# Patient Record
Sex: MALE | Race: WHITE | Employment: UNEMPLOYED | URBAN - METROPOLITAN AREA
[De-identification: names, ages, dates, MRNs, and addresses within clinical notes are randomized per-mention and may not be internally consistent; named-entity substitution may affect disease eponyms.]

---

## 2017-10-18 ENCOUNTER — OFFICE VISIT (OUTPATIENT)
Dept: FAMILY MEDICINE CLINIC | Age: 42
End: 2017-10-18

## 2017-10-18 VITALS
DIASTOLIC BLOOD PRESSURE: 90 MMHG | RESPIRATION RATE: 16 BRPM | SYSTOLIC BLOOD PRESSURE: 170 MMHG | BODY MASS INDEX: 23.04 KG/M2 | HEART RATE: 124 BPM | HEIGHT: 68 IN | WEIGHT: 152 LBS | OXYGEN SATURATION: 97 %

## 2017-10-18 DIAGNOSIS — F41.9 ANXIETY: ICD-10-CM

## 2017-10-18 DIAGNOSIS — Z86.79 HISTORY OF HYPERTENSION: Primary | ICD-10-CM

## 2017-10-18 PROCEDURE — 99203 OFFICE O/P NEW LOW 30 MIN: CPT | Performed by: FAMILY MEDICINE

## 2017-10-18 RX ORDER — LISINOPRIL 10 MG/1
10 TABLET ORAL DAILY
Qty: 30 TABLET | Refills: 3 | Status: ON HOLD | OUTPATIENT
Start: 2017-10-18 | End: 2018-11-15 | Stop reason: HOSPADM

## 2017-10-18 RX ORDER — ESCITALOPRAM OXALATE 10 MG/1
10 TABLET ORAL DAILY
Qty: 30 TABLET | Refills: 3 | Status: ON HOLD | OUTPATIENT
Start: 2017-10-18 | End: 2018-11-15 | Stop reason: HOSPADM

## 2017-10-18 ASSESSMENT — PATIENT HEALTH QUESTIONNAIRE - PHQ9
SUM OF ALL RESPONSES TO PHQ QUESTIONS 1-9: 0
SUM OF ALL RESPONSES TO PHQ9 QUESTIONS 1 & 2: 0
2. FEELING DOWN, DEPRESSED OR HOPELESS: 0
1. LITTLE INTEREST OR PLEASURE IN DOING THINGS: 0

## 2017-10-18 NOTE — PROGRESS NOTES
10/18/2017    This is a 36 y.o. male   Chief Complaint   Patient presents with    Established New Doctor     HTN   . HPI  Pt presents today as a new pt to establish a PCP. Transfer pt from Dr. Dianne So. Has a past medical history of hypertension. States that he has been under a lot of stress lately and that he has once a month tremors of both hands that occur when he starts working. In the process of getting a new job and had a physical a few weeks ago. States that the physical was fine except for elevated BP. Past Medical History:   Diagnosis Date    Allergic rhinitis     Anxiety     Depression     Hypertension        History reviewed. No pertinent surgical history. Social History     Social History    Marital status: Single     Spouse name: N/A    Number of children: N/A    Years of education: N/A     Occupational History    Not on file. Social History Main Topics    Smoking status: Never Smoker    Smokeless tobacco: Current User     Types: Chew    Alcohol use 2.4 oz/week     4 Cans of beer per week      Comment: 28 cans    Drug use: No    Sexual activity: No     Other Topics Concern    Not on file     Social History Narrative    No narrative on file       History reviewed. No pertinent family history. Current Outpatient Prescriptions   Medication Sig Dispense Refill    lisinopril (PRINIVIL;ZESTRIL) 10 MG tablet Take 1 tablet by mouth daily 30 tablet 3    escitalopram (LEXAPRO) 10 MG tablet Take 1 tablet by mouth daily 30 tablet 3     No current facility-administered medications for this visit. There is no immunization history on file for this patient. Allergies   Allergen Reactions    No Known Allergies        No results found for any previous visit. Review of Systems   Neurological: Positive for tremors (once a month when he goes to work. ). Negative for dizziness, seizures, light-headedness and headaches.    Psychiatric/Behavioral: Positive for sleep disturbance. Negative for decreased concentration, dysphoric mood, self-injury and suicidal ideas. The patient is nervous/anxious. BP (!) 170/90 (Site: Left Arm, Position: Sitting, Cuff Size: Medium Adult)   Pulse 124   Resp 16   Ht 5' 8\" (1.727 m)   Wt 152 lb (68.9 kg)   SpO2 97%   BMI 23.11 kg/m²     Physical Exam   Constitutional: He is oriented to person, place, and time. He appears well-developed and well-nourished. Eyes: EOM are normal. Pupils are equal, round, and reactive to light. Neck: Normal range of motion. Cardiovascular: Normal rate, regular rhythm and normal heart sounds. Pulmonary/Chest: Effort normal and breath sounds normal.   Abdominal: Soft. There is no tenderness. Neurological: He is alert and oriented to person, place, and time. Skin: Skin is warm and dry. Psychiatric: He has a normal mood and affect. His behavior is normal. Judgment and thought content normal.       Plan  1. History of hypertension  TSH without Reflex    T4, Free    T3, Free    Lipid Panel    CBC Auto Differential    Comprehensive Metabolic Panel    Vitamin D 25 Hydroxy    Magnesium    lisinopril (PRINIVIL;ZESTRIL) 10 MG tablet   2. Anxiety  escitalopram (LEXAPRO) 10 MG tablet     Pt to record BP twice a day to a log to bring to his next appointment. Return in about 3 weeks (around 11/8/2017) for Physical Exam.    Prior to Visit Medications    Medication Sig Taking?  Authorizing Provider   lisinopril (PRINIVIL;ZESTRIL) 10 MG tablet Take 1 tablet by mouth daily Yes Mile Ellis, DO   escitalopram (LEXAPRO) 10 MG tablet Take 1 tablet by mouth daily Yes Mile Ellis, DO

## 2018-11-13 ENCOUNTER — HOSPITAL ENCOUNTER (INPATIENT)
Age: 43
LOS: 2 days | Discharge: NURSING, REHAB OR LONG-TERM CARE | DRG: 897 | End: 2018-11-15
Attending: EMERGENCY MEDICINE | Admitting: INTERNAL MEDICINE

## 2018-11-13 DIAGNOSIS — F10.929 ACUTE ALCOHOLIC INTOXICATION WITH COMPLICATION (HCC): Primary | ICD-10-CM

## 2018-11-13 DIAGNOSIS — F10.931 ALCOHOL WITHDRAWAL DELIRIUM (HCC): ICD-10-CM

## 2018-11-13 DIAGNOSIS — E87.1 HYPONATREMIA: ICD-10-CM

## 2018-11-13 LAB
A/G RATIO: 1.7 (ref 1.1–2.2)
ACETAMINOPHEN LEVEL: <5 UG/ML (ref 10–30)
ALBUMIN SERPL-MCNC: 4.6 G/DL (ref 3.4–5)
ALP BLD-CCNC: 69 U/L (ref 40–129)
ALT SERPL-CCNC: 31 U/L (ref 10–40)
AMPHETAMINE SCREEN, URINE: NORMAL
ANION GAP SERPL CALCULATED.3IONS-SCNC: 15 MMOL/L (ref 3–16)
AST SERPL-CCNC: 68 U/L (ref 15–37)
BARBITURATE SCREEN URINE: NORMAL
BASOPHILS ABSOLUTE: 0.1 K/UL (ref 0–0.2)
BASOPHILS RELATIVE PERCENT: 2.6 %
BENZODIAZEPINE SCREEN, URINE: NORMAL
BILIRUB SERPL-MCNC: 0.9 MG/DL (ref 0–1)
BILIRUBIN URINE: NEGATIVE
BLOOD, URINE: NEGATIVE
BUN BLDV-MCNC: 3 MG/DL (ref 7–20)
CALCIUM SERPL-MCNC: 9.1 MG/DL (ref 8.3–10.6)
CANNABINOID SCREEN URINE: NORMAL
CHLORIDE BLD-SCNC: 89 MMOL/L (ref 99–110)
CLARITY: CLEAR
CO2: 24 MMOL/L (ref 21–32)
COCAINE METABOLITE SCREEN URINE: NORMAL
COLOR: NORMAL
CREAT SERPL-MCNC: <0.5 MG/DL (ref 0.9–1.3)
EOSINOPHILS ABSOLUTE: 0 K/UL (ref 0–0.6)
EOSINOPHILS RELATIVE PERCENT: 0.7 %
ETHANOL: 328 MG/DL (ref 0–0.08)
GFR AFRICAN AMERICAN: >60
GFR NON-AFRICAN AMERICAN: >60
GLOBULIN: 2.7 G/DL
GLUCOSE BLD-MCNC: 91 MG/DL (ref 70–99)
GLUCOSE URINE: NEGATIVE MG/DL
HCT VFR BLD CALC: 42.7 % (ref 40.5–52.5)
HEMOGLOBIN: 14.5 G/DL (ref 13.5–17.5)
KETONES, URINE: NEGATIVE MG/DL
LEUKOCYTE ESTERASE, URINE: NEGATIVE
LYMPHOCYTES ABSOLUTE: 0.7 K/UL (ref 1–5.1)
LYMPHOCYTES RELATIVE PERCENT: 16.2 %
Lab: NORMAL
MCH RBC QN AUTO: 29.1 PG (ref 26–34)
MCHC RBC AUTO-ENTMCNC: 34 G/DL (ref 31–36)
MCV RBC AUTO: 85.3 FL (ref 80–100)
METHADONE SCREEN, URINE: NORMAL
MICROSCOPIC EXAMINATION: NORMAL
MONOCYTES ABSOLUTE: 0.3 K/UL (ref 0–1.3)
MONOCYTES RELATIVE PERCENT: 7.3 %
NEUTROPHILS ABSOLUTE: 3.2 K/UL (ref 1.7–7.7)
NEUTROPHILS RELATIVE PERCENT: 73.2 %
NITRITE, URINE: NEGATIVE
OPIATE SCREEN URINE: NORMAL
OXYCODONE URINE: NORMAL
PDW BLD-RTO: 13.6 % (ref 12.4–15.4)
PH UA: 6.5
PH UA: 6.5
PHENCYCLIDINE SCREEN URINE: NORMAL
PLATELET # BLD: 173 K/UL (ref 135–450)
PMV BLD AUTO: 6.9 FL (ref 5–10.5)
POTASSIUM SERPL-SCNC: 4.1 MMOL/L (ref 3.5–5.1)
PROPOXYPHENE SCREEN: NORMAL
PROTEIN UA: NEGATIVE MG/DL
RBC # BLD: 5 M/UL (ref 4.2–5.9)
SALICYLATE, SERUM: <0.3 MG/DL (ref 15–30)
SODIUM BLD-SCNC: 128 MMOL/L (ref 136–145)
SPECIFIC GRAVITY UA: <=1.005
TOTAL PROTEIN: 7.3 G/DL (ref 6.4–8.2)
URINE TYPE: NORMAL
UROBILINOGEN, URINE: 0.2 E.U./DL
WBC # BLD: 4.3 K/UL (ref 4–11)

## 2018-11-13 PROCEDURE — 6370000000 HC RX 637 (ALT 250 FOR IP): Performed by: INTERNAL MEDICINE

## 2018-11-13 PROCEDURE — 2580000003 HC RX 258: Performed by: INTERNAL MEDICINE

## 2018-11-13 PROCEDURE — 81003 URINALYSIS AUTO W/O SCOPE: CPT

## 2018-11-13 PROCEDURE — 96374 THER/PROPH/DIAG INJ IV PUSH: CPT

## 2018-11-13 PROCEDURE — 6360000002 HC RX W HCPCS: Performed by: INTERNAL MEDICINE

## 2018-11-13 PROCEDURE — 2500000003 HC RX 250 WO HCPCS: Performed by: INTERNAL MEDICINE

## 2018-11-13 PROCEDURE — 2580000003 HC RX 258: Performed by: EMERGENCY MEDICINE

## 2018-11-13 PROCEDURE — G0480 DRUG TEST DEF 1-7 CLASSES: HCPCS

## 2018-11-13 PROCEDURE — 96361 HYDRATE IV INFUSION ADD-ON: CPT

## 2018-11-13 PROCEDURE — 80307 DRUG TEST PRSMV CHEM ANLYZR: CPT

## 2018-11-13 PROCEDURE — 99284 EMERGENCY DEPT VISIT MOD MDM: CPT

## 2018-11-13 PROCEDURE — 80053 COMPREHEN METABOLIC PANEL: CPT

## 2018-11-13 PROCEDURE — 85025 COMPLETE CBC W/AUTO DIFF WBC: CPT

## 2018-11-13 PROCEDURE — 6360000002 HC RX W HCPCS: Performed by: EMERGENCY MEDICINE

## 2018-11-13 PROCEDURE — 99222 1ST HOSP IP/OBS MODERATE 55: CPT | Performed by: INTERNAL MEDICINE

## 2018-11-13 PROCEDURE — 2580000003 HC RX 258

## 2018-11-13 PROCEDURE — 2060000000 HC ICU INTERMEDIATE R&B

## 2018-11-13 RX ORDER — LORAZEPAM 1 MG/1
1 TABLET ORAL
Status: DISCONTINUED | OUTPATIENT
Start: 2018-11-13 | End: 2018-11-15 | Stop reason: HOSPADM

## 2018-11-13 RX ORDER — LORAZEPAM 2 MG/ML
4 INJECTION INTRAMUSCULAR
Status: DISCONTINUED | OUTPATIENT
Start: 2018-11-13 | End: 2018-11-15 | Stop reason: HOSPADM

## 2018-11-13 RX ORDER — LORAZEPAM 2 MG/ML
1 INJECTION INTRAMUSCULAR ONCE
Status: COMPLETED | OUTPATIENT
Start: 2018-11-13 | End: 2018-11-13

## 2018-11-13 RX ORDER — LORAZEPAM 2 MG/1
2 TABLET ORAL
Status: DISCONTINUED | OUTPATIENT
Start: 2018-11-13 | End: 2018-11-15 | Stop reason: HOSPADM

## 2018-11-13 RX ORDER — LORAZEPAM 2 MG/ML
3 INJECTION INTRAMUSCULAR
Status: DISCONTINUED | OUTPATIENT
Start: 2018-11-13 | End: 2018-11-15 | Stop reason: HOSPADM

## 2018-11-13 RX ORDER — POTASSIUM CHLORIDE 20 MEQ/1
40 TABLET, EXTENDED RELEASE ORAL PRN
Status: DISCONTINUED | OUTPATIENT
Start: 2018-11-13 | End: 2018-11-15 | Stop reason: HOSPADM

## 2018-11-13 RX ORDER — CHLORDIAZEPOXIDE HYDROCHLORIDE 25 MG/1
25 CAPSULE, GELATIN COATED ORAL 4 TIMES DAILY
Status: DISCONTINUED | OUTPATIENT
Start: 2018-11-13 | End: 2018-11-15 | Stop reason: HOSPADM

## 2018-11-13 RX ORDER — POTASSIUM CHLORIDE 7.45 MG/ML
10 INJECTION INTRAVENOUS PRN
Status: DISCONTINUED | OUTPATIENT
Start: 2018-11-13 | End: 2018-11-15 | Stop reason: HOSPADM

## 2018-11-13 RX ORDER — LORAZEPAM 2 MG/1
4 TABLET ORAL
Status: DISCONTINUED | OUTPATIENT
Start: 2018-11-13 | End: 2018-11-15 | Stop reason: HOSPADM

## 2018-11-13 RX ORDER — LISINOPRIL 10 MG/1
10 TABLET ORAL DAILY
Status: DISCONTINUED | OUTPATIENT
Start: 2018-11-13 | End: 2018-11-13

## 2018-11-13 RX ORDER — MAGNESIUM SULFATE 1 G/100ML
1 INJECTION INTRAVENOUS PRN
Status: DISCONTINUED | OUTPATIENT
Start: 2018-11-13 | End: 2018-11-15 | Stop reason: HOSPADM

## 2018-11-13 RX ORDER — SODIUM CHLORIDE 9 MG/ML
INJECTION, SOLUTION INTRAVENOUS
Status: COMPLETED
Start: 2018-11-13 | End: 2018-11-13

## 2018-11-13 RX ORDER — ESCITALOPRAM OXALATE 10 MG/1
10 TABLET ORAL DAILY
Status: DISCONTINUED | OUTPATIENT
Start: 2018-11-13 | End: 2018-11-13

## 2018-11-13 RX ORDER — LORAZEPAM 2 MG/ML
1 INJECTION INTRAMUSCULAR
Status: DISCONTINUED | OUTPATIENT
Start: 2018-11-13 | End: 2018-11-15 | Stop reason: HOSPADM

## 2018-11-13 RX ORDER — ACETAMINOPHEN 325 MG/1
650 TABLET ORAL EVERY 4 HOURS PRN
Status: DISCONTINUED | OUTPATIENT
Start: 2018-11-13 | End: 2018-11-15 | Stop reason: HOSPADM

## 2018-11-13 RX ORDER — SODIUM CHLORIDE 0.9 % (FLUSH) 0.9 %
10 SYRINGE (ML) INJECTION EVERY 12 HOURS SCHEDULED
Status: DISCONTINUED | OUTPATIENT
Start: 2018-11-13 | End: 2018-11-15 | Stop reason: HOSPADM

## 2018-11-13 RX ORDER — 0.9 % SODIUM CHLORIDE 0.9 %
1000 INTRAVENOUS SOLUTION INTRAVENOUS ONCE
Status: COMPLETED | OUTPATIENT
Start: 2018-11-13 | End: 2018-11-13

## 2018-11-13 RX ORDER — ONDANSETRON 2 MG/ML
4 INJECTION INTRAMUSCULAR; INTRAVENOUS EVERY 6 HOURS PRN
Status: DISCONTINUED | OUTPATIENT
Start: 2018-11-13 | End: 2018-11-15 | Stop reason: HOSPADM

## 2018-11-13 RX ORDER — CLONIDINE HYDROCHLORIDE 0.1 MG/1
0.1 TABLET ORAL EVERY 6 HOURS PRN
Status: DISCONTINUED | OUTPATIENT
Start: 2018-11-13 | End: 2018-11-15 | Stop reason: HOSPADM

## 2018-11-13 RX ORDER — SODIUM CHLORIDE 0.9 % (FLUSH) 0.9 %
10 SYRINGE (ML) INJECTION PRN
Status: DISCONTINUED | OUTPATIENT
Start: 2018-11-13 | End: 2018-11-15 | Stop reason: HOSPADM

## 2018-11-13 RX ORDER — LORAZEPAM 2 MG/ML
2 INJECTION INTRAMUSCULAR
Status: DISCONTINUED | OUTPATIENT
Start: 2018-11-13 | End: 2018-11-15 | Stop reason: HOSPADM

## 2018-11-13 RX ADMIN — CHLORDIAZEPOXIDE HYDROCHLORIDE 25 MG: 25 CAPSULE ORAL at 20:33

## 2018-11-13 RX ADMIN — SODIUM CHLORIDE 1000 ML: 9 INJECTION, SOLUTION INTRAVENOUS at 11:42

## 2018-11-13 RX ADMIN — SODIUM CHLORIDE, PRESERVATIVE FREE 10 ML: 5 INJECTION INTRAVENOUS at 20:33

## 2018-11-13 RX ADMIN — LORAZEPAM 1 MG: 2 INJECTION INTRAMUSCULAR; INTRAVENOUS at 11:42

## 2018-11-13 RX ADMIN — FOLIC ACID: 5 INJECTION, SOLUTION INTRAMUSCULAR; INTRAVENOUS; SUBCUTANEOUS at 16:05

## 2018-11-13 RX ADMIN — SODIUM CHLORIDE 250 ML: 9 INJECTION, SOLUTION INTRAVENOUS at 16:21

## 2018-11-13 RX ADMIN — CHLORDIAZEPOXIDE HYDROCHLORIDE 25 MG: 25 CAPSULE ORAL at 17:16

## 2018-11-13 RX ADMIN — ENOXAPARIN SODIUM 40 MG: 40 INJECTION SUBCUTANEOUS at 16:17

## 2018-11-13 ASSESSMENT — PAIN SCALES - GENERAL
PAINLEVEL_OUTOF10: 0
PAINLEVEL_OUTOF10: 0

## 2018-11-13 NOTE — PROGRESS NOTES
Patient refused 4 eyes. No outwardly visible wounds and patient reports no known wounds or open areas on the body. Will continue to monitor.  Justina Gracia RN

## 2018-11-13 NOTE — ED PROVIDER NOTES
the currently available lab results from this visit (if applicable):  Results for orders placed or performed during the hospital encounter of 11/13/18   Urinalysis   Result Value Ref Range    Color, UA Straw Straw/Yellow    Clarity, UA Clear Clear    Glucose, Ur Negative Negative mg/dL    Bilirubin Urine Negative Negative    Ketones, Urine Negative Negative mg/dL    Specific Gravity, UA <=1.005 1.005 - 1.030    Blood, Urine Negative Negative    pH, UA 6.5 5.0 - 8.0    Protein, UA Negative Negative mg/dL    Urobilinogen, Urine 0.2 <2.0 E.U./dL    Nitrite, Urine Negative Negative    Leukocyte Esterase, Urine Negative Negative    Microscopic Examination Not Indicated     Urine Type Not Specified    Ethanol   Result Value Ref Range    Ethanol Lvl 328 mg/dL   Comprehensive Metabolic Panel   Result Value Ref Range    Sodium 128 (L) 136 - 145 mmol/L    Potassium 4.1 3.5 - 5.1 mmol/L    Chloride 89 (L) 99 - 110 mmol/L    CO2 24 21 - 32 mmol/L    Anion Gap 15 3 - 16    Glucose 91 70 - 99 mg/dL    BUN 3 (L) 7 - 20 mg/dL    CREATININE <0.5 (L) 0.9 - 1.3 mg/dL    GFR Non-African American >60 >60    GFR African American >60 >60    Calcium 9.1 8.3 - 10.6 mg/dL    Total Protein 7.3 6.4 - 8.2 g/dL    Alb 4.6 3.4 - 5.0 g/dL    Albumin/Globulin Ratio 1.7 1.1 - 2.2    Total Bilirubin 0.9 0.0 - 1.0 mg/dL    Alkaline Phosphatase 69 40 - 129 U/L    ALT 31 10 - 40 U/L    AST 68 (H) 15 - 37 U/L    Globulin 2.7 g/dL   CBC Auto Differential   Result Value Ref Range    WBC 4.3 4.0 - 11.0 K/uL    RBC 5.00 4.20 - 5.90 M/uL    Hemoglobin 14.5 13.5 - 17.5 g/dL    Hematocrit 42.7 40.5 - 52.5 %    MCV 85.3 80.0 - 100.0 fL    MCH 29.1 26.0 - 34.0 pg    MCHC 34.0 31.0 - 36.0 g/dL    RDW 13.6 12.4 - 15.4 %    Platelets 980 621 - 108 K/uL    MPV 6.9 5.0 - 10.5 fL    Neutrophils % 73.2 %    Lymphocytes % 16.2 %    Monocytes % 7.3 %    Eosinophils % 0.7 %    Basophils % 2.6 %    Neutrophils # 3.2 1.7 - 7.7 K/uL    Lymphocytes # 0.7 (L) 1.0 - 5.1 K/uL errors in grammar, punctuation, and spelling, as well as words and phrases that may be inappropriate. If there are any questions or concerns please feel free to contact the dictating provider for clarification. Maryuri Douglass MD  11/13/18 8026

## 2018-11-13 NOTE — LETTER
[address]  Phone: [phone]             November 15, 2018    Patient: Tommie Morris   YOB: 1975   Date of Visit: 11/13/2018       To Whom It May Concern:    Adelita Maya was seen and treated in our facility  beginning 11/13/2018 until 11/15/2018. He may return to work on 11/16/18.       Sincerely,       Heron Ellis RN         Signature:__________________________________

## 2018-11-13 NOTE — H&P
History and Physical        HISTORY OF PRESENT ILLNESS:   Patient with chronic alcohol abuse drinks 12 pack per day. Failed detox alcohol rehab in the past.  Patient now interested in detox and wanted to go to alcohol rehab. Last drank earlier today. Feeling a little anxious. Denies any nausea or vomiting. No dizziness or shortness of breath. Past history of anxiety depression and hypertension. Lexapro and lisinopril listed on medication list.  Patient states that he has not taken any of these medications for several months    Past Medical History:   Diagnosis Date    Allergic rhinitis     Anxiety     Depression     Hypertension        History reviewed. No pertinent surgical history. Patient is allergic to no known allergies. Prior to Admission medications    Medication Sig Start Date End Date Taking?  Authorizing Provider   lisinopril (PRINIVIL;ZESTRIL) 10 MG tablet Take 1 tablet by mouth daily 10/18/17  Yes Pati El DO   escitalopram (LEXAPRO) 10 MG tablet Take 1 tablet by mouth daily 10/18/17  Yes Pati El DO       Social History     Social History    Marital status: Single     Spouse name: N/A    Number of children: N/A    Years of education: N/A     Social History Main Topics    Smoking status: Current Every Day Smoker     Years: 20.00    Smokeless tobacco: Current User     Types: Chew    Alcohol use 7.2 oz/week     12 Cans of beer per week      Comment: 28 cans    Drug use: No    Sexual activity: No     Other Topics Concern    None     Social History Narrative    None       Family History   Problem Relation Age of Onset    Depression Mother     Depression Father     High Blood Pressure Father     Depression Brother        REVIEW OF SYSTEMS:   Constitutional: Negative for fever   HEENT: Negative for sore throat   Eyes: Negative for redness   Respiratory: Negative for dyspnea, cough   Cardiovascular: Negative for chest pain   Gastrointestinal: Negative for protocol with Ativan as needed. Start scheduled Librium, and rally pack with multivitamin and thiamine and folate daily.  -Referral to alcohol rehab    2. Hyponatremia  -Related to alcohol abuse, monitor sodium levels    3. History of hypertension  - Has not taken his meds for a while. We will order clonidine as needed    4. Past history of anxiety /depression per chart  -Patient currently not on any medications.   Continue management of alcohol abuse and dependence as above       Full Code   DIET GENERAL;  Lovenox for DVT prophylaxis            Deepti Queen 11/13/2018 3:11 PM

## 2018-11-13 NOTE — ED NOTES
Perfect serve message to Dr. Gabriela Pineda for admission orders.  @ [address] [facility]  11/13/18 1228

## 2018-11-14 LAB
ALBUMIN SERPL-MCNC: 4 G/DL (ref 3.4–5)
ALP BLD-CCNC: 58 U/L (ref 40–129)
ALT SERPL-CCNC: 33 U/L (ref 10–40)
ANION GAP SERPL CALCULATED.3IONS-SCNC: 10 MMOL/L (ref 3–16)
AST SERPL-CCNC: 62 U/L (ref 15–37)
BILIRUB SERPL-MCNC: 1.1 MG/DL (ref 0–1)
BILIRUBIN DIRECT: <0.2 MG/DL (ref 0–0.3)
BILIRUBIN, INDIRECT: ABNORMAL MG/DL (ref 0–1)
BUN BLDV-MCNC: 8 MG/DL (ref 7–20)
CALCIUM SERPL-MCNC: 9.6 MG/DL (ref 8.3–10.6)
CHLORIDE BLD-SCNC: 100 MMOL/L (ref 99–110)
CO2: 28 MMOL/L (ref 21–32)
CREAT SERPL-MCNC: 0.6 MG/DL (ref 0.9–1.3)
GFR AFRICAN AMERICAN: >60
GFR NON-AFRICAN AMERICAN: >60
GLUCOSE BLD-MCNC: 82 MG/DL (ref 70–99)
PHOSPHORUS: 3.7 MG/DL (ref 2.5–4.9)
POTASSIUM REFLEX MAGNESIUM: 4 MMOL/L (ref 3.5–5.1)
SODIUM BLD-SCNC: 138 MMOL/L (ref 136–145)
TOTAL PROTEIN: 6.3 G/DL (ref 6.4–8.2)

## 2018-11-14 PROCEDURE — 6370000000 HC RX 637 (ALT 250 FOR IP): Performed by: INTERNAL MEDICINE

## 2018-11-14 PROCEDURE — 90686 IIV4 VACC NO PRSV 0.5 ML IM: CPT | Performed by: INTERNAL MEDICINE

## 2018-11-14 PROCEDURE — 2500000003 HC RX 250 WO HCPCS: Performed by: INTERNAL MEDICINE

## 2018-11-14 PROCEDURE — 6360000002 HC RX W HCPCS: Performed by: INTERNAL MEDICINE

## 2018-11-14 PROCEDURE — 84100 ASSAY OF PHOSPHORUS: CPT

## 2018-11-14 PROCEDURE — 36415 COLL VENOUS BLD VENIPUNCTURE: CPT

## 2018-11-14 PROCEDURE — 2580000003 HC RX 258: Performed by: INTERNAL MEDICINE

## 2018-11-14 PROCEDURE — G0008 ADMIN INFLUENZA VIRUS VAC: HCPCS | Performed by: INTERNAL MEDICINE

## 2018-11-14 PROCEDURE — 80048 BASIC METABOLIC PNL TOTAL CA: CPT

## 2018-11-14 PROCEDURE — 80076 HEPATIC FUNCTION PANEL: CPT

## 2018-11-14 PROCEDURE — 99232 SBSQ HOSP IP/OBS MODERATE 35: CPT | Performed by: INTERNAL MEDICINE

## 2018-11-14 PROCEDURE — 2060000000 HC ICU INTERMEDIATE R&B

## 2018-11-14 RX ADMIN — CLONIDINE HYDROCHLORIDE 0.1 MG: 0.1 TABLET ORAL at 21:24

## 2018-11-14 RX ADMIN — CHLORDIAZEPOXIDE HYDROCHLORIDE 25 MG: 25 CAPSULE ORAL at 17:04

## 2018-11-14 RX ADMIN — INFLUENZA A VIRUS A/MICHIGAN/45/2015 X-275 (H1N1) ANTIGEN (FORMALDEHYDE INACTIVATED), INFLUENZA A VIRUS A/SINGAPORE/INFIMH-16-0019/2016 IVR-186 (H3N2) ANTIGEN (FORMALDEHYDE INACTIVATED), INFLUENZA B VIRUS B/PHUKET/3073/2013 ANTIGEN (FORMALDEHYDE INACTIVATED), AND INFLUENZA B VIRUS B/MARYLAND/15/2016 BX-69A ANTIGEN (FORMALDEHYDE INACTIVATED) 0.5 ML: 15; 15; 15; 15 INJECTION, SUSPENSION INTRAMUSCULAR at 08:45

## 2018-11-14 RX ADMIN — CHLORDIAZEPOXIDE HYDROCHLORIDE 25 MG: 25 CAPSULE ORAL at 08:45

## 2018-11-14 RX ADMIN — CLONIDINE HYDROCHLORIDE 0.1 MG: 0.1 TABLET ORAL at 17:07

## 2018-11-14 RX ADMIN — SODIUM CHLORIDE, PRESERVATIVE FREE 10 ML: 5 INJECTION INTRAVENOUS at 21:13

## 2018-11-14 RX ADMIN — ENOXAPARIN SODIUM 40 MG: 40 INJECTION SUBCUTANEOUS at 08:45

## 2018-11-14 RX ADMIN — FOLIC ACID: 5 INJECTION, SOLUTION INTRAMUSCULAR; INTRAVENOUS; SUBCUTANEOUS at 09:35

## 2018-11-14 RX ADMIN — SODIUM CHLORIDE, PRESERVATIVE FREE 10 ML: 5 INJECTION INTRAVENOUS at 08:46

## 2018-11-14 RX ADMIN — CHLORDIAZEPOXIDE HYDROCHLORIDE 25 MG: 25 CAPSULE ORAL at 14:50

## 2018-11-14 RX ADMIN — CHLORDIAZEPOXIDE HYDROCHLORIDE 25 MG: 25 CAPSULE ORAL at 21:13

## 2018-11-14 ASSESSMENT — PAIN SCALES - GENERAL
PAINLEVEL_OUTOF10: 0

## 2018-11-14 NOTE — FLOWSHEET NOTE
Advance Directive referral      11/14/18 1446   Encounter Summary   Services provided to: Patient   Referral/Consult From: Nurse   Continue Visiting (11/14 Provided AD forms and discussion)   Complexity of Encounter Moderate   Length of Encounter 15 minutes   Advance Directives (For Healthcare)   Information on Healthcare Directives Requested (Provided information/education)

## 2018-11-15 VITALS
TEMPERATURE: 97.3 F | BODY MASS INDEX: 23.4 KG/M2 | HEART RATE: 91 BPM | WEIGHT: 149.1 LBS | RESPIRATION RATE: 16 BRPM | DIASTOLIC BLOOD PRESSURE: 79 MMHG | OXYGEN SATURATION: 96 % | HEIGHT: 67 IN | SYSTOLIC BLOOD PRESSURE: 116 MMHG

## 2018-11-15 PROCEDURE — 6360000002 HC RX W HCPCS: Performed by: INTERNAL MEDICINE

## 2018-11-15 PROCEDURE — 2500000003 HC RX 250 WO HCPCS: Performed by: INTERNAL MEDICINE

## 2018-11-15 PROCEDURE — 2580000003 HC RX 258: Performed by: INTERNAL MEDICINE

## 2018-11-15 PROCEDURE — 6370000000 HC RX 637 (ALT 250 FOR IP): Performed by: INTERNAL MEDICINE

## 2018-11-15 PROCEDURE — 99238 HOSP IP/OBS DSCHRG MGMT 30/<: CPT | Performed by: INTERNAL MEDICINE

## 2018-11-15 RX ORDER — THIAMINE MONONITRATE (VIT B1) 100 MG
100 TABLET ORAL DAILY
Qty: 30 TABLET | Refills: 2 | Status: SHIPPED | OUTPATIENT
Start: 2018-11-15 | End: 2019-03-12

## 2018-11-15 RX ORDER — FOLIC ACID 1 MG/1
1 TABLET ORAL DAILY
Qty: 30 TABLET | Refills: 1 | Status: SHIPPED | OUTPATIENT
Start: 2018-11-15 | End: 2019-03-12

## 2018-11-15 RX ORDER — CLONIDINE HYDROCHLORIDE 0.1 MG/1
0.1 TABLET ORAL 2 TIMES DAILY
Qty: 60 TABLET | Refills: 1 | Status: SHIPPED | OUTPATIENT
Start: 2018-11-15 | End: 2019-03-12

## 2018-11-15 RX ORDER — M-VIT,TX,IRON,MINS/CALC/FOLIC 27MG-0.4MG
1 TABLET ORAL DAILY
Qty: 30 TABLET | Refills: 2 | Status: SHIPPED | OUTPATIENT
Start: 2018-11-15 | End: 2019-03-12

## 2018-11-15 RX ORDER — CHLORDIAZEPOXIDE HYDROCHLORIDE 25 MG/1
25 CAPSULE, GELATIN COATED ORAL 3 TIMES DAILY PRN
Qty: 20 CAPSULE | Refills: 0 | Status: SHIPPED | OUTPATIENT
Start: 2018-11-15 | End: 2018-11-22

## 2018-11-15 RX ADMIN — FOLIC ACID: 5 INJECTION, SOLUTION INTRAMUSCULAR; INTRAVENOUS; SUBCUTANEOUS at 08:33

## 2018-11-15 RX ADMIN — CHLORDIAZEPOXIDE HYDROCHLORIDE 25 MG: 25 CAPSULE ORAL at 08:31

## 2018-11-15 RX ADMIN — CHLORDIAZEPOXIDE HYDROCHLORIDE 25 MG: 25 CAPSULE ORAL at 12:34

## 2018-11-15 RX ADMIN — ENOXAPARIN SODIUM 40 MG: 40 INJECTION SUBCUTANEOUS at 08:31

## 2018-11-15 RX ADMIN — SODIUM CHLORIDE, PRESERVATIVE FREE 10 ML: 5 INJECTION INTRAVENOUS at 08:33

## 2018-11-15 ASSESSMENT — PAIN SCALES - GENERAL
PAINLEVEL_OUTOF10: 0

## 2018-11-15 NOTE — PROGRESS NOTES
Shift assessment complete. Pt states he is feeling \"fine. \" Medications given per order. PRN clonidine given for b/p 161/84. No c/o pain; slight perspiration noted to forehead. Pt states all needs are met at this time.  Electronically signed by Stefano Bush RN on 11/14/2018 at 10:19 PM

## 2018-11-15 NOTE — PROGRESS NOTES
Hand off report given to [facility], RN. Pt is in stable condition at this time. Care transferred.  Electronically signed by Gretchen Dukes RN on 11/15/2018 at 7:31 AM

## 2018-11-15 NOTE — DISCHARGE SUMMARY
100 MG tablet           Discharged in stable condition to home. Follow Up: Follow up with PCP in 1 week. F/u OP with [facility].       Vin Ya MD  11/15/18

## 2018-11-15 NOTE — PROGRESS NOTES
Patient educated about drugs that help with withdrawal symptoms. Patient appeared to have understanding of education provided.  Agustin Corrales RN

## 2018-11-15 NOTE — PLAN OF CARE
Problem: DAILY CARE  Goal: Daily care needs are met  Outcome: Ongoing      Problem: SKIN INTEGRITY  Goal: Skin integrity is maintained or improved  Outcome: Ongoing      Problem: Nutrition Deficit:  Goal: Ability to achieve adequate nutritional intake will improve  Ability to achieve adequate nutritional intake will improve   Outcome: Ongoing

## 2018-11-15 NOTE — CARE COORDINATION
DISCHARGE ORDER  Date/Time 11/15/2018 3:02 PM  Completed by: Concepcion Valdes, Case Management    Patient Name: Payma Roberts    : 1975  Admitting Diagnosis: [facility] delir[facility] [F10.231]  Admit Date/Time: 2018 11:16 AM    Noted discharge order. Confirmed discharge plan with patient : Yes   Discharge Plan: Order for dc noted. Spoke with pt who states [facility] came in and met with him today and he has follow up info. Denies other needs. Chart reviewed and no other needs identified.

## 2018-11-15 NOTE — PROGRESS NOTES
Spoke with Efrain Viramontes from outpatient regarding pt stress test @[id number].     Williams Skiff PCA/MT  11/15/2018

## 2019-03-12 ENCOUNTER — HOSPITAL ENCOUNTER (EMERGENCY)
Age: 44
Discharge: HOME | End: 2019-03-12
Attending: EMERGENCY MEDICINE

## 2019-03-12 VITALS
SYSTOLIC BLOOD PRESSURE: 140 MMHG | RESPIRATION RATE: 16 BRPM | HEIGHT: 68 IN | DIASTOLIC BLOOD PRESSURE: 94 MMHG | BODY MASS INDEX: 22.73 KG/M2 | TEMPERATURE: 97 F | WEIGHT: 150 LBS | HEART RATE: 124 BPM | OXYGEN SATURATION: 94 %

## 2019-03-12 DIAGNOSIS — F10.10 ALCOHOL ABUSE: Primary | ICD-10-CM

## 2019-03-12 PROCEDURE — 99284 EMERGENCY DEPT VISIT MOD MDM: CPT

## 2019-03-12 RX ORDER — ONDANSETRON 4 MG/1
4 TABLET, ORALLY DISINTEGRATING ORAL EVERY 8 HOURS PRN
Qty: 20 TABLET | Refills: 0 | Status: SHIPPED | OUTPATIENT
Start: 2019-03-12 | End: 2019-03-22

## 2019-03-12 RX ORDER — HYDROXYZINE 50 MG/1
50 TABLET, FILM COATED ORAL 3 TIMES DAILY PRN
Qty: 20 TABLET | Refills: 0 | Status: SHIPPED | OUTPATIENT
Start: 2019-03-12 | End: 2019-03-22

## 2019-03-12 ASSESSMENT — PAIN SCALES - GENERAL: PAINLEVEL_OUTOF10: 7

## 2019-03-22 ENCOUNTER — OFFICE VISIT (OUTPATIENT)
Dept: FAMILY MEDICINE CLINIC | Age: 44
End: 2019-03-22

## 2019-03-22 VITALS
BODY MASS INDEX: 24.25 KG/M2 | DIASTOLIC BLOOD PRESSURE: 100 MMHG | RESPIRATION RATE: 14 BRPM | OXYGEN SATURATION: 96 % | HEIGHT: 68 IN | HEART RATE: 98 BPM | SYSTOLIC BLOOD PRESSURE: 160 MMHG | WEIGHT: 160 LBS

## 2019-03-22 DIAGNOSIS — I10 ESSENTIAL HYPERTENSION: ICD-10-CM

## 2019-03-22 DIAGNOSIS — F41.9 ANXIETY: Primary | ICD-10-CM

## 2019-03-22 DIAGNOSIS — R45.89 DEPRESSED MOOD: ICD-10-CM

## 2019-03-22 PROCEDURE — 99213 OFFICE O/P EST LOW 20 MIN: CPT | Performed by: FAMILY MEDICINE

## 2019-03-22 RX ORDER — CLONIDINE HYDROCHLORIDE 0.1 MG/1
0.1 TABLET ORAL DAILY
COMMUNITY
End: 2019-05-23 | Stop reason: SDUPTHER

## 2019-03-22 RX ORDER — THIAMINE MONONITRATE (VIT B1) 100 MG
100 TABLET ORAL DAILY
COMMUNITY
End: 2019-10-01 | Stop reason: ALTCHOICE

## 2019-03-22 RX ORDER — M-VIT,TX,IRON,MINS/CALC/FOLIC 27MG-0.4MG
1 TABLET ORAL DAILY
COMMUNITY

## 2019-03-22 RX ORDER — FOLIC ACID 1 MG/1
1 TABLET ORAL DAILY
COMMUNITY
End: 2019-10-01 | Stop reason: ALTCHOICE

## 2019-03-22 RX ORDER — CHLORDIAZEPOXIDE HYDROCHLORIDE 25 MG/1
25 CAPSULE, GELATIN COATED ORAL DAILY
Status: ON HOLD | COMMUNITY
End: 2019-09-24 | Stop reason: HOSPADM

## 2019-03-22 RX ORDER — ESCITALOPRAM OXALATE 10 MG/1
10 TABLET ORAL DAILY
Qty: 30 TABLET | Refills: 3 | Status: SHIPPED | OUTPATIENT
Start: 2019-03-22 | End: 2019-07-20 | Stop reason: SDUPTHER

## 2019-03-22 ASSESSMENT — PATIENT HEALTH QUESTIONNAIRE - PHQ9
SUM OF ALL RESPONSES TO PHQ9 QUESTIONS 1 & 2: 0
SUM OF ALL RESPONSES TO PHQ QUESTIONS 1-9: 0
1. LITTLE INTEREST OR PLEASURE IN DOING THINGS: 0
SUM OF ALL RESPONSES TO PHQ QUESTIONS 1-9: 0
2. FEELING DOWN, DEPRESSED OR HOPELESS: 0

## 2019-04-23 ENCOUNTER — OFFICE VISIT (OUTPATIENT)
Dept: FAMILY MEDICINE CLINIC | Age: 44
End: 2019-04-23

## 2019-04-23 VITALS
HEART RATE: 77 BPM | SYSTOLIC BLOOD PRESSURE: 146 MMHG | RESPIRATION RATE: 18 BRPM | OXYGEN SATURATION: 97 % | TEMPERATURE: 98 F | BODY MASS INDEX: 22.29 KG/M2 | WEIGHT: 146.6 LBS | DIASTOLIC BLOOD PRESSURE: 98 MMHG

## 2019-04-23 DIAGNOSIS — I10 ESSENTIAL HYPERTENSION: ICD-10-CM

## 2019-04-23 DIAGNOSIS — F41.9 ANXIETY: Primary | ICD-10-CM

## 2019-04-23 PROCEDURE — 99214 OFFICE O/P EST MOD 30 MIN: CPT | Performed by: FAMILY MEDICINE

## 2019-04-23 RX ORDER — LISINOPRIL 10 MG/1
10 TABLET ORAL DAILY
Qty: 30 TABLET | Refills: 1 | Status: SHIPPED | OUTPATIENT
Start: 2019-04-23 | End: 2019-07-04 | Stop reason: SDUPTHER

## 2019-04-23 ASSESSMENT — ENCOUNTER SYMPTOMS
ABDOMINAL PAIN: 0
CONSTIPATION: 0
DIARRHEA: 1

## 2019-04-23 NOTE — PROGRESS NOTES
4/23/2019    This is a 39 y.o. male   Chief Complaint   Patient presents with    Hypertension     pt brought a BP log with him. states he is averaging around 140    Anxiety     pt states he is \"doing good\". appetite is back, sleeping better, able to handle work    Other     1 mo f/u   . HPI  Patient presents today for hypertension and anxiety follow-up. He is currently on clonidine 0.1 mg and Lexapro 10 mg. Pt's BP log average 130-140's/mid/high 80's, does not salt his food but eats a lot of fast food. Uses a bicep cuff to check his BP at home. Also states that the Lexapro 10 mg has helped out a lot, appetite has returned and sleeps better, handles work better. Has some diarrhea periodically. Past Medical History:   Diagnosis Date    Allergic rhinitis     Anxiety     Depression     Hypertension        No past surgical history on file.     Social History     Socioeconomic History    Marital status: Single     Spouse name: Not on file    Number of children: Not on file    Years of education: Not on file    Highest education level: Not on file   Occupational History    Not on file   Social Needs    Financial resource strain: Not on file    Food insecurity:     Worry: Not on file     Inability: Not on file    Transportation needs:     Medical: Not on file     Non-medical: Not on file   Tobacco Use    Smoking status: Former Smoker     Packs/day: 1.50     Years: 20.00     Pack years: 30.00     Types: Cigarettes    Smokeless tobacco: Current User     Types: Chew   Substance and Sexual Activity    Alcohol use: Not Currently     Alcohol/week: 7.2 oz     Types: 12 Cans of beer per week    Drug use: No    Sexual activity: Never   Lifestyle    Physical activity:     Days per week: Not on file     Minutes per session: Not on file    Stress: Not on file   Relationships    Social connections:     Talks on phone: Not on file     Gets together: Not on file     Attends [employer] service: Not on file Negative  Negative Final    pH, UA 11/13/2018 6.5  5.0 - 8.0 Final    Protein, UA 11/13/2018 Negative  Negative mg/dL Final    Urobilinogen, Urine 11/13/2018 0.2  <2.0 E.U./dL Final    Nitrite, Urine 11/13/2018 Negative  Negative Final    Leukocyte Esterase, Urine 11/13/2018 Negative  Negative Final    Microscopic Examination 11/13/2018 Not Indicated   Final    Urine Type 11/13/2018 Not Specified   Final    Ethanol Lvl 11/13/2018 328  mg/dL Final    Comment:    None Detected  Conversion factor:  100 mg/dl = .100 g/dl  For Medical Purposes Only      Sodium 11/13/2018 128* 136 - 145 mmol/L Final    Potassium 11/13/2018 4.1  3.5 - 5.1 mmol/L Final    Chloride 11/13/2018 89* 99 - 110 mmol/L Final    CO2 11/13/2018 24  21 - 32 mmol/L Final    Anion Gap 11/13/2018 15  3 - 16 Final    Glucose 11/13/2018 91  70 - 99 mg/dL Final    BUN 11/13/2018 3* 7 - 20 mg/dL Final    CREATININE 11/13/2018 <0.5* 0.9 - 1.3 mg/dL Final    GFR Non- 11/13/2018 >60  >60 Final    Comment: >60 mL/min/1.73m2 EGFR, calc. for ages 25 and older using the  MDRD formula (not corrected for weight), is valid for stable  renal function.  GFR  11/13/2018 >60  >60 Final    Comment: Chronic Kidney Disease: less than 60 ml/min/1.73 sq.m. Kidney Failure: less than 15 ml/min/1.73 sq.m. Results valid for patients 18 years and older.       Calcium 11/13/2018 9.1  8.3 - 10.6 mg/dL Final    Total Protein 11/13/2018 7.3  6.4 - 8.2 g/dL Final    Alb 11/13/2018 4.6  3.4 - 5.0 g/dL Final    Albumin/Globulin Ratio 11/13/2018 1.7  1.1 - 2.2 Final    Total Bilirubin 11/13/2018 0.9  0.0 - 1.0 mg/dL Final    Alkaline Phosphatase 11/13/2018 69  40 - 129 U/L Final    ALT 11/13/2018 31  10 - 40 U/L Final    AST 11/13/2018 68* 15 - 37 U/L Final    Globulin 11/13/2018 2.7  g/dL Final    WBC 11/13/2018 4.3  4.0 - 11.0 K/uL Final    RBC 11/13/2018 5.00  4.20 - 5.90 M/uL Final    Hemoglobin 11/13/2018 14.5 13.5 - 17.5 g/dL Final    Hematocrit 11/13/2018 42.7  40.5 - 52.5 % Final    MCV 11/13/2018 85.3  80.0 - 100.0 fL Final    MCH 11/13/2018 29.1  26.0 - 34.0 pg Final    MCHC 11/13/2018 34.0  31.0 - 36.0 g/dL Final    RDW 11/13/2018 13.6  12.4 - 15.4 % Final    Platelets 50/62/0696 173  135 - 450 K/uL Final    MPV 11/13/2018 6.9  5.0 - 10.5 fL Final    Neutrophils % 11/13/2018 73.2  % Final    Lymphocytes % 11/13/2018 16.2  % Final    Monocytes % 11/13/2018 7.3  % Final    Eosinophils % 11/13/2018 0.7  % Final    Basophils % 11/13/2018 2.6  % Final    Neutrophils # 11/13/2018 3.2  1.7 - 7.7 K/uL Final    Lymphocytes # 11/13/2018 0.7* 1.0 - 5.1 K/uL Final    Monocytes # 11/13/2018 0.3  0.0 - 1.3 K/uL Final    Eosinophils # 11/13/2018 0.0  0.0 - 0.6 K/uL Final    Basophils # 11/13/2018 0.1  0.0 - 0.2 K/uL Final    Salicylate, Serum 87/08/9527 <0.3* 15.0 - 30.0 mg/dL Final    Comment: Therapeutic Range: 15.0-30.0 mg/dL  Toxic: >30.0 mg/dL      Acetaminophen Level 11/13/2018 <5* 10 - 30 ug/mL Final    Comment: Therapeutic Range: 10.0-30.0 ug/mL  Toxic: >200.0 ug/mL      Amphetamine Screen, Urine 11/13/2018 Neg  Negative <1000ng/mL Final    Barbiturate Screen, Ur 11/13/2018 Neg  Negative <200 ng/mL Final    Benzodiazepine Screen, Urine 11/13/2018 Neg  Negative <200 ng/mL Final    Cannabinoid Scrn, Ur 11/13/2018 Neg  Negative <50 ng/mL Final    Cocaine Metabolite Screen, Urine 11/13/2018 Neg  Negative <300 ng/mL Final    Opiate Scrn, Ur 11/13/2018 Neg  Negative <300 ng/mL Final    Comment: \"Therapeutic levels of pain medication, especially oxycontin and synthetic  opioids, may not be detected by this Methodology. Pain management screen  panel  Drug panel-PM-Hi Res Ur, Interp (PAIN) should be considered for drug  monitoring \".       PCP Screen, Urine 11/13/2018 Neg  Negative <25 ng/mL Final    Methadone Screen, Urine 11/13/2018 Neg  Negative <300 ng/mL Final    Propoxyphene Scrn, Ur 11/13/2018 Neg Negative <300 ng/mL Final    pH, UA 11/13/2018 6.5   Final    Comment: Urine pH less than 5.0 or greater than 8.0 may indicate sample adulteration. Another sample should be collected if clinically  indicated.  Drug Screen Comment: 11/13/2018 see below   Final    Comment: This method is a screening test to detect only these drug  classes as part of a medical workup. Confirmatory testing  by another method should be ordered if clinically indicated.  Oxycodone Urine 11/13/2018 Neg  Negative <100 ng/ml Final    Sodium 11/14/2018 138  136 - 145 mmol/L Final    Potassium reflex Magnesium 11/14/2018 4.0  3.5 - 5.1 mmol/L Final    Chloride 11/14/2018 100  99 - 110 mmol/L Final    CO2 11/14/2018 28  21 - 32 mmol/L Final    Anion Gap 11/14/2018 10  3 - 16 Final    Glucose 11/14/2018 82  70 - 99 mg/dL Final    BUN 11/14/2018 8  7 - 20 mg/dL Final    CREATININE 11/14/2018 0.6* 0.9 - 1.3 mg/dL Final    GFR Non- 11/14/2018 >60  >60 Final    Comment: >60 mL/min/1.73m2 EGFR, calc. for ages 25 and older using the  MDRD formula (not corrected for weight), is valid for stable  renal function.  GFR  11/14/2018 >60  >60 Final    Comment: Chronic Kidney Disease: less than 60 ml/min/1.73 sq.m. Kidney Failure: less than 15 ml/min/1.73 sq.m. Results valid for patients 18 years and older.       Calcium 11/14/2018 9.6  8.3 - 10.6 mg/dL Final    Total Protein 11/14/2018 6.3* 6.4 - 8.2 g/dL Final    Alb 11/14/2018 4.0  3.4 - 5.0 g/dL Final    Alkaline Phosphatase 11/14/2018 58  40 - 129 U/L Final    ALT 11/14/2018 33  10 - 40 U/L Final    AST 11/14/2018 62* 15 - 37 U/L Final    Total Bilirubin 11/14/2018 1.1* 0.0 - 1.0 mg/dL Final    Bilirubin, Direct 11/14/2018 <0.2  0.0 - 0.3 mg/dL Final    Bilirubin, Indirect 11/14/2018 see below  0.0 - 1.0 mg/dL Final    Comment: Indirect Bilirubin cannot be calculated since Total Bilirubin  and/or Direct Bilirubin is below measurable range.  Phosphorus 11/14/2018 3.7  2.5 - 4.9 mg/dL Final       Review of Systems   Constitutional: Positive for appetite change (improved). Negative for fatigue. Gastrointestinal: Positive for diarrhea. Negative for abdominal pain and constipation. Allergic/Immunologic: Positive for environmental allergies. Neurological: Negative for dizziness and headaches. Psychiatric/Behavioral: Negative for dysphoric mood and sleep disturbance. The patient is nervous/anxious. BP (!) 146/98   Pulse 77   Temp 98 °F (36.7 °C) (Oral)   Resp 18   Wt 146 lb 9.6 oz (66.5 kg)   SpO2 97%   BMI 22.29 kg/m²     Physical Exam   Constitutional: He is oriented to person, place, and time. He appears well-developed and well-nourished. HENT:   Head: Normocephalic and atraumatic. Eyes: Pupils are equal, round, and reactive to light. EOM are normal.   Neck: Normal range of motion. Cardiovascular: Normal rate, regular rhythm and normal heart sounds. Pulmonary/Chest: Effort normal and breath sounds normal.   Abdominal: Bowel sounds are normal.   Neurological: He is alert and oriented to person, place, and time. Psychiatric: He has a normal mood and affect. His behavior is normal. Judgment and thought content normal.   Vitals reviewed. Plan   Diagnosis Orders   1. Anxiety  Improved - continue Lexapro 10 mg   2. Essential hypertension  lisinopril (PRINIVIL;ZESTRIL) 10 MG tablet and record BP BID for 3 weeks       Return in about 3 weeks (around 5/14/2019) for HTN F/U. Prior to Visit Medications    Medication Sig Taking?  Authorizing Provider   lisinopril (PRINIVIL;ZESTRIL) 10 MG tablet Take 1 tablet by mouth daily Yes Nayana Lawrence DO   cloNIDine (CATAPRES) 0.1 MG tablet Take 0.1 mg by mouth daily Yes Historical Provider, MD   Multiple Vitamins-Minerals (THERAPEUTIC MULTIVITAMIN-MINERALS) tablet Take 1 tablet by mouth daily Yes Historical Provider, MD   folic acid (FOLVITE) 1 MG tablet Take 1 mg by mouth daily Yes Historical Provider, MD   vitamin B-1 (THIAMINE) 100 MG tablet Take 100 mg by mouth daily Yes Historical Provider, MD   chlordiazePOXIDE (LIBRIUM) 25 MG capsule Take 25 mg by mouth daily.  Indications: Panick Attacks Yes Historical Provider, MD   escitalopram (LEXAPRO) 10 MG tablet Take 1 tablet by mouth daily Yes aTshi Andres,

## 2019-05-21 ENCOUNTER — OFFICE VISIT (OUTPATIENT)
Dept: FAMILY MEDICINE CLINIC | Age: 44
End: 2019-05-21

## 2019-05-21 VITALS
BODY MASS INDEX: 23.95 KG/M2 | DIASTOLIC BLOOD PRESSURE: 112 MMHG | HEIGHT: 68 IN | OXYGEN SATURATION: 98 % | SYSTOLIC BLOOD PRESSURE: 172 MMHG | HEART RATE: 92 BPM | RESPIRATION RATE: 14 BRPM | WEIGHT: 158 LBS | TEMPERATURE: 98.1 F

## 2019-05-21 DIAGNOSIS — R42 DIZZINESS: ICD-10-CM

## 2019-05-21 DIAGNOSIS — I10 ESSENTIAL HYPERTENSION: Primary | ICD-10-CM

## 2019-05-21 PROCEDURE — 99213 OFFICE O/P EST LOW 20 MIN: CPT | Performed by: FAMILY MEDICINE

## 2019-05-21 NOTE — PROGRESS NOTES
5/21/2019    This is a 39 y.o. male   Chief Complaint   Patient presents with    Hypertension     F/U   .    HPI  Patient presents today for hypertension follow-up. He is currently on lisinopril 10 mg and clonidine 0.1 mg daily. His last appointment on April 23, 2019 patient was instructed to check his blood pressure twice a day for 3 weeks. His original blood pressure log averaged 130 to 140's over mid to high 80s. States that he slowly stopped taking all his medications a few days after his last visit because he was feeling good, started taking all his medications again a few days ago, after stopping his meds he started to feel anxiety again, loss of appetite and poor sleep. Also states that he has not checked his BP. Noticed that when he takes his meds sometimes he feels a little dizzy in th emorning when he stands up. Past Medical History:   Diagnosis Date    Allergic rhinitis     Anxiety     Depression     Hypertension        History reviewed. No pertinent surgical history.     Social History     Socioeconomic History    Marital status: Single     Spouse name: Not on file    Number of children: Not on file    Years of education: Not on file    Highest education level: Not on file   Occupational History    Not on file   Social Needs    Financial resource strain: Not on file    Food insecurity:     Worry: Not on file     Inability: Not on file    Transportation needs:     Medical: Not on file     Non-medical: Not on file   Tobacco Use    Smoking status: Former Smoker     Packs/day: 1.50     Years: 20.00     Pack years: 30.00     Types: Cigarettes    Smokeless tobacco: Current User     Types: Chew   Substance and Sexual Activity    Alcohol use: Not Currently     Alcohol/week: 7.2 oz     Types: 12 Cans of beer per week    Drug use: No    Sexual activity: Never   Lifestyle    Physical activity:     Days per week: Not on file     Minutes per session: Not on file    Stress: Not on file Relationships    Social connections:     Talks on phone: Not on file     Gets together: Not on file     Attends Buddhist service: Not on file     Active member of club or organization: Not on file     Attends meetings of clubs or organizations: Not on file     Relationship status: Not on file    Intimate partner violence:     Fear of current or ex partner: Not on file     Emotionally abused: Not on file     Physically abused: Not on file     Forced sexual activity: Not on file   Other Topics Concern    Not on file   Social History Narrative    Not on file       Family History   Problem Relation Age of Onset    Depression Mother     Depression Father     High Blood Pressure Father     Depression Brother        Current Outpatient Medications   Medication Sig Dispense Refill    lisinopril (PRINIVIL;ZESTRIL) 10 MG tablet Take 1 tablet by mouth daily 30 tablet 1    cloNIDine (CATAPRES) 0.1 MG tablet Take 0.1 mg by mouth daily      Multiple Vitamins-Minerals (THERAPEUTIC MULTIVITAMIN-MINERALS) tablet Take 1 tablet by mouth daily      folic acid (FOLVITE) 1 MG tablet Take 1 mg by mouth daily      vitamin B-1 (THIAMINE) 100 MG tablet Take 100 mg by mouth daily      chlordiazePOXIDE (LIBRIUM) 25 MG capsule Take 25 mg by mouth daily. Indications: Panick Attacks      escitalopram (LEXAPRO) 10 MG tablet Take 1 tablet by mouth daily 30 tablet 3     No current facility-administered medications for this visit.         Immunization History   Administered Date(s) Administered    Influenza, Quadv, 6 mo and older, IM, PF (Flulaval, Fluarix) 11/14/2018       Allergies   Allergen Reactions    No Known Allergies        Admission on 11/13/2018, Discharged on 11/15/2018   Component Date Value Ref Range Status    Color, UA 11/13/2018 Straw  Straw/Yellow Final    Clarity, UA 11/13/2018 Clear  Clear Final    Glucose, Ur 11/13/2018 Negative  Negative mg/dL Final    Bilirubin Urine 11/13/2018 Negative  Negative Final    Ketones, Urine 11/13/2018 Negative  Negative mg/dL Final    Specific [place], UA 11/13/2018 <=1.005  1.005 - 1.030 Final    Blood, Urine 11/13/2018 Negative  Negative Final    pH, UA 11/13/2018 6.5  5.0 - 8.0 Final    Protein, UA 11/13/2018 Negative  Negative mg/dL Final    Urobilinogen, Urine 11/13/2018 0.2  <2.0 E.U./dL Final    Nitrite, Urine 11/13/2018 Negative  Negative Final    Leukocyte Esterase, Urine 11/13/2018 Negative  Negative Final    Microscopic Examination 11/13/2018 Not Indicated   Final    Urine Type 11/13/2018 Not Specified   Final    Ethanol Lvl 11/13/2018 328  mg/dL Final    Comment:    None Detected  Conversion factor:  100 mg/dl = .100 g/dl  For Medical Purposes Only      Sodium 11/13/2018 128* 136 - 145 mmol/L Final    Potassium 11/13/2018 4.1  3.5 - 5.1 mmol/L Final    Chloride 11/13/2018 89* 99 - 110 mmol/L Final    CO2 11/13/2018 24  21 - 32 mmol/L Final    Anion Gap 11/13/2018 15  3 - 16 Final    Glucose 11/13/2018 91  70 - 99 mg/dL Final    BUN 11/13/2018 3* 7 - 20 mg/dL Final    CREATININE 11/13/2018 <0.5* 0.9 - 1.3 mg/dL Final    GFR Non- 11/13/2018 >60  >60 Final    Comment: >60 mL/min/1.73m2 EGFR, calc. for ages 25 and older using the  MDRD formula (not corrected for weight), is valid for stable  renal function.  GFR  11/13/2018 >60  >60 Final    Comment: Chronic Kidney Disease: less than 60 ml/min/1.73 sq.m. Kidney Failure: less than 15 ml/min/1.73 sq.m. Results valid for patients 18 years and older.       Calcium 11/13/2018 9.1  8.3 - 10.6 mg/dL Final    Total Protein 11/13/2018 7.3  6.4 - 8.2 g/dL Final    Alb 11/13/2018 4.6  3.4 - 5.0 g/dL Final    Albumin/Globulin Ratio 11/13/2018 1.7  1.1 - 2.2 Final    Total Bilirubin 11/13/2018 0.9  0.0 - 1.0 mg/dL Final    Alkaline Phosphatase 11/13/2018 69  40 - 129 U/L Final    ALT 11/13/2018 31  10 - 40 U/L Final    AST 11/13/2018 68* 15 - 37 U/L Final    Globulin 11/13/2018 2.7  g/dL Final    WBC 11/13/2018 4.3  4.0 - 11.0 K/uL Final    RBC 11/13/2018 5.00  4.20 - 5.90 M/uL Final    Hemoglobin 11/13/2018 14.5  13.5 - 17.5 g/dL Final    Hematocrit 11/13/2018 42.7  40.5 - 52.5 % Final    MCV 11/13/2018 85.3  80.0 - 100.0 fL Final    MCH 11/13/2018 29.1  26.0 - 34.0 pg Final    MCHC 11/13/2018 34.0  31.0 - 36.0 g/dL Final    RDW 11/13/2018 13.6  12.4 - 15.4 % Final    Platelets 73/61/0473 173  135 - 450 K/uL Final    MPV 11/13/2018 6.9  5.0 - 10.5 fL Final    Neutrophils % 11/13/2018 73.2  % Final    Lymphocytes % 11/13/2018 16.2  % Final    Monocytes % 11/13/2018 7.3  % Final    Eosinophils % 11/13/2018 0.7  % Final    Basophils % 11/13/2018 2.6  % Final    Neutrophils # 11/13/2018 3.2  1.7 - 7.7 K/uL Final    Lymphocytes # 11/13/2018 0.7* 1.0 - 5.1 K/uL Final    Monocytes # 11/13/2018 0.3  0.0 - 1.3 K/uL Final    Eosinophils # 11/13/2018 0.0  0.0 - 0.6 K/uL Final    Basophils # 11/13/2018 0.1  0.0 - 0.2 K/uL Final    Salicylate, Serum 77/41/0388 <0.3* 15.0 - 30.0 mg/dL Final    Comment: Therapeutic Range: 15.0-30.0 mg/dL  Toxic: >30.0 mg/dL      Acetaminophen Level 11/13/2018 <5* 10 - 30 ug/mL Final    Comment: Therapeutic Range: 10.0-30.0 ug/mL  Toxic: >200.0 ug/mL      Amphetamine Screen, Urine 11/13/2018 Neg  Negative <1000ng/mL Final    Barbiturate Screen, Ur 11/13/2018 Neg  Negative <200 ng/mL Final    Benzodiazepine Screen, Urine 11/13/2018 Neg  Negative <200 ng/mL Final    Cannabinoid Scrn, Ur 11/13/2018 Neg  Negative <50 ng/mL Final    Cocaine Metabolite Screen, Urine 11/13/2018 Neg  Negative <300 ng/mL Final    Opiate Scrn, Ur 11/13/2018 Neg  Negative <300 ng/mL Final    Comment: \"Therapeutic levels of pain medication, especially oxycontin and synthetic  opioids, may not be detected by this Methodology. Pain management screen  panel  Drug panel-PM-Hi Res Ur, Interp (PAIN) should be considered for drug  monitoring \".       PCP Screen, Urine 11/13/2018 Neg  Negative <25 ng/mL Final    Methadone Screen, Urine 11/13/2018 Neg  Negative <300 ng/mL Final    Propoxyphene Scrn, Ur 11/13/2018 Neg  Negative <300 ng/mL Final    pH, UA 11/13/2018 6.5   Final    Comment: Urine pH less than 5.0 or greater than 8.0 may indicate sample adulteration. Another sample should be collected if clinically  indicated.  Drug Screen Comment: 11/13/2018 see below   Final    Comment: This method is a screening test to detect only these drug  classes as part of a medical workup. Confirmatory testing  by another method should be ordered if clinically indicated.  Oxycodone Urine 11/13/2018 Neg  Negative <100 ng/ml Final    Sodium 11/14/2018 138  136 - 145 mmol/L Final    Potassium reflex Magnesium 11/14/2018 4.0  3.5 - 5.1 mmol/L Final    Chloride 11/14/2018 100  99 - 110 mmol/L Final    CO2 11/14/2018 28  21 - 32 mmol/L Final    Anion Gap 11/14/2018 10  3 - 16 Final    Glucose 11/14/2018 82  70 - 99 mg/dL Final    BUN 11/14/2018 8  7 - 20 mg/dL Final    CREATININE 11/14/2018 0.6* 0.9 - 1.3 mg/dL Final    GFR Non- 11/14/2018 >60  >60 Final    Comment: >60 mL/min/1.73m2 EGFR, calc. for ages 25 and older using the  MDRD formula (not corrected for weight), is valid for stable  renal function.  GFR  11/14/2018 >60  >60 Final    Comment: Chronic Kidney Disease: less than 60 ml/min/1.73 sq.m. Kidney Failure: less than 15 ml/min/1.73 sq.m. Results valid for patients 18 years and older.       Calcium 11/14/2018 9.6  8.3 - 10.6 mg/dL Final    Total Protein 11/14/2018 6.3* 6.4 - 8.2 g/dL Final    Alb 11/14/2018 4.0  3.4 - 5.0 g/dL Final    Alkaline Phosphatase 11/14/2018 58  40 - 129 U/L Final    ALT 11/14/2018 33  10 - 40 U/L Final    AST 11/14/2018 62* 15 - 37 U/L Final    Total Bilirubin 11/14/2018 1.1* 0.0 - 1.0 mg/dL Final    Bilirubin, Direct 11/14/2018 <0.2  0.0 - 0.3 mg/dL Final    Bilirubin, Indirect 11/14/2018 see below  0.0 - 1.0 mg/dL Final    Comment: Indirect Bilirubin cannot be calculated since Total Bilirubin  and/or Direct Bilirubin is below measurable range.  Phosphorus 11/14/2018 3.7  2.5 - 4.9 mg/dL Final       Review of Systems   Neurological: Positive for dizziness (mild in the morning when standing). Negative for headaches. Psychiatric/Behavioral: Positive for sleep disturbance. The patient is nervous/anxious. BP (!) 172/112 (Site: Right Upper Arm, Position: Sitting, Cuff Size: Medium Adult) Comment: states no naseu or dizziness feels fine just nervous  Pulse 92   Temp 98.1 °F (36.7 °C) (Oral)   Resp 14   Ht 5' 8\" (1.727 m)   Wt 158 lb (71.7 kg)   SpO2 98%   BMI 24.02 kg/m²     Physical Exam   Constitutional: He is oriented to person, place, and time. He appears well-developed and well-nourished. HENT:   Head: Normocephalic and atraumatic. Eyes: Pupils are equal, round, and reactive to light. EOM are normal.   Neck: Normal range of motion. Cardiovascular: Normal rate, regular rhythm and normal heart sounds. Pulmonary/Chest: Effort normal and breath sounds normal.   Abdominal: Bowel sounds are normal. There is no tenderness. Neurological: He is alert and oriented to person, place, and time. Psychiatric: He has a normal mood and affect. His behavior is normal. Judgment and thought content normal.   Vitals reviewed. Plan   Diagnosis Orders   1. Essential hypertension  Counseled pt on importance of taking medication daily   2. Dizziness  Pt to monitor and let office know     Pt Instructions:  Record your BP twice a day to a log to bring to the office with your name on it in 2 weeks. Return in about 3 months (around 8/21/2019) for HTN F/U. Prior to Visit Medications    Medication Sig Taking?  Authorizing Provider   lisinopril (PRINIVIL;ZESTRIL) 10 MG tablet Take 1 tablet by mouth daily Yes [place],    cloNIDine (CATAPRES) 0.1 MG tablet Take 0.1 mg by mouth daily Yes Historical Provider, MD   Multiple Vitamins-Minerals (THERAPEUTIC MULTIVITAMIN-MINERALS) tablet Take 1 tablet by mouth daily Yes Historical Provider, MD   folic acid (FOLVITE) 1 MG tablet Take 1 mg by mouth daily Yes Historical Provider, MD   vitamin B-1 (THIAMINE) 100 MG tablet Take 100 mg by mouth daily Yes Historical Provider, MD   chlordiazePOXIDE (LIBRIUM) 25 MG capsule Take 25 mg by mouth daily.  Indications: Panick Attacks Yes Historical Provider, MD   escitalopram (LEXAPRO) 10 MG tablet Take 1 tablet by mouth daily Yes Javad Underwood DO

## 2019-05-23 RX ORDER — CLONIDINE HYDROCHLORIDE 0.1 MG/1
0.1 TABLET ORAL DAILY
Qty: 30 TABLET | Refills: 1 | Status: SHIPPED | OUTPATIENT
Start: 2019-05-23 | End: 2019-07-20 | Stop reason: SDUPTHER

## 2019-07-04 DIAGNOSIS — I10 ESSENTIAL HYPERTENSION: ICD-10-CM

## 2019-07-05 RX ORDER — LISINOPRIL 10 MG/1
TABLET ORAL
Qty: 30 TABLET | Refills: 1 | Status: SHIPPED | OUTPATIENT
Start: 2019-07-05 | End: 2019-08-06 | Stop reason: SDUPTHER

## 2019-07-20 DIAGNOSIS — F41.9 ANXIETY: ICD-10-CM

## 2019-07-20 DIAGNOSIS — R45.89 DEPRESSED MOOD: ICD-10-CM

## 2019-07-23 RX ORDER — CLONIDINE HYDROCHLORIDE 0.1 MG/1
TABLET ORAL
Qty: 30 TABLET | Refills: 1 | Status: ON HOLD | OUTPATIENT
Start: 2019-07-23 | End: 2020-09-14 | Stop reason: HOSPADM

## 2019-07-23 RX ORDER — ESCITALOPRAM OXALATE 10 MG/1
TABLET ORAL
Qty: 30 TABLET | Refills: 3 | Status: SHIPPED | OUTPATIENT
Start: 2019-07-23 | End: 2020-09-15 | Stop reason: SDUPTHER

## 2019-08-06 DIAGNOSIS — I10 ESSENTIAL HYPERTENSION: ICD-10-CM

## 2019-08-06 RX ORDER — LISINOPRIL 10 MG/1
TABLET ORAL
Qty: 30 TABLET | Refills: 1 | Status: SHIPPED | OUTPATIENT
Start: 2019-08-06 | End: 2019-09-07 | Stop reason: SDUPTHER

## 2019-08-23 RX ORDER — CLONIDINE HYDROCHLORIDE 0.1 MG/1
TABLET ORAL
Qty: 30 TABLET | Refills: 1 | OUTPATIENT
Start: 2019-08-23

## 2019-09-06 DIAGNOSIS — I10 ESSENTIAL HYPERTENSION: ICD-10-CM

## 2019-09-07 RX ORDER — LISINOPRIL 10 MG/1
TABLET ORAL
Qty: 30 TABLET | Refills: 2 | Status: SHIPPED | OUTPATIENT
Start: 2019-09-07 | End: 2019-12-16 | Stop reason: SDUPTHER

## 2019-09-19 ENCOUNTER — HOSPITAL ENCOUNTER (INPATIENT)
Age: 44
LOS: 5 days | Discharge: HOME HEALTH | End: 2019-09-24
Attending: EMERGENCY MEDICINE | Admitting: INTERNAL MEDICINE
Payer: MEDICAID

## 2019-09-19 ENCOUNTER — APPOINTMENT (OUTPATIENT)
Dept: GENERAL RADIOLOGY | Age: 44
End: 2019-09-19
Payer: MEDICAID

## 2019-09-19 DIAGNOSIS — R79.89 ELEVATED LFTS: ICD-10-CM

## 2019-09-19 DIAGNOSIS — F10.931 ALCOHOL WITHDRAWAL DELIRIUM (HCC): Primary | ICD-10-CM

## 2019-09-19 DIAGNOSIS — E87.1 HYPONATREMIA: ICD-10-CM

## 2019-09-19 DIAGNOSIS — I10 ESSENTIAL HYPERTENSION: ICD-10-CM

## 2019-09-19 DIAGNOSIS — E83.42 HYPOMAGNESEMIA: ICD-10-CM

## 2019-09-19 DIAGNOSIS — E87.8 HYPOCHLOREMIA: ICD-10-CM

## 2019-09-19 DIAGNOSIS — R94.31 PROLONGED QT INTERVAL: ICD-10-CM

## 2019-09-19 PROBLEM — G93.41 ACUTE METABOLIC ENCEPHALOPATHY: Status: ACTIVE | Noted: 2019-09-19

## 2019-09-19 LAB
A/G RATIO: 2.1 (ref 1.1–2.2)
ALBUMIN SERPL-MCNC: 4.9 G/DL (ref 3.4–5)
ALP BLD-CCNC: 54 U/L (ref 40–129)
ALT SERPL-CCNC: 83 U/L (ref 10–40)
AMPHETAMINE SCREEN, URINE: NORMAL
ANION GAP SERPL CALCULATED.3IONS-SCNC: 11 MMOL/L (ref 3–16)
ANION GAP SERPL CALCULATED.3IONS-SCNC: 15 MMOL/L (ref 3–16)
ANION GAP SERPL CALCULATED.3IONS-SCNC: 17 MMOL/L (ref 3–16)
ANION GAP SERPL CALCULATED.3IONS-SCNC: 7 MMOL/L (ref 3–16)
ANION GAP SERPL CALCULATED.3IONS-SCNC: 8 MMOL/L (ref 3–16)
AST SERPL-CCNC: 108 U/L (ref 15–37)
BARBITURATE SCREEN URINE: NORMAL
BASOPHILS ABSOLUTE: 0 K/UL (ref 0–0.2)
BASOPHILS RELATIVE PERCENT: 0.9 %
BENZODIAZEPINE SCREEN, URINE: NORMAL
BILIRUB SERPL-MCNC: 1.1 MG/DL (ref 0–1)
BILIRUBIN URINE: NEGATIVE
BLOOD, URINE: NEGATIVE
BUN BLDV-MCNC: 3 MG/DL (ref 7–20)
BUN BLDV-MCNC: 3 MG/DL (ref 7–20)
BUN BLDV-MCNC: <2 MG/DL (ref 7–20)
CALCIUM SERPL-MCNC: 8.1 MG/DL (ref 8.3–10.6)
CALCIUM SERPL-MCNC: 8.1 MG/DL (ref 8.3–10.6)
CALCIUM SERPL-MCNC: 8.2 MG/DL (ref 8.3–10.6)
CALCIUM SERPL-MCNC: 8.3 MG/DL (ref 8.3–10.6)
CALCIUM SERPL-MCNC: 9.2 MG/DL (ref 8.3–10.6)
CANNABINOID SCREEN URINE: NORMAL
CHLORIDE BLD-SCNC: 101 MMOL/L (ref 99–110)
CHLORIDE BLD-SCNC: 109 MMOL/L (ref 99–110)
CHLORIDE BLD-SCNC: 84 MMOL/L (ref 99–110)
CHLORIDE BLD-SCNC: 90 MMOL/L (ref 99–110)
CHLORIDE BLD-SCNC: 94 MMOL/L (ref 99–110)
CLARITY: CLEAR
CO2: 19 MMOL/L (ref 21–32)
CO2: 21 MMOL/L (ref 21–32)
CO2: 21 MMOL/L (ref 21–32)
CO2: 24 MMOL/L (ref 21–32)
CO2: 24 MMOL/L (ref 21–32)
COCAINE METABOLITE SCREEN URINE: NORMAL
COLOR: YELLOW
CREAT SERPL-MCNC: <0.5 MG/DL (ref 0.9–1.3)
EKG ATRIAL RATE: 312 BPM
EKG DIAGNOSIS: NORMAL
EKG P AXIS: -15 DEGREES
EKG Q-T INTERVAL: 508 MS
EKG QRS DURATION: 88 MS
EKG QTC CALCULATION (BAZETT): 579 MS
EKG R AXIS: 46 DEGREES
EKG T AXIS: 38 DEGREES
EKG VENTRICULAR RATE: 78 BPM
EOSINOPHILS ABSOLUTE: 0 K/UL (ref 0–0.6)
EOSINOPHILS RELATIVE PERCENT: 0.5 %
ETHANOL: NORMAL MG/DL (ref 0–0.08)
GFR AFRICAN AMERICAN: >60
GFR NON-AFRICAN AMERICAN: >60
GLOBULIN: 2.3 G/DL
GLUCOSE BLD-MCNC: 114 MG/DL (ref 70–99)
GLUCOSE BLD-MCNC: 128 MG/DL (ref 70–99)
GLUCOSE BLD-MCNC: 85 MG/DL (ref 70–99)
GLUCOSE BLD-MCNC: 90 MG/DL (ref 70–99)
GLUCOSE BLD-MCNC: 91 MG/DL (ref 70–99)
GLUCOSE URINE: 250 MG/DL
HCT VFR BLD CALC: 37 % (ref 40.5–52.5)
HEMOGLOBIN: 13.1 G/DL (ref 13.5–17.5)
KETONES, URINE: NEGATIVE MG/DL
LEUKOCYTE ESTERASE, URINE: NEGATIVE
LYMPHOCYTES ABSOLUTE: 0.6 K/UL (ref 1–5.1)
LYMPHOCYTES RELATIVE PERCENT: 13.7 %
Lab: NORMAL
MAGNESIUM: 1 MG/DL (ref 1.8–2.4)
MAGNESIUM: 1.6 MG/DL (ref 1.8–2.4)
MAGNESIUM: 1.8 MG/DL (ref 1.8–2.4)
MCH RBC QN AUTO: 31 PG (ref 26–34)
MCHC RBC AUTO-ENTMCNC: 35.5 G/DL (ref 31–36)
MCV RBC AUTO: 87.3 FL (ref 80–100)
METHADONE SCREEN, URINE: NORMAL
MICROSCOPIC EXAMINATION: ABNORMAL
MONOCYTES ABSOLUTE: 0.6 K/UL (ref 0–1.3)
MONOCYTES RELATIVE PERCENT: 13.2 %
NEUTROPHILS ABSOLUTE: 3.4 K/UL (ref 1.7–7.7)
NEUTROPHILS RELATIVE PERCENT: 71.7 %
NITRITE, URINE: NEGATIVE
OPIATE SCREEN URINE: NORMAL
OSMOLALITY URINE: 89 MOSM/KG (ref 390–1070)
OSMOLALITY: 247 MOSM/KG (ref 275–295)
OXYCODONE URINE: NORMAL
PDW BLD-RTO: 13 % (ref 12.4–15.4)
PH UA: 7
PH UA: 7 (ref 5–8)
PHENCYCLIDINE SCREEN URINE: NORMAL
PLATELET # BLD: 134 K/UL (ref 135–450)
PMV BLD AUTO: 7.8 FL (ref 5–10.5)
POTASSIUM REFLEX MAGNESIUM: 3.5 MMOL/L (ref 3.5–5.1)
POTASSIUM SERPL-SCNC: 3 MMOL/L (ref 3.5–5.1)
POTASSIUM SERPL-SCNC: 3.2 MMOL/L (ref 3.5–5.1)
POTASSIUM SERPL-SCNC: 3.5 MMOL/L (ref 3.5–5.1)
POTASSIUM SERPL-SCNC: 4.1 MMOL/L (ref 3.5–5.1)
PROPOXYPHENE SCREEN: NORMAL
PROTEIN UA: NEGATIVE MG/DL
RBC # BLD: 4.23 M/UL (ref 4.2–5.9)
SODIUM BLD-SCNC: 122 MMOL/L (ref 136–145)
SODIUM BLD-SCNC: 126 MMOL/L (ref 136–145)
SODIUM BLD-SCNC: 129 MMOL/L (ref 136–145)
SODIUM BLD-SCNC: 132 MMOL/L (ref 136–145)
SODIUM BLD-SCNC: 136 MMOL/L (ref 136–145)
SPECIFIC GRAVITY UA: <=1.005 (ref 1–1.03)
TOTAL PROTEIN: 7.2 G/DL (ref 6.4–8.2)
URINE REFLEX TO CULTURE: ABNORMAL
URINE TYPE: ABNORMAL
UROBILINOGEN, URINE: 0.2 E.U./DL
WBC # BLD: 4.7 K/UL (ref 4–11)

## 2019-09-19 PROCEDURE — 71045 X-RAY EXAM CHEST 1 VIEW: CPT

## 2019-09-19 PROCEDURE — 83735 ASSAY OF MAGNESIUM: CPT

## 2019-09-19 PROCEDURE — 6370000000 HC RX 637 (ALT 250 FOR IP): Performed by: INTERNAL MEDICINE

## 2019-09-19 PROCEDURE — 6360000002 HC RX W HCPCS: Performed by: INTERNAL MEDICINE

## 2019-09-19 PROCEDURE — 2580000003 HC RX 258: Performed by: INTERNAL MEDICINE

## 2019-09-19 PROCEDURE — 2580000003 HC RX 258: Performed by: EMERGENCY MEDICINE

## 2019-09-19 PROCEDURE — 83930 ASSAY OF BLOOD OSMOLALITY: CPT

## 2019-09-19 PROCEDURE — 6360000002 HC RX W HCPCS

## 2019-09-19 PROCEDURE — 2000000000 HC ICU R&B

## 2019-09-19 PROCEDURE — 96374 THER/PROPH/DIAG INJ IV PUSH: CPT

## 2019-09-19 PROCEDURE — 83935 ASSAY OF URINE OSMOLALITY: CPT

## 2019-09-19 PROCEDURE — 80307 DRUG TEST PRSMV CHEM ANLYZR: CPT

## 2019-09-19 PROCEDURE — G0480 DRUG TEST DEF 1-7 CLASSES: HCPCS

## 2019-09-19 PROCEDURE — 80053 COMPREHEN METABOLIC PANEL: CPT

## 2019-09-19 PROCEDURE — 93005 ELECTROCARDIOGRAM TRACING: CPT | Performed by: EMERGENCY MEDICINE

## 2019-09-19 PROCEDURE — 36415 COLL VENOUS BLD VENIPUNCTURE: CPT

## 2019-09-19 PROCEDURE — 81003 URINALYSIS AUTO W/O SCOPE: CPT

## 2019-09-19 PROCEDURE — 6360000002 HC RX W HCPCS: Performed by: EMERGENCY MEDICINE

## 2019-09-19 PROCEDURE — 85025 COMPLETE CBC W/AUTO DIFF WBC: CPT

## 2019-09-19 PROCEDURE — 2500000003 HC RX 250 WO HCPCS: Performed by: EMERGENCY MEDICINE

## 2019-09-19 PROCEDURE — 96376 TX/PRO/DX INJ SAME DRUG ADON: CPT

## 2019-09-19 PROCEDURE — 93010 ELECTROCARDIOGRAM REPORT: CPT | Performed by: INTERNAL MEDICINE

## 2019-09-19 PROCEDURE — 99285 EMERGENCY DEPT VISIT HI MDM: CPT

## 2019-09-19 PROCEDURE — 99291 CRITICAL CARE FIRST HOUR: CPT | Performed by: INTERNAL MEDICINE

## 2019-09-19 PROCEDURE — 96375 TX/PRO/DX INJ NEW DRUG ADDON: CPT

## 2019-09-19 RX ORDER — POTASSIUM CHLORIDE 7.45 MG/ML
20 INJECTION INTRAVENOUS ONCE
Status: COMPLETED | OUTPATIENT
Start: 2019-09-19 | End: 2019-09-19

## 2019-09-19 RX ORDER — LORAZEPAM 1 MG/1
1 TABLET ORAL
Status: DISCONTINUED | OUTPATIENT
Start: 2019-09-19 | End: 2019-09-24 | Stop reason: HOSPADM

## 2019-09-19 RX ORDER — LABETALOL HYDROCHLORIDE 5 MG/ML
10 INJECTION, SOLUTION INTRAVENOUS EVERY 6 HOURS PRN
Status: DISCONTINUED | OUTPATIENT
Start: 2019-09-19 | End: 2019-09-24 | Stop reason: HOSPADM

## 2019-09-19 RX ORDER — LORAZEPAM 2 MG/ML
2 INJECTION INTRAMUSCULAR EVERY 30 MIN PRN
Status: DISCONTINUED | OUTPATIENT
Start: 2019-09-19 | End: 2019-09-24 | Stop reason: HOSPADM

## 2019-09-19 RX ORDER — CHLORDIAZEPOXIDE HYDROCHLORIDE 5 MG/1
10 CAPSULE, GELATIN COATED ORAL 4 TIMES DAILY
Status: DISCONTINUED | OUTPATIENT
Start: 2019-09-19 | End: 2019-09-19

## 2019-09-19 RX ORDER — LORAZEPAM 2 MG/ML
3 INJECTION INTRAMUSCULAR
Status: DISCONTINUED | OUTPATIENT
Start: 2019-09-19 | End: 2019-09-24 | Stop reason: HOSPADM

## 2019-09-19 RX ORDER — SODIUM CHLORIDE 0.9 % (FLUSH) 0.9 %
10 SYRINGE (ML) INJECTION EVERY 12 HOURS SCHEDULED
Status: DISCONTINUED | OUTPATIENT
Start: 2019-09-19 | End: 2019-09-24 | Stop reason: HOSPADM

## 2019-09-19 RX ORDER — LORAZEPAM 2 MG/1
2 TABLET ORAL
Status: DISCONTINUED | OUTPATIENT
Start: 2019-09-19 | End: 2019-09-24 | Stop reason: HOSPADM

## 2019-09-19 RX ORDER — LORAZEPAM 2 MG/ML
2 INJECTION INTRAMUSCULAR
Status: DISCONTINUED | OUTPATIENT
Start: 2019-09-19 | End: 2019-09-24 | Stop reason: HOSPADM

## 2019-09-19 RX ORDER — MAGNESIUM SULFATE IN WATER 40 MG/ML
2 INJECTION, SOLUTION INTRAVENOUS ONCE
Status: COMPLETED | OUTPATIENT
Start: 2019-09-19 | End: 2019-09-19

## 2019-09-19 RX ORDER — LORAZEPAM 2 MG/ML
INJECTION INTRAMUSCULAR
Status: DISCONTINUED
Start: 2019-09-19 | End: 2019-09-19

## 2019-09-19 RX ORDER — MAGNESIUM SULFATE 1 G/100ML
1 INJECTION INTRAVENOUS PRN
Status: DISCONTINUED | OUTPATIENT
Start: 2019-09-19 | End: 2019-09-24 | Stop reason: HOSPADM

## 2019-09-19 RX ORDER — LORAZEPAM 2 MG/ML
1 INJECTION INTRAMUSCULAR
Status: DISCONTINUED | OUTPATIENT
Start: 2019-09-19 | End: 2019-09-24 | Stop reason: HOSPADM

## 2019-09-19 RX ORDER — HALOPERIDOL 5 MG/ML
INJECTION INTRAMUSCULAR
Status: COMPLETED
Start: 2019-09-19 | End: 2019-09-19

## 2019-09-19 RX ORDER — DIPHENHYDRAMINE HYDROCHLORIDE 50 MG/ML
50 INJECTION INTRAMUSCULAR; INTRAVENOUS ONCE
Status: COMPLETED | OUTPATIENT
Start: 2019-09-19 | End: 2019-09-19

## 2019-09-19 RX ORDER — OLANZAPINE 10 MG/1
10 TABLET, ORALLY DISINTEGRATING ORAL ONCE
Status: DISCONTINUED | OUTPATIENT
Start: 2019-09-19 | End: 2019-09-19

## 2019-09-19 RX ORDER — 0.9 % SODIUM CHLORIDE 0.9 %
1000 INTRAVENOUS SOLUTION INTRAVENOUS ONCE
Status: COMPLETED | OUTPATIENT
Start: 2019-09-19 | End: 2019-09-19

## 2019-09-19 RX ORDER — LORAZEPAM 2 MG/ML
2 INJECTION INTRAMUSCULAR ONCE
Status: COMPLETED | OUTPATIENT
Start: 2019-09-19 | End: 2019-09-19

## 2019-09-19 RX ORDER — LORAZEPAM 2 MG/ML
10 INJECTION INTRAMUSCULAR ONCE
Status: COMPLETED | OUTPATIENT
Start: 2019-09-19 | End: 2019-09-19

## 2019-09-19 RX ORDER — ONDANSETRON 2 MG/ML
4 INJECTION INTRAMUSCULAR; INTRAVENOUS EVERY 6 HOURS PRN
Status: DISCONTINUED | OUTPATIENT
Start: 2019-09-19 | End: 2019-09-24 | Stop reason: HOSPADM

## 2019-09-19 RX ORDER — LORAZEPAM 2 MG/ML
4 INJECTION INTRAMUSCULAR ONCE
Status: COMPLETED | OUTPATIENT
Start: 2019-09-19 | End: 2019-09-19

## 2019-09-19 RX ORDER — M-VIT,TX,IRON,MINS/CALC/FOLIC 27MG-0.4MG
1 TABLET ORAL DAILY
Status: DISCONTINUED | OUTPATIENT
Start: 2019-09-19 | End: 2019-09-19

## 2019-09-19 RX ORDER — LORAZEPAM 2 MG/1
4 TABLET ORAL
Status: DISCONTINUED | OUTPATIENT
Start: 2019-09-19 | End: 2019-09-24 | Stop reason: HOSPADM

## 2019-09-19 RX ORDER — LORAZEPAM 2 MG/ML
2 INJECTION INTRAMUSCULAR ONCE
Status: DISCONTINUED | OUTPATIENT
Start: 2019-09-19 | End: 2019-09-19

## 2019-09-19 RX ORDER — FOLIC ACID 1 MG/1
1 TABLET ORAL DAILY
Status: DISCONTINUED | OUTPATIENT
Start: 2019-09-19 | End: 2019-09-19

## 2019-09-19 RX ORDER — LORAZEPAM 2 MG/ML
4 INJECTION INTRAMUSCULAR
Status: DISCONTINUED | OUTPATIENT
Start: 2019-09-19 | End: 2019-09-24 | Stop reason: HOSPADM

## 2019-09-19 RX ORDER — THIAMINE MONONITRATE (VIT B1) 100 MG
100 TABLET ORAL DAILY
Status: DISCONTINUED | OUTPATIENT
Start: 2019-09-19 | End: 2019-09-19

## 2019-09-19 RX ORDER — SODIUM CHLORIDE 9 MG/ML
INJECTION, SOLUTION INTRAVENOUS CONTINUOUS
Status: DISCONTINUED | OUTPATIENT
Start: 2019-09-19 | End: 2019-09-20

## 2019-09-19 RX ORDER — SODIUM CHLORIDE 0.9 % (FLUSH) 0.9 %
10 SYRINGE (ML) INJECTION PRN
Status: DISCONTINUED | OUTPATIENT
Start: 2019-09-19 | End: 2019-09-24 | Stop reason: HOSPADM

## 2019-09-19 RX ORDER — CLONIDINE HYDROCHLORIDE 0.1 MG/1
0.1 TABLET ORAL 2 TIMES DAILY
Status: DISCONTINUED | OUTPATIENT
Start: 2019-09-19 | End: 2019-09-24 | Stop reason: HOSPADM

## 2019-09-19 RX ORDER — ESCITALOPRAM OXALATE 10 MG/1
10 TABLET ORAL DAILY
Status: DISCONTINUED | OUTPATIENT
Start: 2019-09-19 | End: 2019-09-19

## 2019-09-19 RX ORDER — POTASSIUM CHLORIDE 750 MG/1
40 TABLET, EXTENDED RELEASE ORAL PRN
Status: DISCONTINUED | OUTPATIENT
Start: 2019-09-19 | End: 2019-09-24 | Stop reason: HOSPADM

## 2019-09-19 RX ORDER — POTASSIUM CHLORIDE 7.45 MG/ML
10 INJECTION INTRAVENOUS
Status: COMPLETED | OUTPATIENT
Start: 2019-09-19 | End: 2019-09-19

## 2019-09-19 RX ORDER — POTASSIUM CHLORIDE 7.45 MG/ML
10 INJECTION INTRAVENOUS PRN
Status: DISCONTINUED | OUTPATIENT
Start: 2019-09-19 | End: 2019-09-24 | Stop reason: HOSPADM

## 2019-09-19 RX ORDER — LISINOPRIL 10 MG/1
10 TABLET ORAL DAILY
Status: DISCONTINUED | OUTPATIENT
Start: 2019-09-19 | End: 2019-09-24 | Stop reason: HOSPADM

## 2019-09-19 RX ADMIN — LORAZEPAM 2 MG: 2 INJECTION INTRAMUSCULAR; INTRAVENOUS at 09:23

## 2019-09-19 RX ADMIN — LORAZEPAM 10 MG: 2 INJECTION INTRAMUSCULAR; INTRAVENOUS at 06:31

## 2019-09-19 RX ADMIN — MUPIROCIN: 20 OINTMENT TOPICAL at 11:00

## 2019-09-19 RX ADMIN — Medication 10 ML: at 20:39

## 2019-09-19 RX ADMIN — SODIUM CHLORIDE: 9 INJECTION, SOLUTION INTRAVENOUS at 09:26

## 2019-09-19 RX ADMIN — MAGNESIUM SULFATE HEPTAHYDRATE 2 G: 500 INJECTION, SOLUTION INTRAMUSCULAR; INTRAVENOUS at 07:30

## 2019-09-19 RX ADMIN — SODIUM CHLORIDE: 9 INJECTION, SOLUTION INTRAVENOUS at 17:49

## 2019-09-19 RX ADMIN — SODIUM CHLORIDE 1000 ML: 9 INJECTION, SOLUTION INTRAVENOUS at 12:24

## 2019-09-19 RX ADMIN — LORAZEPAM 2 MG/HR: 2 INJECTION INTRAMUSCULAR; INTRAVENOUS at 11:00

## 2019-09-19 RX ADMIN — POTASSIUM CHLORIDE 10 MEQ: 7.46 INJECTION, SOLUTION INTRAVENOUS at 17:48

## 2019-09-19 RX ADMIN — FOLIC ACID: 5 INJECTION, SOLUTION INTRAMUSCULAR; INTRAVENOUS; SUBCUTANEOUS at 07:09

## 2019-09-19 RX ADMIN — ENOXAPARIN SODIUM 40 MG: 40 INJECTION SUBCUTANEOUS at 09:22

## 2019-09-19 RX ADMIN — MUPIROCIN: 20 OINTMENT TOPICAL at 20:39

## 2019-09-19 RX ADMIN — DIPHENHYDRAMINE HYDROCHLORIDE 50 MG: 50 INJECTION, SOLUTION INTRAMUSCULAR; INTRAVENOUS at 05:05

## 2019-09-19 RX ADMIN — LORAZEPAM 4 MG: 2 INJECTION INTRAMUSCULAR; INTRAVENOUS at 05:58

## 2019-09-19 RX ADMIN — POTASSIUM CHLORIDE 20 MEQ: 7.46 INJECTION, SOLUTION INTRAVENOUS at 12:24

## 2019-09-19 RX ADMIN — MAGNESIUM SULFATE HEPTAHYDRATE 2 G: 40 INJECTION, SOLUTION INTRAVENOUS at 12:24

## 2019-09-19 RX ADMIN — POTASSIUM CHLORIDE 10 MEQ: 7.46 INJECTION, SOLUTION INTRAVENOUS at 18:39

## 2019-09-19 RX ADMIN — LORAZEPAM 1 MG/HR: 2 INJECTION INTRAMUSCULAR; INTRAVENOUS at 22:02

## 2019-09-19 RX ADMIN — HALOPERIDOL LACTATE 5 MG: 5 INJECTION, SOLUTION INTRAMUSCULAR at 06:11

## 2019-09-19 RX ADMIN — LORAZEPAM 2 MG: 2 INJECTION INTRAMUSCULAR; INTRAVENOUS at 05:05

## 2019-09-19 RX ADMIN — MAGNESIUM SULFATE HEPTAHYDRATE 2 G: 500 INJECTION, SOLUTION INTRAMUSCULAR; INTRAVENOUS at 07:09

## 2019-09-19 RX ADMIN — POTASSIUM CHLORIDE 10 MEQ: 7.46 INJECTION, SOLUTION INTRAVENOUS at 13:07

## 2019-09-19 ASSESSMENT — PAIN DESCRIPTION - PAIN TYPE: TYPE: ACUTE PAIN

## 2019-09-19 ASSESSMENT — PAIN DESCRIPTION - DESCRIPTORS: DESCRIPTORS: SQUEEZING

## 2019-09-19 ASSESSMENT — PAIN DESCRIPTION - LOCATION: LOCATION: CHEST

## 2019-09-19 ASSESSMENT — PAIN DESCRIPTION - FREQUENCY: FREQUENCY: INTERMITTENT

## 2019-09-20 LAB
A/G RATIO: 1.8 (ref 1.1–2.2)
ALBUMIN SERPL-MCNC: 3.4 G/DL (ref 3.4–5)
ALP BLD-CCNC: 44 U/L (ref 40–129)
ALT SERPL-CCNC: 61 U/L (ref 10–40)
ANION GAP SERPL CALCULATED.3IONS-SCNC: 12 MMOL/L (ref 3–16)
ANION GAP SERPL CALCULATED.3IONS-SCNC: 7 MMOL/L (ref 3–16)
ANION GAP SERPL CALCULATED.3IONS-SCNC: 7 MMOL/L (ref 3–16)
AST SERPL-CCNC: 72 U/L (ref 15–37)
BASOPHILS ABSOLUTE: 0 K/UL (ref 0–0.2)
BASOPHILS RELATIVE PERCENT: 1.2 %
BILIRUB SERPL-MCNC: 1 MG/DL (ref 0–1)
BUN BLDV-MCNC: <2 MG/DL (ref 7–20)
CALCIUM SERPL-MCNC: 8.1 MG/DL (ref 8.3–10.6)
CALCIUM SERPL-MCNC: 8.2 MG/DL (ref 8.3–10.6)
CALCIUM SERPL-MCNC: 8.6 MG/DL (ref 8.3–10.6)
CHLORIDE BLD-SCNC: 105 MMOL/L (ref 99–110)
CHLORIDE BLD-SCNC: 106 MMOL/L (ref 99–110)
CHLORIDE BLD-SCNC: 110 MMOL/L (ref 99–110)
CO2: 21 MMOL/L (ref 21–32)
CO2: 22 MMOL/L (ref 21–32)
CO2: 23 MMOL/L (ref 21–32)
CREAT SERPL-MCNC: 0.6 MG/DL (ref 0.9–1.3)
CREAT SERPL-MCNC: <0.5 MG/DL (ref 0.9–1.3)
CREAT SERPL-MCNC: <0.5 MG/DL (ref 0.9–1.3)
EOSINOPHILS ABSOLUTE: 0.1 K/UL (ref 0–0.6)
EOSINOPHILS RELATIVE PERCENT: 2.4 %
GFR AFRICAN AMERICAN: >60
GFR NON-AFRICAN AMERICAN: >60
GLOBULIN: 1.9 G/DL
GLUCOSE BLD-MCNC: 127 MG/DL (ref 70–99)
GLUCOSE BLD-MCNC: 83 MG/DL (ref 70–99)
GLUCOSE BLD-MCNC: 93 MG/DL (ref 70–99)
HCT VFR BLD CALC: 34.1 % (ref 40.5–52.5)
HEMOGLOBIN: 11.8 G/DL (ref 13.5–17.5)
LYMPHOCYTES ABSOLUTE: 0.5 K/UL (ref 1–5.1)
LYMPHOCYTES RELATIVE PERCENT: 15.6 %
MCH RBC QN AUTO: 31.4 PG (ref 26–34)
MCHC RBC AUTO-ENTMCNC: 34.6 G/DL (ref 31–36)
MCV RBC AUTO: 90.8 FL (ref 80–100)
MONOCYTES ABSOLUTE: 0.3 K/UL (ref 0–1.3)
MONOCYTES RELATIVE PERCENT: 8.6 %
NEUTROPHILS ABSOLUTE: 2.1 K/UL (ref 1.7–7.7)
NEUTROPHILS RELATIVE PERCENT: 72.2 %
PDW BLD-RTO: 13.5 % (ref 12.4–15.4)
PLATELET # BLD: 104 K/UL (ref 135–450)
PMV BLD AUTO: 7.4 FL (ref 5–10.5)
POTASSIUM REFLEX MAGNESIUM: 3.8 MMOL/L (ref 3.5–5.1)
POTASSIUM SERPL-SCNC: 3.5 MMOL/L (ref 3.5–5.1)
POTASSIUM SERPL-SCNC: 3.6 MMOL/L (ref 3.5–5.1)
POTASSIUM SERPL-SCNC: 3.8 MMOL/L (ref 3.5–5.1)
RBC # BLD: 3.75 M/UL (ref 4.2–5.9)
SODIUM BLD-SCNC: 135 MMOL/L (ref 136–145)
SODIUM BLD-SCNC: 139 MMOL/L (ref 136–145)
SODIUM BLD-SCNC: 139 MMOL/L (ref 136–145)
TOTAL PROTEIN: 5.3 G/DL (ref 6.4–8.2)
WBC # BLD: 3 K/UL (ref 4–11)

## 2019-09-20 PROCEDURE — 2500000003 HC RX 250 WO HCPCS: Performed by: INTERNAL MEDICINE

## 2019-09-20 PROCEDURE — 6370000000 HC RX 637 (ALT 250 FOR IP): Performed by: INTERNAL MEDICINE

## 2019-09-20 PROCEDURE — 80053 COMPREHEN METABOLIC PANEL: CPT

## 2019-09-20 PROCEDURE — 36415 COLL VENOUS BLD VENIPUNCTURE: CPT

## 2019-09-20 PROCEDURE — 99291 CRITICAL CARE FIRST HOUR: CPT | Performed by: INTERNAL MEDICINE

## 2019-09-20 PROCEDURE — 2580000003 HC RX 258: Performed by: INTERNAL MEDICINE

## 2019-09-20 PROCEDURE — 99232 SBSQ HOSP IP/OBS MODERATE 35: CPT | Performed by: INTERNAL MEDICINE

## 2019-09-20 PROCEDURE — 6360000002 HC RX W HCPCS: Performed by: INTERNAL MEDICINE

## 2019-09-20 PROCEDURE — 2000000000 HC ICU R&B

## 2019-09-20 PROCEDURE — 85025 COMPLETE CBC W/AUTO DIFF WBC: CPT

## 2019-09-20 RX ORDER — DEXTROSE AND POTASSIUM CHLORIDE 5; .15 G/100ML; G/100ML
SOLUTION INTRAVENOUS CONTINUOUS
Status: DISCONTINUED | OUTPATIENT
Start: 2019-09-20 | End: 2019-09-24 | Stop reason: HOSPADM

## 2019-09-20 RX ADMIN — MUPIROCIN: 20 OINTMENT TOPICAL at 20:33

## 2019-09-20 RX ADMIN — CLONIDINE HYDROCHLORIDE 0.1 MG: 0.1 TABLET ORAL at 11:26

## 2019-09-20 RX ADMIN — POTASSIUM CHLORIDE AND DEXTROSE MONOHYDRATE 75 ML/HR: 150; 5 INJECTION, SOLUTION INTRAVENOUS at 13:45

## 2019-09-20 RX ADMIN — SODIUM CHLORIDE: 9 INJECTION, SOLUTION INTRAVENOUS at 01:00

## 2019-09-20 RX ADMIN — FOLIC ACID: 5 INJECTION, SOLUTION INTRAMUSCULAR; INTRAVENOUS; SUBCUTANEOUS at 11:26

## 2019-09-20 RX ADMIN — SODIUM CHLORIDE: 9 INJECTION, SOLUTION INTRAVENOUS at 08:00

## 2019-09-20 RX ADMIN — ENOXAPARIN SODIUM 40 MG: 40 INJECTION SUBCUTANEOUS at 11:26

## 2019-09-20 RX ADMIN — LISINOPRIL 10 MG: 10 TABLET ORAL at 11:25

## 2019-09-20 RX ADMIN — Medication 10 ML: at 11:26

## 2019-09-20 RX ADMIN — MUPIROCIN: 20 OINTMENT TOPICAL at 11:26

## 2019-09-20 RX ADMIN — LORAZEPAM 1 MG/HR: 2 INJECTION INTRAMUSCULAR; INTRAVENOUS at 03:49

## 2019-09-20 RX ADMIN — Medication 10 ML: at 20:33

## 2019-09-20 ASSESSMENT — PAIN SCALES - GENERAL
PAINLEVEL_OUTOF10: 0

## 2019-09-21 LAB
ANION GAP SERPL CALCULATED.3IONS-SCNC: 12 MMOL/L (ref 3–16)
ANION GAP SERPL CALCULATED.3IONS-SCNC: 9 MMOL/L (ref 3–16)
ANION GAP SERPL CALCULATED.3IONS-SCNC: 9 MMOL/L (ref 3–16)
BUN BLDV-MCNC: <2 MG/DL (ref 7–20)
CALCIUM SERPL-MCNC: 8.7 MG/DL (ref 8.3–10.6)
CALCIUM SERPL-MCNC: 9.2 MG/DL (ref 8.3–10.6)
CALCIUM SERPL-MCNC: 9.2 MG/DL (ref 8.3–10.6)
CHLORIDE BLD-SCNC: 101 MMOL/L (ref 99–110)
CHLORIDE BLD-SCNC: 101 MMOL/L (ref 99–110)
CHLORIDE BLD-SCNC: 105 MMOL/L (ref 99–110)
CO2: 23 MMOL/L (ref 21–32)
CO2: 24 MMOL/L (ref 21–32)
CO2: 25 MMOL/L (ref 21–32)
CREAT SERPL-MCNC: <0.5 MG/DL (ref 0.9–1.3)
GFR AFRICAN AMERICAN: >60
GFR NON-AFRICAN AMERICAN: >60
GLUCOSE BLD-MCNC: 105 MG/DL (ref 70–99)
GLUCOSE BLD-MCNC: 124 MG/DL (ref 70–99)
GLUCOSE BLD-MCNC: 125 MG/DL (ref 70–99)
POTASSIUM SERPL-SCNC: 3.4 MMOL/L (ref 3.5–5.1)
POTASSIUM SERPL-SCNC: 3.4 MMOL/L (ref 3.5–5.1)
POTASSIUM SERPL-SCNC: 4 MMOL/L (ref 3.5–5.1)
SODIUM BLD-SCNC: 133 MMOL/L (ref 136–145)
SODIUM BLD-SCNC: 135 MMOL/L (ref 136–145)
SODIUM BLD-SCNC: 141 MMOL/L (ref 136–145)

## 2019-09-21 PROCEDURE — 99291 CRITICAL CARE FIRST HOUR: CPT | Performed by: INTERNAL MEDICINE

## 2019-09-21 PROCEDURE — 2500000003 HC RX 250 WO HCPCS: Performed by: INTERNAL MEDICINE

## 2019-09-21 PROCEDURE — 80048 BASIC METABOLIC PNL TOTAL CA: CPT

## 2019-09-21 PROCEDURE — 6360000002 HC RX W HCPCS: Performed by: INTERNAL MEDICINE

## 2019-09-21 PROCEDURE — 2580000003 HC RX 258: Performed by: INTERNAL MEDICINE

## 2019-09-21 PROCEDURE — 36415 COLL VENOUS BLD VENIPUNCTURE: CPT

## 2019-09-21 PROCEDURE — 2000000000 HC ICU R&B

## 2019-09-21 RX ADMIN — LORAZEPAM 2 MG/HR: 2 INJECTION INTRAMUSCULAR; INTRAVENOUS at 03:20

## 2019-09-21 RX ADMIN — LORAZEPAM 1 MG/HR: 2 INJECTION INTRAMUSCULAR; INTRAVENOUS at 16:42

## 2019-09-21 RX ADMIN — Medication 10 ML: at 20:06

## 2019-09-21 RX ADMIN — MUPIROCIN: 20 OINTMENT TOPICAL at 10:35

## 2019-09-21 RX ADMIN — LORAZEPAM 4 MG: 2 INJECTION INTRAMUSCULAR; INTRAVENOUS at 04:52

## 2019-09-21 RX ADMIN — LORAZEPAM 4 MG: 2 INJECTION INTRAMUSCULAR; INTRAVENOUS at 07:21

## 2019-09-21 RX ADMIN — FOLIC ACID: 5 INJECTION, SOLUTION INTRAMUSCULAR; INTRAVENOUS; SUBCUTANEOUS at 10:36

## 2019-09-21 RX ADMIN — DEXMEDETOMIDINE HYDROCHLORIDE 0.6 MCG/KG/HR: 100 INJECTION, SOLUTION INTRAVENOUS at 08:09

## 2019-09-21 RX ADMIN — LORAZEPAM 2 MG: 2 INJECTION INTRAMUSCULAR; INTRAVENOUS at 01:04

## 2019-09-21 RX ADMIN — Medication 10 ML: at 10:35

## 2019-09-21 RX ADMIN — LORAZEPAM 4 MG: 2 INJECTION INTRAMUSCULAR; INTRAVENOUS at 13:40

## 2019-09-21 RX ADMIN — POTASSIUM CHLORIDE AND DEXTROSE MONOHYDRATE 75 ML/HR: 150; 5 INJECTION, SOLUTION INTRAVENOUS at 17:41

## 2019-09-21 RX ADMIN — POTASSIUM CHLORIDE AND DEXTROSE MONOHYDRATE 75 ML/HR: 150; 5 INJECTION, SOLUTION INTRAVENOUS at 01:50

## 2019-09-21 RX ADMIN — MUPIROCIN: 20 OINTMENT TOPICAL at 20:06

## 2019-09-21 RX ADMIN — DEXMEDETOMIDINE HYDROCHLORIDE 0.6 MCG/KG/HR: 100 INJECTION, SOLUTION INTRAVENOUS at 16:42

## 2019-09-21 RX ADMIN — DEXMEDETOMIDINE HYDROCHLORIDE 0.2 MCG/KG/HR: 100 INJECTION, SOLUTION INTRAVENOUS at 06:03

## 2019-09-21 RX ADMIN — ENOXAPARIN SODIUM 40 MG: 40 INJECTION SUBCUTANEOUS at 10:34

## 2019-09-22 LAB
ANION GAP SERPL CALCULATED.3IONS-SCNC: 10 MMOL/L (ref 3–16)
ANION GAP SERPL CALCULATED.3IONS-SCNC: 10 MMOL/L (ref 3–16)
ANION GAP SERPL CALCULATED.3IONS-SCNC: 13 MMOL/L (ref 3–16)
BASOPHILS ABSOLUTE: 0 K/UL (ref 0–0.2)
BASOPHILS RELATIVE PERCENT: 0.8 %
BUN BLDV-MCNC: 4 MG/DL (ref 7–20)
BUN BLDV-MCNC: 4 MG/DL (ref 7–20)
BUN BLDV-MCNC: 5 MG/DL (ref 7–20)
CALCIUM SERPL-MCNC: 9.5 MG/DL (ref 8.3–10.6)
CALCIUM SERPL-MCNC: 9.5 MG/DL (ref 8.3–10.6)
CALCIUM SERPL-MCNC: 9.6 MG/DL (ref 8.3–10.6)
CHLORIDE BLD-SCNC: 101 MMOL/L (ref 99–110)
CHLORIDE BLD-SCNC: 102 MMOL/L (ref 99–110)
CHLORIDE BLD-SCNC: 103 MMOL/L (ref 99–110)
CO2: 24 MMOL/L (ref 21–32)
CO2: 25 MMOL/L (ref 21–32)
CO2: 25 MMOL/L (ref 21–32)
CREAT SERPL-MCNC: 0.6 MG/DL (ref 0.9–1.3)
CREAT SERPL-MCNC: 0.7 MG/DL (ref 0.9–1.3)
CREAT SERPL-MCNC: <0.5 MG/DL (ref 0.9–1.3)
EOSINOPHILS ABSOLUTE: 0.1 K/UL (ref 0–0.6)
EOSINOPHILS RELATIVE PERCENT: 1.9 %
GFR AFRICAN AMERICAN: >60
GFR NON-AFRICAN AMERICAN: >60
GLUCOSE BLD-MCNC: 114 MG/DL (ref 70–99)
GLUCOSE BLD-MCNC: 128 MG/DL (ref 70–99)
GLUCOSE BLD-MCNC: 136 MG/DL (ref 70–99)
HCT VFR BLD CALC: 41.8 % (ref 40.5–52.5)
HEMOGLOBIN: 14.1 G/DL (ref 13.5–17.5)
LYMPHOCYTES ABSOLUTE: 0.9 K/UL (ref 1–5.1)
LYMPHOCYTES RELATIVE PERCENT: 15.8 %
MCH RBC QN AUTO: 30.9 PG (ref 26–34)
MCHC RBC AUTO-ENTMCNC: 33.7 G/DL (ref 31–36)
MCV RBC AUTO: 91.7 FL (ref 80–100)
MONOCYTES ABSOLUTE: 0.9 K/UL (ref 0–1.3)
MONOCYTES RELATIVE PERCENT: 15.1 %
NEUTROPHILS ABSOLUTE: 3.9 K/UL (ref 1.7–7.7)
NEUTROPHILS RELATIVE PERCENT: 66.4 %
PDW BLD-RTO: 13.5 % (ref 12.4–15.4)
PLATELET # BLD: 127 K/UL (ref 135–450)
PMV BLD AUTO: 8.1 FL (ref 5–10.5)
POTASSIUM SERPL-SCNC: 3.7 MMOL/L (ref 3.5–5.1)
POTASSIUM SERPL-SCNC: 3.9 MMOL/L (ref 3.5–5.1)
POTASSIUM SERPL-SCNC: 4.5 MMOL/L (ref 3.5–5.1)
RBC # BLD: 4.56 M/UL (ref 4.2–5.9)
SODIUM BLD-SCNC: 135 MMOL/L (ref 136–145)
SODIUM BLD-SCNC: 137 MMOL/L (ref 136–145)
SODIUM BLD-SCNC: 141 MMOL/L (ref 136–145)
WBC # BLD: 5.9 K/UL (ref 4–11)

## 2019-09-22 PROCEDURE — 2500000003 HC RX 250 WO HCPCS: Performed by: INTERNAL MEDICINE

## 2019-09-22 PROCEDURE — 85025 COMPLETE CBC W/AUTO DIFF WBC: CPT

## 2019-09-22 PROCEDURE — 2000000000 HC ICU R&B

## 2019-09-22 PROCEDURE — 99291 CRITICAL CARE FIRST HOUR: CPT | Performed by: INTERNAL MEDICINE

## 2019-09-22 PROCEDURE — 6360000002 HC RX W HCPCS: Performed by: INTERNAL MEDICINE

## 2019-09-22 PROCEDURE — 6370000000 HC RX 637 (ALT 250 FOR IP): Performed by: INTERNAL MEDICINE

## 2019-09-22 PROCEDURE — 80048 BASIC METABOLIC PNL TOTAL CA: CPT

## 2019-09-22 PROCEDURE — 2580000003 HC RX 258: Performed by: INTERNAL MEDICINE

## 2019-09-22 PROCEDURE — 36415 COLL VENOUS BLD VENIPUNCTURE: CPT

## 2019-09-22 RX ADMIN — LORAZEPAM 2 MG: 2 INJECTION INTRAMUSCULAR; INTRAVENOUS at 13:33

## 2019-09-22 RX ADMIN — CLONIDINE HYDROCHLORIDE 0.1 MG: 0.1 TABLET ORAL at 20:48

## 2019-09-22 RX ADMIN — LISINOPRIL 10 MG: 10 TABLET ORAL at 10:27

## 2019-09-22 RX ADMIN — ENOXAPARIN SODIUM 40 MG: 40 INJECTION SUBCUTANEOUS at 10:27

## 2019-09-22 RX ADMIN — Medication 10 ML: at 20:47

## 2019-09-22 RX ADMIN — DEXMEDETOMIDINE HYDROCHLORIDE 0.7 MCG/KG/HR: 100 INJECTION, SOLUTION INTRAVENOUS at 04:20

## 2019-09-22 RX ADMIN — Medication 10 ML: at 10:27

## 2019-09-22 RX ADMIN — CLONIDINE HYDROCHLORIDE 0.1 MG: 0.1 TABLET ORAL at 10:27

## 2019-09-22 RX ADMIN — MUPIROCIN: 20 OINTMENT TOPICAL at 20:47

## 2019-09-22 RX ADMIN — MUPIROCIN: 20 OINTMENT TOPICAL at 10:29

## 2019-09-22 RX ADMIN — POTASSIUM CHLORIDE AND DEXTROSE MONOHYDRATE 75 ML/HR: 150; 5 INJECTION, SOLUTION INTRAVENOUS at 06:59

## 2019-09-22 RX ADMIN — DEXMEDETOMIDINE HYDROCHLORIDE 0.7 MCG/KG/HR: 100 INJECTION, SOLUTION INTRAVENOUS at 14:23

## 2019-09-22 RX ADMIN — POTASSIUM CHLORIDE AND DEXTROSE MONOHYDRATE 75 ML/HR: 150; 5 INJECTION, SOLUTION INTRAVENOUS at 21:31

## 2019-09-22 RX ADMIN — LORAZEPAM 3 MG: 2 INJECTION INTRAMUSCULAR; INTRAVENOUS at 11:16

## 2019-09-22 RX ADMIN — LORAZEPAM 4 MG: 2 INJECTION INTRAMUSCULAR; INTRAVENOUS at 06:59

## 2019-09-23 LAB
ANION GAP SERPL CALCULATED.3IONS-SCNC: 11 MMOL/L (ref 3–16)
ANION GAP SERPL CALCULATED.3IONS-SCNC: 11 MMOL/L (ref 3–16)
ANION GAP SERPL CALCULATED.3IONS-SCNC: 9 MMOL/L (ref 3–16)
BASOPHILS ABSOLUTE: 0.1 K/UL (ref 0–0.2)
BASOPHILS RELATIVE PERCENT: 2.3 %
BUN BLDV-MCNC: 4 MG/DL (ref 7–20)
BUN BLDV-MCNC: 5 MG/DL (ref 7–20)
BUN BLDV-MCNC: 5 MG/DL (ref 7–20)
CALCIUM SERPL-MCNC: 9 MG/DL (ref 8.3–10.6)
CALCIUM SERPL-MCNC: 9.1 MG/DL (ref 8.3–10.6)
CALCIUM SERPL-MCNC: 9.1 MG/DL (ref 8.3–10.6)
CHLORIDE BLD-SCNC: 101 MMOL/L (ref 99–110)
CHLORIDE BLD-SCNC: 103 MMOL/L (ref 99–110)
CHLORIDE BLD-SCNC: 103 MMOL/L (ref 99–110)
CO2: 24 MMOL/L (ref 21–32)
CO2: 24 MMOL/L (ref 21–32)
CO2: 25 MMOL/L (ref 21–32)
CREAT SERPL-MCNC: 0.6 MG/DL (ref 0.9–1.3)
CREAT SERPL-MCNC: 0.6 MG/DL (ref 0.9–1.3)
CREAT SERPL-MCNC: 0.7 MG/DL (ref 0.9–1.3)
EOSINOPHILS ABSOLUTE: 0.1 K/UL (ref 0–0.6)
EOSINOPHILS RELATIVE PERCENT: 3.2 %
GFR AFRICAN AMERICAN: >60
GFR NON-AFRICAN AMERICAN: >60
GLUCOSE BLD-MCNC: 102 MG/DL (ref 70–99)
GLUCOSE BLD-MCNC: 126 MG/DL (ref 70–99)
GLUCOSE BLD-MCNC: 127 MG/DL (ref 70–99)
HCT VFR BLD CALC: 40.7 % (ref 40.5–52.5)
HEMOGLOBIN: 14 G/DL (ref 13.5–17.5)
LYMPHOCYTES ABSOLUTE: 0.8 K/UL (ref 1–5.1)
LYMPHOCYTES RELATIVE PERCENT: 21.2 %
MCH RBC QN AUTO: 31.1 PG (ref 26–34)
MCHC RBC AUTO-ENTMCNC: 34.5 G/DL (ref 31–36)
MCV RBC AUTO: 89.9 FL (ref 80–100)
MONOCYTES ABSOLUTE: 0.6 K/UL (ref 0–1.3)
MONOCYTES RELATIVE PERCENT: 16.3 %
NEUTROPHILS ABSOLUTE: 2.1 K/UL (ref 1.7–7.7)
NEUTROPHILS RELATIVE PERCENT: 57 %
PDW BLD-RTO: 13.1 % (ref 12.4–15.4)
PLATELET # BLD: 161 K/UL (ref 135–450)
PMV BLD AUTO: 7.3 FL (ref 5–10.5)
POTASSIUM SERPL-SCNC: 4 MMOL/L (ref 3.5–5.1)
POTASSIUM SERPL-SCNC: 4 MMOL/L (ref 3.5–5.1)
POTASSIUM SERPL-SCNC: 4.2 MMOL/L (ref 3.5–5.1)
RBC # BLD: 4.52 M/UL (ref 4.2–5.9)
SODIUM BLD-SCNC: 135 MMOL/L (ref 136–145)
SODIUM BLD-SCNC: 138 MMOL/L (ref 136–145)
SODIUM BLD-SCNC: 138 MMOL/L (ref 136–145)
WBC # BLD: 3.6 K/UL (ref 4–11)

## 2019-09-23 PROCEDURE — 6360000002 HC RX W HCPCS: Performed by: INTERNAL MEDICINE

## 2019-09-23 PROCEDURE — 2500000003 HC RX 250 WO HCPCS: Performed by: INTERNAL MEDICINE

## 2019-09-23 PROCEDURE — 97535 SELF CARE MNGMENT TRAINING: CPT

## 2019-09-23 PROCEDURE — 99232 SBSQ HOSP IP/OBS MODERATE 35: CPT | Performed by: INTERNAL MEDICINE

## 2019-09-23 PROCEDURE — 97116 GAIT TRAINING THERAPY: CPT

## 2019-09-23 PROCEDURE — 36415 COLL VENOUS BLD VENIPUNCTURE: CPT

## 2019-09-23 PROCEDURE — 2000000000 HC ICU R&B

## 2019-09-23 PROCEDURE — 6370000000 HC RX 637 (ALT 250 FOR IP): Performed by: INTERNAL MEDICINE

## 2019-09-23 PROCEDURE — 97530 THERAPEUTIC ACTIVITIES: CPT

## 2019-09-23 PROCEDURE — 80048 BASIC METABOLIC PNL TOTAL CA: CPT

## 2019-09-23 PROCEDURE — 2580000003 HC RX 258: Performed by: INTERNAL MEDICINE

## 2019-09-23 PROCEDURE — 85025 COMPLETE CBC W/AUTO DIFF WBC: CPT

## 2019-09-23 PROCEDURE — 99291 CRITICAL CARE FIRST HOUR: CPT | Performed by: INTERNAL MEDICINE

## 2019-09-23 PROCEDURE — 97162 PT EVAL MOD COMPLEX 30 MIN: CPT

## 2019-09-23 PROCEDURE — 97166 OT EVAL MOD COMPLEX 45 MIN: CPT

## 2019-09-23 PROCEDURE — 2580000003 HC RX 258

## 2019-09-23 RX ORDER — THIAMINE MONONITRATE (VIT B1) 100 MG
100 TABLET ORAL DAILY
Status: DISCONTINUED | OUTPATIENT
Start: 2019-09-23 | End: 2019-09-24 | Stop reason: HOSPADM

## 2019-09-23 RX ORDER — M-VIT,TX,IRON,MINS/CALC/FOLIC 27MG-0.4MG
1 TABLET ORAL DAILY
Status: DISCONTINUED | OUTPATIENT
Start: 2019-09-23 | End: 2019-09-24 | Stop reason: HOSPADM

## 2019-09-23 RX ORDER — 0.9 % SODIUM CHLORIDE 0.9 %
1000 INTRAVENOUS SOLUTION INTRAVENOUS ONCE
Status: COMPLETED | OUTPATIENT
Start: 2019-09-23 | End: 2019-09-23

## 2019-09-23 RX ORDER — SODIUM CHLORIDE 9 MG/ML
INJECTION, SOLUTION INTRAVENOUS
Status: COMPLETED
Start: 2019-09-23 | End: 2019-09-23

## 2019-09-23 RX ORDER — FOLIC ACID 1 MG/1
1 TABLET ORAL DAILY
Status: DISCONTINUED | OUTPATIENT
Start: 2019-09-23 | End: 2019-09-24 | Stop reason: HOSPADM

## 2019-09-23 RX ADMIN — Medication 10 ML: at 20:13

## 2019-09-23 RX ADMIN — MUPIROCIN: 20 OINTMENT TOPICAL at 20:13

## 2019-09-23 RX ADMIN — POTASSIUM CHLORIDE AND DEXTROSE MONOHYDRATE 75 ML/HR: 150; 5 INJECTION, SOLUTION INTRAVENOUS at 11:25

## 2019-09-23 RX ADMIN — Medication 1000 ML: at 13:42

## 2019-09-23 RX ADMIN — Medication 10 ML: at 08:22

## 2019-09-23 RX ADMIN — FOLIC ACID 1 MG: 1 TABLET ORAL at 23:31

## 2019-09-23 RX ADMIN — Medication 100 MG: at 23:31

## 2019-09-23 RX ADMIN — SODIUM CHLORIDE 1000 ML: 9 INJECTION, SOLUTION INTRAVENOUS at 13:42

## 2019-09-23 RX ADMIN — DEXMEDETOMIDINE HYDROCHLORIDE 0.7 MCG/KG/HR: 100 INJECTION, SOLUTION INTRAVENOUS at 01:04

## 2019-09-23 RX ADMIN — LISINOPRIL 10 MG: 10 TABLET ORAL at 08:22

## 2019-09-23 RX ADMIN — ENOXAPARIN SODIUM 40 MG: 40 INJECTION SUBCUTANEOUS at 08:22

## 2019-09-23 RX ADMIN — MUPIROCIN: 20 OINTMENT TOPICAL at 08:23

## 2019-09-23 RX ADMIN — CLONIDINE HYDROCHLORIDE 0.1 MG: 0.1 TABLET ORAL at 08:22

## 2019-09-23 RX ADMIN — MULTIPLE VITAMINS W/ MINERALS TAB 1 TABLET: TAB at 23:31

## 2019-09-23 ASSESSMENT — PAIN SCALES - GENERAL: PAINLEVEL_OUTOF10: 0

## 2019-09-24 VITALS
RESPIRATION RATE: 21 BRPM | HEIGHT: 68 IN | WEIGHT: 142.3 LBS | TEMPERATURE: 98.5 F | HEART RATE: 119 BPM | SYSTOLIC BLOOD PRESSURE: 122 MMHG | OXYGEN SATURATION: 94 % | BODY MASS INDEX: 21.57 KG/M2 | DIASTOLIC BLOOD PRESSURE: 88 MMHG

## 2019-09-24 PROBLEM — F10.931 DELIRIUM TREMENS (HCC): Status: RESOLVED | Noted: 2019-09-19 | Resolved: 2019-09-24

## 2019-09-24 PROBLEM — E83.42 HYPOMAGNESEMIA: Status: RESOLVED | Noted: 2019-09-19 | Resolved: 2019-09-24

## 2019-09-24 PROBLEM — G93.41 ACUTE METABOLIC ENCEPHALOPATHY: Status: RESOLVED | Noted: 2019-09-19 | Resolved: 2019-09-24

## 2019-09-24 LAB
ANION GAP SERPL CALCULATED.3IONS-SCNC: 11 MMOL/L (ref 3–16)
ANION GAP SERPL CALCULATED.3IONS-SCNC: 12 MMOL/L (ref 3–16)
BASOPHILS ABSOLUTE: 0.1 K/UL (ref 0–0.2)
BASOPHILS RELATIVE PERCENT: 2.3 %
BUN BLDV-MCNC: 3 MG/DL (ref 7–20)
BUN BLDV-MCNC: 4 MG/DL (ref 7–20)
CALCIUM SERPL-MCNC: 9.5 MG/DL (ref 8.3–10.6)
CALCIUM SERPL-MCNC: 9.5 MG/DL (ref 8.3–10.6)
CHLORIDE BLD-SCNC: 95 MMOL/L (ref 99–110)
CHLORIDE BLD-SCNC: 98 MMOL/L (ref 99–110)
CO2: 23 MMOL/L (ref 21–32)
CO2: 23 MMOL/L (ref 21–32)
CREAT SERPL-MCNC: <0.5 MG/DL (ref 0.9–1.3)
CREAT SERPL-MCNC: <0.5 MG/DL (ref 0.9–1.3)
EOSINOPHILS ABSOLUTE: 0 K/UL (ref 0–0.6)
EOSINOPHILS RELATIVE PERCENT: 1.3 %
GFR AFRICAN AMERICAN: >60
GFR AFRICAN AMERICAN: >60
GFR NON-AFRICAN AMERICAN: >60
GFR NON-AFRICAN AMERICAN: >60
GLUCOSE BLD-MCNC: 108 MG/DL (ref 70–99)
GLUCOSE BLD-MCNC: 132 MG/DL (ref 70–99)
HCT VFR BLD CALC: 38.5 % (ref 40.5–52.5)
HEMOGLOBIN: 13.1 G/DL (ref 13.5–17.5)
LYMPHOCYTES ABSOLUTE: 0.7 K/UL (ref 1–5.1)
LYMPHOCYTES RELATIVE PERCENT: 22.2 %
MCH RBC QN AUTO: 30.9 PG (ref 26–34)
MCHC RBC AUTO-ENTMCNC: 34.1 G/DL (ref 31–36)
MCV RBC AUTO: 90.7 FL (ref 80–100)
MONOCYTES ABSOLUTE: 0.6 K/UL (ref 0–1.3)
MONOCYTES RELATIVE PERCENT: 18.5 %
NEUTROPHILS ABSOLUTE: 1.8 K/UL (ref 1.7–7.7)
NEUTROPHILS RELATIVE PERCENT: 55.7 %
PDW BLD-RTO: 13 % (ref 12.4–15.4)
PLATELET # BLD: 196 K/UL (ref 135–450)
PMV BLD AUTO: 8 FL (ref 5–10.5)
POTASSIUM SERPL-SCNC: 3.9 MMOL/L (ref 3.5–5.1)
POTASSIUM SERPL-SCNC: 4.1 MMOL/L (ref 3.5–5.1)
RBC # BLD: 4.25 M/UL (ref 4.2–5.9)
SODIUM BLD-SCNC: 130 MMOL/L (ref 136–145)
SODIUM BLD-SCNC: 132 MMOL/L (ref 136–145)
WBC # BLD: 3.3 K/UL (ref 4–11)

## 2019-09-24 PROCEDURE — 85025 COMPLETE CBC W/AUTO DIFF WBC: CPT

## 2019-09-24 PROCEDURE — 97116 GAIT TRAINING THERAPY: CPT

## 2019-09-24 PROCEDURE — 36415 COLL VENOUS BLD VENIPUNCTURE: CPT

## 2019-09-24 PROCEDURE — 99238 HOSP IP/OBS DSCHRG MGMT 30/<: CPT | Performed by: INTERNAL MEDICINE

## 2019-09-24 PROCEDURE — 6360000002 HC RX W HCPCS: Performed by: INTERNAL MEDICINE

## 2019-09-24 PROCEDURE — 6370000000 HC RX 637 (ALT 250 FOR IP): Performed by: INTERNAL MEDICINE

## 2019-09-24 PROCEDURE — 80048 BASIC METABOLIC PNL TOTAL CA: CPT

## 2019-09-24 PROCEDURE — 90686 IIV4 VACC NO PRSV 0.5 ML IM: CPT | Performed by: INTERNAL MEDICINE

## 2019-09-24 PROCEDURE — G0008 ADMIN INFLUENZA VIRUS VAC: HCPCS | Performed by: INTERNAL MEDICINE

## 2019-09-24 PROCEDURE — 99233 SBSQ HOSP IP/OBS HIGH 50: CPT | Performed by: INTERNAL MEDICINE

## 2019-09-24 RX ORDER — LORAZEPAM 1 MG/1
1 TABLET ORAL EVERY 8 HOURS PRN
Qty: 15 TABLET | Refills: 0 | Status: SHIPPED | OUTPATIENT
Start: 2019-09-24 | End: 2019-09-29

## 2019-09-24 RX ORDER — LORAZEPAM 1 MG/1
1 TABLET ORAL EVERY 8 HOURS PRN
Qty: 15 TABLET | Refills: 0 | Status: SHIPPED | OUTPATIENT
Start: 2019-09-24 | End: 2019-09-24 | Stop reason: SDUPTHER

## 2019-09-24 RX ADMIN — POTASSIUM CHLORIDE AND DEXTROSE MONOHYDRATE 75 ML/HR: 150; 5 INJECTION, SOLUTION INTRAVENOUS at 02:10

## 2019-09-24 RX ADMIN — MULTIPLE VITAMINS W/ MINERALS TAB 1 TABLET: TAB at 10:53

## 2019-09-24 RX ADMIN — FOLIC ACID 1 MG: 1 TABLET ORAL at 10:53

## 2019-09-24 RX ADMIN — LORAZEPAM 1 MG: 1 TABLET ORAL at 10:53

## 2019-09-24 RX ADMIN — CLONIDINE HYDROCHLORIDE 0.1 MG: 0.1 TABLET ORAL at 10:53

## 2019-09-24 RX ADMIN — Medication 100 MG: at 10:55

## 2019-09-24 RX ADMIN — INFLUENZA A VIRUS A/BRISBANE/02/2018 IVR-190 (H1N1) ANTIGEN (PROPIOLACTONE INACTIVATED), INFLUENZA A VIRUS A/KANSAS/14/2017 X-327 (H3N2) ANTIGEN (PROPIOLACTONE INACTIVATED), INFLUENZA B VIRUS B/MARYLAND/15/2016 ANTIGEN (PROPIOLACTONE INACTIVATED), INFLUENZA B VIRUS B/PHUKET/3073/2013 BVR-1B ANTIGEN (PROPIOLACTONE INACTIVATED) 0.5 ML: 15; 15; 15; 15 INJECTION, SUSPENSION INTRAMUSCULAR at 11:09

## 2019-09-24 ASSESSMENT — PAIN SCALES - GENERAL
PAINLEVEL_OUTOF10: 0
PAINLEVEL_OUTOF10: 0

## 2019-10-01 ENCOUNTER — OFFICE VISIT (OUTPATIENT)
Dept: FAMILY MEDICINE CLINIC | Age: 44
End: 2019-10-01
Payer: MEDICAID

## 2019-10-01 ENCOUNTER — TELEPHONE (OUTPATIENT)
Dept: FAMILY MEDICINE CLINIC | Age: 44
End: 2019-10-01

## 2019-10-01 VITALS
SYSTOLIC BLOOD PRESSURE: 118 MMHG | RESPIRATION RATE: 16 BRPM | BODY MASS INDEX: 22.2 KG/M2 | WEIGHT: 146 LBS | HEART RATE: 113 BPM | TEMPERATURE: 97.4 F | OXYGEN SATURATION: 96 % | DIASTOLIC BLOOD PRESSURE: 80 MMHG

## 2019-10-01 DIAGNOSIS — F10.931 ALCOHOL WITHDRAWAL SYNDROME, WITH DELIRIUM (HCC): Primary | ICD-10-CM

## 2019-10-01 DIAGNOSIS — I10 HYPERTENSION, UNSPECIFIED TYPE: ICD-10-CM

## 2019-10-01 DIAGNOSIS — F41.9 ANXIETY: ICD-10-CM

## 2019-10-01 PROCEDURE — 99213 OFFICE O/P EST LOW 20 MIN: CPT | Performed by: NURSE PRACTITIONER

## 2019-10-01 PROCEDURE — 1111F DSCHRG MED/CURRENT MED MERGE: CPT | Performed by: NURSE PRACTITIONER

## 2019-10-01 RX ORDER — LORAZEPAM 1 MG/1
1 TABLET ORAL EVERY 6 HOURS PRN
COMMUNITY
End: 2020-09-04

## 2019-10-01 ASSESSMENT — PATIENT HEALTH QUESTIONNAIRE - PHQ9
SUM OF ALL RESPONSES TO PHQ9 QUESTIONS 1 & 2: 0
SUM OF ALL RESPONSES TO PHQ QUESTIONS 1-9: 0
2. FEELING DOWN, DEPRESSED OR HOPELESS: 0
1. LITTLE INTEREST OR PLEASURE IN DOING THINGS: 0
SUM OF ALL RESPONSES TO PHQ QUESTIONS 1-9: 0

## 2019-12-16 DIAGNOSIS — I10 ESSENTIAL HYPERTENSION: ICD-10-CM

## 2019-12-16 RX ORDER — LISINOPRIL 10 MG/1
TABLET ORAL
Qty: 90 TABLET | Refills: 0 | Status: ON HOLD | OUTPATIENT
Start: 2019-12-16 | End: 2020-09-14 | Stop reason: HOSPADM

## 2020-09-04 ENCOUNTER — HOSPITAL ENCOUNTER (INPATIENT)
Age: 45
LOS: 9 days | Discharge: HOME | DRG: 896 | End: 2020-09-14
Attending: EMERGENCY MEDICINE | Admitting: INTERNAL MEDICINE
Payer: COMMERCIAL

## 2020-09-04 ENCOUNTER — APPOINTMENT (OUTPATIENT)
Dept: GENERAL RADIOLOGY | Age: 45
DRG: 896 | End: 2020-09-04
Payer: COMMERCIAL

## 2020-09-04 LAB
A/G RATIO: 1.8 (ref 1.1–2.2)
ALBUMIN SERPL-MCNC: 4.4 G/DL (ref 3.4–5)
ALP BLD-CCNC: 49 U/L (ref 40–129)
ALT SERPL-CCNC: 92 U/L (ref 10–40)
AMPHETAMINE SCREEN, URINE: NORMAL
ANION GAP SERPL CALCULATED.3IONS-SCNC: 19 MMOL/L (ref 3–16)
AST SERPL-CCNC: 139 U/L (ref 15–37)
BARBITURATE SCREEN URINE: NORMAL
BENZODIAZEPINE SCREEN, URINE: NORMAL
BILIRUB SERPL-MCNC: 0.6 MG/DL (ref 0–1)
BILIRUBIN URINE: NEGATIVE
BLOOD, URINE: NEGATIVE
BUN BLDV-MCNC: 3 MG/DL (ref 7–20)
CALCIUM SERPL-MCNC: 8.8 MG/DL (ref 8.3–10.6)
CANNABINOID SCREEN URINE: NORMAL
CHLORIDE BLD-SCNC: 94 MMOL/L (ref 99–110)
CLARITY: CLEAR
CO2: 21 MMOL/L (ref 21–32)
COCAINE METABOLITE SCREEN URINE: NORMAL
COLOR: YELLOW
CREAT SERPL-MCNC: <0.5 MG/DL (ref 0.9–1.3)
ETHANOL: 288 MG/DL (ref 0–0.08)
GFR AFRICAN AMERICAN: >60
GFR NON-AFRICAN AMERICAN: >60
GLOBULIN: 2.5 G/DL
GLUCOSE BLD-MCNC: 107 MG/DL (ref 70–99)
GLUCOSE URINE: NEGATIVE MG/DL
HCT VFR BLD CALC: 42.9 % (ref 40.5–52.5)
HEMOGLOBIN: 14.4 G/DL (ref 13.5–17.5)
KETONES, URINE: NEGATIVE MG/DL
LEUKOCYTE ESTERASE, URINE: NEGATIVE
LIPASE: 61 U/L (ref 13–60)
Lab: NORMAL
MAGNESIUM: 2 MG/DL (ref 1.8–2.4)
MCH RBC QN AUTO: 29.8 PG (ref 26–34)
MCHC RBC AUTO-ENTMCNC: 33.6 G/DL (ref 31–36)
MCV RBC AUTO: 88.6 FL (ref 80–100)
METHADONE SCREEN, URINE: NORMAL
MICROSCOPIC EXAMINATION: NORMAL
NITRITE, URINE: NEGATIVE
OPIATE SCREEN URINE: NORMAL
OXYCODONE URINE: NORMAL
PDW BLD-RTO: 13 % (ref 12.4–15.4)
PH UA: 6.5
PH UA: 6.5 (ref 5–8)
PHENCYCLIDINE SCREEN URINE: NORMAL
PLATELET # BLD: 111 K/UL (ref 135–450)
PMV BLD AUTO: 7.6 FL (ref 5–10.5)
POTASSIUM REFLEX MAGNESIUM: 4.9 MMOL/L (ref 3.5–5.1)
PROPOXYPHENE SCREEN: NORMAL
PROTEIN UA: NEGATIVE MG/DL
RBC # BLD: 4.85 M/UL (ref 4.2–5.9)
SODIUM BLD-SCNC: 134 MMOL/L (ref 136–145)
SPECIFIC GRAVITY UA: <=1.005 (ref 1–1.03)
TOTAL PROTEIN: 6.9 G/DL (ref 6.4–8.2)
TROPONIN: <0.01 NG/ML
URINE TYPE: NORMAL
UROBILINOGEN, URINE: 0.2 E.U./DL
WBC # BLD: 3.2 K/UL (ref 4–11)

## 2020-09-04 PROCEDURE — 83690 ASSAY OF LIPASE: CPT

## 2020-09-04 PROCEDURE — G0480 DRUG TEST DEF 1-7 CLASSES: HCPCS

## 2020-09-04 PROCEDURE — 85027 COMPLETE CBC AUTOMATED: CPT

## 2020-09-04 PROCEDURE — 83735 ASSAY OF MAGNESIUM: CPT

## 2020-09-04 PROCEDURE — 93005 ELECTROCARDIOGRAM TRACING: CPT | Performed by: EMERGENCY MEDICINE

## 2020-09-04 PROCEDURE — 2580000003 HC RX 258: Performed by: NURSE PRACTITIONER

## 2020-09-04 PROCEDURE — 80053 COMPREHEN METABOLIC PANEL: CPT

## 2020-09-04 PROCEDURE — 80307 DRUG TEST PRSMV CHEM ANLYZR: CPT

## 2020-09-04 PROCEDURE — 84484 ASSAY OF TROPONIN QUANT: CPT

## 2020-09-04 PROCEDURE — 6360000002 HC RX W HCPCS: Performed by: NURSE PRACTITIONER

## 2020-09-04 PROCEDURE — 36415 COLL VENOUS BLD VENIPUNCTURE: CPT

## 2020-09-04 PROCEDURE — 2500000003 HC RX 250 WO HCPCS: Performed by: NURSE PRACTITIONER

## 2020-09-04 PROCEDURE — 99285 EMERGENCY DEPT VISIT HI MDM: CPT

## 2020-09-04 PROCEDURE — 81003 URINALYSIS AUTO W/O SCOPE: CPT

## 2020-09-04 PROCEDURE — 71045 X-RAY EXAM CHEST 1 VIEW: CPT

## 2020-09-04 RX ORDER — 0.9 % SODIUM CHLORIDE 0.9 %
1000 INTRAVENOUS SOLUTION INTRAVENOUS ONCE
Status: COMPLETED | OUTPATIENT
Start: 2020-09-04 | End: 2020-09-04

## 2020-09-04 RX ADMIN — FOLIC ACID: 5 INJECTION, SOLUTION INTRAMUSCULAR; INTRAVENOUS; SUBCUTANEOUS at 22:29

## 2020-09-04 RX ADMIN — SODIUM CHLORIDE 1000 ML: 9 INJECTION, SOLUTION INTRAVENOUS at 22:24

## 2020-09-04 ASSESSMENT — ENCOUNTER SYMPTOMS
BACK PAIN: 0
CONSTIPATION: 0
VOMITING: 0
COUGH: 0
SHORTNESS OF BREATH: 0
NAUSEA: 0
DIARRHEA: 0
ABDOMINAL PAIN: 0
EYES NEGATIVE: 1
ABDOMINAL DISTENTION: 0
WHEEZING: 0
ALLERGIC/IMMUNOLOGIC NEGATIVE: 1

## 2020-09-05 PROBLEM — F10.930 ALCOHOL WITHDRAWAL SYNDROME WITHOUT COMPLICATION (HCC): Status: ACTIVE | Noted: 2020-09-05

## 2020-09-05 LAB
ANION GAP SERPL CALCULATED.3IONS-SCNC: 15 MMOL/L (ref 3–16)
BUN BLDV-MCNC: <2 MG/DL (ref 7–20)
CALCIUM SERPL-MCNC: 8.8 MG/DL (ref 8.3–10.6)
CHLORIDE BLD-SCNC: 104 MMOL/L (ref 99–110)
CO2: 26 MMOL/L (ref 21–32)
CREAT SERPL-MCNC: 0.6 MG/DL (ref 0.9–1.3)
EKG ATRIAL RATE: 111 BPM
EKG DIAGNOSIS: NORMAL
EKG P AXIS: 26 DEGREES
EKG P-R INTERVAL: 138 MS
EKG Q-T INTERVAL: 336 MS
EKG QRS DURATION: 86 MS
EKG QTC CALCULATION (BAZETT): 456 MS
EKG R AXIS: 11 DEGREES
EKG T AXIS: 46 DEGREES
EKG VENTRICULAR RATE: 111 BPM
GFR AFRICAN AMERICAN: >60
GFR NON-AFRICAN AMERICAN: >60
GLUCOSE BLD-MCNC: 75 MG/DL (ref 70–99)
HCT VFR BLD CALC: 41.3 % (ref 40.5–52.5)
HEMOGLOBIN: 13.9 G/DL (ref 13.5–17.5)
MAGNESIUM: 1.9 MG/DL (ref 1.8–2.4)
MCH RBC QN AUTO: 29.8 PG (ref 26–34)
MCHC RBC AUTO-ENTMCNC: 33.8 G/DL (ref 31–36)
MCV RBC AUTO: 88.3 FL (ref 80–100)
PDW BLD-RTO: 13.4 % (ref 12.4–15.4)
PLATELET # BLD: 117 K/UL (ref 135–450)
PMV BLD AUTO: 7.4 FL (ref 5–10.5)
POTASSIUM REFLEX MAGNESIUM: 4.4 MMOL/L (ref 3.5–5.1)
RBC # BLD: 4.68 M/UL (ref 4.2–5.9)
SODIUM BLD-SCNC: 145 MMOL/L (ref 136–145)
WBC # BLD: 3.9 K/UL (ref 4–11)

## 2020-09-05 PROCEDURE — 93010 ELECTROCARDIOGRAM REPORT: CPT | Performed by: INTERNAL MEDICINE

## 2020-09-05 PROCEDURE — 6370000000 HC RX 637 (ALT 250 FOR IP): Performed by: INTERNAL MEDICINE

## 2020-09-05 PROCEDURE — 80048 BASIC METABOLIC PNL TOTAL CA: CPT

## 2020-09-05 PROCEDURE — 83735 ASSAY OF MAGNESIUM: CPT

## 2020-09-05 PROCEDURE — 2060000000 HC ICU INTERMEDIATE R&B

## 2020-09-05 PROCEDURE — 36415 COLL VENOUS BLD VENIPUNCTURE: CPT

## 2020-09-05 PROCEDURE — 2580000003 HC RX 258: Performed by: INTERNAL MEDICINE

## 2020-09-05 PROCEDURE — 2500000003 HC RX 250 WO HCPCS: Performed by: INTERNAL MEDICINE

## 2020-09-05 PROCEDURE — 6360000002 HC RX W HCPCS: Performed by: INTERNAL MEDICINE

## 2020-09-05 PROCEDURE — 85027 COMPLETE CBC AUTOMATED: CPT

## 2020-09-05 RX ORDER — ACETAMINOPHEN 325 MG/1
650 TABLET ORAL EVERY 6 HOURS PRN
Status: DISCONTINUED | OUTPATIENT
Start: 2020-09-05 | End: 2020-09-14 | Stop reason: HOSPADM

## 2020-09-05 RX ORDER — LORAZEPAM 2 MG/ML
3 INJECTION INTRAMUSCULAR
Status: DISCONTINUED | OUTPATIENT
Start: 2020-09-05 | End: 2020-09-14

## 2020-09-05 RX ORDER — LORAZEPAM 2 MG/ML
2 INJECTION INTRAMUSCULAR
Status: DISCONTINUED | OUTPATIENT
Start: 2020-09-05 | End: 2020-09-14

## 2020-09-05 RX ORDER — LORAZEPAM 1 MG/1
3 TABLET ORAL
Status: DISCONTINUED | OUTPATIENT
Start: 2020-09-05 | End: 2020-09-14

## 2020-09-05 RX ORDER — LORAZEPAM 1 MG/1
4 TABLET ORAL
Status: DISCONTINUED | OUTPATIENT
Start: 2020-09-05 | End: 2020-09-14

## 2020-09-05 RX ORDER — LORAZEPAM 2 MG/ML
4 INJECTION INTRAMUSCULAR
Status: DISCONTINUED | OUTPATIENT
Start: 2020-09-05 | End: 2020-09-14

## 2020-09-05 RX ORDER — SODIUM CHLORIDE, SODIUM LACTATE, POTASSIUM CHLORIDE, CALCIUM CHLORIDE 600; 310; 30; 20 MG/100ML; MG/100ML; MG/100ML; MG/100ML
INJECTION, SOLUTION INTRAVENOUS CONTINUOUS
Status: DISCONTINUED | OUTPATIENT
Start: 2020-09-05 | End: 2020-09-06

## 2020-09-05 RX ORDER — SODIUM CHLORIDE 0.9 % (FLUSH) 0.9 %
10 SYRINGE (ML) INJECTION PRN
Status: DISCONTINUED | OUTPATIENT
Start: 2020-09-05 | End: 2020-09-14 | Stop reason: HOSPADM

## 2020-09-05 RX ORDER — LORAZEPAM 1 MG/1
1 TABLET ORAL
Status: DISCONTINUED | OUTPATIENT
Start: 2020-09-05 | End: 2020-09-14

## 2020-09-05 RX ORDER — POLYETHYLENE GLYCOL 3350 17 G/17G
17 POWDER, FOR SOLUTION ORAL DAILY PRN
Status: DISCONTINUED | OUTPATIENT
Start: 2020-09-05 | End: 2020-09-14 | Stop reason: HOSPADM

## 2020-09-05 RX ORDER — ACETAMINOPHEN 650 MG/1
650 SUPPOSITORY RECTAL EVERY 6 HOURS PRN
Status: DISCONTINUED | OUTPATIENT
Start: 2020-09-05 | End: 2020-09-14 | Stop reason: HOSPADM

## 2020-09-05 RX ORDER — LORAZEPAM 2 MG/ML
1 INJECTION INTRAMUSCULAR
Status: DISCONTINUED | OUTPATIENT
Start: 2020-09-05 | End: 2020-09-14

## 2020-09-05 RX ORDER — LORAZEPAM 1 MG/1
2 TABLET ORAL
Status: DISCONTINUED | OUTPATIENT
Start: 2020-09-05 | End: 2020-09-14

## 2020-09-05 RX ORDER — SODIUM CHLORIDE 0.9 % (FLUSH) 0.9 %
10 SYRINGE (ML) INJECTION EVERY 12 HOURS SCHEDULED
Status: DISCONTINUED | OUTPATIENT
Start: 2020-09-05 | End: 2020-09-14 | Stop reason: HOSPADM

## 2020-09-05 RX ORDER — CHLORDIAZEPOXIDE HYDROCHLORIDE 5 MG/1
10 CAPSULE, GELATIN COATED ORAL 3 TIMES DAILY
Status: DISCONTINUED | OUTPATIENT
Start: 2020-09-05 | End: 2020-09-11

## 2020-09-05 RX ORDER — PROMETHAZINE HYDROCHLORIDE 25 MG/1
12.5 TABLET ORAL EVERY 6 HOURS PRN
Status: DISCONTINUED | OUTPATIENT
Start: 2020-09-05 | End: 2020-09-14

## 2020-09-05 RX ORDER — ONDANSETRON 2 MG/ML
4 INJECTION INTRAMUSCULAR; INTRAVENOUS EVERY 6 HOURS PRN
Status: DISCONTINUED | OUTPATIENT
Start: 2020-09-05 | End: 2020-09-14 | Stop reason: HOSPADM

## 2020-09-05 RX ADMIN — SODIUM CHLORIDE, POTASSIUM CHLORIDE, SODIUM LACTATE AND CALCIUM CHLORIDE: 600; 310; 30; 20 INJECTION, SOLUTION INTRAVENOUS at 03:31

## 2020-09-05 RX ADMIN — SODIUM CHLORIDE, POTASSIUM CHLORIDE, SODIUM LACTATE AND CALCIUM CHLORIDE: 600; 310; 30; 20 INJECTION, SOLUTION INTRAVENOUS at 21:27

## 2020-09-05 RX ADMIN — CHLORDIAZEPOXIDE HYDROCHLORIDE 10 MG: 5 CAPSULE ORAL at 21:27

## 2020-09-05 RX ADMIN — Medication 10 ML: at 09:20

## 2020-09-05 RX ADMIN — FAMOTIDINE 20 MG: 10 INJECTION INTRAVENOUS at 21:27

## 2020-09-05 RX ADMIN — ENOXAPARIN SODIUM 40 MG: 40 INJECTION SUBCUTANEOUS at 09:19

## 2020-09-05 RX ADMIN — Medication 10 ML: at 21:27

## 2020-09-05 RX ADMIN — CHLORDIAZEPOXIDE HYDROCHLORIDE 10 MG: 5 CAPSULE ORAL at 09:20

## 2020-09-05 RX ADMIN — CHLORDIAZEPOXIDE HYDROCHLORIDE 10 MG: 5 CAPSULE ORAL at 14:53

## 2020-09-05 RX ADMIN — LORAZEPAM 1 MG: 2 INJECTION INTRAMUSCULAR; INTRAVENOUS at 12:53

## 2020-09-05 RX ADMIN — PROMETHAZINE HYDROCHLORIDE 12.5 MG: 25 TABLET ORAL at 12:52

## 2020-09-05 RX ADMIN — FAMOTIDINE 20 MG: 10 INJECTION INTRAVENOUS at 09:19

## 2020-09-05 ASSESSMENT — PAIN SCALES - GENERAL
PAINLEVEL_OUTOF10: 0

## 2020-09-05 NOTE — ED PROVIDER NOTES
I independently evaluated and obtained a history and physical on [id number]. All diagnostic, treatment, and disposition assistants were made to myself in conjunction the advanced practice provider. For further details of this patient's emergency department encounter, please see the advanced practice provider's documentation. History: 50-year-old male who reportedly drinks 03-08 alcoholic beverages a day presents after calling 911 on himself for being intoxicated and being unable to stand. The patient reports his last alcohol intake was 4 PM.  He reports that he wants to be admitted to safely withdraw from alcohol. Physician Exam: Middle-aged  male no acute distress. Regular tachycardic rhythm with intact distal pulses. Moves all 4 extremities spontaneously. No focal neurologic deficits    MDM: Patient reports high desire to medically detox from alcohol. He is admitted for alcohol detoxification. Patient expresses understanding and agreement with this plan. FINAL IMPRESSION      1. Acute alcoholic intoxication without complication ([facility] [address]    2.  Alcohol abuse               Tammy Fox MD  09/05/20 0117

## 2020-09-05 NOTE — ED NOTES
Bed: 01  Expected date:   Expected time:   Means of arrival:   Comments:  [place] [id number]  09/04/20 2114 Transposition Flap Text: The defect edges were debeveled with a #15 scalpel blade.  Given the location of the defect and the proximity to free margins a transposition flap was deemed most appropriate.  Using a sterile surgical marker, an appropriate transposition flap was drawn incorporating the defect.    The area thus outlined was incised deep to adipose tissue with a #15 scalpel blade.  The skin margins were undermined to an appropriate distance in all directions utilizing iris scissors.

## 2020-09-05 NOTE — FLOWSHEET NOTE
09/05/20 0319   Assessment   Charting Type Admission   Neurological   Neuro (WDL) X   Level of Consciousness 0   Orientation Level Oriented X4   Cognition Appropriate judgement; Appropriate safety awareness; Appropriate attention/concentration; Appropriate for developmental age; Follows commands   Language Clear   RUE Motor Response Tremors; Responds to command;Normal extension;Normal flexion   Sensation RUE Full sensation; No numbness; No pain; No tingling   LUE Motor Response Tremors; Normal flexion; Normal extension; Responds to command   Sensation LUE Full sensation; No numbness; No pain; No tingling   RLE Motor Response Tremors; Normal flexion; Normal extension; Responds to command   Sensation RLE Full sensation; No pain; No numbness; No tingling   LLE Motor Response Tremors; Normal flexion; Normal extension; Responds to command   Sensation LLE Full sensation; No numbness; No pain; No tingling   [place] Coma Scale   Eye Opening 4   Best Verbal Response 5   Best Motor Response 6   [place] Coma Scale Score 15   HEENT   HEENT (WDL) X   Right Eye Impaired vision   Left Eye Impaired vision   Teeth Missing teeth   Cardiac   Cardiac (WDL) WDL   Cardiac Regularity Regular   Cardiac Rhythm NSR   Rhythm Interpretation   Pulse 85   Cardiac Monitor   Telemetry Monitor On Yes   Gastrointestinal   Abdominal (WDL) WDL   Peripheral Vascular   Peripheral Vascular (WDL) WDL   Edema None   Skin Color/Condition   Skin Color/Condition (WDL) WDL   Skin Integrity   Skin Integrity (WDL) WDL   Musculoskeletal   Musculoskeletal (WDL) X   RUE Full movement;Weakness   LUE Full movement;Weakness   RL Extremity Full movement;Weakness   LL Extremity Full movement;Weakness   Genitourinary   Genitourinary (WDL) WDL   Flank Tenderness No   Suprapubic Tenderness No   Dysuria No   Anus/Rectum   Anus/Rectum (WDL) WDL  (per patient)   Psychosocial   Psychosocial (WDL) WDL

## 2020-09-05 NOTE — ED PROVIDER NOTES
[address] [facility]  ED  EMERGENCY DEPARTMENT ENCOUNTER        Pt Name: Darinel Kent  MRN: [id number]  [place] 1975  Date of evaluation: 9/4/2020  Provider: CARLA Boyd - RAJ  PCP: Aleida Smith, DO     I have seen and evaluated this patient with my supervising physician No att. providers found. CHIEF COMPLAINT       Chief Complaint   Patient presents with    Alcohol Problem     Found walking on road; unsteady; states he has been drinking for the last 3 weeks; has no been eating; last drink this afternoon. HISTORY OF PRESENT ILLNESS   (Location, Timing/Onset, Context/Setting, Quality, Duration, Modifying Factors, Severity, Associated Signs and Symptoms)  Note limiting factors. Darinel Kent is a 38 y.o. male who presents to the ED with complaints of having an alcohol problem. States he drinks about 12 beers a day. States he was so drunk today he was walking on the side of the road and was stumbling and he himself was worried he may fall into the road, so he called the police. States his last beer was at 4pm today. States he has also been having intermittent chest pain over the past couple of weeks when he drinks a lot. Denies any fevers, chills, nausea, vomiting, abdominal pain, weakness, headache, hallucinations, tactile disturbances or confusion. States he wants to be admitted for detox. Patient is anxious and states he doesn't want to go home, states he needs help to detox    Nursing Notes were all reviewed and agreed with or any disagreements were addressed in the HPI. REVIEW OF SYSTEMS    (2-9 systems for level 4, 10 or more for level 5)     Review of Systems   Constitutional: Negative for activity change, appetite change, chills, diaphoresis, fatigue and fever. HENT: Negative. Eyes: Negative. Respiratory: Negative for cough, shortness of breath and wheezing. Cardiovascular: Positive for chest pain. Negative for palpitations and leg swelling. Gastrointestinal: Negative for abdominal distention, abdominal pain, constipation, diarrhea, nausea and vomiting. Endocrine: Negative. Genitourinary: Negative for dysuria, flank pain and hematuria. Musculoskeletal: Negative for back pain, myalgias, neck pain and neck stiffness. Skin: Negative. Allergic/Immunologic: Negative. Neurological: Negative for dizziness, seizures, syncope, speech difficulty, weakness, light-headedness, numbness and headaches. Hematological: Negative. Psychiatric/Behavioral: Negative. Positives and Pertinent negatives as per HPI. Except as noted above in the ROS, all other systems were reviewed and negative. PAST MEDICAL HISTORY     Past Medical History:   Diagnosis Date    Allergic rhinitis     Anxiety     Depression     Hypertension          SURGICAL HISTORY   History reviewed. No pertinent surgical history. CURRENTMEDICATIONS       Previous Medications    CLONIDINE (CATAPRES) 0.1 MG TABLET    TAKE 1 TABLET BY MOUTH EVERY DAY    ESCITALOPRAM (LEXAPRO) 10 MG TABLET    TAKE 1 TABLET BY MOUTH EVERY DAY    LISINOPRIL (PRINIVIL;ZESTRIL) 10 MG TABLET    TAKE 1 TABLET BY MOUTH EVERY DAY    MULTIPLE VITAMINS-MINERALS (THERAPEUTIC MULTIVITAMIN-MINERALS) TABLET    Take 1 tablet by mouth daily         ALLERGIES     No known allergies    FAMILYHISTORY       Family History   Problem Relation Age of Onset    Depression Mother     Depression Father     High Blood Pressure Father     Depression Brother           SOCIAL HISTORY       Social History     Tobacco Use    Smoking status: Never Smoker    Smokeless tobacco: Current User     Types: Chew   Substance Use Topics    Alcohol use:  Yes     Alcohol/week: 6.0 standard drinks     Types: 6 Cans of beer per week     Comment: 15 beers/ day    Drug use: No       SCREENINGS    Callahan Coma Scale  Eye Opening: Spontaneous  Best Verbal Response: Oriented  Best Motor Response: Obeys commands  Lora Coma Scale Score: 15        PHYSICAL EXAM    (up to 7 for level 4, 8 or more for level 5)     ED Triage Vitals [09/04/20 2116]   BP Temp Temp Source Pulse Resp SpO2 Height Weight   (!) 175/114 97.6 °F (36.4 °C) Oral 117 22 94 % -- --       Physical Exam  Constitutional:       General: He is not in acute distress. Appearance: Normal appearance. He is normal weight. He is not ill-appearing, toxic-appearing or diaphoretic. HENT:      Head: Normocephalic and atraumatic. Nose: Nose normal.      Mouth/Throat:      Mouth: Mucous membranes are moist.      Pharynx: Oropharynx is clear. No oropharyngeal exudate. Eyes:      Extraocular Movements:      Right eye: Nystagmus present. Left eye: Nystagmus present. Conjunctiva/sclera: Conjunctivae normal.      Pupils: Pupils are equal, round, and reactive to light. Neck:      Musculoskeletal: Normal range of motion and neck supple. Cardiovascular:      Rate and Rhythm: Regular rhythm. Tachycardia present. Pulses: Normal pulses. Heart sounds: Normal heart sounds. No murmur. No friction rub. No gallop. Pulmonary:      Effort: Pulmonary effort is normal. No respiratory distress. Breath sounds: Normal breath sounds. No stridor. No wheezing, rhonchi or rales. Chest:      Chest wall: No tenderness. Abdominal:      General: Abdomen is flat. Bowel sounds are normal. There is no distension. Palpations: There is no mass. Tenderness: There is no abdominal tenderness. There is no guarding. Musculoskeletal: Normal range of motion. Lymphadenopathy:      Cervical: No cervical adenopathy. Skin:     General: Skin is warm and dry. Capillary Refill: Capillary refill takes less than 2 seconds. Neurological:      General: No focal deficit present. Mental Status: He is alert and oriented to person, place, and time. Gait: Gait abnormal.   Psychiatric:         Mood and Affect: Mood is anxious.          Behavior: Behavior normal. Thought Content: Thought content normal.         Judgment: Judgment normal.         DIAGNOSTIC RESULTS   LABS:    Labs Reviewed   COMPREHENSIVE METABOLIC PANEL W/ REFLEX TO MG FOR LOW K - Abnormal; Notable for the following components:       Result Value    Sodium 134 (*)     Chloride 94 (*)     Anion Gap 19 (*)     Glucose 107 (*)     BUN 3 (*)     CREATININE <0.5 (*)     ALT 92 (*)      (*)     All other components within normal limits    Narrative:     Jessie Crump tel. [phone],  Jason Rosario RN notified, 09/04/2020 22:12, by MAPDO  clotted  Performed at:  [place] [address] [employer]   Phone [phone]   LIPASE - Abnormal; Notable for the following components:    Lipase 61.0 (*)     All other components within normal limits    Narrative:     Jessie Crump tel. [phone],  Jason Rosario RN notified, 09/04/2020 22:12, by FREDISDO  clotted  Performed at:  [place] [address] [employer]   Phone [phone]   CBC - Abnormal; Notable for the following components:    WBC 3.2 (*)     Platelets 972 (*)     All other components within normal limits    Narrative:     Performed at:  [address] [place] [address] [employer]   Phone [phone]   ETHANOL    Narrative:     Jessie Crump tel. [phone],  Jason Rosario RN notified, 09/04/2020 22:12, by KATHI  clotted  Performed at:  [address] [place] [address] [employer]   Phone [phone]   URINE DRUG SCREEN    Narrative:     Performed at:  [address] [place] [address] [employer]   Phone [phone]   URINALYSIS    Narrative:     Performed at:  [address] [place] [address] [employer]   Phone [phone]   TROPONIN    Narrative:     [facility]  [facility] tel. [phone],  Ehsan Quinones RN notified, 09/04/2020 22:12, by KATHI  clotted  Performed at:  [address],  [place], [address] [place] [address] [employer]   Phone [phone]   MAGNESIUM    Narrative:     Stacie Reeves tel. [phone],  Ehsan Quinones RN notified, 09/04/2020 22:12, by KATHI  clotted  Performed at:  [address],  [place], [email or web address]   Phone [phone]       All other labs were within normal range or not returned as of this dictation. EKG: All EKG's are interpreted by the Emergency Department Physician in the absence of a cardiologist.  Please see their note for interpretation of EKG. RADIOLOGY:   Non-plain film images such as CT, Ultrasound and MRI are read by the radiologist. Plain radiographic images are visualized and preliminarily interpreted by the ED Provider with the below findings:        Interpretation per the Radiologist below, if available at the time of this note:    XR CHEST PORTABLE   Final Result   No acute process. No results found. PROCEDURES   Unless otherwise noted below, none     Procedures    CRITICAL CARE TIME   N/A    CONSULTS:  IP CONSULT TO HOSPITALIST      EMERGENCY DEPARTMENT COURSE and DIFFERENTIAL DIAGNOSIS/MDM:   Vitals:    Vitals:    09/04/20 2116 09/04/20 2312 09/04/20 2313 09/05/20 0020   BP: (!) 175/114 134/88 134/88 123/79   Pulse: 117 99 94 103   Resp: 22   17   Temp: 97.6 °F (36.4 °C)      TempSrc: Oral      SpO2: 94% 95%  97%       Patient was given the following medications:  Medications   sodium chloride 0.9 % 6,262 mL with folic acid 1 mg, adult multi-vitamin with vitamin k 10 mL, thiamine 100 mg ( Intravenous Stopped 9/5/20 0055)   0.9 % sodium chloride bolus (0 mLs Intravenous Stopped 9/4/20 2312)           Patient is alert and oriented x4. He is anxious. PERRLA, there is bilateral nystagmus on exam. Moist mucus membranes noted.  Patient is tachycardic with regular rhythm. Lungs are clear to auscultation. Abdomen is soft, non-tender and non-distended. Mild tremor noted to upper extremities with arms extended. Distal pulses are intact  Differentials: alcohol intoxication, alcohol withdrawal, hypomagnesemia, hyponatremia  Labs: elevated liver enzymes. Ethanol level 288  Patient ambulated to the bathroom and had a very unsteady gait and needed complete assistance in order not to fall  Xray: no acute process   Urine: no signs of infection  Banana bag given along with NS bolus  Patient signed out to Dr. Armin Landis. Please see attending note for further information. Diagnosis: alcohol intoxication, desire to detox  Patient will be admitted to safely detox from alcohol    FINAL IMPRESSION      1. Acute alcoholic intoxication without complication ([facility] [address]    2. Alcohol abuse          DISPOSITION/PLAN   DISPOSITION        PATIENT REFERREDTO:  No follow-up provider specified. DISCHARGE MEDICATIONS:  New Prescriptions    No medications on file       DISCONTINUED MEDICATIONS:  Discontinued Medications    LORAZEPAM (ATIVAN) 1 MG TABLET    Take 1 mg by mouth every 6 hours as needed for Anxiety.               (Please note that portions of this note were completed with a voice recognition program.  Efforts were made to edit the dictations but occasionally words are mis-transcribed.)    CARLA Strickland CNP (electronically signed)            CARLA Strickland CNP  09/05/20 [facility] CARLA Garcia CNP  09/05/20 9107

## 2020-09-05 NOTE — PROGRESS NOTES
Patient admitted to room 423 from ED. Patient oriented to room, call light, bed rails, phone, lights and bathroom. Patient instructed about the schedule of the day including: vital sign frequency, lab draws, possible tests, frequency of MD and staff rounds, including RN/MD rounding together at bedside, daily weights, and I &O's. Patient instructed about prescribed diet, how to use 8MENU, and television. Bed alarm in place, patient aware of placement and reason. Telemetry box 85 in place, patient aware of placement and reason. Bed locked, in lowest position, side rails up 2/4, call light within reach. Will continue to monitor.

## 2020-09-05 NOTE — H&P
 Depression Father     High Blood Pressure Father     Depression Brother        REVIEW OF SYSTEMS:   Pertinent positives as noted in the HPI. All other systems reviewed and negative. PHYSICAL EXAM PERFORMED:    BP (!) 157/94   Pulse 85   Temp 98.7 °F (37.1 °C) (Temporal)   Resp 18   Wt 153 lb 1.6 oz (69.4 kg)   SpO2 96%   BMI 23.28 kg/m²     General appearance:  No apparent distress, appears stated age and cooperative. HEENT:  Normal cephalic, atraumatic without obvious deformity. Pupils equal, round, and reactive to light. Extra ocular muscles intact. Conjunctivae/corneas clear. Neck: Supple, with full range of motion. No jugular venous distention. Trachea midline. Respiratory:  Normal respiratory effort. Clear to auscultation, bilaterally without Rales/Wheezes/Rhonchi. Cardiovascular:  Regular rate and rhythm with normal S1/S2 without murmurs, rubs or gallops. Abdomen: Soft, non-tender, non-distended with normal bowel sounds. Musculoskeletal:  No clubbing, cyanosis or edema bilaterally. Full range of motion without deformity. Skin: Skin color, texture, turgor normal.  No rashes or lesions. Neurologic:  Neurovascularly intact without any focal sensory/motor deficits. Cranial nerves: II-XII intact, grossly non-focal.  Psychiatric:  Alert and oriented, thought content appropriate, normal insight  Capillary Refill: Brisk,< 3 seconds   Peripheral Pulses: +2 palpable, equal bilaterally       Labs:     Recent Labs     09/04/20 2156 09/05/20  0555   WBC 3.2* 3.9*   HGB 14.4 13.9   HCT 42.9 41.3   * 117*     Recent Labs     09/04/20 2148 09/05/20  0555   * 145   K 4.9 4.4   CL 94* 104   CO2 21 26   BUN 3* <2*   CREATININE <0.5* 0.6*   CALCIUM 8.8 8.8     Recent Labs     09/04/20 2148   *   ALT 92*   BILITOT 0.6   ALKPHOS 49     No results for input(s): INR in the last 72 hours.   Recent Labs     09/04/20 2148   TROPONINI <0.01       Urinalysis:      Lab Results   Component Value Date    NITRU Negative 09/04/2020    BLOODU Negative 09/04/2020    SPECGRAV <=1.005 09/04/2020    GLUCOSEU Negative 09/04/2020       Radiology:     CXR: I have reviewed the CXR with the following interpretation:   No acute pathology noted. EKG:  I have reviewed the EKG with the following interpretation:   Sinus tachycardia, no ischemic changes noted. XR CHEST PORTABLE   Final Result   No acute process. ASSESSMENT:    Active Hospital Problems    Diagnosis Date Noted    [facility] [H85.628] 09/05/2020     Priority: High         PLAN:    Alcohol withdrawal and intoxication, admit to MedSurg, CIWA scale protocol initiated, PRN Ativan, supportive care, IV hydration, advance diet as tolerated, continue to monitor. Patient was counseled for alcoholism. DVT Prophylaxis: Lovenox  Diet: DIET GENERAL;  Code Status: Full Code    PT/OT Eval Status: Not ordered    Dispo -in 1 to 2 days       Serina Tiwari MD    Thank you Kami Avila DO for the opportunity to be involved in this patient's care. If you have any questions or concerns please feel free to contact me at [phone].

## 2020-09-05 NOTE — ED NOTES
Bed: 02  Expected date:   Expected time:   Means of arrival:   Comments:  Korey Holman RN  09/04/20 5932

## 2020-09-05 NOTE — ED NOTES
Patient identified as a positive fall risk on the ED triage fall screening. Patient placed in fall precautions which includes:  yellow fall risk bracelet on wrist, yellow socks on bed, \"Be Safe\" sign placed on patient's door, and bed alarm placed under patient/alarm turned on. Patient instructed on importance of not getting out of bed or ambulating without assistance for safety.           Filiberto Ramos RN  09/04/20 9719

## 2020-09-05 NOTE — ED NOTES
Patient ambulated to bathroom with RN at side. Patient was unsteady on feet at times.       Adam Champion RN  09/04/20 8464

## 2020-09-05 NOTE — ED NOTES
Patient ambulated to bathroom with more steady gait than last time.       Simran So, JEANINE  09/05/20 6456

## 2020-09-06 LAB
ALBUMIN SERPL-MCNC: 3.8 G/DL (ref 3.4–5)
ALP BLD-CCNC: 56 U/L (ref 40–129)
ALT SERPL-CCNC: 54 U/L (ref 10–40)
AMMONIA: 37 UMOL/L (ref 16–60)
ANION GAP SERPL CALCULATED.3IONS-SCNC: 10 MMOL/L (ref 3–16)
AST SERPL-CCNC: 63 U/L (ref 15–37)
BILIRUB SERPL-MCNC: 1 MG/DL (ref 0–1)
BILIRUBIN DIRECT: 0.3 MG/DL (ref 0–0.3)
BILIRUBIN, INDIRECT: 0.7 MG/DL (ref 0–1)
BUN BLDV-MCNC: 6 MG/DL (ref 7–20)
CALCIUM SERPL-MCNC: 9.5 MG/DL (ref 8.3–10.6)
CHLORIDE BLD-SCNC: 98 MMOL/L (ref 99–110)
CO2: 28 MMOL/L (ref 21–32)
CREAT SERPL-MCNC: <0.5 MG/DL (ref 0.9–1.3)
GFR AFRICAN AMERICAN: >60
GFR NON-AFRICAN AMERICAN: >60
GLUCOSE BLD-MCNC: 99 MG/DL (ref 70–99)
HCT VFR BLD CALC: 40.3 % (ref 40.5–52.5)
HEMOGLOBIN: 13.7 G/DL (ref 13.5–17.5)
INR BLD: 0.98 (ref 0.86–1.14)
MAGNESIUM: 1.7 MG/DL (ref 1.8–2.4)
MCH RBC QN AUTO: 30.1 PG (ref 26–34)
MCHC RBC AUTO-ENTMCNC: 34 G/DL (ref 31–36)
MCV RBC AUTO: 88.4 FL (ref 80–100)
PDW BLD-RTO: 13.4 % (ref 12.4–15.4)
PHOSPHORUS: 2.7 MG/DL (ref 2.5–4.9)
PLATELET # BLD: 92 K/UL (ref 135–450)
PLATELET SLIDE REVIEW: ABNORMAL
PMV BLD AUTO: 8.1 FL (ref 5–10.5)
POTASSIUM REFLEX MAGNESIUM: 3.5 MMOL/L (ref 3.5–5.1)
PROTHROMBIN TIME: 11.4 SEC (ref 10–13.2)
RBC # BLD: 4.56 M/UL (ref 4.2–5.9)
SLIDE REVIEW: ABNORMAL
SODIUM BLD-SCNC: 136 MMOL/L (ref 136–145)
TOTAL PROTEIN: 5.7 G/DL (ref 6.4–8.2)
TSH SERPL DL<=0.05 MIU/L-ACNC: 3.94 UIU/ML (ref 0.27–4.2)
VITAMIN B-12: 931 PG/ML (ref 211–911)
WBC # BLD: 3.1 K/UL (ref 4–11)

## 2020-09-06 PROCEDURE — 85027 COMPLETE CBC AUTOMATED: CPT

## 2020-09-06 PROCEDURE — 36415 COLL VENOUS BLD VENIPUNCTURE: CPT

## 2020-09-06 PROCEDURE — 82607 VITAMIN B-12: CPT

## 2020-09-06 PROCEDURE — 6370000000 HC RX 637 (ALT 250 FOR IP): Performed by: INTERNAL MEDICINE

## 2020-09-06 PROCEDURE — 2580000003 HC RX 258: Performed by: INTERNAL MEDICINE

## 2020-09-06 PROCEDURE — 2060000000 HC ICU INTERMEDIATE R&B

## 2020-09-06 PROCEDURE — 80076 HEPATIC FUNCTION PANEL: CPT

## 2020-09-06 PROCEDURE — 2500000003 HC RX 250 WO HCPCS: Performed by: INTERNAL MEDICINE

## 2020-09-06 PROCEDURE — 82140 ASSAY OF AMMONIA: CPT

## 2020-09-06 PROCEDURE — 85610 PROTHROMBIN TIME: CPT

## 2020-09-06 PROCEDURE — 84100 ASSAY OF PHOSPHORUS: CPT

## 2020-09-06 PROCEDURE — 83735 ASSAY OF MAGNESIUM: CPT

## 2020-09-06 PROCEDURE — 6360000002 HC RX W HCPCS: Performed by: INTERNAL MEDICINE

## 2020-09-06 PROCEDURE — 84443 ASSAY THYROID STIM HORMONE: CPT

## 2020-09-06 PROCEDURE — 80048 BASIC METABOLIC PNL TOTAL CA: CPT

## 2020-09-06 RX ORDER — CLONIDINE HYDROCHLORIDE 0.1 MG/1
0.2 TABLET ORAL EVERY 4 HOURS PRN
Status: DISCONTINUED | OUTPATIENT
Start: 2020-09-06 | End: 2020-09-14 | Stop reason: HOSPADM

## 2020-09-06 RX ADMIN — LORAZEPAM 4 MG: 2 INJECTION, SOLUTION INTRAMUSCULAR; INTRAVENOUS at 18:26

## 2020-09-06 RX ADMIN — LORAZEPAM 4 MG: 2 INJECTION, SOLUTION INTRAMUSCULAR; INTRAVENOUS at 17:25

## 2020-09-06 RX ADMIN — ENOXAPARIN SODIUM 40 MG: 40 INJECTION SUBCUTANEOUS at 09:51

## 2020-09-06 RX ADMIN — LORAZEPAM 3 MG: 1 TABLET ORAL at 14:53

## 2020-09-06 RX ADMIN — LORAZEPAM 4 MG: 2 INJECTION, SOLUTION INTRAMUSCULAR; INTRAVENOUS at 21:55

## 2020-09-06 RX ADMIN — CLONIDINE HYDROCHLORIDE 0.2 MG: 0.1 TABLET ORAL at 17:45

## 2020-09-06 RX ADMIN — LORAZEPAM 4 MG: 2 INJECTION, SOLUTION INTRAMUSCULAR; INTRAVENOUS at 22:58

## 2020-09-06 RX ADMIN — Medication 10 ML: at 19:33

## 2020-09-06 RX ADMIN — LORAZEPAM 4 MG: 2 INJECTION, SOLUTION INTRAMUSCULAR; INTRAVENOUS at 19:33

## 2020-09-06 RX ADMIN — CHLORDIAZEPOXIDE HYDROCHLORIDE 10 MG: 5 CAPSULE ORAL at 13:35

## 2020-09-06 RX ADMIN — CHLORDIAZEPOXIDE HYDROCHLORIDE 10 MG: 5 CAPSULE ORAL at 09:51

## 2020-09-06 RX ADMIN — FAMOTIDINE 20 MG: 10 INJECTION INTRAVENOUS at 09:51

## 2020-09-06 RX ADMIN — LORAZEPAM 2 MG: 1 TABLET ORAL at 13:54

## 2020-09-06 RX ADMIN — SODIUM CHLORIDE, POTASSIUM CHLORIDE, SODIUM LACTATE AND CALCIUM CHLORIDE: 600; 310; 30; 20 INJECTION, SOLUTION INTRAVENOUS at 06:58

## 2020-09-06 RX ADMIN — LORAZEPAM 2 MG: 1 TABLET ORAL at 15:54

## 2020-09-06 RX ADMIN — FAMOTIDINE 20 MG: 10 INJECTION INTRAVENOUS at 20:47

## 2020-09-06 ASSESSMENT — PAIN SCALES - GENERAL
PAINLEVEL_OUTOF10: 0

## 2020-09-06 NOTE — PROGRESS NOTES
Pts BP  continues to elevate now 165/105 . No medications ordered to help with their BP/HR. Upon hourly assessment Pt went from completely alert and orientated to extremely agitated and very confused trying to climb out of bed. We have been giving Ativan PRN hourly since 1:30pm. Dr. Comfort Wilhelm has been messaged.

## 2020-09-06 NOTE — PLAN OF CARE
Problem: Falls - Risk of:  Goal: Will remain free from falls  Description: Will remain free from falls  9/6/2020 1112 by Maria Elena Frausto RN  Outcome: Ongoing  9/5/2020 2248 by Payton Aguilar RN  Outcome: Ongoing  Note: Pt high fall risk. Instructed to use call light before getting out of bed. Call light within reach. Bed in low position. Bed alarm on. Will continue to monitor. Goal: Absence of physical injury  Description: Absence of physical injury  Outcome: Ongoing   Pt. Understands to call out when needing to get out of bed.

## 2020-09-06 NOTE — PROGRESS NOTES
Pts /110 HR 87. Has no PRN meds ordered. Would you like me to give him something? Also LR is still running at 125mL/hr would you like me to continue this? Pt is eating/drinking fine. Thank you. Dr. Moustapha Wasserman has been notified.        1453- D/C IVF per Dr. Helen Rojas orders

## 2020-09-06 NOTE — FLOWSHEET NOTE
09/06/20 0930   Vital Signs   Temp 99 °F (37.2 °C)   Temp Source Oral   Pulse 91   Heart Rate Source Monitor   Resp 16   BP (!) 147/88   BP Location Left upper arm   Level of Consciousness 0   MEWS Score 1   Patient Currently in Pain Denies   Pain Assessment   Pain Assessment 0-10   Pain Level 0   Oxygen Therapy   SpO2 96 %   O2 Device None (Room air)

## 2020-09-06 NOTE — FLOWSHEET NOTE
Spiritual Care     Received referral to support patient due to recent relapse after a long time of sobriety. Met with patient briefly. He simply invited prayer today. We prayed for recovery and moving forward a step at a time. Spiritual Care is available for further support, as needed.  [facility]      09/06/20 8394   Encounter Summary   Services provided to: Patient   Referral/Consult From: Multi-disciplinary team   Continue Visiting   (9/6 Provided prayer for recovery)   Complexity of Encounter Moderate   Length of Encounter 15 minutes   Spiritual Assessment Completed Yes   Spiritual/Sikhism   Type Spiritual support   Assessment Approachable   Intervention Active listening;Nurtured hope;Prayer   Outcome Connection/belonging;Comfort;Expressed gratitude

## 2020-09-06 NOTE — PROGRESS NOTES
Hospitalist Progress Note      PCP: Sridhar Denson DO    Date of Admission: 9/4/2020    Chief Complaint: Alcoholism    Hospital Course: See H&P     Subjective:   Patent is up in bed, comfortable, not in distress. No new event overnight. Medications:  Reviewed    Infusion Medications    lactated ringers 125 mL/hr at 09/06/20 0658     Scheduled Medications    sodium chloride flush  10 mL Intravenous 2 times per day    famotidine (PEPCID) injection  20 mg Intravenous BID    enoxaparin  40 mg Subcutaneous Daily    chlordiazePOXIDE  10 mg Oral TID     PRN Meds: sodium chloride flush, acetaminophen **OR** acetaminophen, polyethylene glycol, promethazine **OR** ondansetron, LORazepam **OR** LORazepam **OR** LORazepam **OR** LORazepam **OR** LORazepam **OR** LORazepam **OR** LORazepam **OR** LORazepam      Intake/Output Summary (Last 24 hours) at 9/6/2020 0924  Last data filed at 9/6/2020 0651  Gross per 24 hour   Intake 3145 ml   Output 2925 ml   Net 220 ml       Physical Exam Performed:    BP (!) 157/94   Pulse 73   Temp 97.8 °F (36.6 °C) (Axillary)   Resp 16   Ht 5' 7\" (1.702 m)   Wt 165 lb 5.5 oz (75 kg)   SpO2 97%   BMI 25.90 kg/m²     General appearance: No apparent distress, appears stated age and cooperative. HEENT: Pupils equal, round, and reactive to light. Conjunctivae/corneas clear. Neck: Supple, with full range of motion. No jugular venous distention. Trachea midline. Respiratory:  Normal respiratory effort. Clear to auscultation, bilaterally without Rales/Wheezes/Rhonchi. Cardiovascular: Regular rate and rhythm with normal S1/S2 without murmurs, rubs or gallops. Abdomen: Soft, non-tender, non-distended with normal bowel sounds. Musculoskeletal: No clubbing, cyanosis or edema bilaterally. Full range of motion without deformity. Skin: Skin color, texture, turgor normal.  No rashes or lesions. Neurologic:  Neurovascularly intact without any focal sensory/motor deficits. Cranial nerves: II-XII intact, grossly non-focal.  Psychiatric: Alert and oriented, thought content appropriate, normal insight  Capillary Refill: Brisk,< 3 seconds   Peripheral Pulses: +2 palpable, equal bilaterally       Labs:   Recent Labs     09/04/20 2156 09/05/20 0555 09/06/20  0535   WBC 3.2* 3.9* 3.1*   HGB 14.4 13.9 13.7   HCT 42.9 41.3 40.3*   * 117* 92*     Recent Labs     09/04/20 2148 09/05/20  0555 09/06/20  0535   * 145 136   K 4.9 4.4 3.5   CL 94* 104 98*   CO2 21 26 28   BUN 3* <2* 6*   CREATININE <0.5* 0.6* <0.5*   CALCIUM 8.8 8.8 9.5   PHOS  --   --  2.7     Recent Labs     09/04/20 2148 09/06/20  0535   * 63*   ALT 92* 54*   BILIDIR  --  0.3   BILITOT 0.6 1.0   ALKPHOS 49 56     Recent Labs     09/06/20  0535   INR 0.98     Recent Labs     09/04/20 2148   TROPONINI <0.01       Urinalysis:      Lab Results   Component Value Date    NITRU Negative 09/04/2020    BLOODU Negative 09/04/2020    SPECGRAV <=1.005 09/04/2020    GLUCOSEU Negative 09/04/2020       Radiology:  XR CHEST PORTABLE   Final Result   No acute process. Assessment/Plan:    Active Hospital Problems    Diagnosis    [facility] [U96.913]     Priority: High     Alcohol withdrawal and intoxication, admit to [facility] scale protocol initiated, PRN Ativan, supportive care, IV hydration, advance diet as tolerated, continue to monitor. Patient was counseled for alcoholism. Clinically improving gradually.     DVT Prophylaxis: Lovenox  Diet: DIET GENERAL;  Code Status: Full Code    PT/OT Eval Status: Ambulatory    Dispo - in 1-2 days    Orquidea Truong MD

## 2020-09-07 LAB
ANION GAP SERPL CALCULATED.3IONS-SCNC: 14 MMOL/L (ref 3–16)
BUN BLDV-MCNC: 6 MG/DL (ref 7–20)
CALCIUM SERPL-MCNC: 9.4 MG/DL (ref 8.3–10.6)
CHLORIDE BLD-SCNC: 105 MMOL/L (ref 99–110)
CO2: 25 MMOL/L (ref 21–32)
CREAT SERPL-MCNC: <0.5 MG/DL (ref 0.9–1.3)
GFR AFRICAN AMERICAN: >60
GFR NON-AFRICAN AMERICAN: >60
GLUCOSE BLD-MCNC: 91 MG/DL (ref 70–99)
HCT VFR BLD CALC: 40.1 % (ref 40.5–52.5)
HEMOGLOBIN: 13.6 G/DL (ref 13.5–17.5)
MAGNESIUM: 1.7 MG/DL (ref 1.8–2.4)
MCH RBC QN AUTO: 29.9 PG (ref 26–34)
MCHC RBC AUTO-ENTMCNC: 34 G/DL (ref 31–36)
MCV RBC AUTO: 88 FL (ref 80–100)
PDW BLD-RTO: 13.2 % (ref 12.4–15.4)
PLATELET # BLD: 101 K/UL (ref 135–450)
PMV BLD AUTO: 8.3 FL (ref 5–10.5)
POTASSIUM REFLEX MAGNESIUM: 3.7 MMOL/L (ref 3.5–5.1)
RBC # BLD: 4.55 M/UL (ref 4.2–5.9)
SODIUM BLD-SCNC: 144 MMOL/L (ref 136–145)
WBC # BLD: 4.1 K/UL (ref 4–11)

## 2020-09-07 PROCEDURE — 6370000000 HC RX 637 (ALT 250 FOR IP): Performed by: INTERNAL MEDICINE

## 2020-09-07 PROCEDURE — 51798 US URINE CAPACITY MEASURE: CPT

## 2020-09-07 PROCEDURE — 6360000002 HC RX W HCPCS: Performed by: INTERNAL MEDICINE

## 2020-09-07 PROCEDURE — 2500000003 HC RX 250 WO HCPCS: Performed by: INTERNAL MEDICINE

## 2020-09-07 PROCEDURE — 80048 BASIC METABOLIC PNL TOTAL CA: CPT

## 2020-09-07 PROCEDURE — 2580000003 HC RX 258: Performed by: INTERNAL MEDICINE

## 2020-09-07 PROCEDURE — 83735 ASSAY OF MAGNESIUM: CPT

## 2020-09-07 PROCEDURE — 36415 COLL VENOUS BLD VENIPUNCTURE: CPT

## 2020-09-07 PROCEDURE — 6360000002 HC RX W HCPCS: Performed by: NURSE PRACTITIONER

## 2020-09-07 PROCEDURE — 51701 INSERT BLADDER CATHETER: CPT

## 2020-09-07 PROCEDURE — 2060000000 HC ICU INTERMEDIATE R&B

## 2020-09-07 PROCEDURE — 85027 COMPLETE CBC AUTOMATED: CPT

## 2020-09-07 RX ORDER — LORAZEPAM 2 MG/ML
4 INJECTION INTRAMUSCULAR ONCE
Status: COMPLETED | OUTPATIENT
Start: 2020-09-07 | End: 2020-09-07

## 2020-09-07 RX ORDER — SODIUM CHLORIDE 9 MG/ML
INJECTION, SOLUTION INTRAVENOUS CONTINUOUS
Status: ACTIVE | OUTPATIENT
Start: 2020-09-08 | End: 2020-09-08

## 2020-09-07 RX ADMIN — FAMOTIDINE 20 MG: 10 INJECTION INTRAVENOUS at 09:55

## 2020-09-07 RX ADMIN — LORAZEPAM 4 MG: 2 INJECTION, SOLUTION INTRAMUSCULAR; INTRAVENOUS at 02:13

## 2020-09-07 RX ADMIN — LORAZEPAM 4 MG: 2 INJECTION, SOLUTION INTRAMUSCULAR; INTRAVENOUS at 22:51

## 2020-09-07 RX ADMIN — Medication 10 ML: at 20:25

## 2020-09-07 RX ADMIN — CHLORDIAZEPOXIDE HYDROCHLORIDE 10 MG: 5 CAPSULE ORAL at 16:32

## 2020-09-07 RX ADMIN — CHLORDIAZEPOXIDE HYDROCHLORIDE 10 MG: 5 CAPSULE ORAL at 10:01

## 2020-09-07 RX ADMIN — ENOXAPARIN SODIUM 40 MG: 40 INJECTION SUBCUTANEOUS at 09:55

## 2020-09-07 RX ADMIN — LORAZEPAM 4 MG: 2 INJECTION, SOLUTION INTRAMUSCULAR; INTRAVENOUS at 22:05

## 2020-09-07 RX ADMIN — LORAZEPAM 4 MG: 2 INJECTION, SOLUTION INTRAMUSCULAR; INTRAVENOUS at 06:02

## 2020-09-07 RX ADMIN — CHLORDIAZEPOXIDE HYDROCHLORIDE 10 MG: 5 CAPSULE ORAL at 20:25

## 2020-09-07 RX ADMIN — FAMOTIDINE 20 MG: 10 INJECTION INTRAVENOUS at 20:25

## 2020-09-07 RX ADMIN — LORAZEPAM 4 MG: 2 INJECTION, SOLUTION INTRAMUSCULAR; INTRAVENOUS at 03:14

## 2020-09-07 RX ADMIN — LORAZEPAM 4 MG: 2 INJECTION, SOLUTION INTRAMUSCULAR; INTRAVENOUS at 04:22

## 2020-09-07 RX ADMIN — LORAZEPAM 4 MG: 2 INJECTION, SOLUTION INTRAMUSCULAR; INTRAVENOUS at 20:50

## 2020-09-07 ASSESSMENT — PAIN SCALES - GENERAL: PAINLEVEL_OUTOF10: 0

## 2020-09-07 NOTE — PLAN OF CARE
Problem: Falls - Risk of:  Goal: Will remain free from falls  Description: Will remain free from falls  9/7/2020 0027 by Brooke Valero RN  Outcome: Ongoing     Problem: Falls - Risk of:  Goal: Absence of physical injury  Description: Absence of physical injury  9/7/2020 0027 by Brooke Valero RN  Outcome: Ongoing     Problem: Restraint Use - Nonviolent/Non-Self-Destructive Behavior:  Goal: Absence of restraint indications  Description: Absence of restraint indications  Outcome: Ongoing     Problem: Restraint Use - Nonviolent/Non-Self-Destructive Behavior:  Goal: Absence of restraint-related injury  Description: Absence of restraint-related injury  Outcome: Ongoing

## 2020-09-07 NOTE — PROGRESS NOTES
non-focal.  Psychiatric: Alert and oriented, thought content appropriate, normal insight  Capillary Refill: Brisk,< 3 seconds   Peripheral Pulses: +2 palpable, equal bilaterally       Labs:   Recent Labs     09/05/20  0555 09/06/20  0535 09/07/20  0617   WBC 3.9* 3.1* 4.1   HGB 13.9 13.7 13.6   HCT 41.3 40.3* 40.1*   * 92* 101*     Recent Labs     09/05/20  0555 09/06/20  0535 09/07/20  0617    136 144   K 4.4 3.5 3.7    98* 105   CO2 26 28 25   BUN <2* 6* 6*   CREATININE 0.6* <0.5* <0.5*   CALCIUM 8.8 9.5 9.4   PHOS  --  2.7  --      Recent Labs     09/04/20 2148 09/06/20  0535   * 63*   ALT 92* 54*   BILIDIR  --  0.3   BILITOT 0.6 1.0   ALKPHOS 49 56     Recent Labs     09/06/20  0535   INR 0.98     Recent Labs     09/04/20 2148   Henry Virgilio <0.01       Urinalysis:      Lab Results   Component Value Date    NITRU Negative 09/04/2020    BLOODU Negative 09/04/2020    SPECGRAV <=1.005 09/04/2020    GLUCOSEU Negative 09/04/2020       Radiology:  XR CHEST PORTABLE   Final Result   No acute process. Assessment/Plan:    Active Hospital Problems    Diagnosis    [facility] [L80.017]     Priority: High     Alcohol withdrawal and intoxication, admit to [facility] [place] scale protocol initiated, PRN Ativan, supportive care, IV hydration, advance diet as tolerated, continue to monitor. Patient was counseled for alcoholism. Clinically improving gradually.     DVT Prophylaxis: Lovenox  Diet: DIET GENERAL;  Code Status: Full Code    PT/OT Eval Status: Ambulatory    Dispo - in 1-2 days    Kymberly Toscano MD

## 2020-09-08 LAB
ANION GAP SERPL CALCULATED.3IONS-SCNC: 12 MMOL/L (ref 3–16)
BUN BLDV-MCNC: 5 MG/DL (ref 7–20)
CALCIUM SERPL-MCNC: 9 MG/DL (ref 8.3–10.6)
CHLORIDE BLD-SCNC: 106 MMOL/L (ref 99–110)
CO2: 23 MMOL/L (ref 21–32)
CREAT SERPL-MCNC: 0.6 MG/DL (ref 0.9–1.3)
GFR AFRICAN AMERICAN: >60
GFR NON-AFRICAN AMERICAN: >60
GLUCOSE BLD-MCNC: 118 MG/DL (ref 70–99)
HCT VFR BLD CALC: 38.5 % (ref 40.5–52.5)
HEMOGLOBIN: 12.9 G/DL (ref 13.5–17.5)
MAGNESIUM: 1.5 MG/DL (ref 1.8–2.4)
MCH RBC QN AUTO: 29.7 PG (ref 26–34)
MCHC RBC AUTO-ENTMCNC: 33.6 G/DL (ref 31–36)
MCV RBC AUTO: 88.4 FL (ref 80–100)
PDW BLD-RTO: 13.4 % (ref 12.4–15.4)
PLATELET # BLD: 106 K/UL (ref 135–450)
PMV BLD AUTO: 8.2 FL (ref 5–10.5)
POTASSIUM REFLEX MAGNESIUM: 3.4 MMOL/L (ref 3.5–5.1)
RBC # BLD: 4.35 M/UL (ref 4.2–5.9)
SODIUM BLD-SCNC: 141 MMOL/L (ref 136–145)
WBC # BLD: 3.6 K/UL (ref 4–11)

## 2020-09-08 PROCEDURE — 2580000003 HC RX 258: Performed by: NURSE PRACTITIONER

## 2020-09-08 PROCEDURE — 80048 BASIC METABOLIC PNL TOTAL CA: CPT

## 2020-09-08 PROCEDURE — 2580000003 HC RX 258: Performed by: INTERNAL MEDICINE

## 2020-09-08 PROCEDURE — 6360000002 HC RX W HCPCS: Performed by: INTERNAL MEDICINE

## 2020-09-08 PROCEDURE — 36415 COLL VENOUS BLD VENIPUNCTURE: CPT

## 2020-09-08 PROCEDURE — 6370000000 HC RX 637 (ALT 250 FOR IP): Performed by: INTERNAL MEDICINE

## 2020-09-08 PROCEDURE — 85027 COMPLETE CBC AUTOMATED: CPT

## 2020-09-08 PROCEDURE — 83735 ASSAY OF MAGNESIUM: CPT

## 2020-09-08 PROCEDURE — 2500000003 HC RX 250 WO HCPCS: Performed by: INTERNAL MEDICINE

## 2020-09-08 PROCEDURE — 2060000000 HC ICU INTERMEDIATE R&B

## 2020-09-08 RX ORDER — HALOPERIDOL 5 MG/ML
5 INJECTION INTRAMUSCULAR ONCE
Status: COMPLETED | OUTPATIENT
Start: 2020-09-08 | End: 2020-09-08

## 2020-09-08 RX ORDER — DEXMEDETOMIDINE HYDROCHLORIDE 4 UG/ML
0.2 INJECTION, SOLUTION INTRAVENOUS CONTINUOUS
Status: DISCONTINUED | OUTPATIENT
Start: 2020-09-08 | End: 2020-09-14

## 2020-09-08 RX ADMIN — Medication 0.8 MCG/KG/HR: at 20:02

## 2020-09-08 RX ADMIN — LORAZEPAM 4 MG: 2 INJECTION, SOLUTION INTRAMUSCULAR; INTRAVENOUS at 01:40

## 2020-09-08 RX ADMIN — Medication 10 ML: at 21:44

## 2020-09-08 RX ADMIN — ENOXAPARIN SODIUM 40 MG: 40 INJECTION SUBCUTANEOUS at 09:32

## 2020-09-08 RX ADMIN — FAMOTIDINE 20 MG: 10 INJECTION INTRAVENOUS at 09:32

## 2020-09-08 RX ADMIN — SODIUM CHLORIDE: 9 INJECTION, SOLUTION INTRAVENOUS at 00:13

## 2020-09-08 RX ADMIN — CLONIDINE HYDROCHLORIDE 0.2 MG: 0.1 TABLET ORAL at 22:06

## 2020-09-08 RX ADMIN — CHLORDIAZEPOXIDE HYDROCHLORIDE 10 MG: 5 CAPSULE ORAL at 21:43

## 2020-09-08 RX ADMIN — LORAZEPAM 4 MG: 2 INJECTION, SOLUTION INTRAMUSCULAR; INTRAVENOUS at 03:53

## 2020-09-08 RX ADMIN — Medication 0.4 MCG/KG/HR: at 12:46

## 2020-09-08 RX ADMIN — SODIUM CHLORIDE: 9 INJECTION, SOLUTION INTRAVENOUS at 09:38

## 2020-09-08 RX ADMIN — CHLORDIAZEPOXIDE HYDROCHLORIDE 10 MG: 5 CAPSULE ORAL at 09:37

## 2020-09-08 RX ADMIN — MUPIROCIN: 20 OINTMENT TOPICAL at 11:23

## 2020-09-08 RX ADMIN — LORAZEPAM 2 MG: 2 INJECTION INTRAMUSCULAR; INTRAVENOUS at 11:34

## 2020-09-08 RX ADMIN — CHLORDIAZEPOXIDE HYDROCHLORIDE 10 MG: 5 CAPSULE ORAL at 16:46

## 2020-09-08 RX ADMIN — HALOPERIDOL LACTATE 5 MG: 5 INJECTION, SOLUTION INTRAMUSCULAR at 06:22

## 2020-09-08 RX ADMIN — LORAZEPAM 4 MG: 2 INJECTION, SOLUTION INTRAMUSCULAR; INTRAVENOUS at 05:04

## 2020-09-08 RX ADMIN — FAMOTIDINE 20 MG: 10 INJECTION INTRAVENOUS at 21:44

## 2020-09-08 RX ADMIN — LORAZEPAM 3 MG: 2 INJECTION, SOLUTION INTRAMUSCULAR; INTRAVENOUS at 08:20

## 2020-09-08 RX ADMIN — MUPIROCIN: 20 OINTMENT TOPICAL at 21:49

## 2020-09-08 RX ADMIN — Medication 10 ML: at 11:28

## 2020-09-08 RX ADMIN — LORAZEPAM 4 MG: 2 INJECTION, SOLUTION INTRAMUSCULAR; INTRAVENOUS at 00:13

## 2020-09-08 ASSESSMENT — PAIN SCALES - GENERAL
PAINLEVEL_OUTOF10: 0

## 2020-09-08 NOTE — PROGRESS NOTES
Gave Bedside report to: Nelly Giang RN    4 Eyes Skin Assessment     The patient is being assess for   Shift Handoff    I agree that 2 RN's have performed a thorough Head to Toe Skin Assessment on the patient. ALL assessment sites listed below have been assessed. Areas assessed by both nurses:   [x]   Head, Face, and Ears   [x]   Shoulders, Back, and Chest, Abdomen  [x]   Arms, Elbows, and Hands   [x]   Coccyx, Sacrum, and Ischium  [x]   Legs, Feet, and Heels        Does the Patient have Skin Breakdown?   No          Mateusz Prevention initiated:  Yes   Wound Care Orders initiated:  No      WOC nurse consulted for Pressure Injury (Stage 3,4, Unstageable, DTI, NWPT, Complex wounds)and New or Established Ostomies:  NA      Primary Nurse eSignature: Electronically signed by Cesario Mccormack RN on 9/8/20 at 7:20 PM EDT    **SHARE this note so that the co-signing nurse is able to place an eSignature**    Co-signer eSignature: Electronically signed by Maxim Ji RN on 9/8/20 at 7:20 PM EDT

## 2020-09-08 NOTE — PROGRESS NOTES
Hospitalist Progress Note      PCP: Elio Goodwin DO    Date of Admission: 9/4/2020    Chief Complaint: Alcoholism    Hospital Course: See H&P     Subjective:   Patent is up in bed, more agitated. Transfer to ICU. Medications:  Reviewed    Infusion Medications    dexmedetomidine       Scheduled Medications    sodium chloride flush  10 mL Intravenous 2 times per day    famotidine (PEPCID) injection  20 mg Intravenous BID    enoxaparin  40 mg Subcutaneous Daily    chlordiazePOXIDE  10 mg Oral TID     PRN Meds: cloNIDine, sodium chloride flush, acetaminophen **OR** acetaminophen, polyethylene glycol, promethazine **OR** ondansetron, LORazepam **OR** LORazepam **OR** LORazepam **OR** LORazepam **OR** LORazepam **OR** LORazepam **OR** LORazepam **OR** LORazepam      Intake/Output Summary (Last 24 hours) at 9/8/2020 1025  Last data filed at 9/8/2020 0507  Gross per 24 hour   Intake 540 ml   Output 950 ml   Net -410 ml       Physical Exam Performed:    /84   Pulse 77   Temp 97.8 °F (36.6 °C) (Oral)   Resp 22   Ht 5' 7\" (1.702 m)   Wt 160 lb 7.9 oz (72.8 kg)   SpO2 97%   BMI 25.14 kg/m²     General appearance: No apparent distress, appears confused  HEENT: Pupils equal, round, and reactive to light. Conjunctivae/corneas clear. Neck: Supple, with full range of motion. No jugular venous distention. Trachea midline. Respiratory:  Normal respiratory effort. Clear to auscultation, bilaterally without Rales/Wheezes/Rhonchi. Cardiovascular: Regular rate and rhythm with normal S1/S2 without murmurs, rubs or gallops. Abdomen: Soft, non-tender, non-distended with normal bowel sounds. Musculoskeletal: No clubbing, cyanosis or edema bilaterally. Full range of motion without deformity. Skin: Skin color, texture, turgor normal.  No rashes or lesions. Neurologic:  Neurovascularly intact without any focal sensory/motor deficits.  Cranial nerves: II-XII intact, grossly non-focal.  Psychiatric: confused  Capillary Refill: Brisk,< 3 seconds   Peripheral Pulses: +2 palpable, equal bilaterally       Labs:   Recent Labs     09/06/20  0535 09/07/20  0617 09/08/20  1007   WBC 3.1* 4.1 3.6*   HGB 13.7 13.6 12.9*   HCT 40.3* 40.1* 38.5*   PLT 92* 101* 106*     Recent Labs     09/06/20  0535 09/07/20  0617    144   K 3.5 3.7   CL 98* 105   CO2 28 25   BUN 6* 6*   CREATININE <0.5* <0.5*   CALCIUM 9.5 9.4   PHOS 2.7  --      Recent Labs     09/06/20  0535   AST 63*   ALT 54*   BILIDIR 0.3   BILITOT 1.0   ALKPHOS 56     Recent Labs     09/06/20  0535   INR 0.98     No results for input(s): CKTOTAL, TROPONINI in the last 72 hours. Urinalysis:      Lab Results   Component Value Date    NITRU Negative 09/04/2020    BLOODU Negative 09/04/2020    SPECGRAV <=1.005 09/04/2020    GLUCOSEU Negative 09/04/2020       Radiology:  XR CHEST PORTABLE   Final Result   No acute process. Assessment/Plan:    Active Hospital Problems    Diagnosis    Alcohol withdrawal syndrome without complication ([facility] [C12.512]     Priority: High     Alcohol withdrawal and intoxication, admit to [facility] scale protocol initiated, PRN Ativan, supportive care, IV hydration, advance diet as tolerated, continue to monitor. Patient was counseled for alcoholism. Clinically worsening, start on iv Precedex, transfer to ICU.     DVT Prophylaxis: Lovenox  Diet: DIET GENERAL;  Code Status: Full Code    PT/OT Eval Status: Ambulatory    Dispo -  > 2 days    Renee Hahn MD

## 2020-09-08 NOTE — PROGRESS NOTES
4 Eyes Skin Assessment     The patient is being assess for   Transfer to New Unit    I agree that 2 RN's have performed a thorough Head to Toe Skin Assessment on the patient. ALL assessment sites listed below have been assessed. Areas assessed by both nurses:   [x]   Head, Face, and Ears   [x]   Shoulders, Back, and Chest, Abdomen  [x]   Arms, Elbows, and Hands   [x]   Coccyx, Sacrum, and Ischium  [x]   Legs, Feet, and Heels        ** A blister was found on each heel. They appear to be old and likely r/t wear from shoes **    **SHARE this note so that the co-signing nurse is able to place an eSignature**    Co-signer eSignature: Electronically signed by Connie Mendoza RN on 9/8/20 at 11:57 AM EDT    Does the Patient have Skin Breakdown?   No          Mateusz Prevention initiated:  Yes   Wound Care Orders initiated:  Yes      [id number]  nurse consulted for Pressure Injury (Stage 3,4, Unstageable, DTI, NWPT, Complex wounds)and New or Established Ostomies:  No      Primary Nurse eSignature: [email or web address]

## 2020-09-08 NOTE — PROGRESS NOTES
Patient is extremely agitated and restless tonight. Perfect serve sent to Deisi Hough NP for an extra dose of ativan as the patient is not due for another dose for the CIWA scale. Perfect serve sent to Deisi Hough NP for an order for IV fluids. Patient is very dehydrated. NS @ 100ml/hr ordered.

## 2020-09-08 NOTE — PLAN OF CARE
Problem: Falls - Risk of:  Goal: Will remain free from falls  Description: Will remain free from falls  Outcome: Ongoing  Goal: Absence of physical injury  Description: Absence of physical injury  Outcome: Ongoing     Problem: Restraint Use - Nonviolent/Non-Self-Destructive Behavior:  Goal: Absence of restraint indications  Description: Absence of restraint indications  Outcome: Ongoing  Goal: Absence of restraint-related injury  Description: Absence of restraint-related injury  Outcome: Ongoing     Problem: Skin Integrity:  Goal: Will show no infection signs and symptoms  Description: Will show no infection signs and symptoms  Outcome: Ongoing

## 2020-09-09 LAB
ANION GAP SERPL CALCULATED.3IONS-SCNC: 15 MMOL/L (ref 3–16)
BUN BLDV-MCNC: 4 MG/DL (ref 7–20)
CALCIUM SERPL-MCNC: 9 MG/DL (ref 8.3–10.6)
CHLORIDE BLD-SCNC: 103 MMOL/L (ref 99–110)
CO2: 24 MMOL/L (ref 21–32)
CREAT SERPL-MCNC: <0.5 MG/DL (ref 0.9–1.3)
GFR AFRICAN AMERICAN: >60
GFR NON-AFRICAN AMERICAN: >60
GLUCOSE BLD-MCNC: 100 MG/DL (ref 70–99)
HCT VFR BLD CALC: 43.8 % (ref 40.5–52.5)
HEMOGLOBIN: 15 G/DL (ref 13.5–17.5)
MAGNESIUM: 1.9 MG/DL (ref 1.8–2.4)
MCH RBC QN AUTO: 30.3 PG (ref 26–34)
MCHC RBC AUTO-ENTMCNC: 34.3 G/DL (ref 31–36)
MCV RBC AUTO: 88.4 FL (ref 80–100)
PDW BLD-RTO: 13.7 % (ref 12.4–15.4)
PLATELET # BLD: 123 K/UL (ref 135–450)
PMV BLD AUTO: 8 FL (ref 5–10.5)
POTASSIUM REFLEX MAGNESIUM: 3.4 MMOL/L (ref 3.5–5.1)
RBC # BLD: 4.96 M/UL (ref 4.2–5.9)
SODIUM BLD-SCNC: 142 MMOL/L (ref 136–145)
WBC # BLD: 4.1 K/UL (ref 4–11)

## 2020-09-09 PROCEDURE — 2500000003 HC RX 250 WO HCPCS: Performed by: INTERNAL MEDICINE

## 2020-09-09 PROCEDURE — 51702 INSERT TEMP BLADDER CATH: CPT

## 2020-09-09 PROCEDURE — 36415 COLL VENOUS BLD VENIPUNCTURE: CPT

## 2020-09-09 PROCEDURE — 2060000000 HC ICU INTERMEDIATE R&B

## 2020-09-09 PROCEDURE — 6360000002 HC RX W HCPCS: Performed by: INTERNAL MEDICINE

## 2020-09-09 PROCEDURE — 2580000003 HC RX 258: Performed by: INTERNAL MEDICINE

## 2020-09-09 PROCEDURE — 83735 ASSAY OF MAGNESIUM: CPT

## 2020-09-09 PROCEDURE — 85027 COMPLETE CBC AUTOMATED: CPT

## 2020-09-09 PROCEDURE — 6370000000 HC RX 637 (ALT 250 FOR IP): Performed by: INTERNAL MEDICINE

## 2020-09-09 PROCEDURE — 80048 BASIC METABOLIC PNL TOTAL CA: CPT

## 2020-09-09 RX ADMIN — LORAZEPAM 2 MG: 2 INJECTION INTRAMUSCULAR; INTRAVENOUS at 04:10

## 2020-09-09 RX ADMIN — LORAZEPAM 2 MG: 2 INJECTION INTRAMUSCULAR; INTRAVENOUS at 00:17

## 2020-09-09 RX ADMIN — ENOXAPARIN SODIUM 40 MG: 40 INJECTION SUBCUTANEOUS at 10:10

## 2020-09-09 RX ADMIN — LORAZEPAM 2 MG: 2 INJECTION INTRAMUSCULAR; INTRAVENOUS at 03:19

## 2020-09-09 RX ADMIN — LORAZEPAM 4 MG: 2 INJECTION, SOLUTION INTRAMUSCULAR; INTRAVENOUS at 02:13

## 2020-09-09 RX ADMIN — Medication 0.85 MCG/KG/HR: at 10:07

## 2020-09-09 RX ADMIN — LORAZEPAM 2 MG: 2 INJECTION INTRAMUSCULAR; INTRAVENOUS at 05:22

## 2020-09-09 RX ADMIN — Medication 0.9 MCG/KG/HR: at 06:52

## 2020-09-09 RX ADMIN — MUPIROCIN: 20 OINTMENT TOPICAL at 20:06

## 2020-09-09 RX ADMIN — Medication 0.9 MCG/KG/HR: at 22:08

## 2020-09-09 RX ADMIN — MUPIROCIN: 20 OINTMENT TOPICAL at 10:13

## 2020-09-09 RX ADMIN — Medication 10 ML: at 20:06

## 2020-09-09 RX ADMIN — Medication 0.9 MCG/KG/HR: at 08:27

## 2020-09-09 RX ADMIN — Medication 10 ML: at 10:13

## 2020-09-09 RX ADMIN — CHLORDIAZEPOXIDE HYDROCHLORIDE 10 MG: 5 CAPSULE ORAL at 20:06

## 2020-09-09 RX ADMIN — Medication 0.9 MCG/KG/HR: at 02:22

## 2020-09-09 RX ADMIN — FAMOTIDINE 20 MG: 10 INJECTION INTRAVENOUS at 20:06

## 2020-09-09 RX ADMIN — FAMOTIDINE 20 MG: 10 INJECTION INTRAVENOUS at 10:11

## 2020-09-09 RX ADMIN — LORAZEPAM 4 MG: 2 INJECTION, SOLUTION INTRAMUSCULAR; INTRAVENOUS at 06:18

## 2020-09-09 RX ADMIN — Medication 1 MCG/KG/HR: at 04:11

## 2020-09-09 ASSESSMENT — PAIN SCALES - GENERAL
PAINLEVEL_OUTOF10: 0

## 2020-09-09 NOTE — PROGRESS NOTES
Hospitalist Progress Note      PCP: Fer Goldstein DO    Date of Admission: 9/4/2020    Chief Complaint: Alcoholism    Hospital Course: See H&P     Subjective:   Patent is up in bed, more agitated. Transfer to ICU. Medications:  Reviewed    Infusion Medications    dexmedetomidine 0.9 mcg/kg/hr (09/09/20 0827)     Scheduled Medications    mupirocin   Nasal BID    sodium chloride flush  10 mL Intravenous 2 times per day    famotidine (PEPCID) injection  20 mg Intravenous BID    enoxaparin  40 mg Subcutaneous Daily    chlordiazePOXIDE  10 mg Oral TID     PRN Meds: cloNIDine, sodium chloride flush, acetaminophen **OR** acetaminophen, polyethylene glycol, promethazine **OR** ondansetron, LORazepam **OR** LORazepam **OR** LORazepam **OR** LORazepam **OR** LORazepam **OR** LORazepam **OR** LORazepam **OR** LORazepam      Intake/Output Summary (Last 24 hours) at 9/9/2020 0901  Last data filed at 9/9/2020 0700  Gross per 24 hour   Intake 1267 ml   Output --   Net 1267 ml       Physical Exam Performed:    BP (!) 151/88   Pulse 53   Temp 97.8 °F (36.6 °C) (Axillary)   Resp 10   Ht 5' 7\" (1.702 m)   Wt 151 lb 3.8 oz (68.6 kg)   SpO2 96%   BMI 23.69 kg/m²     General appearance: No apparent distress, appears confused  HEENT: Pupils equal, round, and reactive to light. Conjunctivae/corneas clear. Neck: Supple, with full range of motion. No jugular venous distention. Trachea midline. Respiratory:  Normal respiratory effort. Clear to auscultation, bilaterally without Rales/Wheezes/Rhonchi. Cardiovascular: Regular rate and rhythm with normal S1/S2 without murmurs, rubs or gallops. Abdomen: Soft, non-tender, non-distended with normal bowel sounds. Musculoskeletal: No clubbing, cyanosis or edema bilaterally. Full range of motion without deformity. Skin: Skin color, texture, turgor normal.  No rashes or lesions. Neurologic:  Neurovascularly intact without any focal sensory/motor deficits.  Cranial nerves: II-XII intact, grossly non-focal.  Psychiatric: confused  Capillary Refill: Brisk,< 3 seconds   Peripheral Pulses: +2 palpable, equal bilaterally       Labs:   Recent Labs     09/07/20 0617 09/08/20 1007 09/09/20  0429   WBC 4.1 3.6* 4.1   HGB 13.6 12.9* 15.0   HCT 40.1* 38.5* 43.8   * 106* 123*     Recent Labs     09/07/20 0617 09/08/20 1007 09/09/20  0429    141 142   K 3.7 3.4* 3.4*    106 103   CO2 25 23 24   BUN 6* 5* 4*   CREATININE <0.5* 0.6* <0.5*   CALCIUM 9.4 9.0 9.0       Urinalysis:      Lab Results   Component Value Date    NITRU Negative 09/04/2020    BLOODU Negative 09/04/2020    SPECGRAV <=1.005 09/04/2020    GLUCOSEU Negative 09/04/2020       Radiology:  XR CHEST PORTABLE   Final Result   No acute process. Assessment/Plan:    Active Hospital Problems    Diagnosis    [facility] [L52.352]     Priority: High     Alcohol withdrawal and intoxication, admit to [facility] scale protocol initiated, PRN Ativan, supportive care, IV hydration, advance diet as tolerated, continue to monitor. Patient was counseled for alcoholism. Clinically worsening, start on iv Precedex, transfer to ICU. Continue supportive care and monitor.     DVT Prophylaxis: Lovenox  Diet: DIET GENERAL;  Code Status: Full Code    PT/OT Eval Status: Ambulatory    Dispo -  > 2 days    Shanika Garcia MD

## 2020-09-09 NOTE — CARE COORDINATION
Chart review completed. Patient a 38year old male, admitted for alcohol withdrawal.  Hospital day 4, currently in ICU level of care on Precedex drip. Previously pt had undergone treatment at [facility] however does not appear to want to return. Previous CM provided resource packet. Pt unable to discuss further with CM at this time. Please advise should needs arise.  Jewell Nunez RN

## 2020-09-10 LAB
ANION GAP SERPL CALCULATED.3IONS-SCNC: 22 MMOL/L (ref 3–16)
BUN BLDV-MCNC: 10 MG/DL (ref 7–20)
CALCIUM SERPL-MCNC: 9.5 MG/DL (ref 8.3–10.6)
CHLORIDE BLD-SCNC: 103 MMOL/L (ref 99–110)
CO2: 18 MMOL/L (ref 21–32)
CREAT SERPL-MCNC: 0.6 MG/DL (ref 0.9–1.3)
GFR AFRICAN AMERICAN: >60
GFR NON-AFRICAN AMERICAN: >60
GLUCOSE BLD-MCNC: 72 MG/DL (ref 70–99)
HCT VFR BLD CALC: 45.5 % (ref 40.5–52.5)
HEMOGLOBIN: 15.2 G/DL (ref 13.5–17.5)
MAGNESIUM: 1.9 MG/DL (ref 1.8–2.4)
MCH RBC QN AUTO: 29.7 PG (ref 26–34)
MCHC RBC AUTO-ENTMCNC: 33.4 G/DL (ref 31–36)
MCV RBC AUTO: 89.1 FL (ref 80–100)
PDW BLD-RTO: 13.5 % (ref 12.4–15.4)
PLATELET # BLD: 141 K/UL (ref 135–450)
PMV BLD AUTO: 8 FL (ref 5–10.5)
POTASSIUM REFLEX MAGNESIUM: 3.9 MMOL/L (ref 3.5–5.1)
RBC # BLD: 5.11 M/UL (ref 4.2–5.9)
SODIUM BLD-SCNC: 143 MMOL/L (ref 136–145)
WBC # BLD: 5.3 K/UL (ref 4–11)

## 2020-09-10 PROCEDURE — 1200000000 HC SEMI PRIVATE

## 2020-09-10 PROCEDURE — 6370000000 HC RX 637 (ALT 250 FOR IP): Performed by: INTERNAL MEDICINE

## 2020-09-10 PROCEDURE — 36415 COLL VENOUS BLD VENIPUNCTURE: CPT

## 2020-09-10 PROCEDURE — 2580000003 HC RX 258: Performed by: INTERNAL MEDICINE

## 2020-09-10 PROCEDURE — 2500000003 HC RX 250 WO HCPCS: Performed by: INTERNAL MEDICINE

## 2020-09-10 PROCEDURE — 85027 COMPLETE CBC AUTOMATED: CPT

## 2020-09-10 PROCEDURE — 6360000002 HC RX W HCPCS: Performed by: INTERNAL MEDICINE

## 2020-09-10 PROCEDURE — 83735 ASSAY OF MAGNESIUM: CPT

## 2020-09-10 PROCEDURE — 80048 BASIC METABOLIC PNL TOTAL CA: CPT

## 2020-09-10 RX ORDER — CIPROFLOXACIN HYDROCHLORIDE 3.5 MG/ML
1 SOLUTION/ DROPS TOPICAL
Status: DISCONTINUED | OUTPATIENT
Start: 2020-09-10 | End: 2020-09-14 | Stop reason: HOSPADM

## 2020-09-10 RX ADMIN — ENOXAPARIN SODIUM 40 MG: 40 INJECTION SUBCUTANEOUS at 09:02

## 2020-09-10 RX ADMIN — FAMOTIDINE 20 MG: 10 INJECTION INTRAVENOUS at 21:51

## 2020-09-10 RX ADMIN — Medication 0.7 MCG/KG/HR: at 07:13

## 2020-09-10 RX ADMIN — FAMOTIDINE 20 MG: 10 INJECTION INTRAVENOUS at 09:02

## 2020-09-10 RX ADMIN — CHLORDIAZEPOXIDE HYDROCHLORIDE 10 MG: 5 CAPSULE ORAL at 13:30

## 2020-09-10 RX ADMIN — CHLORDIAZEPOXIDE HYDROCHLORIDE 10 MG: 5 CAPSULE ORAL at 09:02

## 2020-09-10 RX ADMIN — MUPIROCIN: 20 OINTMENT TOPICAL at 21:52

## 2020-09-10 RX ADMIN — MUPIROCIN: 20 OINTMENT TOPICAL at 09:02

## 2020-09-10 RX ADMIN — CHLORDIAZEPOXIDE HYDROCHLORIDE 10 MG: 5 CAPSULE ORAL at 21:51

## 2020-09-10 RX ADMIN — Medication 0.9 MCG/KG/HR: at 05:28

## 2020-09-10 RX ADMIN — LORAZEPAM 2 MG: 2 INJECTION INTRAMUSCULAR; INTRAVENOUS at 04:35

## 2020-09-10 RX ADMIN — LORAZEPAM 2 MG: 2 INJECTION INTRAMUSCULAR; INTRAVENOUS at 03:22

## 2020-09-10 RX ADMIN — CIPROFLOXACIN 1 DROP: 3 SOLUTION OPHTHALMIC at 16:56

## 2020-09-10 RX ADMIN — Medication 10 ML: at 21:51

## 2020-09-10 RX ADMIN — CIPROFLOXACIN 1 DROP: 3 SOLUTION OPHTHALMIC at 13:23

## 2020-09-10 RX ADMIN — LORAZEPAM 2 MG: 2 INJECTION INTRAMUSCULAR; INTRAVENOUS at 01:11

## 2020-09-10 RX ADMIN — CIPROFLOXACIN 1 DROP: 3 SOLUTION OPHTHALMIC at 21:51

## 2020-09-10 ASSESSMENT — PAIN SCALES - GENERAL
PAINLEVEL_OUTOF10: 0

## 2020-09-10 NOTE — PLAN OF CARE
Problem: Falls - Risk of:  Goal: Will remain free from falls  Description: Will remain free from falls  Outcome: Ongoing  Note: Pt high fall risk. Instructed to use call light before getting out of bed. Call light within reach. Bed in low position. Bed alarm on. Will continue to monitor. Goal: Absence of physical injury  Description: Absence of physical injury  Outcome: Ongoing     Problem: Restraint Use - Nonviolent/Non-Self-Destructive Behavior:  Goal: Absence of restraint indications  Description: Absence of restraint indications  Outcome: Ongoing  Note: Patient still demonstrating indications of the need to be restrained at this time. Visual safety checks performed every hour, and restraint monitoring performed every two hours. Patient has no signs of injuries from restraints at this time. Educated pt on the need for the restraints, no evidence of learning. Will continue to monitor. Goal: Absence of restraint-related injury  Description: Absence of restraint-related injury  Outcome: Ongoing     Problem: Skin Integrity:  Goal: Will show no infection signs and symptoms  Description: Will show no infection signs and symptoms  Outcome: Ongoing  Note: Patient skin kept clean and dry, cleansed if soiled. Patient turned every 2 hours if not able to turn self. No evidence of skin breakdown this shift, will continue to monitor.     Goal: Absence of new skin breakdown  Description: Absence of new skin breakdown  Outcome: Ongoing     Problem: Discharge Planning:  Goal: Discharged to appropriate level of care  Description: Discharged to appropriate level of care  Outcome: Ongoing     Problem: Fluid Volume - Deficit:  Goal: Absence of fluid volume deficit signs and symptoms  Description: Absence of fluid volume deficit signs and symptoms  Outcome: Ongoing     Problem: Nutrition Deficit:  Goal: Ability to achieve adequate nutritional intake will improve  Description: Ability to achieve adequate nutritional intake will improve  Outcome: Ongoing     Problem: Sleep Pattern Disturbance:  Goal: Appears well-rested  Description: Appears well-rested  Outcome: Ongoing     Problem: Violence - Risk of, Self/Other-Directed:  Goal: Knowledge of developmental care interventions  Description: Absence of violence  Outcome: Ongoing

## 2020-09-10 NOTE — PROGRESS NOTES
Hospitalist Progress Note      PCP: Sharan Lee DO    Date of Admission: 9/4/2020    Chief Complaint: Alcoholism    Hospital Course: See H&P     Subjective:   Patent is up in bed, more agitated. No new event overnight. Medications:  Reviewed    Infusion Medications    dexmedetomidine 0.7 mcg/kg/hr (09/10/20 0713)     Scheduled Medications    ciprofloxacin  1 drop Left Eye Q4H While awake    mupirocin   Nasal BID    sodium chloride flush  10 mL Intravenous 2 times per day    famotidine (PEPCID) injection  20 mg Intravenous BID    enoxaparin  40 mg Subcutaneous Daily    chlordiazePOXIDE  10 mg Oral TID     PRN Meds: cloNIDine, sodium chloride flush, acetaminophen **OR** acetaminophen, polyethylene glycol, promethazine **OR** ondansetron, LORazepam **OR** LORazepam **OR** LORazepam **OR** LORazepam **OR** LORazepam **OR** LORazepam **OR** LORazepam **OR** LORazepam      Intake/Output Summary (Last 24 hours) at 9/10/2020 1102  Last data filed at 9/10/2020 0900  Gross per 24 hour   Intake 613 ml   Output 1125 ml   Net -512 ml       Physical Exam Performed:    /67   Pulse 79   Temp 97.5 °F (36.4 °C) (Oral)   Resp 12   Ht 5' 7\" (1.702 m)   Wt 151 lb 3.8 oz (68.6 kg)   SpO2 99%   BMI 23.69 kg/m²     General appearance: No apparent distress, appears confused  HEENT: Pupils equal, round, and reactive to light. Conjunctivae/corneas clear. Neck: Supple, with full range of motion. No jugular venous distention. Trachea midline. Respiratory:  Normal respiratory effort. Clear to auscultation, bilaterally without Rales/Wheezes/Rhonchi. Cardiovascular: Regular rate and rhythm with normal S1/S2 without murmurs, rubs or gallops. Abdomen: Soft, non-tender, non-distended with normal bowel sounds. Musculoskeletal: No clubbing, cyanosis or edema bilaterally. Full range of motion without deformity. Skin: Skin color, texture, turgor normal.  No rashes or lesions.   Neurologic:  Neurovascularly intact without any focal sensory/motor deficits. Cranial nerves: II-XII intact, grossly non-focal.  Psychiatric: confused  Capillary Refill: Brisk,< 3 seconds   Peripheral Pulses: +2 palpable, equal bilaterally       Labs:   Recent Labs     09/08/20  1007 09/09/20  0429 09/10/20  0427   WBC 3.6* 4.1 5.3   HGB 12.9* 15.0 15.2   HCT 38.5* 43.8 45.5   * 123* 141     Recent Labs     09/08/20  1007 09/09/20  0429 09/10/20  0427    142 143   K 3.4* 3.4* 3.9    103 103   CO2 23 24 18*   BUN 5* 4* 10   CREATININE 0.6* <0.5* 0.6*   CALCIUM 9.0 9.0 9.5       Urinalysis:      Lab Results   Component Value Date    NITRU Negative 09/04/2020    BLOODU Negative 09/04/2020    SPECGRAV <=1.005 09/04/2020    GLUCOSEU Negative 09/04/2020       Radiology:  XR CHEST PORTABLE   Final Result   No acute process. Assessment/Plan:    Active Hospital Problems    Diagnosis    Alcohol withdrawal syndrome without complication ([facility] [P99.297]     Priority: High     Alcohol withdrawal and intoxication, admit to [facility] scale protocol initiated, PRN Ativan, supportive care, IV hydration, advance diet as tolerated, continue to monitor. Patient was counseled for alcoholism. Clinically worsening, start on iv Precedex, transfer to ICU. Continue supportive care and monitor.     DVT Prophylaxis: Lovenox  Diet: DIET GENERAL;  Code Status: Full Code    PT/OT Eval Status: Ambulatory    Dispo -  > 2 days    Jessie Millan MD

## 2020-09-10 NOTE — FLOWSHEET NOTE
09/10/20 0900   Assessment   Charting Type Shift assessment   Neurological   Neuro (WDL) X   Level of Consciousness 0   Orientation Level Oriented to person;Disoriented to place; Disoriented to time;Disoriented to situation   Cognition Poor safety awareness;Poor attention/concentration; Impulsive;Poor judgement   Language Clear   Size R Pupil (mm) 3   R Pupil Shape Round   R Pupil Reaction Brisk   Size L Pupil (mm) 3   L Pupil Shape Round   L Pupil Reaction Sluggish   R Hand  Strong   L Hand  Strong   R Foot Dorsiflexion Strong   L Foot Dorsiflexion Strong   R Foot Plantar Flexion Strong   L Foot Plantar Flexion Strong   RUE Motor Response Responds to command;Normal extension;Normal flexion   Sensation RUE Full sensation   LUE Motor Response Responds to command;Normal extension;Normal flexion   Sensation LUE Full sensation   RLE Motor Response Responds to command;Normal extension;Normal flexion   Sensation RLE Full sensation   LLE Motor Response Responds to command;Normal extension;Normal flexion   Sensation LLE Full sensation   Gag Present   Hurt Coma Scale   Eye Opening 4   Best Verbal Response 4   Best Motor Response 6   Hurt Coma Scale Score 14   HEENT   HEENT (WDL) X   Right Eye Impaired vision; Intact   Left Eye Intact; Cloudy;Drainage;Sclera red   Tongue Pink & moist   Voice Normal   Mucous Membrane Moist;Pink; Intact   Teeth Missing teeth   Respiratory   Respiratory (WDL) WDL   Cardiac   Cardiac (WDL) WDL   Cardiac Regularity Regular   Cardiac Rhythm NSR   Cardiac Monitor   Telemetry Box Number ICU Bedside monitor   Bedside Cardiac Monitor On Yes   Bedside Cardiac Audible Yes   Bedside Cardiac Alarms Set Yes   Bedside Monitor Alarm Parameters    Gastrointestinal   Abdominal (WDL) WDL   Peripheral Vascular   Peripheral Vascular (WDL) WDL   Edema None   Skin Color/Condition   Skin Color/Condition (WDL) WDL   Skin Integrity   Skin Integrity (WDL) X   Skin Integrity Blister   Location Bilat

## 2020-09-10 NOTE — PROGRESS NOTES
Perfectserve to Dr. Unique Warner: Pt's Precedex drip has been off for approximately an hour and he has been calm and cooperative. Pt still in restraints d/t poor safety awareness. His last CIWA score was 5. Is he able to be moved off? Electronically signed by Fiorella Starks RN on 9/10/2020 at 9090    Per Dr. Unique Warner, discontinue restraints and continue to monitor in the ICU.    Electronically signed by Fiorella Starks RN on 9/10/2020 at 2:43 PM

## 2020-09-10 NOTE — PROGRESS NOTES
Perfectserve to Dr. Olamide Nuñez: Pt's restraint order has , can I reorder? Thanks! Awaiting response. Per Dr. Olamide Nuñez, reorder restraints.    Electronically signed by Sarika Perry RN on 9/10/2020 at 8:13 AM

## 2020-09-10 NOTE — FLOWSHEET NOTE
This note also relates to the following rows which could not be included:  Pulse - Cannot attach notes to unvalidated device data       09/10/20 1600   Assessment   Charting Type Reassessment   Neurological   Neuro (WDL) X   Level of Consciousness 0   Orientation Level Oriented to person;Disoriented to place; Disoriented to time;Disoriented to situation   Cognition Poor safety awareness;Poor attention/concentration; Impulsive;Poor judgement   Language Clear   Size R Pupil (mm) 3   R Pupil Shape Round   R Pupil Reaction Brisk   Size L Pupil (mm) 3   L Pupil Shape Round   L Pupil Reaction Sluggish   R Hand  Strong   L Hand  Strong   R Foot Dorsiflexion Strong   L Foot Dorsiflexion Strong   R Foot Plantar Flexion Strong   L Foot Plantar Flexion Strong   RUE Motor Response Responds to command;Normal extension;Normal flexion   Sensation RUE Full sensation   LUE Motor Response Responds to command;Normal extension;Normal flexion   Sensation LUE Full sensation   RLE Motor Response Responds to command;Normal extension;Normal flexion   Sensation RLE Full sensation   LLE Motor Response Responds to command;Normal extension;Normal flexion   Sensation LLE Full sensation   Gag Present   Lora Coma Scale   Eye Opening 4   Best Verbal Response 4   Best Motor Response 6   Lora Coma Scale Score 14   HEENT   HEENT (WDL) X   Right Eye Impaired vision; Intact   Left Eye Intact; Cloudy;Drainage;Sclera red   Tongue Pink & moist   Voice Normal   Mucous Membrane Moist;Pink; Intact   Teeth Missing teeth   Respiratory   Respiratory (WDL) WDL   Cardiac   Cardiac (WDL) WDL   Cardiac Regularity Regular   Cardiac Rhythm NSR   Cardiac Monitor   Telemetry Box Number ICU Bedside monitor   Bedside Cardiac Monitor On Yes   Bedside Cardiac Audible Yes   Bedside Cardiac Alarms Set Yes   Bedside Monitor Alarm Parameters    Gastrointestinal   Abdominal (WDL) WDL   Peripheral Vascular   Peripheral Vascular (WDL) WDL   Edema None   Skin Color/Condition   Skin Color/Condition (WDL) WDL   Skin Integrity   Skin Integrity (WDL) X   Skin Integrity Blister   Location Bilat heels   Preventative Dressing Yes   Multiple Skin Integrity Sites No   Dressing Site Sacrum   Date Applied 09/10/20   Assessed this shift? Yes   Musculoskeletal   Musculoskeletal (WDL) X   RUE Full movement;Weakness   LUE Full movement;Weakness   RL Extremity Full movement;Weakness   LL Extremity Full movement;Weakness   Genitourinary   Genitourinary (WDL) WDL   Flank Tenderness No   Suprapubic Tenderness No   Dysuria No   Anus/Rectum   Anus/Rectum (WDL) WDL   Urethral Catheter Temperature probe 16 fr   Placement Date/Time: 09/09/20 0615   Urethral Catheter Timeout: Patient;Procedure;Site/Side;Appropriate Equipment;Sterile technique; Correct Position  Inserted by: Christine Kim RN  Catheter Type: Temperature probe  Tube Size (fr): 16 fr  Catheter Balloon Si. ..    Catheter Indications Need for fluid management in critically ill patients in a critical care setting not able to be managed by other means such as BSC with hat, bedpan, urinal, condom catheter, or short term intermittent urethral catherization   Site Assessment Pink   Urine Color Yellow   Psychosocial   Psychosocial (WDL) X   Patient Behaviors Cooperative

## 2020-09-10 NOTE — PROGRESS NOTES
[facility] Pt in three point restraints, appears calm at the moment. Precedex going at 0.7 mcg/k,g/hr. Bed in lowest position, side rails up x2, call light and bedside table within reach. Will continue to monitor. No

## 2020-09-10 NOTE — PROGRESS NOTES
Pt calm and cooperative, still disoriented. Precedex drip turned off. Three point restraints still in place as pt disoriented and has poor safety awareness.

## 2020-09-11 LAB
ANION GAP SERPL CALCULATED.3IONS-SCNC: 20 MMOL/L (ref 3–16)
BUN BLDV-MCNC: 12 MG/DL (ref 7–20)
CALCIUM SERPL-MCNC: 9.2 MG/DL (ref 8.3–10.6)
CHLORIDE BLD-SCNC: 99 MMOL/L (ref 99–110)
CO2: 21 MMOL/L (ref 21–32)
CREAT SERPL-MCNC: 0.6 MG/DL (ref 0.9–1.3)
GFR AFRICAN AMERICAN: >60
GFR NON-AFRICAN AMERICAN: >60
GLUCOSE BLD-MCNC: 86 MG/DL (ref 70–99)
HCT VFR BLD CALC: 42.3 % (ref 40.5–52.5)
HEMOGLOBIN: 14.3 G/DL (ref 13.5–17.5)
MAGNESIUM: 1.8 MG/DL (ref 1.8–2.4)
MCH RBC QN AUTO: 29.4 PG (ref 26–34)
MCHC RBC AUTO-ENTMCNC: 33.8 G/DL (ref 31–36)
MCV RBC AUTO: 87.2 FL (ref 80–100)
PDW BLD-RTO: 13.4 % (ref 12.4–15.4)
PLATELET # BLD: 189 K/UL (ref 135–450)
PMV BLD AUTO: 8.4 FL (ref 5–10.5)
POTASSIUM REFLEX MAGNESIUM: 3.4 MMOL/L (ref 3.5–5.1)
RBC # BLD: 4.86 M/UL (ref 4.2–5.9)
SODIUM BLD-SCNC: 140 MMOL/L (ref 136–145)
WBC # BLD: 4.8 K/UL (ref 4–11)

## 2020-09-11 PROCEDURE — 6370000000 HC RX 637 (ALT 250 FOR IP): Performed by: INTERNAL MEDICINE

## 2020-09-11 PROCEDURE — 2500000003 HC RX 250 WO HCPCS: Performed by: INTERNAL MEDICINE

## 2020-09-11 PROCEDURE — 85027 COMPLETE CBC AUTOMATED: CPT

## 2020-09-11 PROCEDURE — 6360000002 HC RX W HCPCS: Performed by: INTERNAL MEDICINE

## 2020-09-11 PROCEDURE — 80048 BASIC METABOLIC PNL TOTAL CA: CPT

## 2020-09-11 PROCEDURE — 36415 COLL VENOUS BLD VENIPUNCTURE: CPT

## 2020-09-11 PROCEDURE — 2580000003 HC RX 258: Performed by: INTERNAL MEDICINE

## 2020-09-11 PROCEDURE — 1200000000 HC SEMI PRIVATE

## 2020-09-11 PROCEDURE — 83735 ASSAY OF MAGNESIUM: CPT

## 2020-09-11 PROCEDURE — 6360000002 HC RX W HCPCS: Performed by: NURSE PRACTITIONER

## 2020-09-11 RX ORDER — THIAMINE MONONITRATE (VIT B1) 100 MG
100 TABLET ORAL DAILY
Status: DISCONTINUED | OUTPATIENT
Start: 2020-09-11 | End: 2020-09-12

## 2020-09-11 RX ORDER — FOLIC ACID 1 MG/1
1 TABLET ORAL DAILY
Status: DISCONTINUED | OUTPATIENT
Start: 2020-09-11 | End: 2020-09-12

## 2020-09-11 RX ORDER — LORAZEPAM 1 MG/1
2 TABLET ORAL ONCE
Status: DISCONTINUED | OUTPATIENT
Start: 2020-09-11 | End: 2020-09-11

## 2020-09-11 RX ORDER — HALOPERIDOL 5 MG/ML
5 INJECTION INTRAMUSCULAR EVERY 6 HOURS PRN
Status: DISCONTINUED | OUTPATIENT
Start: 2020-09-11 | End: 2020-09-14 | Stop reason: HOSPADM

## 2020-09-11 RX ORDER — HYDRALAZINE HYDROCHLORIDE 20 MG/ML
10 INJECTION INTRAMUSCULAR; INTRAVENOUS ONCE
Status: COMPLETED | OUTPATIENT
Start: 2020-09-12 | End: 2020-09-11

## 2020-09-11 RX ORDER — M-VIT,TX,IRON,MINS/CALC/FOLIC 27MG-0.4MG
1 TABLET ORAL DAILY
Status: DISCONTINUED | OUTPATIENT
Start: 2020-09-11 | End: 2020-09-12

## 2020-09-11 RX ORDER — HALOPERIDOL 5 MG/ML
INJECTION INTRAMUSCULAR
Status: DISPENSED
Start: 2020-09-11 | End: 2020-09-11

## 2020-09-11 RX ORDER — METOPROLOL TARTRATE 5 MG/5ML
5 INJECTION INTRAVENOUS ONCE
Status: COMPLETED | OUTPATIENT
Start: 2020-09-11 | End: 2020-09-11

## 2020-09-11 RX ORDER — CHLORDIAZEPOXIDE HYDROCHLORIDE 25 MG/1
25 CAPSULE, GELATIN COATED ORAL 3 TIMES DAILY
Status: DISCONTINUED | OUTPATIENT
Start: 2020-09-11 | End: 2020-09-14

## 2020-09-11 RX ADMIN — METOPROLOL TARTRATE 5 MG: 5 INJECTION INTRAVENOUS at 08:37

## 2020-09-11 RX ADMIN — FAMOTIDINE 20 MG: 10 INJECTION INTRAVENOUS at 10:20

## 2020-09-11 RX ADMIN — CIPROFLOXACIN 1 DROP: 3 SOLUTION OPHTHALMIC at 13:57

## 2020-09-11 RX ADMIN — FAMOTIDINE 20 MG: 10 INJECTION INTRAVENOUS at 23:07

## 2020-09-11 RX ADMIN — CIPROFLOXACIN 1 DROP: 3 SOLUTION OPHTHALMIC at 10:23

## 2020-09-11 RX ADMIN — ENOXAPARIN SODIUM 40 MG: 40 INJECTION SUBCUTANEOUS at 10:20

## 2020-09-11 RX ADMIN — Medication 100 MG: at 10:21

## 2020-09-11 RX ADMIN — CHLORDIAZEPOXIDE HYDROCHLORIDE 25 MG: 25 CAPSULE ORAL at 13:57

## 2020-09-11 RX ADMIN — CHLORDIAZEPOXIDE HYDROCHLORIDE 10 MG: 5 CAPSULE ORAL at 10:20

## 2020-09-11 RX ADMIN — HYDRALAZINE HYDROCHLORIDE 10 MG: 20 INJECTION INTRAMUSCULAR; INTRAVENOUS at 23:56

## 2020-09-11 RX ADMIN — CIPROFLOXACIN 1 DROP: 3 SOLUTION OPHTHALMIC at 05:10

## 2020-09-11 RX ADMIN — LORAZEPAM 2 MG: 2 INJECTION INTRAMUSCULAR; INTRAVENOUS at 18:53

## 2020-09-11 RX ADMIN — LORAZEPAM 4 MG: 2 INJECTION, SOLUTION INTRAMUSCULAR; INTRAVENOUS at 13:03

## 2020-09-11 RX ADMIN — HALOPERIDOL LACTATE 5 MG: 5 INJECTION, SOLUTION INTRAMUSCULAR at 23:11

## 2020-09-11 RX ADMIN — LORAZEPAM 2 MG: 2 INJECTION INTRAMUSCULAR; INTRAVENOUS at 17:12

## 2020-09-11 RX ADMIN — LORAZEPAM 2 MG: 2 INJECTION INTRAMUSCULAR; INTRAVENOUS at 20:13

## 2020-09-11 RX ADMIN — LORAZEPAM 3 MG: 2 INJECTION, SOLUTION INTRAMUSCULAR; INTRAVENOUS at 23:11

## 2020-09-11 RX ADMIN — LORAZEPAM 3 MG: 2 INJECTION, SOLUTION INTRAMUSCULAR; INTRAVENOUS at 11:40

## 2020-09-11 RX ADMIN — HALOPERIDOL LACTATE 5 MG: 5 INJECTION, SOLUTION INTRAMUSCULAR at 09:26

## 2020-09-11 RX ADMIN — Medication 10 ML: at 10:23

## 2020-09-11 RX ADMIN — LORAZEPAM 1 MG: 2 INJECTION INTRAMUSCULAR; INTRAVENOUS at 04:00

## 2020-09-11 RX ADMIN — LORAZEPAM 2 MG: 2 INJECTION INTRAMUSCULAR; INTRAVENOUS at 08:25

## 2020-09-11 RX ADMIN — LORAZEPAM 2 MG: 2 INJECTION INTRAMUSCULAR; INTRAVENOUS at 06:43

## 2020-09-11 RX ADMIN — LORAZEPAM 1 MG: 2 INJECTION INTRAMUSCULAR; INTRAVENOUS at 14:24

## 2020-09-11 RX ADMIN — CIPROFLOXACIN 1 DROP: 3 SOLUTION OPHTHALMIC at 18:44

## 2020-09-11 RX ADMIN — Medication 1 TABLET: at 10:21

## 2020-09-11 RX ADMIN — Medication 10 ML: at 23:07

## 2020-09-11 RX ADMIN — LORAZEPAM 2 MG: 2 INJECTION INTRAMUSCULAR; INTRAVENOUS at 15:35

## 2020-09-11 ASSESSMENT — PAIN SCALES - GENERAL: PAINLEVEL_OUTOF10: 0

## 2020-09-11 NOTE — CARE COORDINATION
Chart review day 6:  Pt followed by IM, transferred from ICU over night. CM attempted to assess pt, but pt is confused, with a sitter and in restraints. DCP will continue to follow for needs as they arise and will reassess needs once pt is stable.   Marcellina Kehr, RN

## 2020-09-11 NOTE — PROGRESS NOTES
Pt Reassessment completed. Pt is alert and orient * 4. Doesn't appear to be in pain. Pt is ambulate with assistance. Pt follows command. Pt respiration are regular and unlabored and on room air. Breath sounds diminished. PIV flushed and patent. Scheduled medications given as ordered. Patient encouraged to use call light with any needs. Patient states understanding, call light in reach, bed alarm on. All safety checks in place and will continue to monitor pt.

## 2020-09-11 NOTE — PROGRESS NOTES
4 Eyes Skin Assessment     The patient is being assess for  Transfer to New Unit    I agree that 2 RN's have performed a thorough Head to Toe Skin Assessment on the patient. ALL assessment sites listed below have been assessed. Areas assessed by both nurses:   [x]   Head, Face, and Ears   [x]   Shoulders, Back, and Chest  [x]   Arms, Elbows, and Hands   [x]   Coccyx, Sacrum, and Ischum  [x]   Legs, Feet, and Heels        Does the Patient have Skin Breakdown? No. Ulcers on feet were documented on admission.       Mateusz Prevention initiated:  Yes   Wound Care Orders initiated:  N/A      WOC nurse consulted for Pressure Injury (Stage 3,4, Unstageable, DTI, NWPT, and Complex wounds):  NA      Nurse 1 eSignature: Electronically signed by Jesse Miller RN on 9/10/20 at 10:45 PM EDT    **SHARE this note so that the co-signing nurse is able to place an eSignature**    Nurse 2 eSignature: Electronically signed by Dyana Alonzo RN on 9/10/20 at 11:14 PM EDT

## 2020-09-11 NOTE — PROGRESS NOTES
Transfer    Patient transferred out of ICU to room 526. Report given to Mikki Hagan RN, at bedside. All vital signs are stable. Patient left in good condition with all personal belongings via bed accompanied by RN & PCA.     Camelia Duel

## 2020-09-11 NOTE — PLAN OF CARE
Problem: Falls - Risk of:  Goal: Will remain free from falls  Description: Will remain free from falls  Outcome: Ongoing  Goal: Absence of physical injury  Description: Absence of physical injury  Outcome: Ongoing     Problem: Restraint Use - Nonviolent/Non-Self-Destructive Behavior:  Goal: Absence of restraint indications  Description: Absence of restraint indications  Outcome: Ongoing  Goal: Absence of restraint-related injury  Description: Absence of restraint-related injury  Outcome: Ongoing     Problem: Skin Integrity:  Goal: Will show no infection signs and symptoms  Description: Will show no infection signs and symptoms  Outcome: Ongoing  Goal: Absence of new skin breakdown  Description: Absence of new skin breakdown  Outcome: Ongoing     Problem: Discharge Planning:  Goal: Discharged to appropriate level of care  Description: Discharged to appropriate level of care  Outcome: Ongoing     Problem: Fluid Volume - Deficit:  Goal: Absence of fluid volume deficit signs and symptoms  Description: Absence of fluid volume deficit signs and symptoms  Outcome: Ongoing     Problem: Nutrition Deficit:  Goal: Ability to achieve adequate nutritional intake will improve  Description: Ability to achieve adequate nutritional intake will improve  Outcome: Ongoing     Problem: Sleep Pattern Disturbance:  Goal: Appears well-rested  Description: Appears well-rested  Outcome: Ongoing     Problem: Violence - Risk of, Self/Other-Directed:  Goal: Knowledge of developmental care interventions  Description: Absence of violence  Outcome: Ongoing

## 2020-09-11 NOTE — PROGRESS NOTES
clubbing, cyanosis or edema bilaterally. Full range of motion without deformity. Skin: Skin color, texture, turgor normal.  No rashes or lesions. Neurologic:  Neurovascularly intact without any focal sensory/motor deficits. Cranial nerves: II-XII intact, grossly non-focal.  Psychiatric: confused  Capillary Refill: Brisk,< 3 seconds   Peripheral Pulses: +2 palpable, equal bilaterally       Labs:   Recent Labs     09/09/20  0429 09/10/20  0427 09/11/20  0647   WBC 4.1 5.3 4.8   HGB 15.0 15.2 14.3   HCT 43.8 45.5 42.3   * 141 189     Recent Labs     09/09/20  0429 09/10/20  0427 09/11/20  0647    143 140   K 3.4* 3.9 3.4*    103 99   CO2 24 18* 21   BUN 4* 10 12   CREATININE <0.5* 0.6* 0.6*   CALCIUM 9.0 9.5 9.2       Urinalysis:      Lab Results   Component Value Date    NITRU Negative 09/04/2020    BLOODU Negative 09/04/2020    SPECGRAV <=1.005 09/04/2020    GLUCOSEU Negative 09/04/2020       Radiology:  XR CHEST PORTABLE   Final Result   No acute process. Assessment/Plan:    Active Hospital Problems    Diagnosis    [facility] [C10.942]     Priority: High     Alcohol withdrawal and intoxication, admit to [facility] [place] scale protocol initiated, PRN Ativan, supportive care, IV hydration, advance diet as tolerated, continue to monitor. Patient was counseled for alcoholism. Clinically worsening, start on iv Precedex, transfer to ICU. Clinically improving, off of Precedex, transfer to medsur. Worsening agitation, add prn Haldol, restrain, monitor. Continue supportive care and monitor.     DVT Prophylaxis: Lovenox  Diet: DIET GENERAL;  Code Status: Full Code    PT/OT Eval Status: Ambulatory    Dispo -  > 2 days    Irene Laboy MD

## 2020-09-11 NOTE — PROGRESS NOTES
While getting morning vitals pt stated \" I should just hang myself with this phone cord\" and \" I am going to kill myself with this phone cord\". Notified MD of suicide ideations.  Pt placed on suicide precautions per MD.

## 2020-09-11 NOTE — PROGRESS NOTES
Suicide precaution placed at 0830 Am. Pt told Nurse he was going to hang himself with phone cord. All cords are removed from Pt reach. Sitter was initiated at 0830 am on 9/11/2020. @ 0922 Pt tried getting out of bed and attempted to swat at me (sitter) as I was redirecting him back into bed. Pt also ripped out IV in left wrist. Pt now restrained, medicated, and calm.

## 2020-09-11 NOTE — PROGRESS NOTES
Pt transferred to C5 per bed from B2 awake alert , confused to situation. States he is going to have surgery right now. trying to clarify with pt that he is being transferred to a regular room .  Keeps saying\"will it hurt\" trying to reassure and reorient pt bedcheck on for safety and call light with in reach

## 2020-09-12 LAB
ANION GAP SERPL CALCULATED.3IONS-SCNC: 19 MMOL/L (ref 3–16)
BUN BLDV-MCNC: 8 MG/DL (ref 7–20)
CALCIUM SERPL-MCNC: 9.4 MG/DL (ref 8.3–10.6)
CHLORIDE BLD-SCNC: 104 MMOL/L (ref 99–110)
CO2: 21 MMOL/L (ref 21–32)
CREAT SERPL-MCNC: 0.6 MG/DL (ref 0.9–1.3)
GFR AFRICAN AMERICAN: >60
GFR NON-AFRICAN AMERICAN: >60
GLUCOSE BLD-MCNC: 108 MG/DL (ref 70–99)
HCT VFR BLD CALC: 44.3 % (ref 40.5–52.5)
HEMOGLOBIN: 15.1 G/DL (ref 13.5–17.5)
MAGNESIUM: 1.9 MG/DL (ref 1.8–2.4)
MCH RBC QN AUTO: 29.9 PG (ref 26–34)
MCHC RBC AUTO-ENTMCNC: 34.2 G/DL (ref 31–36)
MCV RBC AUTO: 87.3 FL (ref 80–100)
PDW BLD-RTO: 13.7 % (ref 12.4–15.4)
PLATELET # BLD: 227 K/UL (ref 135–450)
PMV BLD AUTO: 7.9 FL (ref 5–10.5)
POTASSIUM REFLEX MAGNESIUM: 3.1 MMOL/L (ref 3.5–5.1)
RBC # BLD: 5.07 M/UL (ref 4.2–5.9)
SODIUM BLD-SCNC: 144 MMOL/L (ref 136–145)
WBC # BLD: 6 K/UL (ref 4–11)

## 2020-09-12 PROCEDURE — 80048 BASIC METABOLIC PNL TOTAL CA: CPT

## 2020-09-12 PROCEDURE — 6360000002 HC RX W HCPCS: Performed by: HOSPITALIST

## 2020-09-12 PROCEDURE — 6360000002 HC RX W HCPCS: Performed by: INTERNAL MEDICINE

## 2020-09-12 PROCEDURE — 51798 US URINE CAPACITY MEASURE: CPT

## 2020-09-12 PROCEDURE — 6360000002 HC RX W HCPCS: Performed by: NURSE PRACTITIONER

## 2020-09-12 PROCEDURE — 2580000003 HC RX 258: Performed by: HOSPITALIST

## 2020-09-12 PROCEDURE — 2580000003 HC RX 258: Performed by: INTERNAL MEDICINE

## 2020-09-12 PROCEDURE — 83735 ASSAY OF MAGNESIUM: CPT

## 2020-09-12 PROCEDURE — 2500000003 HC RX 250 WO HCPCS: Performed by: NURSE PRACTITIONER

## 2020-09-12 PROCEDURE — 1200000000 HC SEMI PRIVATE

## 2020-09-12 PROCEDURE — 2500000003 HC RX 250 WO HCPCS: Performed by: HOSPITALIST

## 2020-09-12 PROCEDURE — 2500000003 HC RX 250 WO HCPCS: Performed by: INTERNAL MEDICINE

## 2020-09-12 PROCEDURE — 36415 COLL VENOUS BLD VENIPUNCTURE: CPT

## 2020-09-12 PROCEDURE — 85027 COMPLETE CBC AUTOMATED: CPT

## 2020-09-12 PROCEDURE — 6370000000 HC RX 637 (ALT 250 FOR IP): Performed by: INTERNAL MEDICINE

## 2020-09-12 RX ORDER — METOPROLOL TARTRATE 5 MG/5ML
5 INJECTION INTRAVENOUS ONCE
Status: COMPLETED | OUTPATIENT
Start: 2020-09-12 | End: 2020-09-12

## 2020-09-12 RX ORDER — ZIPRASIDONE MESYLATE 20 MG/ML
10 INJECTION, POWDER, LYOPHILIZED, FOR SOLUTION INTRAMUSCULAR ONCE
Status: COMPLETED | OUTPATIENT
Start: 2020-09-12 | End: 2020-09-12

## 2020-09-12 RX ORDER — HYDRALAZINE HYDROCHLORIDE 20 MG/ML
10 INJECTION INTRAMUSCULAR; INTRAVENOUS ONCE
Status: COMPLETED | OUTPATIENT
Start: 2020-09-12 | End: 2020-09-12

## 2020-09-12 RX ORDER — POTASSIUM CHLORIDE 7.45 MG/ML
10 INJECTION INTRAVENOUS
Status: COMPLETED | OUTPATIENT
Start: 2020-09-12 | End: 2020-09-12

## 2020-09-12 RX ADMIN — Medication 10 ML: at 21:04

## 2020-09-12 RX ADMIN — Medication 0.2 MCG/KG/HR: at 10:39

## 2020-09-12 RX ADMIN — CHLORDIAZEPOXIDE HYDROCHLORIDE 25 MG: 25 CAPSULE ORAL at 21:04

## 2020-09-12 RX ADMIN — LORAZEPAM 4 MG: 2 INJECTION, SOLUTION INTRAMUSCULAR; INTRAVENOUS at 03:44

## 2020-09-12 RX ADMIN — METOPROLOL TARTRATE 5 MG: 5 INJECTION INTRAVENOUS at 06:00

## 2020-09-12 RX ADMIN — LORAZEPAM 4 MG: 2 INJECTION, SOLUTION INTRAMUSCULAR; INTRAVENOUS at 08:14

## 2020-09-12 RX ADMIN — CIPROFLOXACIN 1 DROP: 3 SOLUTION OPHTHALMIC at 14:55

## 2020-09-12 RX ADMIN — METOPROLOL TARTRATE 5 MG: 5 INJECTION INTRAVENOUS at 02:56

## 2020-09-12 RX ADMIN — Medication 0.4 MCG/KG/HR: at 22:36

## 2020-09-12 RX ADMIN — MUPIROCIN: 20 OINTMENT TOPICAL at 22:33

## 2020-09-12 RX ADMIN — FAMOTIDINE 20 MG: 10 INJECTION INTRAVENOUS at 21:04

## 2020-09-12 RX ADMIN — LORAZEPAM 4 MG: 2 INJECTION, SOLUTION INTRAMUSCULAR; INTRAVENOUS at 09:19

## 2020-09-12 RX ADMIN — LORAZEPAM 3 MG: 2 INJECTION, SOLUTION INTRAMUSCULAR; INTRAVENOUS at 00:15

## 2020-09-12 RX ADMIN — POTASSIUM CHLORIDE 10 MEQ: 7.46 INJECTION, SOLUTION INTRAVENOUS at 15:48

## 2020-09-12 RX ADMIN — CIPROFLOXACIN 1 DROP: 3 SOLUTION OPHTHALMIC at 11:22

## 2020-09-12 RX ADMIN — POTASSIUM CHLORIDE 10 MEQ: 7.46 INJECTION, SOLUTION INTRAVENOUS at 18:45

## 2020-09-12 RX ADMIN — CIPROFLOXACIN 1 DROP: 3 SOLUTION OPHTHALMIC at 21:07

## 2020-09-12 RX ADMIN — LORAZEPAM 4 MG: 2 INJECTION, SOLUTION INTRAMUSCULAR; INTRAVENOUS at 06:00

## 2020-09-12 RX ADMIN — HYDRALAZINE HYDROCHLORIDE 10 MG: 20 INJECTION INTRAMUSCULAR; INTRAVENOUS at 06:45

## 2020-09-12 RX ADMIN — POTASSIUM CHLORIDE 10 MEQ: 7.46 INJECTION, SOLUTION INTRAVENOUS at 17:30

## 2020-09-12 RX ADMIN — FOLIC ACID: 5 INJECTION, SOLUTION INTRAMUSCULAR; INTRAVENOUS; SUBCUTANEOUS at 16:35

## 2020-09-12 RX ADMIN — CIPROFLOXACIN 1 DROP: 3 SOLUTION OPHTHALMIC at 18:44

## 2020-09-12 RX ADMIN — POTASSIUM CHLORIDE 10 MEQ: 7.46 INJECTION, SOLUTION INTRAVENOUS at 21:08

## 2020-09-12 RX ADMIN — LORAZEPAM 3 MG: 2 INJECTION, SOLUTION INTRAMUSCULAR; INTRAVENOUS at 02:22

## 2020-09-12 RX ADMIN — ZIPRASIDONE MESYLATE 10 MG: 20 INJECTION, POWDER, LYOPHILIZED, FOR SOLUTION INTRAMUSCULAR at 01:38

## 2020-09-12 ASSESSMENT — PAIN SCALES - GENERAL: PAINLEVEL_OUTOF10: 0

## 2020-09-12 NOTE — PROGRESS NOTES
[employer] to Dr. Zion Degroot:     Pt's BP running 150s-160s/110s. Metoprolol just administered for elevated HR. One time dose of 10mg hydralazine was given overnight for HTN. Would you like to repeat this? Also, pt already in bilateral wrist restraints (order expires at 0930), I added a soft restraint to R ankle because patient started kicking at sitter. Please advise, thanks!    0652:   Adike:  \"Yes you can repeat that dose\"

## 2020-09-12 NOTE — PLAN OF CARE
Nutrition Problem #1: Inadequate oral intake  Intervention: Food and/or Nutrient Delivery: Continue Current Diet, Start Oral Nutrition Supplement  Nutritional Goals: Patient will eat 50% or greater of meals and supplements without GI distress.

## 2020-09-12 NOTE — PROGRESS NOTES
Comprehensive Nutrition Assessment    Type and Reason for Visit:  Initial(LOS)    Nutrition Recommendations/Plan:   1. High nutrition risk with current condition making it difficult to safely eat and with recent inadequate consumption, monitor plan of care and if enteral nutrition possible  2. Continue general diet as able  3. Ensure with meals encourage in between meals and with medications as able  4. Will monitor nutritional adequacy, nutrition-related labs, weights, BMs, and clinical progress     Nutrition Assessment:  Patient admitted with alcohol withdrawl, increased agitation and confusion reported. Currently on a general diet eating 25% meals. Nursing attempting to feed patient today getting a few bites in.  RD ordered Ensure to start tomorrow to aid in caloric intake. Will closely monitor nutritional intake and plan of care with patient's poor nutritional intake. Malnutrition Assessment:  Malnutrition Status: At risk for malnutrition (Comment)      Estimated Daily Nutrient Needs:  Energy (kcal):  9636-0737; Weight Used for Energy Requirements:  Current(68.6 kg)     Protein (g):  ; Weight Used for Protein Requirements:  Current(1.3-1.5)        Fluid (ml/day):  1 kcal/ml; Weight Used for Fluid Requirements:         Nutrition Related Findings:  Last BM On 9/10/20      Wounds:  (blister on heels)       Current Nutrition Therapies:    DIET GENERAL;  Dietary Nutrition Supplements: Standard High Calorie Oral Supplement    Anthropometric Measures:  · Height: 5' 7\" (170.2 cm)  · Current Body Weight: 151 lb 3 oz (68.6 kg)   · Admission Body Weight:      · Usual Body Weight:       · Ideal Body Weight: 148 lbs; % Ideal Body Weight     · BMI: 23.7  · Adjusted Body Weight:  ;     · Adjusted BMI:      · BMI Categories: Normal Weight (BMI 18.5-24. 9)       Nutrition Diagnosis:   · Inadequate oral intake related to cognitive or neurological impairment, impaired respiratory function as evidenced by intake

## 2020-09-12 NOTE — CARE COORDINATION
Writer approached by Shanon Kendrick RN with information regarding pt substance abuse history. Stated that pt was hospitalized a year ago at [facility] for the same. After that pt enrolled at [facility] completed the program and had a joband was working until [place] when he was laid off. He was to go back to work in August but for some unknown reason that did not happen and pt relapsed. Mom Ernie Benoit is emergency contact. During this admission pt verbalized some suicide ideation and now has a sitter at bedside. CM will need to discuss sub abuse resources once stable. At this time pt on IV medication for withdrawal and not appropriate.  Yu Wei RN

## 2020-09-12 NOTE — PROGRESS NOTES
Comprehensive Nutrition Assessment    Type and Reason for Visit:  Initial(LOS)    Nutrition Recommendations/Plan: ***    Nutrition Assessment:       Malnutrition Assessment:  Malnutrition Status: At risk for malnutrition (Comment)      Estimated Daily Nutrient Needs:  Energy (kcal):  6829-6363; Weight Used for Energy Requirements:  Current(68.6 kg)     Protein (g):  ; Weight Used for Protein Requirements:  Current(1.3-1.5)        Fluid (ml/day):  1 kcal/ml; Weight Used for Fluid Requirements:         Nutrition Related Findings:  Last BM On 9/10/20      Wounds:  (blister on heels)       Current Nutrition Therapies:    DIET GENERAL;  Dietary Nutrition Supplements: Standard High Calorie Oral Supplement    Anthropometric Measures:  · Height: 5' 7\" (170.2 cm)  · Current Body Weight: 151 lb 3 oz (68.6 kg)   · Ideal Body Weight: 148 lbs; % Ideal Body Weight     · BMI: 23.7  · BMI Categories: Normal Weight (BMI 18.5-24. 9)       Nutrition Diagnosis:   · Inadequate oral intake related to cognitive or neurological impairment, impaired respiratory function as evidenced by intake 0-25%    Nutrition Interventions:   Food and/or Nutrient Delivery:  Continue Current Diet, Start Oral Nutrition Supplement  Nutrition Education/Counseling:      Coordination of Nutrition Care:  Continued Inpatient Monitoring    Goals:  Patient will eat 50% or greater of meals and supplements without GI distress.        Nutrition Monitoring and Evaluation:   Behavioral-Environmental Outcomes:      Food/Nutrient Intake Outcomes:  Supplement Intake, Food and Nutrient Intake  Physical Signs/Symptoms Outcomes:  Chewing or Swallowing, GI Status, Nutrition Focused Physical Findings, Meal Time Behavior, Weight     Discharge Planning:    Continue current diet, Continue Oral Nutrition Supplement     Electronically signed by Victor Manuel Barriga RD, LD on 9/11/20 at 9:51 PM EDT    Contact: Office: [phone]; [address] [place] [address] Road: [phone]

## 2020-09-12 NOTE — PROGRESS NOTES
1030 Patient transferred to ICU for Precedex gtt d/t increased agitation on C5. Sitter at bedside. 4 eye skin check done on transfer, skin intact on coccyx, pink & blanchable. Mepilex applied. Open dried round blisters noted on each inner heel, mepilex applied. Left eye matted shut with green drainage, cleansed & eye gtts given as ordered. 4 Eyes Skin Assessment     The patient is being assess for   Transfer to New Unit    I agree that 2 RN's have performed a thorough Head to Toe Skin Assessment on the patient. ALL assessment sites listed below have been assessed. Areas assessed by both nurses:   [x]   Head, Face, and Ears   [x]   Shoulders, Back, and Chest, Abdomen  [x]   Arms, Elbows, and Hands   [x]   Coccyx, Sacrum, and Ischium  [x]   Legs, Feet, and Heels            **SHARE this note so that the co-signing nurse is able to place an eSignature**    Co-signer eSignature: Electronically signed by Radha Evangelista RN on 9/12/20 at 11:50 AM EDT    Does the Patient have Skin Breakdown?   No          Mateusz Prevention initiated:  Yes   Wound Care Orders initiated:  No      WOC nurse consulted for Pressure Injury (Stage 3,4, Unstageable, DTI, NWPT, Complex wounds)and New or Established Ostomies:  No      Primary Nurse eSignature: {Esignature:[id number]}

## 2020-09-12 NOTE — PROGRESS NOTES
Hospitalist Progress Note      PCP: Piter Feliz DO    Date of Admission: 9/4/2020    Chief Complaint: Alcohol intoxication    Hospital Course: This is a 41-year-old male admitted with alcohol intoxication, delirium overnight requiring more than 4 mg Ativan was transferred to ICU this morning started on Precedex drip. Subjective: Patient is lying in bed in ICU lethargic on Precedex drip. Medications:  Reviewed    Infusion Medications    dexmedetomidine Stopped (09/10/20 1330)     Scheduled Medications    therapeutic multivitamin-minerals  1 tablet Oral Daily    folic acid  1 mg Oral Daily    vitamin B-1  100 mg Oral Daily    chlordiazePOXIDE  25 mg Oral TID    ciprofloxacin  1 drop Left Eye Q4H While awake    mupirocin   Nasal BID    sodium chloride flush  10 mL Intravenous 2 times per day    famotidine (PEPCID) injection  20 mg Intravenous BID    enoxaparin  40 mg Subcutaneous Daily     PRN Meds: haloperidol lactate, cloNIDine, sodium chloride flush, acetaminophen **OR** acetaminophen, polyethylene glycol, promethazine **OR** ondansetron, LORazepam **OR** LORazepam **OR** LORazepam **OR** LORazepam **OR** LORazepam **OR** LORazepam **OR** LORazepam **OR** LORazepam      Intake/Output Summary (Last 24 hours) at 9/12/2020 0949  Last data filed at 9/11/2020 2146  Gross per 24 hour   Intake 290 ml   Output 600 ml   Net -310 ml       Physical Exam Performed:    BP (!) 155/86   Pulse 123   Temp 98.3 °F (36.8 °C) (Oral)   Resp 20   Ht 5' 7\" (1.702 m)   Wt 142 lb 3.2 oz (64.5 kg)   SpO2 98%   BMI 22.27 kg/m²     General appearance: No apparent distress, appears stated age and cooperative. HEENT: Pupils equal, round, and reactive to light. Conjunctivae/corneas clear. Neck: Supple, with full range of motion. No jugular venous distention. Trachea midline. Respiratory:  Normal respiratory effort. Clear to auscultation, bilaterally without Rales/Wheezes/Rhonchi.   Cardiovascular: Regular rate and rhythm with normal S1/S2 without murmurs, rubs or gallops. Abdomen: Soft, non-tender, non-distended with normal bowel sounds. Musculoskeletal: No clubbing, cyanosis or edema bilaterally. Full range of motion without deformity. Skin: Skin color, texture, turgor normal.  No rashes or lesions. Neurologic:  Neurovascularly intact without any focal sensory/motor deficits. Cranial nerves: II-XII intact, grossly non-focal.  Psychiatric: Alert and oriented, thought content appropriate, normal insight  Capillary Refill: Brisk,< 3 seconds   Peripheral Pulses: +2 palpable, equal bilaterally       Labs:   Recent Labs     09/10/20  0427 09/11/20  0647 09/12/20  0731   WBC 5.3 4.8 6.0   HGB 15.2 14.3 15.1   HCT 45.5 42.3 44.3    189 227     Recent Labs     09/10/20  0427 09/11/20  0647 09/12/20  0731    140 144   K 3.9 3.4* 3.1*    99 104   CO2 18* 21 21   BUN 10 12 8   CREATININE 0.6* 0.6* 0.6*   CALCIUM 9.5 9.2 9.4     No results for input(s): AST, ALT, BILIDIR, BILITOT, ALKPHOS in the last 72 hours. No results for input(s): INR in the last 72 hours. No results for input(s): [place] in the last 72 hours. Urinalysis:      Lab Results   Component Value Date    NITRU Negative 09/04/2020    BLOODU Negative 09/04/2020    SPECGRAV <=1.005 09/04/2020    GLUCOSEU Negative 09/04/2020       Radiology:  XR CHEST PORTABLE   Final Result   No acute process. Assessment/Plan:    Active Hospital Problems    Diagnosis    [facility] syndrome [facility] [P32.159]     1. Alcohol withdrawal and intoxication we will transfer patient to ICU for close monitoring started on Precedex drip, [place], continue to monitor closely. IV thiamine, folic acid  2. Hypokalemia supplement IV potassium. Check BMP in a.m.       DVT Prophylaxis: Lovenox  Diet: DIET GENERAL;  Dietary Nutrition Supplements: Standard High Calorie Oral Supplement  Code Status: Full Code    PT/OT

## 2020-09-12 NOTE — PROGRESS NOTES
1300 patient voided per urinal 350 ml urine without difficulty. Will hold on placing falk since patient able to void & continue to monitor output. Precedex currently at 0.5 and resting in naps.  Sits up at times and mumbles, easily redirected to lay back down and rest. Sitter or RN remains at bedside 1:1. Aryan Mckeon

## 2020-09-12 NOTE — PROGRESS NOTES
Pt is receiving ativan 4 mg q 1 hour. Pt is still aggressive and tachycardic. Pt attempting to kick RN and PCA while giving ativan. MD paged about CIWA and moving pt to a higher level of care. Waiting on response.     MD responded and gave verbal order to move to ICU

## 2020-09-12 NOTE — PROGRESS NOTES
Per CMU, pt sustaining 's to 160's. Pt agitated and confused, unable to assess any other c/o. Dominguez NP notified, 1x IV metoprolol ordered. Primary RN Vita aware. 0600: Pt sustaining SVT 140s-160s again, repeat dose of 5mg IV metoprolol ordered by [place].

## 2020-09-12 NOTE — PLAN OF CARE
Problem: Falls - Risk of:  Goal: Will remain free from falls  Description: Will remain free from falls  Outcome: Ongoing  Goal: Absence of physical injury  Description: Absence of physical injury  Outcome: Ongoing     Problem: Restraint Use - Nonviolent/Non-Self-Destructive Behavior:  Goal: Absence of restraint-related injury  Description: Absence of restraint-related injury  Outcome: Ongoing     Problem: Skin Integrity:  Goal: Will show no infection signs and symptoms  Description: Will show no infection signs and symptoms  Outcome: Ongoing  Goal: Absence of new skin breakdown  Description: Absence of new skin breakdown  Outcome: Ongoing     Problem: Discharge Planning:  Goal: Discharged to appropriate level of care  Description: Discharged to appropriate level of care  Outcome: Ongoing     Problem: Fluid Volume - Deficit:  Goal: Absence of fluid volume deficit signs and symptoms  Description: Absence of fluid volume deficit signs and symptoms  Outcome: Ongoing     Problem: Nutrition Deficit:  Goal: Ability to achieve adequate nutritional intake will improve  Description: Ability to achieve adequate nutritional intake will improve  Outcome: Ongoing     Problem: Sleep Pattern Disturbance:  Goal: Appears well-rested  Description: Appears well-rested  Outcome: Ongoing     Problem: Violence - Risk of, Self/Other-Directed:  Goal: Knowledge of developmental care interventions  Description: Absence of violence  Outcome: Ongoing

## 2020-09-13 LAB
AMMONIA: 28 UMOL/L (ref 16–60)
ANION GAP SERPL CALCULATED.3IONS-SCNC: 13 MMOL/L (ref 3–16)
BUN BLDV-MCNC: 9 MG/DL (ref 7–20)
CALCIUM SERPL-MCNC: 8.9 MG/DL (ref 8.3–10.6)
CHLORIDE BLD-SCNC: 108 MMOL/L (ref 99–110)
CO2: 24 MMOL/L (ref 21–32)
CREAT SERPL-MCNC: <0.5 MG/DL (ref 0.9–1.3)
GFR AFRICAN AMERICAN: >60
GFR NON-AFRICAN AMERICAN: >60
GLUCOSE BLD-MCNC: 94 MG/DL (ref 70–99)
HCT VFR BLD CALC: 39.8 % (ref 40.5–52.5)
HEMOGLOBIN: 13.5 G/DL (ref 13.5–17.5)
MAGNESIUM: 1.8 MG/DL (ref 1.8–2.4)
MCH RBC QN AUTO: 29.5 PG (ref 26–34)
MCHC RBC AUTO-ENTMCNC: 34 G/DL (ref 31–36)
MCV RBC AUTO: 86.6 FL (ref 80–100)
PDW BLD-RTO: 13.7 % (ref 12.4–15.4)
PLATELET # BLD: 224 K/UL (ref 135–450)
PMV BLD AUTO: 8.1 FL (ref 5–10.5)
POTASSIUM REFLEX MAGNESIUM: 3.7 MMOL/L (ref 3.5–5.1)
RBC # BLD: 4.59 M/UL (ref 4.2–5.9)
SODIUM BLD-SCNC: 145 MMOL/L (ref 136–145)
WBC # BLD: 5 K/UL (ref 4–11)

## 2020-09-13 PROCEDURE — 6370000000 HC RX 637 (ALT 250 FOR IP): Performed by: INTERNAL MEDICINE

## 2020-09-13 PROCEDURE — 1200000000 HC SEMI PRIVATE

## 2020-09-13 PROCEDURE — 6360000002 HC RX W HCPCS: Performed by: HOSPITALIST

## 2020-09-13 PROCEDURE — 36415 COLL VENOUS BLD VENIPUNCTURE: CPT

## 2020-09-13 PROCEDURE — 2580000003 HC RX 258: Performed by: INTERNAL MEDICINE

## 2020-09-13 PROCEDURE — 2500000003 HC RX 250 WO HCPCS: Performed by: INTERNAL MEDICINE

## 2020-09-13 PROCEDURE — 83735 ASSAY OF MAGNESIUM: CPT

## 2020-09-13 PROCEDURE — 2500000003 HC RX 250 WO HCPCS: Performed by: HOSPITALIST

## 2020-09-13 PROCEDURE — 80048 BASIC METABOLIC PNL TOTAL CA: CPT

## 2020-09-13 PROCEDURE — 6360000002 HC RX W HCPCS: Performed by: INTERNAL MEDICINE

## 2020-09-13 PROCEDURE — 2580000003 HC RX 258: Performed by: HOSPITALIST

## 2020-09-13 PROCEDURE — 82140 ASSAY OF AMMONIA: CPT

## 2020-09-13 PROCEDURE — 85027 COMPLETE CBC AUTOMATED: CPT

## 2020-09-13 RX ORDER — THIAMINE MONONITRATE (VIT B1) 100 MG
100 TABLET ORAL DAILY
Status: DISCONTINUED | OUTPATIENT
Start: 2020-09-14 | End: 2020-09-14 | Stop reason: HOSPADM

## 2020-09-13 RX ORDER — M-VIT,TX,IRON,MINS/CALC/FOLIC 27MG-0.4MG
1 TABLET ORAL DAILY
Status: DISCONTINUED | OUTPATIENT
Start: 2020-09-14 | End: 2020-09-14 | Stop reason: HOSPADM

## 2020-09-13 RX ADMIN — CHLORDIAZEPOXIDE HYDROCHLORIDE 25 MG: 25 CAPSULE ORAL at 13:56

## 2020-09-13 RX ADMIN — FAMOTIDINE 20 MG: 10 INJECTION INTRAVENOUS at 09:40

## 2020-09-13 RX ADMIN — FOLIC ACID: 5 INJECTION, SOLUTION INTRAMUSCULAR; INTRAVENOUS; SUBCUTANEOUS at 09:40

## 2020-09-13 RX ADMIN — ENOXAPARIN SODIUM 40 MG: 40 INJECTION SUBCUTANEOUS at 09:40

## 2020-09-13 RX ADMIN — Medication 10 ML: at 20:52

## 2020-09-13 RX ADMIN — CHLORDIAZEPOXIDE HYDROCHLORIDE 25 MG: 25 CAPSULE ORAL at 20:36

## 2020-09-13 RX ADMIN — CHLORDIAZEPOXIDE HYDROCHLORIDE 25 MG: 25 CAPSULE ORAL at 09:40

## 2020-09-13 RX ADMIN — LORAZEPAM 1 MG: 2 INJECTION INTRAMUSCULAR; INTRAVENOUS at 12:25

## 2020-09-13 RX ADMIN — FAMOTIDINE 20 MG: 10 INJECTION INTRAVENOUS at 20:36

## 2020-09-13 RX ADMIN — LORAZEPAM 1 MG: 2 INJECTION INTRAMUSCULAR; INTRAVENOUS at 10:04

## 2020-09-13 RX ADMIN — ACETAMINOPHEN 650 MG: 325 TABLET ORAL at 16:26

## 2020-09-13 RX ADMIN — CIPROFLOXACIN 1 DROP: 3 SOLUTION OPHTHALMIC at 05:47

## 2020-09-13 RX ADMIN — CIPROFLOXACIN 1 DROP: 3 SOLUTION OPHTHALMIC at 20:36

## 2020-09-13 RX ADMIN — CIPROFLOXACIN 1 DROP: 3 SOLUTION OPHTHALMIC at 09:41

## 2020-09-13 RX ADMIN — Medication 10 ML: at 09:40

## 2020-09-13 RX ADMIN — CIPROFLOXACIN 1 DROP: 3 SOLUTION OPHTHALMIC at 13:58

## 2020-09-13 ASSESSMENT — PAIN SCALES - GENERAL: PAINLEVEL_OUTOF10: 6

## 2020-09-13 ASSESSMENT — PAIN DESCRIPTION - PAIN TYPE: TYPE: ACUTE PAIN

## 2020-09-13 ASSESSMENT — PAIN DESCRIPTION - LOCATION: LOCATION: HEAD

## 2020-09-13 NOTE — PROGRESS NOTES
Hospitalist Progress Note      PCP: Karina Evans DO    Date of Admission: 9/4/2020    Chief Complaint: Alcohol intoxication    Hospital Course:  38 Y M reportedly was intoxicated, walking outside, and realized he was in danger of stumbling into traffic, so he came to the ED for alcohol detox. He was been admitted once during each of the last two years with alcohol withdrawal.  His withdrawal this time was rather severe, unable to be controlled with high-dose benzodiazepines, so he was transferred to the ICU for precedex gtt on 9/8, then back to C4 on 9/10, then got very agitated and delirious again and transferred back to the ICU on 9/13. After being calm all day without the precedex gtt and minimal lorazepam he was transferred to the floor on 9/13. He was still quite confused at that point but very cooperative. Subjective:  No events. Patient is polite, calm, cooperative. Denies suicidal ideation, seems believable. I suspect he was just saying random things a few days ago when he mentioned hurting himself. Will keep the sitter one more day just in case.          Medications:  Reviewed    Infusion Medications    dexmedetomidine 0.2 mcg/kg/hr (09/13/20 0546)     Scheduled Medications    folic acid, thiamine, multi-vitamin with vitamin K infusion   Intravenous Daily    chlordiazePOXIDE  25 mg Oral TID    ciprofloxacin  1 drop Left Eye Q4H While awake    sodium chloride flush  10 mL Intravenous 2 times per day    famotidine (PEPCID) injection  20 mg Intravenous BID    enoxaparin  40 mg Subcutaneous Daily     PRN Meds: haloperidol lactate, cloNIDine, sodium chloride flush, acetaminophen **OR** acetaminophen, polyethylene glycol, promethazine **OR** ondansetron, LORazepam **OR** LORazepam **OR** LORazepam **OR** LORazepam **OR** LORazepam **OR** LORazepam **OR** LORazepam **OR** LORazepam      Intake/Output Summary (Last 24 hours) at 9/13/2020 1235  Last data filed at 9/13/2020 1365  Gross per 24 hour   Intake 1747 ml   Output 650 ml   Net 1097 ml       Physical Exam Performed:    /64   Pulse 63   Temp 98.6 °F (37 °C) (Axillary)   Resp 16   Ht 5' 7\" (1.702 m)   Wt 142 lb 3.2 oz (64.5 kg)   SpO2 96%   BMI 22.27 kg/m²       General appearance: No apparent distress, appears stated age and cooperative. HEENT: Pupils equal, round, and reactive to light. Conjunctivae/corneas clear. Neck: Supple, with full range of motion. No jugular venous distention. Trachea midline. Respiratory:  Normal respiratory effort. Clear to auscultation, bilaterally without Rales/Wheezes/Rhonchi. Cardiovascular: Regular rate and rhythm with normal S1/S2 without murmurs, rubs or gallops. Abdomen: Soft, non-tender, non-distended with normal bowel sounds. No apparent hepatomegaly. Musculoskeletal: No clubbing, cyanosis or edema bilaterally. Full range of motion without deformity. Skin: Skin color, texture, turgor normal.  No rashes or lesions. Neurologic:  Neurovascularly intact without any focal sensory/motor deficits. Cranial nerves: II-XII intact, grossly non-focal.  Psychiatric:  Alert, calm, cooperative, oriented to self only, has no insight  Capillary Refill: Brisk,< 3 seconds   Peripheral Pulses: +2 palpable, equal bilaterally       Labs:   Recent Labs     09/11/20  0647 09/12/20  0731 09/13/20  0429   WBC 4.8 6.0 5.0   HGB 14.3 15.1 13.5   HCT 42.3 44.3 39.8*    227 224     Recent Labs     09/11/20  0647 09/12/20  0731 09/13/20  0429    144 145   K 3.4* 3.1* 3.7   CL 99 104 108   CO2 21 21 24   BUN 12 8 9   CREATININE 0.6* 0.6* <0.5*   CALCIUM 9.2 9.4 8.9     No results for input(s): AST, ALT, BILIDIR, BILITOT, ALKPHOS in the last 72 hours. No results for input(s): INR in the last 72 hours. No results for input(s): [place] in the last 72 hours.     Urinalysis:      Lab Results   Component Value Date    NITRU Negative 09/04/2020    BLOODU Negative 09/04/2020    SPECGRAV <=1.005

## 2020-09-13 NOTE — FLOWSHEET NOTE
2000 Assessment complete, see note for details. 0000 Assessment complete, see note for details. 0600 Assessment complete, see note for details. Full bed bath complete to include linen change.

## 2020-09-13 NOTE — PROGRESS NOTES
At 0730, report was obtained from [facility]. At 0900, the patient was complaining of pain at the right forearm IV. Precedex was turned off at this time, and vitamin bag was connected to the right wrist IV. Will monitor. At 1215, Dr. Hermes Dutta rounded on the patient. Dr. Hermes Dutta was asked if patient required a Psychiatry consult at this time. States not at this time. At [phone], patient transferred via bed to [facility]. In no apparent state of distress at departure. Report was given to Dana Briseno RN. [id number]  Call placed to patient's mother to notify her of transfer. Mikki Hagan notified that patient moved to room 542.

## 2020-09-14 VITALS
BODY MASS INDEX: 22.32 KG/M2 | TEMPERATURE: 98.5 F | RESPIRATION RATE: 18 BRPM | SYSTOLIC BLOOD PRESSURE: 115 MMHG | HEIGHT: 67 IN | WEIGHT: 142.2 LBS | DIASTOLIC BLOOD PRESSURE: 76 MMHG | HEART RATE: 82 BPM | OXYGEN SATURATION: 97 %

## 2020-09-14 LAB
ALBUMIN SERPL-MCNC: 3.5 G/DL (ref 3.4–5)
ALP BLD-CCNC: 55 U/L (ref 40–129)
ALT SERPL-CCNC: 26 U/L (ref 10–40)
ANION GAP SERPL CALCULATED.3IONS-SCNC: 13 MMOL/L (ref 3–16)
AST SERPL-CCNC: 27 U/L (ref 15–37)
BILIRUB SERPL-MCNC: 0.5 MG/DL (ref 0–1)
BILIRUBIN DIRECT: <0.2 MG/DL (ref 0–0.3)
BILIRUBIN, INDIRECT: ABNORMAL MG/DL (ref 0–1)
BUN BLDV-MCNC: 8 MG/DL (ref 7–20)
CALCIUM SERPL-MCNC: 9 MG/DL (ref 8.3–10.6)
CHLORIDE BLD-SCNC: 107 MMOL/L (ref 99–110)
CO2: 23 MMOL/L (ref 21–32)
CREAT SERPL-MCNC: <0.5 MG/DL (ref 0.9–1.3)
GFR AFRICAN AMERICAN: >60
GFR NON-AFRICAN AMERICAN: >60
GLUCOSE BLD-MCNC: 86 MG/DL (ref 70–99)
HCT VFR BLD CALC: 37.6 % (ref 40.5–52.5)
HEMOGLOBIN: 12.9 G/DL (ref 13.5–17.5)
MAGNESIUM: 1.8 MG/DL (ref 1.8–2.4)
MCH RBC QN AUTO: 29.7 PG (ref 26–34)
MCHC RBC AUTO-ENTMCNC: 34.4 G/DL (ref 31–36)
MCV RBC AUTO: 86.4 FL (ref 80–100)
PDW BLD-RTO: 13.6 % (ref 12.4–15.4)
PLATELET # BLD: 223 K/UL (ref 135–450)
PMV BLD AUTO: 7.9 FL (ref 5–10.5)
POTASSIUM REFLEX MAGNESIUM: 3.5 MMOL/L (ref 3.5–5.1)
RBC # BLD: 4.35 M/UL (ref 4.2–5.9)
SODIUM BLD-SCNC: 143 MMOL/L (ref 136–145)
TOTAL PROTEIN: 5.8 G/DL (ref 6.4–8.2)
WBC # BLD: 4.5 K/UL (ref 4–11)

## 2020-09-14 PROCEDURE — 97166 OT EVAL MOD COMPLEX 45 MIN: CPT

## 2020-09-14 PROCEDURE — 36415 COLL VENOUS BLD VENIPUNCTURE: CPT

## 2020-09-14 PROCEDURE — 6370000000 HC RX 637 (ALT 250 FOR IP): Performed by: INTERNAL MEDICINE

## 2020-09-14 PROCEDURE — 97116 GAIT TRAINING THERAPY: CPT

## 2020-09-14 PROCEDURE — 97530 THERAPEUTIC ACTIVITIES: CPT

## 2020-09-14 PROCEDURE — 83735 ASSAY OF MAGNESIUM: CPT

## 2020-09-14 PROCEDURE — 80048 BASIC METABOLIC PNL TOTAL CA: CPT

## 2020-09-14 PROCEDURE — 97535 SELF CARE MNGMENT TRAINING: CPT

## 2020-09-14 PROCEDURE — 6360000002 HC RX W HCPCS: Performed by: INTERNAL MEDICINE

## 2020-09-14 PROCEDURE — 2580000003 HC RX 258: Performed by: INTERNAL MEDICINE

## 2020-09-14 PROCEDURE — 2500000003 HC RX 250 WO HCPCS: Performed by: INTERNAL MEDICINE

## 2020-09-14 PROCEDURE — 80076 HEPATIC FUNCTION PANEL: CPT

## 2020-09-14 PROCEDURE — 97162 PT EVAL MOD COMPLEX 30 MIN: CPT

## 2020-09-14 PROCEDURE — 85027 COMPLETE CBC AUTOMATED: CPT

## 2020-09-14 RX ORDER — LANOLIN ALCOHOL/MO/W.PET/CERES
100 CREAM (GRAM) TOPICAL DAILY
Qty: 30 TABLET | Refills: 3 | Status: SHIPPED | OUTPATIENT
Start: 2020-09-15 | End: 2020-09-16 | Stop reason: ALTCHOICE

## 2020-09-14 RX ORDER — CIPROFLOXACIN HYDROCHLORIDE 3.5 MG/ML
1 SOLUTION/ DROPS TOPICAL
Qty: 25 DROP | Refills: 0 | Status: SHIPPED | OUTPATIENT
Start: 2020-09-14 | End: 2020-09-19

## 2020-09-14 RX ADMIN — CHLORDIAZEPOXIDE HYDROCHLORIDE 25 MG: 25 CAPSULE ORAL at 08:43

## 2020-09-14 RX ADMIN — ENOXAPARIN SODIUM 40 MG: 40 INJECTION SUBCUTANEOUS at 08:43

## 2020-09-14 RX ADMIN — CIPROFLOXACIN 1 DROP: 3 SOLUTION OPHTHALMIC at 08:44

## 2020-09-14 RX ADMIN — Medication 1 TABLET: at 08:43

## 2020-09-14 RX ADMIN — Medication 100 MG: at 08:43

## 2020-09-14 RX ADMIN — Medication 10 ML: at 08:43

## 2020-09-14 RX ADMIN — CIPROFLOXACIN 1 DROP: 3 SOLUTION OPHTHALMIC at 14:22

## 2020-09-14 RX ADMIN — FAMOTIDINE 20 MG: 10 INJECTION INTRAVENOUS at 08:43

## 2020-09-14 ASSESSMENT — PAIN SCALES - GENERAL: PAINLEVEL_OUTOF10: 6

## 2020-09-14 ASSESSMENT — PAIN DESCRIPTION - LOCATION: LOCATION: HEAD

## 2020-09-14 ASSESSMENT — PAIN DESCRIPTION - PAIN TYPE: TYPE: ACUTE PAIN

## 2020-09-14 NOTE — DISCHARGE INSTR - COC
Continuity of Care Form    Patient Name: Sherlyn Davis   :  1975  MRN:  [id number]    Admit date:  2020  Discharge date:  ***    Code Status Order: Full Code   Advance Directives:   Advance Care Flowsheet Documentation       Date/Time Healthcare Directive Type of Healthcare Directive Copy in [address] [place] [address] [address] Agent's Name Healthcare Agent's Phone Number    [phone]  No, patient does not have an advance directive for healthcare treatment -- -- -- -- --            Admitting Physician:  Ella Granda MD  PCP: Keturah Holman DO    Discharging Nurse: [facility]/Room#: 9661/0589-67  Discharging Unit Phone Number: ***    Emergency Contact:   Extended Emergency Contact Information  Primary Emergency Contact: Ivis [address] [place] [address] Phone: [phone]  Mobile Phone: [phone]  Relation: Parent  Secondary Emergency Contact: Niki Neely[address] Phone: [phone]  Relation: Parent    Past Surgical History:  History reviewed. No pertinent surgical history.     Immunization History:   Immunization History   Administered Date(s) Administered    Influenza, Quadv, 6 mo and older, IM, PF (Flulaval, Fluarix) 2018    Influenza, Quadv, IM, PF (6 mo and older Fluzone, Flulaval, Fluarix, and 3 yrs and older Afluria) 2019       Active Problems:  Patient Active Problem List   Diagnosis Code    Alcohol withdrawal delirium ([facility] F10.231    Alcohol dependence with uncomplicated intoxication ([facility] F10.220    Essential hypertension I10    Anxiety F41.9    Elevated LFTs R94.5    Alcohol withdrawal syndrome without complication ([facility] N75.921       Isolation/Infection:   Isolation            No Isolation          Patient Infection Status       None to display            Nurse Assessment:  Last Vital Signs: /76   Pulse 82   Temp 98.5 °F (36.9 °C) (Oral)   Resp 18   Ht 5' 7\" (1.702 m)   Wt 142 lb 3.2 oz (64.5 kg)   SpO2 97%   BMI 22.27 kg/m²     Last documented pain score (0-10 scale): Pain Level: 6  Last Weight:   Wt Readings from Last 1 Encounters:   09/12/20 142 lb 3.2 oz (64.5 kg)     Mental Status:  {IP PT MENTAL STATUS:20030:::0}    IV Access:  { YUSEF IV ACCESS:040360220:::0}    Nursing Mobility/ADLs:  Walking   {CHP DME ADLs:979909136:::0}  Transfer  {CHP DME ADLs:311500212:::0}  Bathing  {CHP DME ADLs:936767809:::0}  Dressing  {CHP DME ADLs:105997923:::0}  Toileting  {CHP DME ADLs:560145340:::0}  Feeding  {CHP DME ADLs:505396149:::0}  Med Admin  {CHP DME ADLs:804977450:::0}  Med Delivery   { YUSEF MED Delivery:514913690:::0}    Wound Care Documentation and Therapy:        Elimination:  Continence: Bowel: {YES / DY:47088}  Bladder: {YES / PH:77965}  Urinary Catheter: {Urinary Catheter:783488161:::0}   Colostomy/Ileostomy/Ileal Conduit: {YES / UB:55486}       Date of Last BM: ***    Intake/Output Summary (Last 24 hours) at 9/14/2020 1552  Last data filed at 9/14/2020 1129  Gross per 24 hour   Intake 360 ml   Output 750 ml   Net -390 ml     I/O last 3 completed shifts:   In: 360 [P.O.:360]  Out: 750 [Urine:750]    Safety Concerns:     [address] Safety Concerns:544180428:::0}    Impairments/Disabilities:      [address] Impairments/Disabilities:202736970:::0}    Nutrition Therapy:  Current Nutrition Therapy:   [address] Diet List:891113490:::0}    Routes of Feeding: {CHP DME Other Feedings:148553895:::0}  Liquids: {Slp liquid thickness:40563}  Daily Fluid Restriction: {CHP DME Yes amt example:946194598:::0}  Last Modified Barium Swallow with Video (Video Swallowing Test): {Done Not Done QFXT:715133536:::5}    Treatments at the Time of Hospital Discharge:   Respiratory Treatments: ***  Oxygen Therapy:  {Therapy; copd oxygen:68096:::0}  Ventilator:    {ALBAN DALY Vent List:193585196:::0}    Rehab Therapies: {THERAPEUTIC INTERVENTION:2603629233}  Weight Bearing Status/Restrictions: {ALBAN DALY Weight Bearin:::0}  Other Medical Equipment (for information only, NOT a DME order):  {EQUIPMENT:533713563}  Other Treatments: ***    Patient's personal belongings (please select all that are sent with patient):  {CHP DME Belongings:282221055:::0}    RN SIGNATURE:  {Esignature:699879649:::0}    CASE MANAGEMENT/SOCIAL WORK SECTION    Inpatient Status Date: ***    Readmission Risk Assessment Score:  Readmission Risk              Risk of Unplanned Readmission:        14           Discharging to Facility/ Agency   Name:   Address:  Phone:  Fax:    Dialysis Facility (if applicable)   Name:  Address:  Dialysis Schedule:  Phone:  Fax:    / signature: {Esignature:010854461:::0}    PHYSICIAN SECTION    Prognosis: Good    Condition at Discharge: Stable    Rehab Potential (if transferring to Rehab): Good    Recommended Labs or Other Treatments After Discharge: Follow up with PCP within 1-2 weeks. Physician Certification: I certify the above information and transfer of Domi Mcbride  is necessary for the continuing treatment of the diagnosis listed and that he requires [address] for less 30 days.      Update Admission H&P: No change in H&P    PHYSICIAN SIGNATURE:  Electronically signed by Perri Cardona MD on 20 at 3:53 PM EDT

## 2020-09-14 NOTE — PROGRESS NOTES
PT d/c to private transportation. All paperwork went over with pt and mother at bedside. All questions answered. IV taken out mother verified all belongings given back to pt. prescriptions sent with patient from outpt pharmacy.

## 2020-09-14 NOTE — DISCHARGE SUMMARY
Hospital Medicine Discharge Summary    Patient ID: Bere Jimenez      Patient's PCP: Vicente Amato DO    Admit Date: 9/4/2020     Discharge Date:   09/14/20     Admitting Physician: Mirna Griffin MD     Discharge Physician: Jada Mihcael MD     Discharge Diagnoses: Active Hospital Problems    Diagnosis    Alcohol withdrawal syndrome without complication ([facility] [U90.741]       The patient was seen and examined on day of discharge and this discharge summary is in conjunction with any daily progress note from day of discharge. Hospital Course:  38 Y M reportedly was intoxicated, walking outside, and realized he was in danger of stumbling into traffic, so he came to the ED for alcohol detox on 9/5. He was been admitted once during each of the last two years with alcohol withdrawal.  His withdrawal this time was rather severe, unable to be controlled with high-dose benzodiazepines, so he was transferred to the ICU for precedex gtt on 9/8, then back to C4 on 9/10, then got very agitated and delirious again and transferred back to the ICU on 9/13. After being calm all day without the precedex gtt and minimal lorazepam he was transferred to the floor on 9/13. He was still quite confused at that point but very cooperative.          Alcohol dependence, with acute metabolic encephalopathy due to withdrawal  - chlordiazepoxide, CIWA, PRN lorazepam.  In contrast to med list, OARRS shows no chronic benzodiazepine prescriptions. - weaned off precedex gtt (again)  - vitamins  - encouraged cessation. CM input appreciated. - he did well and discharged home. He is well aware of outpatient resources.      Suicidal ideation resolved. - patient said \"I should hang myself with this phone cord\" a few days ago when he was agitated and delirious. He has not made any such comments since. Consistently denied any ongoing thoughts of harming himself or anyone else[facility]   No psychiatry consult needed. Muscular deconditioning  - home PT. Walker. Ned Doan was evaluated today and a DME order was entered for a wheeled walker because he requires this to successfully complete daily living tasks of ambulating. A wheeled walker is necessary due to the patient's unsteady gait, upper body weakness, and inability to  an ambulation device; and he can ambulate only by pushing a walker instead of a lesser assistive device such as a cane, crutch, or standard walker. The need for this equipment was discussed with the patient and he understands and is in agreement. Physical Exam Performed:     /70   Pulse 86   Temp 97.6 °F (36.4 °C)   Resp 18   Ht 5' 7\" (1.702 m)   Wt 142 lb 3.2 oz (64.5 kg)   SpO2 96%   BMI 22.27 kg/m²       General appearance:  No apparent distress, appears stated age and cooperative. HEENT:  Normal cephalic, atraumatic without obvious deformity. Pupils equal, round, and reactive to light. Extra ocular muscles intact. Conjunctivae/corneas clear. Neck: Supple, with full range of motion. No jugular venous distention. Trachea midline. Respiratory:  Normal respiratory effort. Clear to auscultation, bilaterally without Rales/Wheezes/Rhonchi. Cardiovascular:  Regular rate and rhythm with normal S1/S2 without murmurs, rubs or gallops. Abdomen: Soft, non-tender, non-distended with normal bowel sounds. Musculoskeletal:  No clubbing, cyanosis or edema bilaterally. Full range of motion without deformity. Skin: Skin color, texture, turgor normal.  No rashes or lesions. Neurologic:  Neurovascularly intact without any focal sensory/motor deficits. Cranial nerves: II-XII intact, grossly non-focal.  Psychiatric:  Alert and oriented, thought content appropriate, normal insight. No suicidal ideation. Capillary Refill: Brisk,< 3 seconds   Peripheral Pulses: +2 palpable, equal bilaterally       Labs:  For convenience and continuity at follow-up the following most recent labs are provided:      CBC:    Lab Results   Component Value Date    WBC 4.5 09/14/2020    HGB 12.9 09/14/2020    HCT 37.6 09/14/2020     09/14/2020       Renal:    Lab Results   Component Value Date     09/14/2020    K 3.5 09/14/2020     09/14/2020    CO2 23 09/14/2020    BUN 8 09/14/2020    CREATININE <0.5 09/14/2020    CALCIUM 9.0 09/14/2020    PHOS 2.7 09/06/2020         Significant Diagnostic Studies    Radiology:   XR CHEST PORTABLE   Final Result   No acute process. Consults:     IP CONSULT TO HOSPITALIST  IP CONSULT TO SOCIAL WORK  IP CONSULT TO SPIRITUAL SERVICES    Disposition:  home     Condition at Discharge: Stable    Discharge Instructions/Follow-up:  Follow up with PCP within 1-2 weeks. Quit drinking. Code Status:  Full Code     Activity: activity as tolerated    Diet: regular diet      Discharge Medications:     Current Discharge Medication List           Details   vitamin B-1 100 MG tablet Take 1 tablet by mouth daily  Qty: 30 tablet, Refills: 3      ciprofloxacin (CILOXAN) 0.3 % ophthalmic solution Place 1 drop into the left eye every 4 hours (while awake) for 5 days  Qty: 25 drop, Refills: 0              Details   escitalopram (LEXAPRO) 10 MG tablet TAKE 1 TABLET BY MOUTH EVERY DAY  Qty: 30 tablet, Refills: 3    Associated Diagnoses: Anxiety; Depressed mood      Multiple Vitamins-Minerals (THERAPEUTIC MULTIVITAMIN-MINERALS) tablet Take 1 tablet by mouth daily               Time Spent on discharge is more than 30 minutes in the examination, evaluation, counseling and review of medications and discharge plan. Signed:    Vasquez Galeano MD   9/14/2020      Thank you Keturah Holman DO for the opportunity to be involved in this patient's care. If you have any questions or concerns please feel free to contact me at [phone].

## 2020-09-14 NOTE — CARE COORDINATION
CASE MANAGEMENT DISCHARGE SUMMARY      Discharge to: [facility] ([facility]), [address] [address]. Updated [place] with [facility].. OP-Alcohol treatment thru [employer], Spoke to [employer] on this day. New Durable Medical Equipment ordered/agency:     Transportation: Mom    Confirmed discharge plan with: Met with pt and Mom at bedside. Facility/Agency, name:  YUSEF/AVS faxed- [facility]..  Spoke to [employer], name: Donis Hilton

## 2020-09-14 NOTE — PROGRESS NOTES
Delivered walker to [address] [place] [address].   Thanks for the referral.  Dennis Garcia  9/14/2020

## 2020-09-14 NOTE — PROGRESS NOTES
Denies  Social/Functional History  Social/Functional History  Lives With: Alone  Type of Home: Apartment(2nd floor apartment)  Home Layout: One level  Home Access: Stairs to enter with rails  Entrance Stairs - Number of Steps: 2STE without rails to enter complex. 8-9STE to apartment door  Entrance Stairs - Rails: Right  Bathroom Shower/Tub: Tub/Shower unit  Bathroom Toilet: Standard  ADL Assistance: Independent  Homemaking Assistance: Independent  Homemaking Responsibilities: Yes  Ambulation Assistance: Independent  Transfer Assistance: Independent  Active : Yes  Occupation: Unemployed  Leisure & Hobbies: Sports Fan       Objective   Vision: Impaired  Vision Exceptions: (Contacts)  Hearing: Within functional limits    Orientation  Overall Orientation Status: Within Functional Limits  Observation/Palpation  Posture: Fair  Balance  Sitting Balance: Supervision  Standing Balance: Contact guard assistance(with use of RW)  Standing Balance  Time: 2-3 minutes  Activity: transfers, ambulation  Comment: Pt demos ataxia  Functional Mobility  Functional - Mobility Device: Rolling Walker  Activity: To/from bathroom  Assist Level: Contact guard assistance  Functional Mobility Comments: Pt armani shaky ataxic gait  ADL  LE Dressing: Supervision(pt able to complete donning socks with figure four technique)  Tone RUE  RUE Tone: Normotonic  Tone LUE  LUE Tone: Normotonic  Coordination  Movements Are Fluid And Coordinated: Yes        Transfers  Sit to stand: Stand by assistance  Stand to sit: Stand by assistance  Vision - Basic Assessment  Prior Vision: (Pt c/o L eye blurriness)  Cognition  Overall Cognitive Status: Exceptions  Arousal/Alertness: Delayed responses to stimuli  Following Commands:  Follows one step commands with increased time  Attention Span: Appears intact  Memory: Appears intact  Safety Judgement: Decreased awareness of need for safety  Problem Solving: Assistance required to generate solutions  Insights: Decreased awareness of deficits  Initiation: Requires cues for some  Sequencing: Requires cues for some        Sensation  Overall Sensation Status: WFL        LUE PROM (degrees)  LUE PROM: WFL  LUE AROM (degrees)  LUE AROM : WFL  Left Hand PROM (degrees)  Left Hand PROM: WFL  Left Hand AROM (degrees)  Left Hand AROM: WFL  RUE PROM (degrees)  RUE PROM: WFL  RUE AROM (degrees)  RUE AROM : WFL  Right Hand PROM (degrees)  Right Hand PROM: WFL  Right Hand AROM (degrees)  Right Hand AROM: WFL  LUE Strength  Gross LUE Strength: WFL  RUE Strength  Gross RUE Strength: WFL                   Plan   Plan  Times per week: 1x  Current Treatment Recommendations: Strengthening, Balance Training, Functional Mobility Training, Safety Education & Training, Self-Care / ADL, Neuromuscular Re-education    Goals  Short term goals  Time Frame for Short term goals: 1 day  Short term goal 1: Pt will complete LB dressing with supervision (goal met 9/14)  Short term goal 2: Pt will manage short house hold distance with RW with CGA (goal met 9/14)  Patient Goals   Patient goals : to go home independent       Therapy Time   Individual Concurrent Group Co-treatment   Time In 1500         Time Out 1534         Minutes 34         Timed Code Treatment Minutes: 24 Minutes(10 minutes for eval)       Shena Soares OT

## 2020-09-14 NOTE — PROGRESS NOTES
Physical Therapy    Facility/Department: [facility] C5 - MED SURG/ORTHO  Initial Assessment    NAME: Cuco Johnston  : 1975  MRN: [id number]    Date of Service: 2020    Discharge Recommendations:  24 hour supervision or assist, Home with Home health PT   PT Equipment Recommendations  Equipment Needed: Yes  Mobility Devices: Jn Ronde: Rolling    Assessment   Body structures, Functions, Activity limitations: Decreased functional mobility ; Decreased strength;Decreased endurance;Decreased balance  Assessment: Pt referred for PT evaluation during current hospital stay with dx of ETOH w/d. Pt currently functioning below her baseline, requiring use of RW and CGA x 1 for safe transfers/amb. Gait appears unsteady compared to usual with pt fatiguing more quickly/easily with standing activity. Recommend 24-hr sup/assist from family  and home PT at D/C with RW for home use. Treatment Diagnosis: Impaired balance, decreased (I) with gait  Specific instructions for Next Treatment: Progress ther ex and mobility as tolerated  Prognosis: Good  Decision Making: Medium Complexity  PT Education: Goals; General Safety;Gait Training;PT Role;Plan of Care; Functional Mobility Training;Precautions;Transfer Training;Disease Specific Education; Family Education;Equipment  Patient Education: Disease-specific education: Pt educated on transfer/gait technique when using RW due to current level of weakness & impaired balance; pt and family verbalize understanding. REQUIRES PT FOLLOW UP: Yes  Activity Tolerance: Patient Tolerated treatment well;Patient limited by fatigue;Patient limited by endurance       Patient Diagnosis(es): The primary encounter diagnosis was Acute alcoholic intoxication without complication ([address] A diagnosis of Alcohol abuse was also pertinent to this visit. has a past medical history of Allergic rhinitis, Anxiety, Depression, and Hypertension.    has no past surgical history on guard assistance;Stand by assistance(using RW)     Ambulation  Ambulation?: Yes  Ambulation 1  Surface: level tile  Device: Rolling Walker  Assistance: Contact guard assistance  Quality of Gait: Pt amb with very slow simba with short, shuffling steps and widened PREMA. Mildly unsteady & ataxic/shaky, although balance appears improved when using RW. Gait Deviations: Slow Simba;Decreased step length;Decreased step height; Increased PREMA  Distance: x 40 feet     Balance  Posture: Fair  Sitting - Static: Good;-  Sitting - Dynamic: Fair;+  Standing - Static: Fair;+  Standing - Dynamic: Fair(using RW)     Exercises  Comments: Reviewed seated LE ther ex with pt including ankle pumps, LAQ, and seated marching; pt verbalizes understanding. Plan   Times per week: 3-5x/week in acute care  Times per day: Daily  Specific instructions for Next Treatment: Progress ther ex and mobility as tolerated  Current Treatment Recommendations: Strengthening, Balance Training, Functional Mobility Training, Transfer Training, Gait Training, Endurance Training, Home Exercise Program, Safety Education & Training, Patient/Caregiver Education & Training, Equipment Evaluation, Education, & procurement, Stair training  Safety Devices:  All fall risk precautions in place, Left in chair, Call light within reach, Chair alarm in place, Nurse notified, Gait belt, Patient at risk for falls    AM-PAC Score  AM-PAC Inpatient Mobility Raw Score : 20 (09/14/20 1550)  AM-PAC Inpatient T-Scale Score : 47.67 (09/14/20 1550)  Mobility Inpatient CMS 0-100% Score: 35.83 (09/14/20 1550)  Mobility Inpatient CMS G-Code Modifier : Ermias Hernandez (09/14/20 1550)    Goals  Short term goals  Time Frame for Short term goals: 3 days, 9/17/20 (unless otherwise specified)  Short term goal 1: Pt will transfer supine <-> sit with modified(I)  Short term goal 2: Pt will transfer sit <-> stand and bed>chair using RW or least AD with modified(I)  Short term goal 3: Pt will ambulate x 100

## 2020-09-15 RX ORDER — ESCITALOPRAM OXALATE 10 MG/1
TABLET ORAL
Qty: 30 TABLET | Refills: 2 | Status: SHIPPED | OUTPATIENT
Start: 2020-09-15 | End: 2020-09-16 | Stop reason: ALTCHOICE

## 2020-09-15 NOTE — TELEPHONE ENCOUNTER
Future Appointments   Date Time Provider Destinee Crump   9/16/2020  9:15 AM DO SANYA Miller  [facility] 10/1/2019

## 2020-09-15 NOTE — TELEPHONE ENCOUNTER
I refilled patient's medication and please give my verbal okay for physical therapy and home health care. Please have them send the paperwork for me to sign. Thank you.

## 2020-09-15 NOTE — TELEPHONE ENCOUNTER
Please contact his mother at [phone] or   [phone]. The patient needs a refill on his Escitalopram (Lexapro) as soon as possible.

## 2020-09-16 ENCOUNTER — VIRTUAL VISIT (OUTPATIENT)
Dept: FAMILY MEDICINE CLINIC | Age: 45
End: 2020-09-16
Payer: COMMERCIAL

## 2020-09-16 PROCEDURE — 99215 OFFICE O/P EST HI 40 MIN: CPT | Performed by: FAMILY MEDICINE

## 2020-09-16 PROCEDURE — 1111F DSCHRG MED/CURRENT MED MERGE: CPT | Performed by: FAMILY MEDICINE

## 2020-09-16 RX ORDER — ESCITALOPRAM OXALATE 10 MG/1
TABLET ORAL
Qty: 90 TABLET | Refills: 1 | Status: SHIPPED | OUTPATIENT
Start: 2020-09-16 | End: 2022-01-24 | Stop reason: ALTCHOICE

## 2020-09-16 ASSESSMENT — PATIENT HEALTH QUESTIONNAIRE - PHQ9
SUM OF ALL RESPONSES TO PHQ QUESTIONS 1-9: 1
SUM OF ALL RESPONSES TO PHQ9 QUESTIONS 1 & 2: 1
2. FEELING DOWN, DEPRESSED OR HOPELESS: 1
SUM OF ALL RESPONSES TO PHQ QUESTIONS 1-9: 1
1. LITTLE INTEREST OR PLEASURE IN DOING THINGS: 0

## 2020-09-16 NOTE — PROGRESS NOTES
2020    TELEHEALTH EVALUATION -- Audio/Visual (During ASSAQ-06 public health emergency)    HPI:    Myrtle Manuel (:  1975) has requested an audio/video evaluation for the following concern(s):        Myrtle Manuel is a 38 y.o. male being evaluated by a Virtual Visit (video visit) encounter to address concerns as mentioned above. A caregiver was present when appropriate. Due to this being a TeleHealth encounter ([facility]), evaluation of the following organ systems was limited: Vitals/Constitutional/EENT/Resp/CV/GI//MS/Neuro/Skin/Heme-Lymph-Imm. Pursuant to the emergency declaration under the [address] [place] [address], [address] [place] [address] authority and the [employer] Act, this Virtual Visit was conducted with patient's (and/or legal guardian's) consent, to reduce the patient's risk of exposure to COVID-19 and provide necessary medical care. The patient (and/or legal guardian) has also been advised to contact this office for worsening conditions or problems, and seek emergency medical treatment and/or call 911 if deemed necessary. Patient identification was verified at the start of the visit: Yes    Total time spent on this encounter: Not billed by time    Services were provided through a video synchronous discussion virtually to substitute for in-person clinic visit. Patient and provider were located at their individual homes. --Natalia Larose DO on 2020 at 9:32 AM    An electronic signature was used to authenticate this note. Post-Discharge Transitional Care Management Services or Hospital Follow Up      Myrtle Manuel   YOB: 1975    Date of Office Visit:  2020  Date of Hospital Admission: 20  Date of Hospital Discharge: 20  Risk of hospital readmission (high >=14%.  Medium >=10%) :Readmission Risk Score: 14      Care management risk score Rising risk (score 2-5) and Complex Care (Scores >=6): 1     Non face to face  following discharge, date last encounter closed (first attempt may have been earlier): *No documented post hospital discharge outreach found in the last 14 days    Call initiated 2 business days of discharge: *No response recorded in the last 14 days    Patient Active Problem List   Diagnosis    Alcohol withdrawal delirium ([address]    Alcohol dependence with uncomplicated intoxication ([address]    Essential hypertension    Anxiety    Elevated LFTs    Alcohol withdrawal syndrome without complication ([facility]       Allergies   Allergen Reactions    No Known Allergies        Medications listed as ordered at the time of discharge from hospital   ShannonGualberto \"Dl\"   Home Medication Instructions JIMMY:    Printed on:09/16/20 8842   Medication Information                      ciprofloxacin (CILOXAN) 0.3 % ophthalmic solution  Place 1 drop into the left eye every 4 hours (while awake) for 5 days             escitalopram (LEXAPRO) 10 MG tablet  TAKE 1 TABLET BY MOUTH EVERY DAY             Multiple Vitamins-Minerals (THERAPEUTIC MULTIVITAMIN-MINERALS) tablet  Take 1 tablet by mouth daily                   Medications marked \"taking\" at this time  Outpatient Medications Marked as Taking for the 9/16/20 encounter (Virtual Visit) with Natalia Marking, DO   Medication Sig Dispense Refill    escitalopram (LEXAPRO) 10 MG tablet TAKE 1 TABLET BY MOUTH EVERY DAY 90 tablet 1    ciprofloxacin (CILOXAN) 0.3 % ophthalmic solution Place 1 drop into the left eye every 4 hours (while awake) for 5 days 25 drop 0    Multiple Vitamins-Minerals (THERAPEUTIC MULTIVITAMIN-MINERALS) tablet Take 1 tablet by mouth daily          Medications patient taking as of now reconciled against medications ordered at time of hospital discharge: Yes    Chief Complaint   Patient presents with    Follow[facility] - 9/4/20 - 9/14/20 - alcohol withdrawal History of Present illness - Follow up of Hospital diagnosis(es): Alcohol Withdrawal Syndrome without complication    Inpatient course: Discharge summary reviewed- see chart. Interval history/Current status: Stable    A comprehensive review of systems was negative except for what was noted in the HPI. There were no vitals filed for this visit. There is no height or weight on file to calculate BMI. Wt Readings from Last 3 Encounters:   09/12/20 142 lb 3.2 oz (64.5 kg)   10/01/19 146 lb (66.2 kg)   09/22/19 142 lb 4.8 oz (64.5 kg)     BP Readings from Last 3 Encounters:   09/14/20 115/76   10/01/19 118/80   09/24/19 122/88        Physical Exam:  BP from 09/14/2020 - 155/76  Height  - 5' 8\"  Weight - 153 lb    Constitutional: [x] Appears well-developed and well-nourished [x] No apparent distress      [] Abnormal-   Mental status  [x] Alert and awake  [x] Oriented to person/place/time [x]Able to follow commands      Eyes:  EOM    [x]  Normal  [] Abnormal-  Sclera  []  Normal  [] Abnormal -         Discharge []  None visible  [] Abnormal -    HENT:   [x] Normocephalic, atraumatic.   [] Abnormal   [] Mouth/Throat: Mucous membranes are moist.     External Ears [x] Normal  [] Abnormal-     Neck: [x] No visualized mass     Pulmonary/Chest: [x] Respiratory effort normal.  [x] No visualized signs of difficulty breathing or respiratory distress        [] Abnormal-      Musculoskeletal:   [] Normal gait with no signs of ataxia         [x] Normal range of moNeurological:        [x] No Facial Asymmetry (Cranial nerve 7 motor function) (limited exam to video visit)          [x] No gaze palsy        [] Abnormal-         Skin:        [x] No significant exanthematous lesions or discoloration noted on facial skin         [] Abnormal-            Psychiatric:       [x] Normal Affect [] No Hallucinations        [] Abnormal-     Other pertinent observable physical exam findings:        [] Abnormal- Assessment/Plan:  1. Hospital discharge follow-up  - MD DISCHARGE MEDS RECONCILED W/ CURRENT OUTPATIENT MED LIST     2. Corneal erythema of left eye - continue Leonie. , pt has appointment with Dr. Tamiko Talamantes, this afternoon    Repeat CBC and CMP in 3 weeks.     Medical Decision Making: low complexity    Follow-up in 1 month

## 2020-09-16 NOTE — TELEPHONE ENCOUNTER
Called & spoke to [facility]. Verbal orders given  Orders to be faxed.     Office notes requested from today  Fax # [phone] Kamlesh Julian

## 2020-10-13 ENCOUNTER — NURSE ONLY (OUTPATIENT)
Dept: FAMILY MEDICINE CLINIC | Age: 45
End: 2020-10-13

## 2020-10-13 VITALS — TEMPERATURE: 97.7 F

## 2020-10-13 LAB
A/G RATIO: 2.3 (ref 1.1–2.2)
ALBUMIN SERPL-MCNC: 4.6 G/DL (ref 3.4–5)
ALP BLD-CCNC: 66 U/L (ref 40–129)
ALT SERPL-CCNC: 17 U/L (ref 10–40)
ANION GAP SERPL CALCULATED.3IONS-SCNC: 18 MMOL/L (ref 3–16)
AST SERPL-CCNC: 17 U/L (ref 15–37)
BASOPHILS ABSOLUTE: 0 K/UL (ref 0–0.2)
BASOPHILS RELATIVE PERCENT: 0.8 %
BILIRUB SERPL-MCNC: 0.5 MG/DL (ref 0–1)
BUN BLDV-MCNC: 11 MG/DL (ref 7–20)
CALCIUM SERPL-MCNC: 10.2 MG/DL (ref 8.3–10.6)
CHLORIDE BLD-SCNC: 103 MMOL/L (ref 99–110)
CO2: 23 MMOL/L (ref 21–32)
CREAT SERPL-MCNC: 0.6 MG/DL (ref 0.9–1.3)
EOSINOPHILS ABSOLUTE: 0.2 K/UL (ref 0–0.6)
EOSINOPHILS RELATIVE PERCENT: 2.9 %
GFR AFRICAN AMERICAN: >60
GFR NON-AFRICAN AMERICAN: >60
GLOBULIN: 2 G/DL
GLUCOSE BLD-MCNC: 114 MG/DL (ref 70–99)
HCT VFR BLD CALC: 42.9 % (ref 40.5–52.5)
HEMOGLOBIN: 14.5 G/DL (ref 13.5–17.5)
LYMPHOCYTES ABSOLUTE: 1.5 K/UL (ref 1–5.1)
LYMPHOCYTES RELATIVE PERCENT: 28.7 %
MCH RBC QN AUTO: 28.8 PG (ref 26–34)
MCHC RBC AUTO-ENTMCNC: 33.8 G/DL (ref 31–36)
MCV RBC AUTO: 85.2 FL (ref 80–100)
MONOCYTES ABSOLUTE: 0.5 K/UL (ref 0–1.3)
MONOCYTES RELATIVE PERCENT: 9.7 %
NEUTROPHILS ABSOLUTE: 3.1 K/UL (ref 1.7–7.7)
NEUTROPHILS RELATIVE PERCENT: 57.9 %
PDW BLD-RTO: 13.5 % (ref 12.4–15.4)
PLATELET # BLD: 233 K/UL (ref 135–450)
PMV BLD AUTO: 8.8 FL (ref 5–10.5)
POTASSIUM SERPL-SCNC: 4.6 MMOL/L (ref 3.5–5.1)
RBC # BLD: 5.03 M/UL (ref 4.2–5.9)
SODIUM BLD-SCNC: 144 MMOL/L (ref 136–145)
TOTAL PROTEIN: 6.6 G/DL (ref 6.4–8.2)
WBC # BLD: 5.3 K/UL (ref 4–11)

## 2020-10-21 ENCOUNTER — VIRTUAL VISIT (OUTPATIENT)
Dept: FAMILY MEDICINE CLINIC | Age: 45
End: 2020-10-21
Payer: COMMERCIAL

## 2020-10-21 PROCEDURE — 99213 OFFICE O/P EST LOW 20 MIN: CPT | Performed by: FAMILY MEDICINE

## 2020-10-21 PROCEDURE — G8427 DOCREV CUR MEDS BY ELIG CLIN: HCPCS | Performed by: FAMILY MEDICINE

## 2020-10-21 ASSESSMENT — PATIENT HEALTH QUESTIONNAIRE - PHQ9
SUM OF ALL RESPONSES TO PHQ9 QUESTIONS 1 & 2: 0
SUM OF ALL RESPONSES TO PHQ QUESTIONS 1-9: 0
1. LITTLE INTEREST OR PLEASURE IN DOING THINGS: 0
SUM OF ALL RESPONSES TO PHQ QUESTIONS 1-9: 0
2. FEELING DOWN, DEPRESSED OR HOPELESS: 0
SUM OF ALL RESPONSES TO PHQ QUESTIONS 1-9: 0

## 2020-10-21 NOTE — PROGRESS NOTES
10/21/2020    TELEHEALTH EVALUATION -- Audio/Visual (During YKOJA-33 public health emergency)    HPI:    Saray Ovalle (:  1975) has requested an audio/video evaluation for the following concern(s):    Pt presents today via doxy. me Video visit for a 1 month follow-up for depression. Was prescribed Lexapro 10 mg on 2020. Recent labs from 10/13/2020 wnl except for glucose of 114. States today that he has been taking taking daily and feels more even keeled and relaxed. Sleeping fine. Denies nightmares or teeth grinding. Denies fatigue. Has torn cornea and is taking a steroid, still some blurred surgery, sees [facility] who doesn't feel surgery needed at this time. States that he drank a regular Gatorade on the way to do his labs on 10/13/2020. Review of Systems   Constitutional: Negative for fatigue. Eyes: Positive for visual disturbance. Psychiatric/Behavioral: Positive for dysphoric mood (improved). Negative for sleep disturbance. The patient is nervous/anxious (improved). Prior to Visit Medications    Medication Sig Taking? Authorizing Provider   lodoxamide (ALOMIDE) 0.1 % ophthalmic solution 1 drop 4 times daily Yes Historical Provider, MD   escitalopram (LEXAPRO) 10 MG tablet TAKE 1 TABLET BY MOUTH EVERY DAY Yes Donal Peguero, DO   Multiple Vitamins-Minerals (THERAPEUTIC MULTIVITAMIN-MINERALS) tablet Take 1 tablet by mouth daily Yes Historical Provider, MD       Social History     Tobacco Use    Smoking status: Never Smoker    Smokeless tobacco: Current User     Types: Chew   Substance Use Topics    Alcohol use: Yes     Alcohol/week: 6.0 standard drinks     Types: 6 Cans of beer per week     Comment: 15 beers/ day    Drug use: No        Allergies   Allergen Reactions    No Known Allergies    ,   Past Medical History:   Diagnosis Date    Allergic rhinitis     Anxiety     Depression     Hypertension    , History reviewed. No pertinent surgical history.     PHYSICAL EXAMINATION:  [ INSTRUCTIONS:  \"[x]\" Indicates a positive item  \"[]\" Indicates a negative item  -- DELETE ALL ITEMS NOT EXAMINED]  Vital Signs: (As obtained by patient/caregiver or practitioner observation)    Height - 5' 8\"  Weight - 150 lb    Constitutional: [x] Appears well-developed and well-nourished [x] No apparent distress      [] Abnormal-   Mental status  [x] Alert and awake  [x] Oriented to person/place/time [x]Able to follow commands      Eyes:  EOM    [x]  Normal  [] Abnormal-  Sclera  []  Normal  [] Abnormal -         Discharge []  None visible  [] Abnormal -    HENT:   [x] Normocephalic, atraumatic. [] Abnormal   [] Mouth/Throat: Mucous membranes are moist.     External Ears [x] Normal  [] Abnormal-     Neck: [x] No visualized mass     Pulmonary/Chest: [x] Respiratory effort normal.  [x] No visualized signs of difficulty breathing or respiratory distress        [] Abnormal-      Musculoskeletal:   [] Normal gait with no signs of ataxia         [x] Normal range of motion of neck        [] Abnormal-       Neurological:        [x] No Facial Asymmetry (Cranial nerve 7 motor function) (limited exam to video visit)          [x] No gaze palsy        [] Abnormal-         Skin:        [x] No significant exanthematous lesions or discoloration noted on facial skin         [] Abnormal-            Psychiatric:       [x] Normal Affect [] No Hallucinations        [] Abnormal-     Other pertinent observable physical exam findings-     ASSESSMENT/PLAN:  1. Elevated glucose  - GLUCOSE in 1 month    2. Depressed mood  - doing well  - continue Lexapro 10 mg    3. Anxiety  - doing well  - continue Lexapro 10 mg    4. Corneal erythema of left eye  - improving  - sees [facility]      No follow-ups on file. Price Pedraza is a 54 y.o. male being evaluated by a Virtual Visit (video visit) encounter to address concerns as mentioned above. A caregiver was present when appropriate.  Due to this being a TeleHealth encounter (During QCKCJ-62 public health emergency), evaluation of the following organ systems was limited: Vitals/Constitutional/EENT/Resp/CV/GI//MS/Neuro/Skin/Heme-Lymph-Imm. Pursuant to the emergency declaration under the [facility], [address] [place] [address] authority and the [employer] Act, this Virtual Visit was conducted with patient's (and/or legal guardian's) consent, to reduce the patient's risk of exposure to COVID-19 and provide necessary medical care. The patient (and/or legal guardian) has also been advised to contact this office for worsening conditions or problems, and seek emergency medical treatment and/or call 911 if deemed necessary. Patient identification was verified at the start of the visit: Yes    Total time spent on this encounter: Not billed by time    Services were provided through a video synchronous discussion virtually to substitute for in-person clinic visit. Patient and provider were located at their individual homes. --Libyb Byrnes,  on 10/21/2020 at 5:02 PM    An electronic signature was used to authenticate this note.

## 2020-11-19 ENCOUNTER — TELEPHONE (OUTPATIENT)
Dept: FAMILY MEDICINE CLINIC | Age: 45
End: 2020-11-19

## 2020-11-19 NOTE — TELEPHONE ENCOUNTER
Following up on Plan of care and pt evaluation was sent 10/27 .  Also would like to see if provider wants to sign up for docusign

## 2022-01-24 ENCOUNTER — HOSPITAL ENCOUNTER (INPATIENT)
Age: 47
LOS: 6 days | Discharge: HOME | End: 2022-01-30
Attending: INTERNAL MEDICINE | Admitting: INTERNAL MEDICINE
Payer: MEDICAID

## 2022-01-24 DIAGNOSIS — F10.230 ALCOHOL DEPENDENCE WITH UNCOMPLICATED WITHDRAWAL (HCC): Primary | ICD-10-CM

## 2022-01-24 PROBLEM — F10.239 ALCOHOL DEPENDENCE WITH WITHDRAWAL (HCC): Status: ACTIVE | Noted: 2022-01-24

## 2022-01-24 LAB
A/G RATIO: 1.8 (ref 1.1–2.2)
ACETAMINOPHEN LEVEL: <5 UG/ML (ref 10–30)
ALBUMIN SERPL-MCNC: 4.7 G/DL (ref 3.4–5)
ALP BLD-CCNC: 56 U/L (ref 40–129)
ALT SERPL-CCNC: 56 U/L (ref 10–40)
AMPHETAMINE SCREEN, URINE: NORMAL
ANION GAP SERPL CALCULATED.3IONS-SCNC: 14 MMOL/L (ref 3–16)
AST SERPL-CCNC: 70 U/L (ref 15–37)
BARBITURATE SCREEN URINE: NORMAL
BASOPHILS ABSOLUTE: 0.1 K/UL (ref 0–0.2)
BASOPHILS RELATIVE PERCENT: 3.7 %
BENZODIAZEPINE SCREEN, URINE: NORMAL
BILIRUB SERPL-MCNC: 0.4 MG/DL (ref 0–1)
BILIRUBIN URINE: NEGATIVE
BLOOD, URINE: NEGATIVE
BUN BLDV-MCNC: 3 MG/DL (ref 7–20)
CALCIUM SERPL-MCNC: 9.3 MG/DL (ref 8.3–10.6)
CANNABINOID SCREEN URINE: NORMAL
CHLORIDE BLD-SCNC: 96 MMOL/L (ref 99–110)
CLARITY: CLEAR
CO2: 24 MMOL/L (ref 21–32)
COCAINE METABOLITE SCREEN URINE: NORMAL
COLOR: YELLOW
CREAT SERPL-MCNC: 0.6 MG/DL (ref 0.9–1.3)
EOSINOPHILS ABSOLUTE: 0 K/UL (ref 0–0.6)
EOSINOPHILS RELATIVE PERCENT: 0.9 %
ETHANOL: 359 MG/DL (ref 0–0.08)
GFR AFRICAN AMERICAN: >60
GFR NON-AFRICAN AMERICAN: >60
GLUCOSE BLD-MCNC: 95 MG/DL (ref 70–99)
GLUCOSE URINE: NEGATIVE MG/DL
HCT VFR BLD CALC: 41.9 % (ref 40.5–52.5)
HEMOGLOBIN: 14.1 G/DL (ref 13.5–17.5)
KETONES, URINE: NEGATIVE MG/DL
LEUKOCYTE ESTERASE, URINE: NEGATIVE
LYMPHOCYTES ABSOLUTE: 0.9 K/UL (ref 1–5.1)
LYMPHOCYTES RELATIVE PERCENT: 30 %
Lab: NORMAL
MCH RBC QN AUTO: 29.4 PG (ref 26–34)
MCHC RBC AUTO-ENTMCNC: 33.7 G/DL (ref 31–36)
MCV RBC AUTO: 87.1 FL (ref 80–100)
METHADONE SCREEN, URINE: NORMAL
MICROSCOPIC EXAMINATION: NORMAL
MONOCYTES ABSOLUTE: 0.3 K/UL (ref 0–1.3)
MONOCYTES RELATIVE PERCENT: 10.3 %
NEUTROPHILS ABSOLUTE: 1.6 K/UL (ref 1.7–7.7)
NEUTROPHILS RELATIVE PERCENT: 55.1 %
NITRITE, URINE: NEGATIVE
OPIATE SCREEN URINE: NORMAL
OXYCODONE URINE: NORMAL
PDW BLD-RTO: 13.7 % (ref 12.4–15.4)
PH UA: 6.5
PH UA: 6.5 (ref 5–8)
PHENCYCLIDINE SCREEN URINE: NORMAL
PLATELET # BLD: 175 K/UL (ref 135–450)
PMV BLD AUTO: 6.3 FL (ref 5–10.5)
POTASSIUM SERPL-SCNC: 3.9 MMOL/L (ref 3.5–5.1)
PROPOXYPHENE SCREEN: NORMAL
PROTEIN UA: NEGATIVE MG/DL
RBC # BLD: 4.81 M/UL (ref 4.2–5.9)
SALICYLATE, SERUM: <0.3 MG/DL (ref 15–30)
SODIUM BLD-SCNC: 134 MMOL/L (ref 136–145)
SPECIFIC GRAVITY UA: <=1.005 (ref 1–1.03)
TOTAL PROTEIN: 7.3 G/DL (ref 6.4–8.2)
URINE TYPE: NORMAL
UROBILINOGEN, URINE: 0.2 E.U./DL
WBC # BLD: 2.9 K/UL (ref 4–11)

## 2022-01-24 PROCEDURE — 2060000000 HC ICU INTERMEDIATE R&B

## 2022-01-24 PROCEDURE — 80307 DRUG TEST PRSMV CHEM ANLYZR: CPT

## 2022-01-24 PROCEDURE — 80053 COMPREHEN METABOLIC PANEL: CPT

## 2022-01-24 PROCEDURE — 6360000002 HC RX W HCPCS: Performed by: PHYSICIAN ASSISTANT

## 2022-01-24 PROCEDURE — 81003 URINALYSIS AUTO W/O SCOPE: CPT

## 2022-01-24 PROCEDURE — 80179 DRUG ASSAY SALICYLATE: CPT

## 2022-01-24 PROCEDURE — 2580000003 HC RX 258: Performed by: PHYSICIAN ASSISTANT

## 2022-01-24 PROCEDURE — 2000000000 HC ICU R&B

## 2022-01-24 PROCEDURE — 82077 ASSAY SPEC XCP UR&BREATH IA: CPT

## 2022-01-24 PROCEDURE — 80143 DRUG ASSAY ACETAMINOPHEN: CPT

## 2022-01-24 PROCEDURE — 2500000003 HC RX 250 WO HCPCS: Performed by: PHYSICIAN ASSISTANT

## 2022-01-24 PROCEDURE — 99283 EMERGENCY DEPT VISIT LOW MDM: CPT

## 2022-01-24 PROCEDURE — 87636 SARSCOV2 & INF A&B AMP PRB: CPT

## 2022-01-24 PROCEDURE — 85025 COMPLETE CBC W/AUTO DIFF WBC: CPT

## 2022-01-24 RX ORDER — LORAZEPAM 2 MG/ML
2 INJECTION INTRAMUSCULAR
Status: DISCONTINUED | OUTPATIENT
Start: 2022-01-24 | End: 2022-01-30 | Stop reason: HOSPADM

## 2022-01-24 RX ORDER — LORAZEPAM 2 MG/ML
1 INJECTION INTRAMUSCULAR
Status: DISCONTINUED | OUTPATIENT
Start: 2022-01-24 | End: 2022-01-30 | Stop reason: HOSPADM

## 2022-01-24 RX ORDER — LORAZEPAM 1 MG/1
4 TABLET ORAL
Status: DISCONTINUED | OUTPATIENT
Start: 2022-01-24 | End: 2022-01-30 | Stop reason: HOSPADM

## 2022-01-24 RX ORDER — ONDANSETRON 4 MG/1
4 TABLET, ORALLY DISINTEGRATING ORAL EVERY 8 HOURS PRN
Status: DISCONTINUED | OUTPATIENT
Start: 2022-01-24 | End: 2022-01-30 | Stop reason: HOSPADM

## 2022-01-24 RX ORDER — SODIUM CHLORIDE 0.9 % (FLUSH) 0.9 %
5-40 SYRINGE (ML) INJECTION EVERY 12 HOURS SCHEDULED
Status: DISCONTINUED | OUTPATIENT
Start: 2022-01-24 | End: 2022-01-30 | Stop reason: HOSPADM

## 2022-01-24 RX ORDER — LORAZEPAM 2 MG/ML
1 INJECTION INTRAMUSCULAR
Status: DISCONTINUED | OUTPATIENT
Start: 2022-01-24 | End: 2022-01-25 | Stop reason: SDUPTHER

## 2022-01-24 RX ORDER — M-VIT,TX,IRON,MINS/CALC/FOLIC 27MG-0.4MG
1 TABLET ORAL DAILY
Status: DISCONTINUED | OUTPATIENT
Start: 2022-01-25 | End: 2022-01-26

## 2022-01-24 RX ORDER — LANOLIN ALCOHOL/MO/W.PET/CERES
100 CREAM (GRAM) TOPICAL DAILY
Status: DISCONTINUED | OUTPATIENT
Start: 2022-01-25 | End: 2022-01-26

## 2022-01-24 RX ORDER — SODIUM CHLORIDE 9 MG/ML
25 INJECTION, SOLUTION INTRAVENOUS PRN
Status: DISCONTINUED | OUTPATIENT
Start: 2022-01-24 | End: 2022-01-30 | Stop reason: HOSPADM

## 2022-01-24 RX ORDER — LORAZEPAM 1 MG/1
2 TABLET ORAL
Status: DISCONTINUED | OUTPATIENT
Start: 2022-01-24 | End: 2022-01-30 | Stop reason: HOSPADM

## 2022-01-24 RX ORDER — MAGNESIUM SULFATE IN WATER 40 MG/ML
2000 INJECTION, SOLUTION INTRAVENOUS PRN
Status: DISCONTINUED | OUTPATIENT
Start: 2022-01-24 | End: 2022-01-30 | Stop reason: HOSPADM

## 2022-01-24 RX ORDER — LORAZEPAM 1 MG/1
3 TABLET ORAL
Status: DISCONTINUED | OUTPATIENT
Start: 2022-01-24 | End: 2022-01-25 | Stop reason: SDUPTHER

## 2022-01-24 RX ORDER — POTASSIUM CHLORIDE 7.45 MG/ML
10 INJECTION INTRAVENOUS PRN
Status: DISCONTINUED | OUTPATIENT
Start: 2022-01-24 | End: 2022-01-30 | Stop reason: HOSPADM

## 2022-01-24 RX ORDER — SODIUM CHLORIDE 0.9 % (FLUSH) 0.9 %
5-40 SYRINGE (ML) INJECTION PRN
Status: DISCONTINUED | OUTPATIENT
Start: 2022-01-24 | End: 2022-01-30 | Stop reason: HOSPADM

## 2022-01-24 RX ORDER — SODIUM CHLORIDE 9 MG/ML
25 INJECTION, SOLUTION INTRAVENOUS PRN
Status: DISCONTINUED | OUTPATIENT
Start: 2022-01-24 | End: 2022-01-24 | Stop reason: SDUPTHER

## 2022-01-24 RX ORDER — POLYETHYLENE GLYCOL 3350 17 G/17G
17 POWDER, FOR SOLUTION ORAL DAILY PRN
Status: DISCONTINUED | OUTPATIENT
Start: 2022-01-24 | End: 2022-01-30 | Stop reason: HOSPADM

## 2022-01-24 RX ORDER — LORAZEPAM 1 MG/1
1 TABLET ORAL
Status: DISCONTINUED | OUTPATIENT
Start: 2022-01-24 | End: 2022-01-30 | Stop reason: HOSPADM

## 2022-01-24 RX ORDER — SODIUM CHLORIDE 0.9 % (FLUSH) 0.9 %
5-40 SYRINGE (ML) INJECTION PRN
Status: DISCONTINUED | OUTPATIENT
Start: 2022-01-24 | End: 2022-01-24 | Stop reason: SDUPTHER

## 2022-01-24 RX ORDER — LORAZEPAM 1 MG/1
4 TABLET ORAL
Status: DISCONTINUED | OUTPATIENT
Start: 2022-01-24 | End: 2022-01-25 | Stop reason: SDUPTHER

## 2022-01-24 RX ORDER — LORAZEPAM 2 MG/ML
2 INJECTION INTRAMUSCULAR
Status: DISCONTINUED | OUTPATIENT
Start: 2022-01-24 | End: 2022-01-25 | Stop reason: SDUPTHER

## 2022-01-24 RX ORDER — FOLIC ACID 1 MG/1
1 TABLET ORAL DAILY
Status: DISCONTINUED | OUTPATIENT
Start: 2022-01-25 | End: 2022-01-26

## 2022-01-24 RX ORDER — LORAZEPAM 2 MG/ML
4 INJECTION INTRAMUSCULAR
Status: DISCONTINUED | OUTPATIENT
Start: 2022-01-24 | End: 2022-01-25 | Stop reason: SDUPTHER

## 2022-01-24 RX ORDER — ACETAMINOPHEN 325 MG/1
650 TABLET ORAL EVERY 6 HOURS PRN
Status: DISCONTINUED | OUTPATIENT
Start: 2022-01-24 | End: 2022-01-30 | Stop reason: HOSPADM

## 2022-01-24 RX ORDER — ACETAMINOPHEN 650 MG/1
650 SUPPOSITORY RECTAL EVERY 6 HOURS PRN
Status: DISCONTINUED | OUTPATIENT
Start: 2022-01-24 | End: 2022-01-30 | Stop reason: HOSPADM

## 2022-01-24 RX ORDER — LORAZEPAM 2 MG/ML
4 INJECTION INTRAMUSCULAR
Status: DISCONTINUED | OUTPATIENT
Start: 2022-01-24 | End: 2022-01-30 | Stop reason: HOSPADM

## 2022-01-24 RX ORDER — LORAZEPAM 1 MG/1
3 TABLET ORAL
Status: DISCONTINUED | OUTPATIENT
Start: 2022-01-24 | End: 2022-01-30 | Stop reason: HOSPADM

## 2022-01-24 RX ORDER — LORAZEPAM 1 MG/1
1 TABLET ORAL
Status: DISCONTINUED | OUTPATIENT
Start: 2022-01-24 | End: 2022-01-25 | Stop reason: SDUPTHER

## 2022-01-24 RX ORDER — SODIUM CHLORIDE 0.9 % (FLUSH) 0.9 %
5-40 SYRINGE (ML) INJECTION EVERY 12 HOURS SCHEDULED
Status: DISCONTINUED | OUTPATIENT
Start: 2022-01-24 | End: 2022-01-24 | Stop reason: SDUPTHER

## 2022-01-24 RX ORDER — LORAZEPAM 1 MG/1
2 TABLET ORAL
Status: DISCONTINUED | OUTPATIENT
Start: 2022-01-24 | End: 2022-01-25 | Stop reason: SDUPTHER

## 2022-01-24 RX ORDER — LORAZEPAM 2 MG/ML
3 INJECTION INTRAMUSCULAR
Status: DISCONTINUED | OUTPATIENT
Start: 2022-01-24 | End: 2022-01-30 | Stop reason: HOSPADM

## 2022-01-24 RX ORDER — LORAZEPAM 2 MG/ML
3 INJECTION INTRAMUSCULAR
Status: DISCONTINUED | OUTPATIENT
Start: 2022-01-24 | End: 2022-01-25 | Stop reason: SDUPTHER

## 2022-01-24 RX ORDER — ONDANSETRON 2 MG/ML
4 INJECTION INTRAMUSCULAR; INTRAVENOUS EVERY 6 HOURS PRN
Status: DISCONTINUED | OUTPATIENT
Start: 2022-01-24 | End: 2022-01-30 | Stop reason: HOSPADM

## 2022-01-24 RX ORDER — POTASSIUM CHLORIDE 20 MEQ/1
40 TABLET, EXTENDED RELEASE ORAL PRN
Status: DISCONTINUED | OUTPATIENT
Start: 2022-01-24 | End: 2022-01-30 | Stop reason: HOSPADM

## 2022-01-24 RX ADMIN — FOLIC ACID: 5 INJECTION, SOLUTION INTRAMUSCULAR; INTRAVENOUS; SUBCUTANEOUS at 23:32

## 2022-01-24 ASSESSMENT — ENCOUNTER SYMPTOMS
RESPIRATORY NEGATIVE: 1
NAUSEA: 1

## 2022-01-25 LAB
A/G RATIO: 1.7 (ref 1.1–2.2)
ALBUMIN SERPL-MCNC: 4.2 G/DL (ref 3.4–5)
ALP BLD-CCNC: 47 U/L (ref 40–129)
ALT SERPL-CCNC: 51 U/L (ref 10–40)
ANION GAP SERPL CALCULATED.3IONS-SCNC: 14 MMOL/L (ref 3–16)
AST SERPL-CCNC: 72 U/L (ref 15–37)
BASOPHILS ABSOLUTE: 0 K/UL (ref 0–0.2)
BASOPHILS RELATIVE PERCENT: 0.6 %
BILIRUB SERPL-MCNC: 0.6 MG/DL (ref 0–1)
BUN BLDV-MCNC: 4 MG/DL (ref 7–20)
CALCIUM SERPL-MCNC: 8.5 MG/DL (ref 8.3–10.6)
CHLORIDE BLD-SCNC: 102 MMOL/L (ref 99–110)
CO2: 23 MMOL/L (ref 21–32)
CREAT SERPL-MCNC: <0.5 MG/DL (ref 0.9–1.3)
EOSINOPHILS ABSOLUTE: 0 K/UL (ref 0–0.6)
EOSINOPHILS RELATIVE PERCENT: 1.5 %
GFR AFRICAN AMERICAN: >60
GFR NON-AFRICAN AMERICAN: >60
GLUCOSE BLD-MCNC: 76 MG/DL (ref 70–99)
HCT VFR BLD CALC: 40.4 % (ref 40.5–52.5)
HEMOGLOBIN: 13.6 G/DL (ref 13.5–17.5)
INFLUENZA A: NOT DETECTED
INFLUENZA B: NOT DETECTED
LYMPHOCYTES ABSOLUTE: 1 K/UL (ref 1–5.1)
LYMPHOCYTES RELATIVE PERCENT: 31.3 %
MCH RBC QN AUTO: 29.4 PG (ref 26–34)
MCHC RBC AUTO-ENTMCNC: 33.6 G/DL (ref 31–36)
MCV RBC AUTO: 87.4 FL (ref 80–100)
MONOCYTES ABSOLUTE: 0.4 K/UL (ref 0–1.3)
MONOCYTES RELATIVE PERCENT: 13.8 %
NEUTROPHILS ABSOLUTE: 1.6 K/UL (ref 1.7–7.7)
NEUTROPHILS RELATIVE PERCENT: 52.8 %
PDW BLD-RTO: 13.8 % (ref 12.4–15.4)
PLATELET # BLD: 154 K/UL (ref 135–450)
PMV BLD AUTO: 6.4 FL (ref 5–10.5)
POTASSIUM REFLEX MAGNESIUM: 3.9 MMOL/L (ref 3.5–5.1)
RBC # BLD: 4.62 M/UL (ref 4.2–5.9)
SARS-COV-2 RNA, RT PCR: NOT DETECTED
SODIUM BLD-SCNC: 139 MMOL/L (ref 136–145)
TOTAL PROTEIN: 6.7 G/DL (ref 6.4–8.2)
WBC # BLD: 3.1 K/UL (ref 4–11)

## 2022-01-25 PROCEDURE — 6360000002 HC RX W HCPCS: Performed by: INTERNAL MEDICINE

## 2022-01-25 PROCEDURE — 85025 COMPLETE CBC W/AUTO DIFF WBC: CPT

## 2022-01-25 PROCEDURE — 99221 1ST HOSP IP/OBS SF/LOW 40: CPT | Performed by: NURSE PRACTITIONER

## 2022-01-25 PROCEDURE — 2580000003 HC RX 258: Performed by: NURSE PRACTITIONER

## 2022-01-25 PROCEDURE — 2000000000 HC ICU R&B

## 2022-01-25 PROCEDURE — 2060000000 HC ICU INTERMEDIATE R&B

## 2022-01-25 PROCEDURE — 84450 TRANSFERASE (AST) (SGOT): CPT

## 2022-01-25 PROCEDURE — 2580000003 HC RX 258: Performed by: INTERNAL MEDICINE

## 2022-01-25 PROCEDURE — 6370000000 HC RX 637 (ALT 250 FOR IP): Performed by: INTERNAL MEDICINE

## 2022-01-25 PROCEDURE — 84132 ASSAY OF SERUM POTASSIUM: CPT

## 2022-01-25 PROCEDURE — 84460 ALANINE AMINO (ALT) (SGPT): CPT

## 2022-01-25 PROCEDURE — 80053 COMPREHEN METABOLIC PANEL: CPT

## 2022-01-25 PROCEDURE — 6370000000 HC RX 637 (ALT 250 FOR IP): Performed by: NURSE PRACTITIONER

## 2022-01-25 PROCEDURE — 6370000000 HC RX 637 (ALT 250 FOR IP)

## 2022-01-25 RX ORDER — SODIUM CHLORIDE 9 MG/ML
INJECTION, SOLUTION INTRAVENOUS CONTINUOUS
Status: DISCONTINUED | OUTPATIENT
Start: 2022-01-25 | End: 2022-01-28

## 2022-01-25 RX ORDER — NICOTINE 21 MG/24HR
1 PATCH, TRANSDERMAL 24 HOURS TRANSDERMAL DAILY
Status: DISCONTINUED | OUTPATIENT
Start: 2022-01-25 | End: 2022-01-30 | Stop reason: HOSPADM

## 2022-01-25 RX ORDER — LORAZEPAM 1 MG/1
TABLET ORAL
Status: COMPLETED
Start: 2022-01-25 | End: 2022-01-25

## 2022-01-25 RX ORDER — CHLORDIAZEPOXIDE HYDROCHLORIDE 25 MG/1
25 CAPSULE, GELATIN COATED ORAL 4 TIMES DAILY
Status: DISCONTINUED | OUTPATIENT
Start: 2022-01-25 | End: 2022-01-26

## 2022-01-25 RX ADMIN — Medication 1 TABLET: at 08:38

## 2022-01-25 RX ADMIN — CHLORDIAZEPOXIDE HYDROCHLORIDE 25 MG: 25 CAPSULE ORAL at 13:48

## 2022-01-25 RX ADMIN — LORAZEPAM 2 MG: 1 TABLET ORAL at 08:45

## 2022-01-25 RX ADMIN — FOLIC ACID 1 MG: 1 TABLET ORAL at 08:38

## 2022-01-25 RX ADMIN — LORAZEPAM 1 MG: 1 TABLET ORAL at 23:13

## 2022-01-25 RX ADMIN — SODIUM CHLORIDE, PRESERVATIVE FREE 10 ML: 5 INJECTION INTRAVENOUS at 22:28

## 2022-01-25 RX ADMIN — Medication 100 MG: at 08:38

## 2022-01-25 RX ADMIN — CHLORDIAZEPOXIDE HYDROCHLORIDE 25 MG: 25 CAPSULE ORAL at 22:27

## 2022-01-25 RX ADMIN — LORAZEPAM 2 MG: 1 TABLET ORAL at 18:22

## 2022-01-25 RX ADMIN — CHLORDIAZEPOXIDE HYDROCHLORIDE 25 MG: 25 CAPSULE ORAL at 17:31

## 2022-01-25 RX ADMIN — ENOXAPARIN SODIUM 40 MG: 100 INJECTION SUBCUTANEOUS at 08:40

## 2022-01-25 RX ADMIN — SODIUM CHLORIDE, PRESERVATIVE FREE 10 ML: 5 INJECTION INTRAVENOUS at 08:47

## 2022-01-25 RX ADMIN — LORAZEPAM 1 MG: 1 TABLET ORAL at 20:58

## 2022-01-25 RX ADMIN — SODIUM CHLORIDE: 9 INJECTION, SOLUTION INTRAVENOUS at 10:44

## 2022-01-25 NOTE — ED NOTES
Pt resting with both eyes closed. Respiration e/u. Appears to be comfortable. Call light within reach.      Nasra Lewis RN  01/25/22 [id number]

## 2022-01-25 NOTE — ED PROVIDER NOTES
I independently examined and evaluated Melita Driver. I personally saw the patient and performed a substantive portion of the visit including all aspects of the medical decision making. In brief, this 25-year-old male is presenting for alcohol detox. He states that he normally drinks 15 beers daily, last drink about 1600 today. He presented to [facility] to discuss detoxing on his own, however they advised to come to the ED. He states that he does have history of severe alcohol withdrawal requiring admission. He is uncertain if he is ever had a seizure from withdrawal.  He is currently feeling tremulous. Denies fevers, chills, headache, nausea, vomiting, chest pain, bone pain, shortness of breath. Focused exam revealed   General: Alert, no acute distress, patient resting comfortably   Skin: warm, intact, no pallor noted   Head: Normocephalic, atraumatic   Eye: Normal conjunctiva   Cardiac: Normal peripheral perfusion  Respiratory: No acute distress   Musculoskeletal: No deformity, full ROM. Neurological: alert and oriented, normal sensory and motor observed. Psychiatric: Cooperative    ED course: Presenting for alcohol withdrawal with a desire to quit drinking. He is currently intoxicated with an alcohol level of 359. I do not believe he is withdrawing at this time. Due to his history of severe withdrawal, his intoxication, and his desire to stop drinking I do believe that the most appropriate to admit to the hospital to monitor for withdrawal.    All diagnostic, treatment, and disposition decisions were made by myself in conjunction with the advanced practice provider. For all further details of the patient's emergency department visit, please see the advanced practice provider's documentation.     Comment: Please note this report has been produced using speech recognition software and may contain errors related to that system including errors in grammar, punctuation, and spelling, as well as words and phrases that may be inappropriate. If there are any questions or concerns please feel free to contact the dictating provider for clarification.        Griffin Dougherty, DO  01/25/22 4336

## 2022-01-25 NOTE — ED PROVIDER NOTES
[address] ED  EMERGENCY DEPARTMENT ENCOUNTER        Pt Name: Abbe Jack  MRN: [id number]  [place] 1975  Date of evaluation: 1/24/2022  Provider: Gagandeep Ghosh PA-C  PCP: Ary Rubio DO  Note Started: 10:39 PM EST        I have seen and evaluated this patient with my supervising physician Dr. Emily Marrero. CHIEF COMPLAINT       Chief Complaint   Patient presents with    Alcohol Problem     states here to detox from etoh. 6 beers PTA. approx 15 beers a day. HISTORY OF PRESENT ILLNESS   (Location, Timing/Onset, Context/Setting, Quality, Duration, Modifying Factors, Severity, Associated Signs and Symptoms)  Note limiting factors. Chief Complaint: alcohol detox/withdrawal     Abbe Jack is a 51 y.o. male with a past medical history of alcohol abuse hypertension brought in today by private vehicle with complaints of alcohol detox wanting to withdrawal from alcohol. Patient states he has been drinking heavily for quite some time. Typically drinks about 15 beers a day. His last drink was sometime this morning. He has tried to detox on his own. Does not recall if he has had any seizures. He states he has had hallucinations from detoxing from alcohol. Reports some nausea. Denies vomiting. Denies suicidal or homicidal ideation. No aggravating or alleviating symptoms. Otherwise denies any other complaints. Nothing seems to make symptoms better or worse. Nursing Notes were all reviewed and agreed with or any disagreements were addressed in the HPI. REVIEW OF SYSTEMS    (2-9 systems for level 4, 10 or more for level 5)     Review of Systems   Constitutional: Negative. HENT: Negative. Respiratory: Negative. Cardiovascular: Negative. Gastrointestinal: Positive for nausea. Genitourinary: Negative. Musculoskeletal: Negative. Skin: Negative. Neurological: Negative. Positives and Pertinent negatives as per HPI.  Except as noted above in the ROS, all other systems were reviewed and negative. PAST MEDICAL HISTORY     Past Medical History:   Diagnosis Date    Allergic rhinitis     Anxiety     Depression     Hypertension          SURGICAL HISTORY   History reviewed. No pertinent surgical history. CURRENTMEDICATIONS       Previous Medications    LODOXAMIDE (ALOMIDE) 0.1 % OPHTHALMIC SOLUTION    1 drop 4 times daily    MULTIPLE VITAMINS-MINERALS (THERAPEUTIC MULTIVITAMIN-MINERALS) TABLET    Take 1 tablet by mouth daily         ALLERGIES     No known allergies    FAMILYHISTORY       Family History   Problem Relation Age of Onset    Depression Mother     Depression Father     High Blood Pressure Father     Depression Brother           SOCIAL HISTORY       Social History     Tobacco Use    Smoking status: Never Smoker    Smokeless tobacco: Current User     Types: Chew   Vaping Use    Vaping Use: Never used   Substance Use Topics    Alcohol use: Yes     Alcohol/week: 6.0 standard drinks     Types: 6 Cans of beer per week     Comment: 15 beers/ day    Drug use: No       SCREENINGS             PHYSICAL EXAM    (up to 7 for level 4, 8 or more for level 5)     ED Triage Vitals [01/24/22 1657]   BP Temp Temp Source Pulse Resp SpO2 Height Weight   (!) 181/107 98.7 °F (37.1 °C) Temporal 120 18 93 % -- 142 lb (64.4 kg)       Physical Exam  Vitals and nursing note reviewed. Constitutional:       General: He is awake. He is not in acute distress. Appearance: Normal appearance. He is well-developed and normal weight. He is not ill-appearing, toxic-appearing or diaphoretic. HENT:      Head: Normocephalic and atraumatic. Nose: Nose normal.   Eyes:      General:         Right eye: No discharge. Left eye: No discharge. Cardiovascular:      Rate and Rhythm: Regular rhythm. Tachycardia present. Pulses:           Radial pulses are 2+ on the right side and 2+ on the left side. Heart sounds: Normal heart sounds. No murmur heard. No gallop. Pulmonary:      Effort: Pulmonary effort is normal. No respiratory distress. Breath sounds: Normal breath sounds. No decreased breath sounds, wheezing, rhonchi or rales. Chest:      Chest wall: No tenderness. Musculoskeletal:         General: No deformity. Normal range of motion. Cervical back: Normal range of motion and neck supple. Skin:     General: Skin is warm and dry. Neurological:      General: No focal deficit present. Mental Status: He is alert and oriented to person, place, and time. GCS: GCS eye subscore is 4. GCS verbal subscore is 5. GCS motor subscore is 6. Psychiatric:         Attention and Perception: Attention normal.         Mood and Affect: Mood normal.         Speech: Speech normal.         Behavior: Behavior normal. Behavior is cooperative. Thought Content:  Thought content normal.         Cognition and Memory: Cognition normal.         Judgment: Judgment normal.         DIAGNOSTIC RESULTS   LABS:    Labs Reviewed   CBC WITH AUTO DIFFERENTIAL - Abnormal; Notable for the following components:       Result Value    WBC 2.9 (*)     Neutrophils Absolute 1.6 (*)     Lymphocytes Absolute 0.9 (*)     All other components within normal limits    Narrative:     Performed at:  [facility] [address],  [employer], [facility]   Phone [phone]   COMPREHENSIVE METABOLIC PANEL - Abnormal; Notable for the following components:    Sodium 134 (*)     Chloride 96 (*)     BUN 3 (*)     CREATININE 0.6 (*)     ALT 56 (*)     AST 70 (*)     All other components within normal limits    Narrative:     Performed at:  [facility] [address],  [employer], [facility]   Phone [phone]   ACETAMINOPHEN LEVEL - Abnormal; Notable for the following components:    Acetaminophen Level <5 (*)     All other components within normal limits    Narrative:     Performed at:  [facility] [address],  [place], [facility]   Phone [phone]   SALICYLATE LEVEL - Abnormal; Notable for the following components:    Salicylate, Serum <0.3 (*)     All other components within normal limits    Narrative:     Performed at:  [facility] [address],  [place], [facility]   Phone [phone]   URINALYSIS    Narrative:     Performed at:  [facility] [address],  [place], [facility]   Phone [phone]   ETHANOL    Narrative:     Performed at:  [address] [place] [address],  [place], [facility]   Phone [phone]   URINE DRUG SCREEN    Narrative:     Performed at:  [facility] [address],  [place], [facility]   Phone [phone]   COMPREHENSIVE METABOLIC PANEL W/ REFLEX TO MG FOR LOW K   CBC WITH AUTO DIFFERENTIAL       When ordered only abnormal lab results are displayed. All other labs were within normal range or not returned as of this dictation. EKG: When ordered, EKG's are interpreted by the Emergency Department Physician in the absence of a cardiologist.  Please see their note for interpretation of EKG. RADIOLOGY:   Non-plain film images such as CT, Ultrasound and MRI are read by the radiologist. Plain radiographic images are visualized and preliminarily interpreted by the ED Provider with the below findings:        Interpretation per the Radiologist below, if available at the time of this note:    No orders to display     No results found.         PROCEDURES   Unless otherwise noted below, none     Procedures    CRITICAL CARE TIME       CONSULTS:  IP CONSULT TO SOCIAL WORK  IP CONSULT TO HOSPITALIST  IP CONSULT TO SOCIAL WORK      EMERGENCY DEPARTMENT COURSE and DIFFERENTIAL DIAGNOSIS/MDM:   Vitals:    Vitals:    01/24/22 1657   BP: (!) 181/107   Pulse: 120 Resp: 18   Temp: 98.7 °F (37.1 °C)   TempSrc: Temporal   SpO2: 93%   Weight: 142 lb (64.4 kg)       Patient was given the following medications:  Medications   sodium chloride flush 0.9 % injection 5-40 mL (has no administration in time range)   sodium chloride flush 0.9 % injection 5-40 mL (has no administration in time range)   0.9 % sodium chloride infusion (has no administration in time range)   LORazepam (ATIVAN) tablet 1 mg (has no administration in time range)     Or   LORazepam (ATIVAN) injection 1 mg (has no administration in time range)     Or   LORazepam (ATIVAN) tablet 2 mg (has no administration in time range)     Or   LORazepam (ATIVAN) injection 2 mg (has no administration in time range)     Or   LORazepam (ATIVAN) tablet 3 mg (has no administration in time range)     Or   LORazepam (ATIVAN) injection 3 mg (has no administration in time range)     Or   LORazepam (ATIVAN) tablet 4 mg (has no administration in time range)     Or   LORazepam (ATIVAN) injection 4 mg (has no administration in time range)   sodium chloride 0.9 % 338 mL with folic acid 1 mg, adult multi-vitamin with vitamin k 10 mL, thiamine 100 mg (has no administration in time range)   sodium chloride flush 0.9 % injection 5-40 mL (has no administration in time range)   sodium chloride flush 0.9 % injection 5-40 mL (has no administration in time range)   0.9 % sodium chloride infusion (has no administration in time range)   enoxaparin (LOVENOX) injection 40 mg (has no administration in time range)   ondansetron (ZOFRAN-ODT) disintegrating tablet 4 mg (has no administration in time range)     Or   ondansetron (ZOFRAN) injection 4 mg (has no administration in time range)   polyethylene glycol (GLYCOLAX) packet 17 g (has no administration in time range)   acetaminophen (TYLENOL) tablet 650 mg (has no administration in time range)     Or   acetaminophen (TYLENOL) suppository 650 mg (has no administration in time range)   potassium chloride (KLOR-CON M) extended release tablet 40 mEq (has no administration in time range)     Or   potassium bicarb-citric acid (EFFER-K) effervescent tablet 40 mEq (has no administration in time range)     Or   potassium chloride 10 mEq/100 mL IVPB (Peripheral Line) (has no administration in time range)   magnesium sulfate 2000 mg in 50 mL IVPB premix (has no administration in time range)   LORazepam (ATIVAN) tablet 1 mg (has no administration in time range)     Or   LORazepam (ATIVAN) injection 1 mg (has no administration in time range)     Or   LORazepam (ATIVAN) tablet 2 mg (has no administration in time range)     Or   LORazepam (ATIVAN) injection 2 mg (has no administration in time range)     Or   LORazepam (ATIVAN) tablet 3 mg (has no administration in time range)     Or   LORazepam (ATIVAN) injection 3 mg (has no administration in time range)     Or   LORazepam (ATIVAN) tablet 4 mg (has no administration in time range)     Or   LORazepam (ATIVAN) injection 4 mg (has no administration in time range)   therapeutic multivitamin-minerals 1 tablet (has no administration in time range)   thiamine tablet 100 mg (has no administration in time range)   folic acid (FOLVITE) tablet 1 mg (has no administration in time range)           Patient Brought in today by private vehicle with complaints of alcohol detox/withdrawal.  On exam he is anxious tachycardic to 120 breathing on room air satting at 93%. Nontoxic. No acute respiratory distress. Old labs and records reviewed at this time. Patient seen and evaluated by myself as well as my attending. Urine negative for infection. Urine drug screen is negative. CBC shows a white count of 2.9. Hemoglobin of 14.1. No electrolyte abnormalities. Elevated ALT and AST. Kidney function unremarkable. Ethanol of 359. Salicylate and acetaminophen levels are negative. CIWA protocol started here in the ED. Plan at this time will be to admit.   We will consult the hospitalist. Patient is agreeable to this plan and stable at time of admission. FINAL IMPRESSION      1. Alcohol dependence with uncomplicated withdrawal ([facility] [address]          DISPOSITION/PLAN   DISPOSITION Admitted 01/24/2022 11:13:29 PM      PATIENT REFERRED TO:  No follow-up provider specified.     DISCHARGE MEDICATIONS:  New Prescriptions    No medications on file       DISCONTINUED MEDICATIONS:  Discontinued Medications    ESCITALOPRAM (LEXAPRO) 10 MG TABLET    TAKE 1 TABLET BY MOUTH EVERY DAY              (Please note that portions of this note were completed with a voice recognition program.  Efforts were made to edit the dictations but occasionally words are mis-transcribed.)    Lillian Plaza PA-C (electronically signed)            Lillian Plaza PA-C  01/24/22 0904

## 2022-01-25 NOTE — ED NOTES
Pt resting with both eyes closed. Respiration e/u. Call light within easy reach.       Joaquín Hackett RN  01/25/22 5210

## 2022-01-25 NOTE — ED NOTES
I briefly spoke with patient about treatment options following detox. He had not been brought back from waiting room and I was getting ready to leave. I also gave the patient my contact info if he needs me or wanted to discuss treatment options later. I gave patient a stress ball to help with anxiety and \"jitters\" he was feeling. Patient states he is with [facility] to get treatment but needed detox prior to treatment. Patient verbalized understanding to call me if he needed help with treatment/food/clothing. I will not be back at [facility] until Thursday but will follow patient's chart for updates.       Meagan Catalan  01/24/22 2042

## 2022-01-25 NOTE — CARE COORDINATION
Case Management Assessment  Initial Evaluation      Patient Name: Biju Coello  YOB: 1975  Diagnosis: Alcohol dependence with withdrawal [facility]) [F10.239]  Date / Time: 1/24/2022 10:20 PM    Admission status/Date:inpt  Chart Reviewed: Yes      Patient Interviewed: No   Family Interviewed:  Yes - pt's momMary      Hospitalization in the last 30 days:  No      Health Care Decision Maker :   Primary Decision Maker: Reji Villegas - Parent - [phone]    Secondary Decision Maker: Corona Craig - Parent - [phone]    (CM - must 1st enter selection under Navigator - emergency contact- [place] Relationship and pick relationship)   Who do you trust or have selected to make healthcare decisions for you      Met with: pt's mom via TC  Interview conducted  (bedside/phone):    Current PCP:   Kathy Howard DO      Financial  Commercial  Precert required for SNF : Y, N          3 night stay required - Y, N    ADLS  Support Systems/Care Needs:    Transportation:family  Meal Preparation: self    Housing  Living Arrangements: home with parents  Steps: 2  Intent for return to present living arrangements: Yes  Identified Issues: no    Home Care Information  Active with [address] : No Agency:(Services)     Passport/Waiver : No  :                      Phone Number:    Passport/Waiver Services: no          Durable Medical Equiptment   DME Provider: mason  Equipment:   Walker___Cane___RTS___ BSC___Shower Chair___Hospital Bed___W/C____Other________  02 at ____Liter(s)---wears(frequency)_______ [facility] ___ CPAP___ BiPap___   N/A____      Home O2 Use :  No    If No for home O2---if presently on O2 during hospitalization:  No  if yes CM to follow for potential DC O2 need  Informed of need for care provider to bring portable home O2 tank on day of discharge for nursing to connect prior to leaving:   Not Indicated  Verbalized agreement/Understanding:   Not Indicated    Community Service Affiliation  Dialysis:  No    · Agency:  · Location:  · Dialysis Schedule:  · Phone:   · Fax: Other Community Services: (ex:PT/OT,Mental Health,Wound Clinic, Cardio/Pul [address])    DISCHARGE PLAN: Explained Case Management role/services. CM met with the pt at bedside and resource folder provided to the pt. Pt from home with parents and plan return. Pt active with [facility] per pt's mom. CM called and left  for Yu Rodriguez with [facility] for bedside peer support. Follow.

## 2022-01-25 NOTE — ED NOTES
Pt resting in bed, easily aroused to name. Denies needs at this time. No acute distress noted. Call light within reach.      Anusha Aguilar RN  01/25/22 8269

## 2022-01-25 NOTE — H&P
Hospital Medicine History & Physical      PCP: Enma López DO    Date of Admission: 1/24/2022    Date of Service: Pt seen/examined on 1/25/2022     Chief Complaint:    Chief Complaint   Patient presents with    Alcohol Problem     states here to detox from etoh. 6 beers PTA. approx 15 beers a day. History Of Present Illness: The patient is a 51 y.o. male with hypertension, depression, anxiety, and alcohol abuse who presents to [facility] with need for alcohol detox. He was going to try to detox on his own. He called [facility] and they recommended he go to the ED. He has been drinking for 20 years. He drinks 18 beers daily. His last drink was yesterday afternoon. He reports he has had hallucinations in the past.  Denies h/o seizures. Denies suicidal ideations. Feels shaky and some nausea. Denies fever, cough, chest pain, dyspnea, vomiting, or diarrhea. BP elevated. Labs with elevated LFT's and leukopenia. Admitted to med-surg for alcohol detox. Past Medical History:        Diagnosis Date    Allergic rhinitis     Anxiety     Depression     Hypertension        Past Surgical History:    History reviewed. No pertinent surgical history. Medications Prior to Admission:    Prior to Admission medications    Medication Sig Start Date End Date Taking? Authorizing Provider   lodoxamide (ALOMIDE) 0.1 % ophthalmic solution 1 drop 4 times daily    Historical Provider, MD   Multiple Vitamins-Minerals (THERAPEUTIC MULTIVITAMIN-MINERALS) tablet Take 1 tablet by mouth daily    Historical Provider, MD       Allergies:  No known allergies    Social History:     TOBACCO:   reports that he has never smoked. His smokeless tobacco use includes chew. ETOH:   reports current alcohol use of about 6.0 standard drinks of alcohol per week.       Family History:   Positive as follows:        Problem Relation Age of Onset    Depression Mother     Depression Father     High Blood Pressure Father     I have reviewed the EKG with the following interpretation:   N/A    RADIOLOGY  No orders to display         Active Problems:    Alcohol dependence with withdrawal ([facility] [address]  Resolved Problems:    * No resolved hospital problems. *        ASSESSMENT/PLAN:  Alcohol dependence  Alcohol withdrawal  - admitted to med-surg.   - fall/seizure precautions  - folic acid, thiamine, MV  - CIWA protocol- PRN Ativan  - IVF's  - added scheduled Librium    Elevated LFT's  - likely related to alcohol use. Monitor LFT's    Leukopenia   - likely related to alcohol use. Monitor CBC    Hypertension  - BP elevated. Likely related to alcohol withdrawal  - does not take any home medications. Monitor for now. Depression, anxiety  - was on Lexapro. Does not take when he drinks alcohol. Tobacco use  - recommend cessation. DVT Prophylaxis: Lovenox  Diet: ADULT DIET;  Regular  Code Status: Full Code    Moni Stanley [facility]  1/25/2022

## 2022-01-26 LAB
A/G RATIO: 2.2 (ref 1.1–2.2)
ALBUMIN SERPL-MCNC: 4.7 G/DL (ref 3.4–5)
ALP BLD-CCNC: 59 U/L (ref 40–129)
ALT SERPL-CCNC: 45 U/L (ref 10–40)
ANION GAP SERPL CALCULATED.3IONS-SCNC: 13 MMOL/L (ref 3–16)
AST SERPL-CCNC: 53 U/L (ref 15–37)
BASOPHILS ABSOLUTE: 0 K/UL (ref 0–0.2)
BASOPHILS RELATIVE PERCENT: 0.9 %
BILIRUB SERPL-MCNC: 1.3 MG/DL (ref 0–1)
BUN BLDV-MCNC: 4 MG/DL (ref 7–20)
CALCIUM SERPL-MCNC: 9.3 MG/DL (ref 8.3–10.6)
CHLORIDE BLD-SCNC: 99 MMOL/L (ref 99–110)
CO2: 22 MMOL/L (ref 21–32)
CREAT SERPL-MCNC: <0.5 MG/DL (ref 0.9–1.3)
EOSINOPHILS ABSOLUTE: 0 K/UL (ref 0–0.6)
EOSINOPHILS RELATIVE PERCENT: 0.7 %
GFR AFRICAN AMERICAN: >60
GFR NON-AFRICAN AMERICAN: >60
GLUCOSE BLD-MCNC: 99 MG/DL (ref 70–99)
HCT VFR BLD CALC: 41.2 % (ref 40.5–52.5)
HEMOGLOBIN: 13.7 G/DL (ref 13.5–17.5)
LYMPHOCYTES ABSOLUTE: 0.4 K/UL (ref 1–5.1)
LYMPHOCYTES RELATIVE PERCENT: 15.6 %
MCH RBC QN AUTO: 29.1 PG (ref 26–34)
MCHC RBC AUTO-ENTMCNC: 33.2 G/DL (ref 31–36)
MCV RBC AUTO: 87.5 FL (ref 80–100)
MONOCYTES ABSOLUTE: 0.3 K/UL (ref 0–1.3)
MONOCYTES RELATIVE PERCENT: 12.3 %
NEUTROPHILS ABSOLUTE: 1.8 K/UL (ref 1.7–7.7)
NEUTROPHILS RELATIVE PERCENT: 70.5 %
PDW BLD-RTO: 13.3 % (ref 12.4–15.4)
PLATELET # BLD: 141 K/UL (ref 135–450)
PMV BLD AUTO: 6.4 FL (ref 5–10.5)
POTASSIUM REFLEX MAGNESIUM: 3.8 MMOL/L (ref 3.5–5.1)
RBC # BLD: 4.71 M/UL (ref 4.2–5.9)
SODIUM BLD-SCNC: 134 MMOL/L (ref 136–145)
TOTAL PROTEIN: 6.8 G/DL (ref 6.4–8.2)
WBC # BLD: 2.5 K/UL (ref 4–11)

## 2022-01-26 PROCEDURE — 6360000002 HC RX W HCPCS: Performed by: INTERNAL MEDICINE

## 2022-01-26 PROCEDURE — 99255 IP/OBS CONSLTJ NEW/EST HI 80: CPT | Performed by: INTERNAL MEDICINE

## 2022-01-26 PROCEDURE — 2060000000 HC ICU INTERMEDIATE R&B

## 2022-01-26 PROCEDURE — 85025 COMPLETE CBC W/AUTO DIFF WBC: CPT

## 2022-01-26 PROCEDURE — 2000000000 HC ICU R&B

## 2022-01-26 PROCEDURE — 6370000000 HC RX 637 (ALT 250 FOR IP): Performed by: INTERNAL MEDICINE

## 2022-01-26 PROCEDURE — 2500000003 HC RX 250 WO HCPCS: Performed by: INTERNAL MEDICINE

## 2022-01-26 PROCEDURE — 80053 COMPREHEN METABOLIC PANEL: CPT

## 2022-01-26 PROCEDURE — 2580000003 HC RX 258: Performed by: INTERNAL MEDICINE

## 2022-01-26 PROCEDURE — 99232 SBSQ HOSP IP/OBS MODERATE 35: CPT | Performed by: NURSE PRACTITIONER

## 2022-01-26 RX ORDER — CLONIDINE HYDROCHLORIDE 0.1 MG/1
0.1 TABLET ORAL 2 TIMES DAILY
Status: DISCONTINUED | OUTPATIENT
Start: 2022-01-26 | End: 2022-01-27

## 2022-01-26 RX ORDER — HYDRALAZINE HYDROCHLORIDE 20 MG/ML
10 INJECTION INTRAMUSCULAR; INTRAVENOUS EVERY 6 HOURS PRN
Status: DISCONTINUED | OUTPATIENT
Start: 2022-01-26 | End: 2022-01-30 | Stop reason: HOSPADM

## 2022-01-26 RX ORDER — CHLORDIAZEPOXIDE HYDROCHLORIDE 25 MG/1
25 CAPSULE, GELATIN COATED ORAL 3 TIMES DAILY
Status: DISCONTINUED | OUTPATIENT
Start: 2022-01-27 | End: 2022-01-27

## 2022-01-26 RX ORDER — DEXMEDETOMIDINE HYDROCHLORIDE 4 UG/ML
.2-1.4 INJECTION, SOLUTION INTRAVENOUS CONTINUOUS
Status: DISCONTINUED | OUTPATIENT
Start: 2022-01-26 | End: 2022-01-28

## 2022-01-26 RX ADMIN — SODIUM CHLORIDE, PRESERVATIVE FREE 10 ML: 5 INJECTION INTRAVENOUS at 12:53

## 2022-01-26 RX ADMIN — LORAZEPAM 1 MG: 2 INJECTION INTRAMUSCULAR; INTRAVENOUS at 12:52

## 2022-01-26 RX ADMIN — LORAZEPAM 1 MG: 2 INJECTION INTRAMUSCULAR; INTRAVENOUS at 15:03

## 2022-01-26 RX ADMIN — LORAZEPAM 2 MG: 2 INJECTION INTRAMUSCULAR; INTRAVENOUS at 23:20

## 2022-01-26 RX ADMIN — Medication 0.8 MCG/KG/HR: at 16:47

## 2022-01-26 RX ADMIN — LORAZEPAM 3 MG: 1 TABLET ORAL at 03:42

## 2022-01-26 RX ADMIN — LORAZEPAM 1 MG: 2 INJECTION INTRAMUSCULAR; INTRAVENOUS at 09:56

## 2022-01-26 RX ADMIN — LORAZEPAM 4 MG: 2 INJECTION INTRAMUSCULAR; INTRAVENOUS at 07:24

## 2022-01-26 RX ADMIN — Medication 0.2 MCG/KG/HR: at 06:25

## 2022-01-26 RX ADMIN — Medication 1.2 MCG/KG/HR: at 10:28

## 2022-01-26 RX ADMIN — LORAZEPAM 4 MG: 2 INJECTION INTRAMUSCULAR; INTRAVENOUS at 06:09

## 2022-01-26 RX ADMIN — ENOXAPARIN SODIUM 40 MG: 100 INJECTION SUBCUTANEOUS at 10:30

## 2022-01-26 RX ADMIN — MUPIROCIN: 20 OINTMENT TOPICAL at 21:16

## 2022-01-26 RX ADMIN — SODIUM CHLORIDE, PRESERVATIVE FREE 10 ML: 5 INJECTION INTRAVENOUS at 22:24

## 2022-01-26 RX ADMIN — LORAZEPAM 4 MG: 2 INJECTION INTRAMUSCULAR; INTRAVENOUS at 05:17

## 2022-01-26 RX ADMIN — LORAZEPAM 1 MG: 2 INJECTION INTRAMUSCULAR; INTRAVENOUS at 10:59

## 2022-01-26 RX ADMIN — LORAZEPAM 3 MG: 1 TABLET ORAL at 04:10

## 2022-01-26 RX ADMIN — LORAZEPAM 1 MG: 2 INJECTION INTRAMUSCULAR; INTRAVENOUS at 08:42

## 2022-01-26 RX ADMIN — LORAZEPAM 2 MG: 1 TABLET ORAL at 02:08

## 2022-01-26 RX ADMIN — HYDRALAZINE HYDROCHLORIDE 10 MG: 20 INJECTION, SOLUTION INTRAMUSCULAR; INTRAVENOUS at 07:52

## 2022-01-26 RX ADMIN — LORAZEPAM 1 MG: 2 INJECTION INTRAMUSCULAR; INTRAVENOUS at 16:49

## 2022-01-26 RX ADMIN — LORAZEPAM 2 MG: 1 TABLET ORAL at 00:01

## 2022-01-26 RX ADMIN — FOLIC ACID: 5 INJECTION, SOLUTION INTRAMUSCULAR; INTRAVENOUS; SUBCUTANEOUS at 15:05

## 2022-01-26 ASSESSMENT — PAIN SCALES - GENERAL: PAINLEVEL_OUTOF10: 0

## 2022-01-26 NOTE — PROGRESS NOTES
Patient admitted to room __20__ in ER. Patient oriented to room, call light, bed rails, phone, lights and bathroom. Patient instructed about the schedule of the day including: vital sign frequency, lab draws, possible tests, frequency of MD and staff rounds, daily weights, I &O's and prescribed diet. Bed alarm deferred patient low fall risk and refuses alarm. Telemetry box in place, patient aware of placement and reason. Bed locked, in lowest position, side rails up 2/4, call light within reach. Recliner Assessment:     Patient is able to demonstrate the ability to move from a reclining position to an upright position within the recliner. 4 Eyes Skin Assessment     The patient is being assess for   Admission    I agree that 2 RN's have performed a thorough Head to Toe Skin Assessment on the patient. ALL assessment sites listed below have been assessed. Areas assessed for pressure by both nurses:   [x]   Head, Face, and Ears   [x]   Shoulders, Back, and Chest, Abdomen  [x]   Arms, Elbows, and Hands   [x]   Coccyx, Sacrum, and Ischium  [x]   Legs, Feet, and Heels        Skin Assessed Under all Medical Devices by both nurses:  N/A              All Mepilex Borders were peeled back and area peeked at by both nurses:  No: N/A  Please list where Mepilex Borders are located:  N/A             **SHARE this note so that the co-signing nurse is able to place an eSignature**    Co-signer eSignature: {Esignature:[id number]}    Does the Patient have Skin Breakdown related to pressure?   No            Mateusz Prevention initiated:  Yes   Wound Care Orders initiated:  NA      [facility] nurse consulted for Pressure Injury (Stage 3,4, Unstageable, DTI, NWPT, Complex wounds)and New or Established Ostomies:  NA      Primary Nurse eSignature: Electronically signed by Ny Zee RN on 1/25/22 at 11:56 PM EST

## 2022-01-26 NOTE — ED NOTES
Message sent to MD:      dominique resendiz 1975 RM: 2 Patient's last blood pressure was 163/105. Please advise    Awaiting new orders at this time.

## 2022-01-26 NOTE — ED NOTES
Verbal report from [facility], patient in bed with eyes closed, respirations easy and even. Will continue to monitor.      Josi Drew RN  01/25/22 1929

## 2022-01-26 NOTE — ED NOTES
Patient resting in bed, provided water per request. No other needs at this time. Will continue to monitor.      Shannen Mcmillan, JEANINE  01/25/22 2016

## 2022-01-26 NOTE — ED NOTES
Patient remains agitated after drip administration. Patient attempting to get out of bed and pulling lines. Patient becoming aggressive with RN. Drip titrated to max rate ahead of schedule. Two Rns in room monitoring patients condition.       Mayo Menjivar RN  01/26/22 8427

## 2022-01-26 NOTE — ED NOTES
After Visit Summary   5/31/2017    Rakan Amador    MRN: [id number]           Patient Information     Date Of Birth          1965        Visit Information        Provider Department      5/31/2017 1:15 PM Carolina Vasquez MD [facility]        Today's Diagnoses     Abnormal uterine bleeding    -  1      Care Instructions    Schedule your ultrasound at the .  We will call you regarding results of the ultrasound and to scheduled a follow up visit if needed.          Follow-ups after your visit        Follow-up notes from your care team     Return if symptoms worsen or fail to improve.      Future tests that were ordered for you today     Open Future Orders        Priority Expected Expires Ordered    US GYN Complete Transvaginal - 81414 (In Clinic) Routine  9/28/2017 5/31/2017            Who to contact     Please call your clinic at [phone] to:    Ask questions about your health    Make or cancel appointments    Discuss your medicines    Learn about your test results    Speak to your doctor   If you have compliments or concerns about an experience at your clinic, or if you wish to file a complaint, please contact [facility] Patient Relations at [phone] or email us at [email or web address]         Additional Information About Your Visit        MyChart Information     [employer] gives you secure access to your electronic health record. If you see a primary care provider, you can also send messages to your care team and make appointments. If you have questions, please call your primary care clinic.  If you do not have a primary care provider, please call [phone] and they will assist you.      [employer] is an electronic gateway that provides easy, online access to your medical records. With [employer], you can request a clinic appointment, read your test results, renew a prescription or communicate with your care team.     To  Patient's mother given updated on patient's condition.       Alyssa Poole RN  01/26/22 2055 "access your existing account, please contact your [facility] or call [phone] for assistance.        Care EveryWhere ID     This is your Care EveryWhere ID. This could be used by other organizations to access your [facility] medical records  [id number]        Your Vitals Were     Pulse Height BMI (Body Mass Index)             71 1.549 m (5' 1\") 22.77 kg/m2          Blood Pressure from Last 3 Encounters:   05/31/17 102/61    Weight from Last 3 Encounters:   05/31/17 54.7 kg (120 lb 8 oz)               Primary Care Provider    None Specified       No primary provider on file.        Thank you!     Thank you for choosing [facility]  for your care. Our goal is always to provide you with excellent care. Hearing back from our patients is one way we can continue to improve our services. Please take a few minutes to complete the written survey that you may receive in the mail after your visit with us. Thank you!             Your Updated Medication List - Protect others around you: Learn how to safely use, store and throw away your medicines at www.disposemymeds.org.          This list is accurate as of: 5/31/17  1:48 PM.  Always use your most recent med list.                   Brand Name Dispense Instructions for use    Multi-vitamin Tabs tablet      Take 1 tablet by mouth daily       progesterone 100 MG capsule    PROMETRIUM     Take 100 mg by mouth daily       zolpidem 10 MG tablet    AMBIEN     Take 10 mg by mouth         "

## 2022-01-26 NOTE — ED NOTES
Verbal report given to [employer], no questions at this time, patient taken to room 20. 1 mg of Ativan also given to [employer], due to not scanning.      Darryl Lockhart RN  01/25/22 1283

## 2022-01-26 NOTE — CONSULTS
Patient is being seen at the request of  CARLA Pop CNP for a consultation for DT    HISTORY OF PRESENT ILLNESS:   51years old with history of hypertension, depression, anxiety, alcohol abuse presented to [facility] for alcohol detox. He was trying to do detox on his own. Has been drinking for 20 years, 18 beers daily. Last drink was 1/2 23. No history of seizure or suicidal ideation. Patient was started on CIWA protocol, however increased agitation and therefore started on Precedex drip. Seen in ED on Precedex drip nonverbal unable to obtain further HPI. PAST MEDICAL HISTORY:  Past Medical History:   Diagnosis Date    Allergic rhinitis     Anxiety     Depression     Hypertension      PAST SURGICAL HISTORY:  History reviewed. No pertinent surgical history. FAMILY HISTORY:  family history includes Depression in his brother, father, and mother; High Blood Pressure in his father. SOCIAL HISTORY:   reports that he has been smoking cigarettes. He has been smoking about 0.00 packs per day. His smokeless tobacco use includes chew.     Scheduled Meds:   cloNIDine  0.1 mg Oral BID    chlordiazePOXIDE  25 mg Oral 4x Daily    nicotine  1 patch TransDERmal Daily    sodium chloride flush  5-40 mL IntraVENous 2 times per day    enoxaparin  40 mg SubCUTAneous Daily    multivitamin  1 tablet Oral Daily    thiamine  100 mg Oral Daily    folic acid  1 mg Oral Daily     Continuous Infusions:   dexmedetomidine HCl in NaCl 1 mcg/kg/hr (01/26/22 1311)    sodium chloride Stopped (01/25/22 2107)    sodium chloride       PRN Meds:  hydrALAZINE, sodium chloride flush, sodium chloride, ondansetron **OR** ondansetron, polyethylene glycol, acetaminophen **OR** acetaminophen, potassium chloride **OR** potassium alternative oral replacement **OR** potassium chloride, magnesium sulfate, LORazepam **OR** LORazepam **OR** LORazepam **OR** LORazepam **OR** LORazepam **OR** LORazepam **OR** LORazepam **OR** LORazepam    ALLERGIES:  Patient is allergic to no known allergies. REVIEW OF SYSTEMS: Sedated nonverbal unable to obtain      PHYSICAL EXAM:  Blood pressure 100/74, pulse 74, temperature 98.6 °F (37 °C), temperature source Oral, resp. rate 20, weight 142 lb (64.4 kg), SpO2 97 %.' on RA  Gen: No distress. Sedated  Eyes: PERRL. No sclera icterus. No conjunctival injection. ENT: No discharge. Pharynx clear. Neck: Trachea midline. No obvious mass. Resp: No accessory muscle use. No crackles. No wheezes. No rhonchi. No dullness on percussion. CV: Regular rate. Regular rhythm. No murmur or rub. No edema. GI: Non-tender. Non-distended. No hernia. Skin: Warm and dry. No nodule on exposed extremities. Lymph: No cervical LAD. No supraclavicular LAD. M/S: No cyanosis. No joint deformity. No clubbing. Neuro: Sedated. Responsive to painful stimuli. Did not follow commands. Psych: No agitation. LABS:  CBC:   Recent Labs     01/24/22 1704 01/25/22  0612 01/26/22  0735   WBC 2.9* 3.1* 2.5*   HGB 14.1 13.6 13.7   HCT 41.9 40.4* 41.2   MCV 87.1 87.4 87.5    154 141     BMP:   Recent Labs     01/24/22 1704 01/25/22  0612 01/25/22  0703 01/26/22  0735   * 139  --  134*   K 3.9  --  3.9 3.8   CL 96* 102  --  99   CO2 24 23  --  22   BUN 3* 4*  --  4*   CREATININE 0.6* <0.5*  --  <0.5*     LIVER PROFILE:   Recent Labs     01/24/22 1704 01/25/22 0612 01/25/22  0703 01/26/22  0735   AST 70*  --  72* 53*   ALT 56*  --  51* 45*   BILITOT 0.4 0.6  --  1.3*   ALKPHOS 56 47  --  59     PT/INR: No results for input(s): PROTIME, INR in the last 72 hours. APTT: No results for input(s): APTT in the last 72 hours.   UA:  Recent Labs     01/24/22  1712   COLORU Yellow   PHUR 6.5  6.5   CLARITYU Clear   SPECGRAV <=1.005   LEUKOCYTESUR Negative   UROBILINOGEN 0.2   BILIRUBINUR Negative   BLOODU Negative   GLUCOSEU Negative     No results for input(s): PHART, LMM2OGY, PO2ART in the last 72 hours.    Chest x-ray 9/4/2020 imaging was reviewed by me and showed   No acute cardiopulmonary disease    ASSESSMENT:  Alcohol withdrawal with Delirium tremens  Acute encephalopathy/Metabolic encephalopathy   Sinus tachycardia  Uncontrolled hypertension  Alcohol abuse  Elevated LFTs  Leukopenia  Agitation      PLAN:  Supplemental oxygen to maintain SaO2 >92%; wean as tolerated  ICU monitoring  Closely monitory airways, clinical status, cardiac rhythm, vital signs, and urine output   Seizure and fall precautions  Precedex drip  Librium 25 mg 3 times daily  Rally pack for 3 days  Intubation and propofol drip if refractory SATISH with no response to above medications  IVF NS  Urine drug screen-negative  Monitor electrolytes  Check CK for rhabdomyolysis  Adequate caloric support  DVT prophylaxis: Lovenox  MRSA prophylaxis: Bactroban

## 2022-01-26 NOTE — ED NOTES
Dose pulled from [address], 1 mg pulled instead of 2mg. Pulled a second Ativan, spoke with Shashank Mills in pharmacy and Valentino Hogue needs changed in [address]. Charge nurse Kennedy SOLORZANO notified.      Evangelist Fu RN  01/25/22 2932

## 2022-01-26 NOTE — ED NOTES
Patient with increased agitation despite hourly ativan doses. Patient started on precedex drip. Patient requiring 2 people to keep him in bed. Rates titrated up faster than schedule due to emergent situation. Rn remaining in room to monitor.       Kailash Menjivar RN  01/26/22 4355

## 2022-01-26 NOTE — PROGRESS NOTES
Pt arrived to [address] ICU bed [phone] with ER RN and transport. Pt was placed in seizure precautions, BUE restraints were checked and precedex running at 1. Pt declined any needs at this time. Bed in lowest position and call light within reach.

## 2022-01-26 NOTE — ED NOTES
Report given to [facility], ICU RN.  Transport put in to take patient up to room [address] Allan Pompa RN  01/26/22 1479

## 2022-01-27 LAB
A/G RATIO: 1.8 (ref 1.1–2.2)
ALBUMIN SERPL-MCNC: 4.2 G/DL (ref 3.4–5)
ALP BLD-CCNC: 67 U/L (ref 40–129)
ALT SERPL-CCNC: 41 U/L (ref 10–40)
ANION GAP SERPL CALCULATED.3IONS-SCNC: 13 MMOL/L (ref 3–16)
AST SERPL-CCNC: 45 U/L (ref 15–37)
BASOPHILS ABSOLUTE: 0 K/UL (ref 0–0.2)
BASOPHILS RELATIVE PERCENT: 1.2 %
BILIRUB SERPL-MCNC: 0.9 MG/DL (ref 0–1)
BUN BLDV-MCNC: 5 MG/DL (ref 7–20)
CALCIUM SERPL-MCNC: 9.5 MG/DL (ref 8.3–10.6)
CHLORIDE BLD-SCNC: 105 MMOL/L (ref 99–110)
CO2: 21 MMOL/L (ref 21–32)
CREAT SERPL-MCNC: <0.5 MG/DL (ref 0.9–1.3)
EOSINOPHILS ABSOLUTE: 0.1 K/UL (ref 0–0.6)
EOSINOPHILS RELATIVE PERCENT: 2.7 %
GFR AFRICAN AMERICAN: >60
GFR NON-AFRICAN AMERICAN: >60
GLUCOSE BLD-MCNC: 97 MG/DL (ref 70–99)
HCT VFR BLD CALC: 43.1 % (ref 40.5–52.5)
HEMOGLOBIN: 14.4 G/DL (ref 13.5–17.5)
LYMPHOCYTES ABSOLUTE: 0.5 K/UL (ref 1–5.1)
LYMPHOCYTES RELATIVE PERCENT: 14.5 %
MAGNESIUM: 2 MG/DL (ref 1.8–2.4)
MCH RBC QN AUTO: 29.4 PG (ref 26–34)
MCHC RBC AUTO-ENTMCNC: 33.4 G/DL (ref 31–36)
MCV RBC AUTO: 87.9 FL (ref 80–100)
MONOCYTES ABSOLUTE: 0.3 K/UL (ref 0–1.3)
MONOCYTES RELATIVE PERCENT: 9.2 %
NEUTROPHILS ABSOLUTE: 2.4 K/UL (ref 1.7–7.7)
NEUTROPHILS RELATIVE PERCENT: 72.4 %
PDW BLD-RTO: 13.3 % (ref 12.4–15.4)
PHOSPHORUS: 2.5 MG/DL (ref 2.5–4.9)
PLATELET # BLD: 140 K/UL (ref 135–450)
PMV BLD AUTO: 6.6 FL (ref 5–10.5)
POTASSIUM REFLEX MAGNESIUM: 3.9 MMOL/L (ref 3.5–5.1)
POTASSIUM SERPL-SCNC: 3.9 MMOL/L (ref 3.5–5.1)
RBC # BLD: 4.9 M/UL (ref 4.2–5.9)
SODIUM BLD-SCNC: 139 MMOL/L (ref 136–145)
TOTAL CK: 72 U/L (ref 39–308)
TOTAL PROTEIN: 6.6 G/DL (ref 6.4–8.2)
WBC # BLD: 3.3 K/UL (ref 4–11)

## 2022-01-27 PROCEDURE — 82550 ASSAY OF CK (CPK): CPT

## 2022-01-27 PROCEDURE — 6360000002 HC RX W HCPCS: Performed by: INTERNAL MEDICINE

## 2022-01-27 PROCEDURE — 2000000000 HC ICU R&B

## 2022-01-27 PROCEDURE — 6370000000 HC RX 637 (ALT 250 FOR IP): Performed by: INTERNAL MEDICINE

## 2022-01-27 PROCEDURE — 83735 ASSAY OF MAGNESIUM: CPT

## 2022-01-27 PROCEDURE — 99291 CRITICAL CARE FIRST HOUR: CPT | Performed by: INTERNAL MEDICINE

## 2022-01-27 PROCEDURE — 84100 ASSAY OF PHOSPHORUS: CPT

## 2022-01-27 PROCEDURE — 2580000003 HC RX 258: Performed by: INTERNAL MEDICINE

## 2022-01-27 PROCEDURE — 2500000003 HC RX 250 WO HCPCS: Performed by: INTERNAL MEDICINE

## 2022-01-27 PROCEDURE — 99232 SBSQ HOSP IP/OBS MODERATE 35: CPT | Performed by: INTERNAL MEDICINE

## 2022-01-27 PROCEDURE — 85025 COMPLETE CBC W/AUTO DIFF WBC: CPT

## 2022-01-27 PROCEDURE — 80053 COMPREHEN METABOLIC PANEL: CPT

## 2022-01-27 RX ORDER — CHLORDIAZEPOXIDE HYDROCHLORIDE 25 MG/1
25 CAPSULE, GELATIN COATED ORAL EVERY 6 HOURS SCHEDULED
Status: DISCONTINUED | OUTPATIENT
Start: 2022-01-27 | End: 2022-01-30 | Stop reason: HOSPADM

## 2022-01-27 RX ORDER — CLONIDINE 0.1 MG/24H
1 PATCH, EXTENDED RELEASE TRANSDERMAL WEEKLY
Status: DISCONTINUED | OUTPATIENT
Start: 2022-01-27 | End: 2022-01-30 | Stop reason: HOSPADM

## 2022-01-27 RX ADMIN — CHLORDIAZEPOXIDE HYDROCHLORIDE 25 MG: 25 CAPSULE ORAL at 09:21

## 2022-01-27 RX ADMIN — Medication 1 MCG/KG/HR: at 00:19

## 2022-01-27 RX ADMIN — LORAZEPAM 2 MG: 2 INJECTION INTRAMUSCULAR; INTRAVENOUS at 06:28

## 2022-01-27 RX ADMIN — HYDRALAZINE HYDROCHLORIDE 10 MG: 20 INJECTION, SOLUTION INTRAMUSCULAR; INTRAVENOUS at 11:48

## 2022-01-27 RX ADMIN — ENOXAPARIN SODIUM 40 MG: 100 INJECTION SUBCUTANEOUS at 09:21

## 2022-01-27 RX ADMIN — SODIUM CHLORIDE, PRESERVATIVE FREE 10 ML: 5 INJECTION INTRAVENOUS at 09:27

## 2022-01-27 RX ADMIN — CHLORDIAZEPOXIDE HYDROCHLORIDE 25 MG: 25 CAPSULE ORAL at 18:43

## 2022-01-27 RX ADMIN — FOLIC ACID: 5 INJECTION, SOLUTION INTRAMUSCULAR; INTRAVENOUS; SUBCUTANEOUS at 17:40

## 2022-01-27 RX ADMIN — Medication 1 MCG/KG/HR: at 07:45

## 2022-01-27 RX ADMIN — SODIUM CHLORIDE, PRESERVATIVE FREE 10 ML: 5 INJECTION INTRAVENOUS at 22:26

## 2022-01-27 RX ADMIN — CHLORDIAZEPOXIDE HYDROCHLORIDE 25 MG: 25 CAPSULE ORAL at 11:48

## 2022-01-27 RX ADMIN — MUPIROCIN: 20 OINTMENT TOPICAL at 22:25

## 2022-01-27 RX ADMIN — MUPIROCIN: 20 OINTMENT TOPICAL at 09:28

## 2022-01-27 ASSESSMENT — PAIN SCALES - GENERAL
PAINLEVEL_OUTOF10: 0

## 2022-01-27 NOTE — PROGRESS NOTES
Pulmonary & Critical Care Medicine ICU Progress Note    CC: DTs    Events of Last 24 hours: remains on preceddex gtt    Vitals:  BP (!) 168/97   Pulse 70   Temp 98.2 °F (36.8 °C) (Temporal)   Resp 15   Wt 142 lb (64.4 kg)   SpO2 98%   BMI 22.24 kg/m²     EXAM:  Constitutional: ill appearing. Eyes: PERRL. No sclera icterus. ENT: Normal Nose. Normal Tongue. Neck:  Trachea is midline. No thyroid tenderness. Respiratory: No accessory muscle usage. No wheezes. No rales. No Rhonchi. Cardiovascular: Normal S1S2. Cory Awkward No murmur. No digit cyanosis. No digit clubbing. No LE edema. GI: Non-tender. Non-distended. Normal bowel sounds. No masses. No umbilical hernia. Skin: No rash on the exposed extremities. No Nodules on exposed extremities. Neuro: Sedated but easily awakens and follows commands and is oriented to self. Moves all extremities. Psych:No agitation, no anxiety.     Scheduled Meds:   cloNIDine  0.1 mg Oral BID    folic acid, thiamine, multi-vitamin with vitamin K infusion   IntraVENous Daily    mupirocin   Nasal BID    chlordiazePOXIDE  25 mg Oral TID    nicotine  1 patch TransDERmal Daily    sodium chloride flush  5-40 mL IntraVENous 2 times per day    enoxaparin  40 mg SubCUTAneous Daily     PRN Meds:  hydrALAZINE, sodium chloride flush, sodium chloride, ondansetron **OR** ondansetron, polyethylene glycol, acetaminophen **OR** acetaminophen, potassium chloride **OR** potassium alternative oral replacement **OR** potassium chloride, magnesium sulfate, LORazepam **OR** LORazepam **OR** LORazepam **OR** LORazepam **OR** LORazepam **OR** LORazepam **OR** LORazepam **OR** LORazepam    Results:  CBC:   Recent Labs     01/25/22  0612 01/26/22  0735 01/27/22  0455   WBC 3.1* 2.5* 3.3*   HGB 13.6 13.7 14.4   HCT 40.4* 41.2 43.1   MCV 87.4 87.5 87.9    141 140     BMP:   Recent Labs     01/25/22  0612 01/25/22  0703 01/26/22  0735 01/27/22  0455     --  134* 139   K  --    < > 3.8 3.9 3.9     --  99 105   CO2 23  --  22 21   PHOS  --   --   --  2.5   BUN 4*  --  4* 5*   CREATININE <0.5*  --  <0.5* <0.5*    < > = values in this interval not displayed. LIVER PROFILE:   Recent Labs     01/24/22  1704 01/25/22  0612 01/25/22  0703 01/26/22  0735 01/27/22  0455   AST   < >  --  72* 53* 45*   ALT   < >  --  51* 45* 41*   BILITOT  --  0.6  --  1.3* 0.9   ALKPHOS  --  47  --  59 67    < > = values in this interval not displayed. Chest x-ray 9/4/2020 imaging was reviewed by me and showed   No acute cardiopulmonary disease     ASSESSMENT:  · Alcohol withdrawal with Delirium tremens  · Acute encephalopathy/Metabolic encephalopathy   · Sinus tachycardia  · Uncontrolled hypertension  · Alcohol abuse  · Elevated LFTs  · Leukopenia  · Agitation        PLAN:  · Supplemental oxygen to maintain SaO2 >92%; wean as tolerated  · ICU monitoring  · Closely monitory airways, clinical status, cardiac rhythm, vital signs, and urine output   · Seizure and fall precautions  · Precedex drip  · Librium 25 mg 3 times daily  · Rally pack for 3 days  · Intubation and propofol drip if refractory SATISH with no response to above medications  · IVF NS  · DVT prophylaxis: Lovenox  MRSA prophylaxis: Bactroban   Total critical care time caring for this patient with life threatening, unstable organ failure, including direct patient contact, management of life support systems, review of data including imaging and labs, discussions with other team members and physicians at least 31 minutes so far today, excluding procedures.

## 2022-01-27 NOTE — PROGRESS NOTES
CM-SB. Precedex gtt infusing @ 1 mcg/kg/hr. Catapres patch held for HR. Will attempt to wean Precedex gtt. Pt will arouses & remains calm. Will place Catapres patch or give Hydralazine if needed for BP. Will cont. To monitor.

## 2022-01-27 NOTE — PROGRESS NOTES
Progress Note    Admit Date:  1/24/2022    51 y.o. male with hypertension, depression, anxiety, and alcohol abuse who presents to [facility] with need for alcohol detox. Subjective:  Mr. Omid Martinez. Continues to be on the Precedex drip    Objective:   Patient Vitals for the past 4 hrs:   BP Pulse Resp   01/27/22 0628 (!) 168/97 70 15   01/27/22 0600 (!) 147/93 51 18   01/27/22 0448 121/87 61 14          No intake or output data in the 24 hours ending 01/27/22 0748    Physical Exam:    Gen: No distress. Resting  Eyes: PERRL. No sclera icterus. No conjunctival injection. ENT: No discharge. Pharynx clear. Neck: Trachea midline. Resp: No accessory muscle use. No crackles. No wheezes. No rhonchi. CV: Regular rate. Regular rhythm. No murmur. No rub. No edema. GI: Non-tender. Non-distended. Normal bowel sounds. No hernia. Skin: Warm and dry. No nodule on exposed extremities. No rash on exposed extremities. M/S: No cyanosis. No joint deformity. No clubbing. Neuro: Sleeping. Psych: No anxiety or agitation. Data:  CBC:   Recent Labs     01/25/22  0612 01/26/22  0735 01/27/22  0455   WBC 3.1* 2.5* 3.3*   HGB 13.6 13.7 14.4   HCT 40.4* 41.2 43.1   MCV 87.4 87.5 87.9    141 140     BMP:   Recent Labs     01/25/22  0612 01/25/22  0703 01/26/22  0735 01/27/22  0455     --  134* 139   K  --    < > 3.8 3.9  3.9     --  99 105   CO2 23  --  22 21   PHOS  --   --   --  2.5   BUN 4*  --  4* 5*   CREATININE <0.5*  --  <0.5* <0.5*    < > = values in this interval not displayed. LIVER PROFILE:   Recent Labs     01/24/22  1704 01/25/22  0612 01/25/22  0703 01/26/22  0735 01/27/22  0455   AST   < >  --  72* 53* 45*   ALT   < >  --  51* 45* 41*   BILITOT  --  0.6  --  1.3* 0.9   ALKPHOS  --  47  --  59 67    < > = values in this interval not displayed. PT/INR: No results for input(s): PROTIME, INR in the last 72 hours.     CULTURES  COVID/Influenza: not detected    RADIOLOGY  No orders to display         Assessment/Plan:  Alcohol dependence  Alcohol withdrawal  - admitted to med-surg.   - fall/seizure precautions  - folic acid, thiamine, MV  - CIWA protocol- PRN Ativan  - IVF's  - added scheduled Librium  Patient with increased agitation   Started on Precedex drip.      Elevated LFT's  - likely related to alcohol use. Monitor LFTs     Leukopenia   - likely related to alcohol use. Monitor CBC     Hypertension  - BP elevated. Likely related to alcohol withdrawal  - does not take any home medications. - PRN Hydralazine, Clonidine added. Depression, anxiety  - was on Lexapro. Does not take when he drinks alcohol.     Tobacco use  - recommend cessation.   - Nicotine patch.     DVT Prophylaxis: Lovenox    Diet: ADULT DIET; Regular  Code Status: Full Code  VICTORINO Sandoval.

## 2022-01-27 NOTE — PROGRESS NOTES
CM-SR, pt remains afebrile. Pt remains calm @ this time. He is taking PO w/out diffculty. Pt arouses easily. Weaning Precedex gtt. Pt is w/out evidence of distress @ this time.

## 2022-01-28 LAB
ANION GAP SERPL CALCULATED.3IONS-SCNC: 12 MMOL/L (ref 3–16)
BASOPHILS ABSOLUTE: 0 K/UL (ref 0–0.2)
BASOPHILS RELATIVE PERCENT: 0.9 %
BUN BLDV-MCNC: 7 MG/DL (ref 7–20)
CALCIUM SERPL-MCNC: 9.2 MG/DL (ref 8.3–10.6)
CHLORIDE BLD-SCNC: 102 MMOL/L (ref 99–110)
CO2: 25 MMOL/L (ref 21–32)
CREAT SERPL-MCNC: 0.6 MG/DL (ref 0.9–1.3)
EOSINOPHILS ABSOLUTE: 0.1 K/UL (ref 0–0.6)
EOSINOPHILS RELATIVE PERCENT: 1.3 %
GFR AFRICAN AMERICAN: >60
GFR NON-AFRICAN AMERICAN: >60
GLUCOSE BLD-MCNC: 88 MG/DL (ref 70–99)
HCT VFR BLD CALC: 42 % (ref 40.5–52.5)
HEMOGLOBIN: 14.1 G/DL (ref 13.5–17.5)
LYMPHOCYTES ABSOLUTE: 0.6 K/UL (ref 1–5.1)
LYMPHOCYTES RELATIVE PERCENT: 12.9 %
MAGNESIUM: 1.6 MG/DL (ref 1.8–2.4)
MCH RBC QN AUTO: 29.6 PG (ref 26–34)
MCHC RBC AUTO-ENTMCNC: 33.5 G/DL (ref 31–36)
MCV RBC AUTO: 88.5 FL (ref 80–100)
MONOCYTES ABSOLUTE: 0.4 K/UL (ref 0–1.3)
MONOCYTES RELATIVE PERCENT: 9.8 %
NEUTROPHILS ABSOLUTE: 3.2 K/UL (ref 1.7–7.7)
NEUTROPHILS RELATIVE PERCENT: 75.1 %
PDW BLD-RTO: 13.5 % (ref 12.4–15.4)
PHOSPHORUS: 2.5 MG/DL (ref 2.5–4.9)
PLATELET # BLD: 140 K/UL (ref 135–450)
PMV BLD AUTO: 7.3 FL (ref 5–10.5)
POTASSIUM SERPL-SCNC: 3.5 MMOL/L (ref 3.5–5.1)
RBC # BLD: 4.75 M/UL (ref 4.2–5.9)
SODIUM BLD-SCNC: 139 MMOL/L (ref 136–145)
WBC # BLD: 4.3 K/UL (ref 4–11)

## 2022-01-28 PROCEDURE — 6370000000 HC RX 637 (ALT 250 FOR IP): Performed by: INTERNAL MEDICINE

## 2022-01-28 PROCEDURE — 85025 COMPLETE CBC W/AUTO DIFF WBC: CPT

## 2022-01-28 PROCEDURE — 6360000002 HC RX W HCPCS: Performed by: INTERNAL MEDICINE

## 2022-01-28 PROCEDURE — 97161 PT EVAL LOW COMPLEX 20 MIN: CPT

## 2022-01-28 PROCEDURE — 2500000003 HC RX 250 WO HCPCS: Performed by: INTERNAL MEDICINE

## 2022-01-28 PROCEDURE — 97530 THERAPEUTIC ACTIVITIES: CPT

## 2022-01-28 PROCEDURE — 2580000003 HC RX 258: Performed by: INTERNAL MEDICINE

## 2022-01-28 PROCEDURE — 99233 SBSQ HOSP IP/OBS HIGH 50: CPT | Performed by: INTERNAL MEDICINE

## 2022-01-28 PROCEDURE — 97116 GAIT TRAINING THERAPY: CPT

## 2022-01-28 PROCEDURE — 83735 ASSAY OF MAGNESIUM: CPT

## 2022-01-28 PROCEDURE — 97166 OT EVAL MOD COMPLEX 45 MIN: CPT

## 2022-01-28 PROCEDURE — 99232 SBSQ HOSP IP/OBS MODERATE 35: CPT | Performed by: INTERNAL MEDICINE

## 2022-01-28 PROCEDURE — 36415 COLL VENOUS BLD VENIPUNCTURE: CPT

## 2022-01-28 PROCEDURE — 80048 BASIC METABOLIC PNL TOTAL CA: CPT

## 2022-01-28 PROCEDURE — 2060000000 HC ICU INTERMEDIATE R&B

## 2022-01-28 PROCEDURE — 84100 ASSAY OF PHOSPHORUS: CPT

## 2022-01-28 RX ADMIN — FOLIC ACID: 5 INJECTION, SOLUTION INTRAMUSCULAR; INTRAVENOUS; SUBCUTANEOUS at 15:29

## 2022-01-28 RX ADMIN — MUPIROCIN: 20 OINTMENT TOPICAL at 19:47

## 2022-01-28 RX ADMIN — LORAZEPAM 1 MG: 1 TABLET ORAL at 00:24

## 2022-01-28 RX ADMIN — CHLORDIAZEPOXIDE HYDROCHLORIDE 25 MG: 25 CAPSULE ORAL at 00:24

## 2022-01-28 RX ADMIN — MUPIROCIN: 20 OINTMENT TOPICAL at 08:04

## 2022-01-28 RX ADMIN — SODIUM CHLORIDE, PRESERVATIVE FREE 10 ML: 5 INJECTION INTRAVENOUS at 08:03

## 2022-01-28 RX ADMIN — MAGNESIUM SULFATE HEPTAHYDRATE 2000 MG: 40 INJECTION, SOLUTION INTRAVENOUS at 08:14

## 2022-01-28 RX ADMIN — CHLORDIAZEPOXIDE HYDROCHLORIDE 25 MG: 25 CAPSULE ORAL at 17:41

## 2022-01-28 RX ADMIN — LORAZEPAM 1 MG: 2 INJECTION INTRAMUSCULAR; INTRAVENOUS at 08:03

## 2022-01-28 RX ADMIN — CHLORDIAZEPOXIDE HYDROCHLORIDE 25 MG: 25 CAPSULE ORAL at 11:08

## 2022-01-28 RX ADMIN — LORAZEPAM 2 MG: 2 INJECTION INTRAMUSCULAR; INTRAVENOUS at 12:14

## 2022-01-28 RX ADMIN — SODIUM CHLORIDE, PRESERVATIVE FREE 10 ML: 5 INJECTION INTRAVENOUS at 19:47

## 2022-01-28 RX ADMIN — CHLORDIAZEPOXIDE HYDROCHLORIDE 25 MG: 25 CAPSULE ORAL at 06:36

## 2022-01-28 RX ADMIN — ENOXAPARIN SODIUM 40 MG: 100 INJECTION SUBCUTANEOUS at 08:03

## 2022-01-28 ASSESSMENT — PAIN SCALES - GENERAL
PAINLEVEL_OUTOF10: 0

## 2022-01-28 NOTE — FLOWSHEET NOTE
01/28/22 0830   Vital Signs   Temp 98 °F (36.7 °C)   Pulse 122   Heart Rate Source Monitor   Resp 20   BP (!) 148/80   BP Location Left upper arm   Patient Position Semi fowlers   Level of Consciousness Alert (0)   MEWS Score 3   Pain Assessment   Pain Assessment 0-10   Pain Level 0   Patient's Stated Pain Goal No pain   Oxygen Therapy   SpO2 98 %   O2 Device None (Room air)   pt has noted shaking/cold hands , noted anxious , assisted to BSC, pt  weak ,shaking able to transfer with moderate assist back to bed without incident , Dr LOO notified of need for PT/OT CIWA protocol meds given pt refused nicotine patch at this time

## 2022-01-28 NOTE — PROGRESS NOTES
Pulmonary & Critical Care Medicine ICU Progress Note    CC: DTs    Events of Last 24 hours: weaned off of preceddex gtt    Vitals:  BP (!) 121/90   Pulse 119   Temp 98 °F (36.7 °C)   Resp 20   Wt 141 lb 5 oz (64.1 kg)   SpO2 98%   BMI 22.13 kg/m²     EXAM:  Constitutional: ill appearing. Eyes: PERRL. No sclera icterus. ENT: Normal Nose. Normal Tongue. Neck:  Trachea is midline. No thyroid tenderness. Respiratory: No accessory muscle usage. No wheezes. No rales. No Rhonchi. Cardiovascular: Normal S1S2. No murmur. No digit cyanosis. No digit clubbing. No LE edema. GI: Non-tender. Non-distended. Normal bowel sounds. No masses. No umbilical hernia. Skin: No rash on the exposed extremities. No Nodules on exposed extremities. Neuro: Alert and oriented. Moves all extremities. Psych:No agitation, no anxiety.     Scheduled Meds:   chlordiazePOXIDE  25 mg Oral 4 times per day    cloNIDine  1 patch TransDERmal Weekly    folic acid, thiamine, multi-vitamin with vitamin K infusion   IntraVENous Daily    mupirocin   Nasal BID    nicotine  1 patch TransDERmal Daily    sodium chloride flush  5-40 mL IntraVENous 2 times per day    enoxaparin  40 mg SubCUTAneous Daily     PRN Meds:  hydrALAZINE, sodium chloride flush, sodium chloride, ondansetron **OR** ondansetron, polyethylene glycol, acetaminophen **OR** acetaminophen, potassium chloride **OR** potassium alternative oral replacement **OR** potassium chloride, magnesium sulfate, LORazepam **OR** LORazepam **OR** LORazepam **OR** LORazepam **OR** LORazepam **OR** LORazepam **OR** LORazepam **OR** LORazepam    Results:  CBC:   Recent Labs     01/26/22  0735 01/27/22  0455 01/28/22  0430   WBC 2.5* 3.3* 4.3   HGB 13.7 14.4 14.1   HCT 41.2 43.1 42.0   MCV 87.5 87.9 88.5    140 140     BMP:   Recent Labs     01/26/22  0735 01/26/22  0735 01/27/22  0455 01/28/22  0430   *  --  139 139   K 3.8   < > 3.9  3.9 3.5   CL 99  --  105 102   CO2 22  -- 21 25   PHOS  --   --  2.5 2.5   BUN 4*  --  5* 7   CREATININE <0.5*  --  <0.5* 0.6*    < > = values in this interval not displayed. LIVER PROFILE:   Recent Labs     01/26/22  0735 01/27/22  0455   AST 53* 45*   ALT 45* 41*   BILITOT 1.3* 0.9   ALKPHOS 59 67     Chest x-ray 9/4/2020 imaging was reviewed by me and showed   No acute cardiopulmonary disease     ASSESSMENT:  · Alcohol withdrawal with Delirium tremens  · Acute encephalopathy/Metabolic encephalopathy   · Sinus tachycardia  · Uncontrolled hypertension  · Alcohol abuse  · Elevated LFTs  · Leukopenia  · Agitation        PLAN:  · Seizure and fall precautions  · Librium 25 mg 3 times daily  · Rally pack for 3 days  · IVF NS  · Ok to leave the ICU.  I will not plan to follow

## 2022-01-28 NOTE — PROGRESS NOTES
Inpatient Physical Therapy Evaluation and Treatment    Unit: ICU (3E [facility])  Date:  1/28/2022  Patient Name:    Renea Harper  Admitting diagnosis:  Alcohol dependence with withdrawal ([facility] [F10.239]  Alcohol dependence with uncomplicated withdrawal ([facility] [F10.230]  Admit Date:  1/24/2022  Precautions/Restrictions/WB Status/ Lines/ Wounds/ Oxygen: Fall risk, Bed/chair alarm, Lines -IV, Telemetry and Continuous pulse oximetry, monitor HR (tachycardic during functional mobility)    Treatment Time:  1030 - 1054  Treatment Number:  1   Timed Code Treatment Minutes: 13 minutes  Total Treatment Minutes:  23  minutes    Patient Goals for Therapy: \" Go home \"          Discharge Recommendations: Home 24 hr assist and with home PT (Patient planning to stay with his parents at the time of discharge)  DME needs for discharge: Needs Met       Therapy recommendation for EMS Transport: can transport by wheelchair    Therapy recommendations for staff:   Assist of 1 with use of rolling walker (RW) and gait belt for all transfers and ambulation to/from [facility]  to/from chair  to/from bathroom  within room    History of Present Illness: H & P as per CARLA Villatoro CNP's note dated 1/25/2022  The patient is a 51 y.o. male with hypertension, depression, anxiety, and alcohol abuse who presents to [facility] with need for alcohol detox. He was going to try to detox on his own. He called [facility] and they recommended he go to the ED. He has been drinking for 20 years. He drinks 18 beers daily. His last drink was yesterday afternoon. He reports he has had hallucinations in the past.  Denies h/o seizures. Denies suicidal ideations. Feels shaky and some nausea. Denies fever, cough, chest pain, dyspnea, vomiting, or diarrhea. BP elevated. Labs with elevated LFT's and leukopenia.   Admitted to med-surg for alcohol detox.         Home Health S4 Level Recommendation:  Level 3 Safety  AM-PAC Mobility Score    AM-PAC Inpatient Mobility Raw Score : 20       Preadmission Environment    Pt. Lives with family (parents), planning to stay with parents  Home environment:  one story home  Steps to enter first floor: 1 steps to enter, HR on both sides  Steps to second floor: N/A  Bathroom: tub/shower unit, walk in shower, grab bars, standard height commode and shower chair  Equipment owned:     Preadmission Status:  Pt. Able to drive: No  Pt Fully independent with ADLs: Yes  Pt. Required assistance from family for: Independent PTA  Pt. independent for transfers and gait and walked with No Device  History of falls No    Pain   No    Cognition    A&O Person, Place and Situation   Able to follow 2 step commands    Subjective  Patient lying supine in bed with no family present. Pt agreeable to this PT eval & tx. Upper Extremity ROM/Strength  Please see OT evaluation.       Lower Extremity ROM / Strength   AROM WFL: Yes  ROM limitations: N/A    Strength Assessment (measured on a 0-5 scale):  R LE   Quad   4+   Ant Tib  4+   Hamstring 4+   Iliopsoas 4+  L LE  Quad   4+   Ant Tib  4+   Hamstring 4+   Iliopsoas 4+    Lower Extremity Sensation    WFL    Lower Extremity Proprioception:   WFL    Coordination and Tone  WFL    Balance  Sitting:  Fair +; SBA  Comments:     Standing: Fair -; CGA  Comments: 3 min    Bed Mobility   Supine to Sit:    CGA  Sit to Supine:   Not Tested  Rolling:   Not Tested  Scooting in sitting: CGA  Scooting in supine:  Not Tested    Transfer Training     Sit to stand:   CGA  Stand to sit:   CGA  Bed to Chair:   CGA with use of gait belt and rolling walker (RW)    Gait gait completed as indicated below  Distance:      25 ft  Deviations (firm surface/linoleum):  decreased simba, narrow PREMA, decreased step length bilaterally and decreased stance time bilaterally  Assistive Device Used:    gait belt and rolling walker (RW)  Level of Assist:    CGA  Comment: Patient was nervous during ambulation but presented with good balance with RW. Stair Training deferred, pt unsafe/ not appropriate to complete stairs at this time    Activity Tolerance   Pt completed therapy session with anxiety noted with all functional activity. At rest   SpO2: 98% on RA  HR: 111 bpm  BP: 157/103    After ambulation:   SpO2: 98% on RA  HR: 147 recovered to baseline with deep breathing exercises to 111 in 6 min  BP: 155/96    RN notified about the treatment response. Positioning Needs   Pt up in chair, alarm set, positioned in proper neutral alignment and pressure relief provided. Call light provided and all needs within reach    Exercises Initiated  all completed bilaterally unless indicated  Ankle Pumps x 10 reps    Other  None. Patient/Family Education   Pt educated on role of inpatient PT, POC, importance of continued activity, DC recommendations, safety awareness, transfer techniques, pursed lip breathing, energy conservation, pacing activity and calling for assist with mobility. Assessment  Pt seen for Physical Therapy evaluation in acute care setting. Pt demonstrated decreased Activity tolerance, Balance, ROM, Safety and Strength as well as decreased independence with Ambulation, Bed Mobility  and Transfers. Recommending Home 24 hr assist and with home PT upon discharge as patient functioning below baseline level and would benefit from continued therapy services    Goals : To be met in 3 visits:  1). Independent with LE Ex x 10 reps    To be met in 6 visits:  1). Supine to/from sit: Supervision  2). Sit to/from stand: Supervision  3). Bed to chair: Supervision  4). Gait: Ambulate 150 ft.  with  Supervision and use of LRAD (least restrictive assistive device)  5). Tolerate B LE exercises 3 sets of 10-15 reps  6).   Ascend/descend 1 steps with SBA with use of no handrail and LRAD (least restrictive assistive device)    Rehabilitation Potential: Good  Strengths for achieving goals include:   Pt motivated and Pt cooperative   Barriers to achieving goals include:    Complexity of condition    Plan    To be seen 3-5 x / week  while in acute care setting for therapeutic exercises, bed mobility, transfers, progressive gait training, balance training, and family/patient education. Signature: Hedy Montano MS PT, # [id number]    If patient discharges from this facility prior to next visit, this note will serve as the Discharge Summary.

## 2022-01-28 NOTE — PROGRESS NOTES
This Rn reported to oncoming RN to assume care of patient , pt resting quietly at time of transfer of care

## 2022-01-28 NOTE — PROGRESS NOTES
Progress Note    Admit Date:  1/24/2022    51 y.o. male with hypertension, depression, anxiety, and alcohol abuse who presents to [facility] with need for alcohol detox. Subjective:  Mr. Trotter Friday is awake and alert today. Precedex drip discontinued yesterday. Tremulous. Tachycardic    Objective:   Patient Vitals for the past 4 hrs:   BP Temp Temp src Pulse Resp SpO2 Weight   01/28/22 0738 (!) 155/90 98 °F (36.7 °C) Oral 101 20 98 % --   01/28/22 0706 (!) 140/96 -- -- -- -- -- --   01/28/22 0658 -- -- -- 94 -- 98 % --   01/28/22 0640 -- -- -- -- -- -- 141 lb 5 oz (64.1 kg)   01/28/22 0600 121/87 -- -- 91 -- 97 % --   01/28/22 0500 (!) 129/92 -- -- 98 25 99 % --   01/28/22 0400 (!) 149/103 98 °F (36.7 °C) Temporal 102 18 100 % --            Intake/Output Summary (Last 24 hours) at 1/28/2022 0756  Last data filed at 1/28/2022 0030  Gross per 24 hour   Intake 102.38 ml   Output 2200 ml   Net -2097.62 ml       Physical Exam:    Gen: No distress. Eyes: PERRL. No sclera icterus. No conjunctival injection. ENT: No discharge. Pharynx clear. Neck: Trachea midline. Resp: No accessory muscle use. No crackles. No wheezes. No rhonchi. CV: Regular rate. Regular rhythm. No murmur. No rub. No edema. GI: Non-tender. Non-distended. Normal bowel sounds. No hernia. Skin: Warm and dry. No nodule on exposed extremities. No rash on exposed extremities. M/S: No cyanosis. No joint deformity. No clubbing. Neuro:  Alert and oriented. Tremulous.     Data:  CBC:   Recent Labs     01/26/22  0735 01/27/22  0455 01/28/22  0430   WBC 2.5* 3.3* 4.3   HGB 13.7 14.4 14.1   HCT 41.2 43.1 42.0   MCV 87.5 87.9 88.5    140 140     BMP:   Recent Labs     01/26/22  0735 01/26/22  0735 01/27/22  0455 01/28/22  0430   *  --  139 139   K 3.8   < > 3.9  3.9 3.5   CL 99  --  105 102   CO2 22  --  21 25   PHOS  --   --  2.5 2.5   BUN 4*  --  5* 7   CREATININE <0.5*  --  <0.5* 0.6*    < > = values in this interval not displayed. LIVER PROFILE:   Recent Labs     01/26/22  0735 01/27/22  0455   AST 53* 45*   ALT 45* 41*   BILITOT 1.3* 0.9   ALKPHOS 59 67     PT/INR: No results for input(s): PROTIME, INR in the last 72 hours. CULTURES  COVID/Influenza: not detected    RADIOLOGY  No orders to display         Assessment/Plan:  Alcohol dependence  Alcohol withdrawal  - admitted to med-surg.   - fall/seizure precautions  - folic acid, thiamine, MV  - CIWA protocol- PRN Ativan  - IVF's  - added scheduled Librium  Patient with increased agitation   Started on Precedex drip. Precedex DC'd 1/27  Continue scheduled Librium and as needed Ativan     Elevated LFTs  - likely related to alcohol use. Monitor LFTs     Leukopenia   - likely related to alcohol use. Monitor CBC     Hypertension  - BP elevated. Likely related to alcohol withdrawal  - does not take any home medications. - PRN Hydralazine, Clonidine added, currently transdermal     Depression, anxiety  - was on Lexapro. Does not take when he drinks alcohol.     Tobacco use  - recommend cessation.   - Nicotine patch.     DVT Prophylaxis: Lovenox    Diet: ADULT DIET; Regular  Code Status: Full Code     DC IV fluids    Transfer to PCU    VICTORINO Sandoval.

## 2022-01-28 NOTE — PROGRESS NOTES
Inpatient Occupational Therapy  Evaluation and Treatment    Unit: ICU (3E overflow)  Date:  1/28/2022  Patient Name:    Florie Fothergill Stenger  Admitting diagnosis:  Alcohol dependence with withdrawal ([facility] [F10.239]  Alcohol dependence with uncomplicated withdrawal ([facility] [F10.230]  Admit Date:  1/24/2022  Precautions/Restrictions/WB Status/ Lines/ Wounds/ Oxygen: Fall risk, Bed/chair alarm, Lines -IV, Telemetry and Continuous pulse oximetry, monitor HR (tachycardic during functional mobility)    Treatment Time:  10:30-10:54  Treatment Number: 1     Billable Treatment Time: 13 minutes   Total Treatment Time:  23  minutes    Patient Goals for Therapy:  \" to go home \"      Discharge Recommendations: Home with 24/7 assist and home therapy  DME needs for discharge: RW       Therapy recommendations for staff:   Assist of 1 with use of rolling walker (RW) for all transfers to/from BSC/chair    History of Present Illness: H & P as per CARLA Higginbotham CNP's note dated 1/25/2022  The patient is [phone] y. o. male with hypertension, depression, anxiety, and alcohol abuse who presents to [facility] with need for alcohol detox.  He was going to try to detox on his own. Mahendra Dawson called [facility] and they recommended he go to the ED. He has been drinking for 20 years.  He drinks 18 beers daily.  His last drink was yesterday afternoon. Mahendra Dawson reports he has had hallucinations in the past.  Denies h/o seizures.  Denies suicidal ideations.  Feels shaky and some nausea.  Denies fever, cough, chest pain, dyspnea, vomiting, or diarrhea.    BP elevated.  Labs with elevated LFT's and leukopenia.  Admitted to med-surg for alcohol detox.      Home Health S4 Level Recommendation:  Level 1 Standard  AM-PAC Score: AM-PAC Inpatient Daily Activity Raw Score: 13     Preadmission Environment    Pt.  Lives with family (parents), planning to stay with parents  Home environment:    one story home  Steps to enter first floor: 1 steps to enter, HR on both sides  Steps to second floor: N/A  Bathroom: tub/shower unit, walk in shower, grab bars, standard height commode and shower chair  Equipment owned:      Preadmission Status:  Pt. Able to drive: No  Pt Fully independent with ADLs: Yes  Pt. Required assistance from family for: Independent PTA  Pt. independent for transfers and gait and walked with No Device  History of falls No     Pain   No     Cognition    A&O Person, Place and Situation   Able to follow 2 step commands    Subjective  Patient lying supine in bed with no family present   Pt agreeable to this OT eval & tx. Upper Extremity ROM:    WFL,  pt able to perform all bed mobility, transfers, and gait without ROM limitation. Upper Extremity Strength:    BUE strength impaired but not formally assessed w/ MMT    Upper Extremity Sensation    Impaired    Upper Extremity Proprioception:  Impaired    Coordination and Tone  Impaired (patient with whole body tremors at rest and with activity. Patient reports feeling anxious)     Balance  Functional Sitting Balance:  Diminished (SBA)  Functional Standing Balance:Impaired (CGA)    Bed mobility:    Supine to sit:   SBA  Sit to supine:   Not Tested  Rolling:    Not Tested  Scooting in sitting:  SBA  Scooting to head of bed:   Not Tested    Bridging:   Not Tested     Transfers:    Sit to stand:  CGA  Stand to sit:  CGA  Bed to chair:   CGA and with use of RW  Standard toilet: Not Tested  Bed to [facility]:  Not Tested    Dressing:      UE:   Not Tested  LE:    Not Tested    Bathing:    UE:  Not Tested  LE:  Not Tested    Eating:   setup    Toileting:  Not Tested    Activity Tolerance   Pt completed therapy session with anxiety noted with all functional activity. At rest   SpO2: 98% on RA  HR: 111 bpm  BP: 157/103     After ambulation:   SpO2: 98% on RA  HR: 147 recovered to baseline with deep breathing exercises to 111 in 6 min  BP: 155/96     RN notified about the treatment response.      Positioning Needs:   Up in chair, call light and needs in reach. Exercise / Activities Initiated:   N/A    Patient/Family Education:   Role of OT  Recommendations for DC  Safe RW use/hand placement    Assessment of Deficits: Pt seen for Occupational therapy evaluation in acute care setting. Pt demonstrated decreased Activity tolerance, ADLs, IADLs, Balance , Bathing, Bed mobility, Dressing, ROM, Safety Awareness, Strength, Transfers, Cognition and Coping Skills. Pt functioning below baseline and will likely benefit from skilled occupational therapy services to maximize safety and independence. Goal(s) : To be met in 3 Visits:  1). Bed to toilet/BSC: SBA    To be met in 5 Visits:  1). Supine to/from Sit:  Supervision  2). Upper Body Bathing:   Supervision  3). Lower Body Bathing:   SBA  4). Upper Body Dressing:  Supervision  5). Lower Body Dressing:  SBA  6). Pt to demonstrate UE exs x 15 reps with minimal cues    Rehabilitation Potential:  Good for goals listed above. Strengths for achieving goals include: Pt motivated, PLOF and Pt cooperative  Barriers to achieving goals include:  Complexity of condition     Plan: To be seen 3-5 x/wk while in acute care setting for therapeutic exercises, bed mobility, transfers, dressing, bathing, family/patient education, ADL/IADL retraining, energy conservation training.        JULES MarcumR/L #[id number]        If patient discharges from this facility prior to next visit, this note will serve as the Discharge Summary

## 2022-01-29 LAB
ANION GAP SERPL CALCULATED.3IONS-SCNC: 11 MMOL/L (ref 3–16)
BUN BLDV-MCNC: 6 MG/DL (ref 7–20)
CALCIUM SERPL-MCNC: 9.3 MG/DL (ref 8.3–10.6)
CHLORIDE BLD-SCNC: 103 MMOL/L (ref 99–110)
CO2: 24 MMOL/L (ref 21–32)
CREAT SERPL-MCNC: <0.5 MG/DL (ref 0.9–1.3)
GFR AFRICAN AMERICAN: >60
GFR NON-AFRICAN AMERICAN: >60
GLUCOSE BLD-MCNC: 153 MG/DL (ref 70–99)
MAGNESIUM: 1.9 MG/DL (ref 1.8–2.4)
POTASSIUM SERPL-SCNC: 3.3 MMOL/L (ref 3.5–5.1)
SODIUM BLD-SCNC: 138 MMOL/L (ref 136–145)

## 2022-01-29 PROCEDURE — 6370000000 HC RX 637 (ALT 250 FOR IP): Performed by: INTERNAL MEDICINE

## 2022-01-29 PROCEDURE — 2580000003 HC RX 258: Performed by: INTERNAL MEDICINE

## 2022-01-29 PROCEDURE — 36415 COLL VENOUS BLD VENIPUNCTURE: CPT

## 2022-01-29 PROCEDURE — 6360000002 HC RX W HCPCS: Performed by: INTERNAL MEDICINE

## 2022-01-29 PROCEDURE — 80048 BASIC METABOLIC PNL TOTAL CA: CPT

## 2022-01-29 PROCEDURE — 99223 1ST HOSP IP/OBS HIGH 75: CPT | Performed by: PSYCHIATRY & NEUROLOGY

## 2022-01-29 PROCEDURE — 1200000000 HC SEMI PRIVATE

## 2022-01-29 PROCEDURE — 83735 ASSAY OF MAGNESIUM: CPT

## 2022-01-29 PROCEDURE — 99232 SBSQ HOSP IP/OBS MODERATE 35: CPT | Performed by: INTERNAL MEDICINE

## 2022-01-29 RX ADMIN — CHLORDIAZEPOXIDE HYDROCHLORIDE 25 MG: 25 CAPSULE ORAL at 05:14

## 2022-01-29 RX ADMIN — LORAZEPAM 1 MG: 1 TABLET ORAL at 08:04

## 2022-01-29 RX ADMIN — SODIUM CHLORIDE, PRESERVATIVE FREE 10 ML: 5 INJECTION INTRAVENOUS at 20:05

## 2022-01-29 RX ADMIN — SODIUM CHLORIDE, PRESERVATIVE FREE 10 ML: 5 INJECTION INTRAVENOUS at 08:06

## 2022-01-29 RX ADMIN — CHLORDIAZEPOXIDE HYDROCHLORIDE 25 MG: 25 CAPSULE ORAL at 00:16

## 2022-01-29 RX ADMIN — LORAZEPAM 2 MG: 1 TABLET ORAL at 03:18

## 2022-01-29 RX ADMIN — LORAZEPAM 3 MG: 1 TABLET ORAL at 06:57

## 2022-01-29 RX ADMIN — LORAZEPAM 2 MG: 2 INJECTION INTRAMUSCULAR; INTRAVENOUS at 00:19

## 2022-01-29 RX ADMIN — CHLORDIAZEPOXIDE HYDROCHLORIDE 25 MG: 25 CAPSULE ORAL at 11:13

## 2022-01-29 RX ADMIN — CHLORDIAZEPOXIDE HYDROCHLORIDE 25 MG: 25 CAPSULE ORAL at 17:52

## 2022-01-29 RX ADMIN — ENOXAPARIN SODIUM 40 MG: 100 INJECTION SUBCUTANEOUS at 08:04

## 2022-01-29 ASSESSMENT — PAIN SCALES - GENERAL: PAINLEVEL_OUTOF10: 0

## 2022-01-29 NOTE — PLAN OF CARE
Problem: Discharge Planning:  Goal: Discharged to appropriate level of care  Description: Discharged to appropriate level of care  1/29/2022 1258 by Vinay Perez RN  Outcome: Ongoing     Problem: Fluid Volume - Deficit:  Goal: Absence of fluid volume deficit signs and symptoms  Description: Absence of fluid volume deficit signs and symptoms  1/29/2022 1258 by Vinay Perez RN  Outcome: Ongoing     Problem: Nutrition Deficit:  Goal: Ability to achieve adequate nutritional intake will improve  Description: Ability to achieve adequate nutritional intake will improve  1/29/2022 1258 by Vinay Perez RN  Outcome: Ongoing     Problem: Sleep Pattern Disturbance:  Goal: Appears well-rested  Description: Appears well-rested  1/29/2022 1258 by Vinay Perez RN  Outcome: Ongoing     Problem: Violence - Risk of, Self/Other-Directed:  Goal: Knowledge of developmental care interventions  Description: Absence of violence  1/29/2022 1258 by Vinay Perez RN  Outcome: Ongoing     Problem: Falls - Risk of:  Goal: Will remain free from falls  Description: Will remain free from falls  1/29/2022 1258 by Vinay Perez RN  Outcome: Ongoing     Problem: Falls - Risk of:  Goal: Absence of physical injury  Description: Absence of physical injury  1/29/2022 1258 by Vinay Perez RN  Outcome: Ongoing     Problem: Non-Violent Restraints  Goal: Removal from restraints as soon as assessed to be safe  1/29/2022 1258 by Vinay Perez RN  Outcome: Ongoing     Problem: Non-Violent Restraints  Goal: No harm/injury to patient while restraints in use  1/29/2022 1258 by Vinay Perez RN  Outcome: Ongoing     Problem: Non-Violent Restraints  Goal: Patient's dignity will be maintained  1/29/2022 1258 by Vinay Perez RN  Outcome: Ongoing

## 2022-01-29 NOTE — PROGRESS NOTES
Physical/Occupational  Therapy  Per discussion with nursing, hold this date. Patient with CIWA of 8, delusional and having hallucinations. Will continue to follow.     Tim Hanson, PT #[id number]  Kandi Wong, [address] [place] [address], OTR/L   [id number]

## 2022-01-29 NOTE — PROGRESS NOTES
Pt started to become confused and agitated. Stated he was going to call his parents and go home. Scored 14 on ciwa scaled. 2mg of ativan given per PRN orders. Pt resting quietly in bed at this time. Will continue to monitor.

## 2022-01-29 NOTE — PROGRESS NOTES
Pt sitting up in chair , got self up and back in bed removing all his telemetry wires and oxygen sensor , pt is caox3 , continue to have ideations of being in the [facility] and being used as an experiment , pt answers questions appropriately

## 2022-01-29 NOTE — PROGRESS NOTES
Shift assessment complete, scheduled meds given. Pt scoring a 3 on te CIWA scale. Pt resting quietly in bed. Call light in reach. Pt denies further needs at this time. Will continue to monitor.

## 2022-01-29 NOTE — PROGRESS NOTES
Pt transported via wheelchair to room 229 without incident Elana SOLORZANO assuming care was notified

## 2022-01-29 NOTE — PLAN OF CARE
Problem: Discharge Planning:  Goal: Discharged to appropriate level of care  Description: Discharged to appropriate level of care  Outcome: Ongoing     Problem: Nutrition Deficit:  Goal: Ability to achieve adequate nutritional intake will improve  Description: Ability to achieve adequate nutritional intake will improve  Outcome: Ongoing     Problem: Fluid Volume - Deficit:  Goal: Absence of fluid volume deficit signs and symptoms  Description: Absence of fluid volume deficit signs and symptoms  Outcome: Ongoing     Problem: Sleep Pattern Disturbance:  Goal: Appears well-rested  Description: Appears well-rested  Outcome: Ongoing     Problem: Violence - Risk of, Self/Other-Directed:  Goal: Knowledge of developmental care interventions  Description: Absence of violence  Outcome: Ongoing     Problem: Falls - Risk of:  Goal: Will remain free from falls  Description: Will remain free from falls  Outcome: Ongoing  Goal: Absence of physical injury  Description: Absence of physical injury  Outcome: Ongoing

## 2022-01-29 NOTE — FLOWSHEET NOTE
01/29/22 0715   Vital Signs   Temp 98 °F (36.7 °C)   Temp Source Oral   Pulse 85   Heart Rate Source Monitor   BP (!) 140/85   BP Location Left upper arm   Patient Position Semi fowlers   Level of Consciousness Alert (0)   Patient Currently in Pain Denies   Pain Assessment   Pain Assessment 0-10   Pain Level 0   Oxygen Therapy   SpO2 96 %   O2 Device None (Room air)   pt laying in bed caox3 , pt requesting \" I want to go to the psych unit\" Dr LOO notified pt reporting he thinks we are  , he is in a star trek movie , and he is shaking/tremors  .  Consult psych per pt request and clinical assessment CIWA score see flowsheet

## 2022-01-29 NOTE — PROGRESS NOTES
Pt believes that he is currently \"in a [place] [employer] movie\". Pt continually repeating the same questions and becoming increasingly confused. Agitated, anxious and restless. 2mg ativan given per CIWA protocol for score of 13.

## 2022-01-30 VITALS
HEART RATE: 82 BPM | DIASTOLIC BLOOD PRESSURE: 62 MMHG | WEIGHT: 153.8 LBS | SYSTOLIC BLOOD PRESSURE: 98 MMHG | OXYGEN SATURATION: 95 % | RESPIRATION RATE: 16 BRPM | TEMPERATURE: 97 F | BODY MASS INDEX: 24.09 KG/M2

## 2022-01-30 PROCEDURE — 99239 HOSP IP/OBS DSCHRG MGMT >30: CPT | Performed by: INTERNAL MEDICINE

## 2022-01-30 PROCEDURE — 6360000002 HC RX W HCPCS: Performed by: INTERNAL MEDICINE

## 2022-01-30 PROCEDURE — 6370000000 HC RX 637 (ALT 250 FOR IP): Performed by: INTERNAL MEDICINE

## 2022-01-30 PROCEDURE — 2580000003 HC RX 258: Performed by: INTERNAL MEDICINE

## 2022-01-30 RX ORDER — ESCITALOPRAM OXALATE 20 MG/1
20 TABLET ORAL DAILY
Qty: 30 TABLET | Refills: 1 | Status: SHIPPED | OUTPATIENT
Start: 2022-01-30

## 2022-01-30 RX ADMIN — ENOXAPARIN SODIUM 40 MG: 100 INJECTION SUBCUTANEOUS at 08:41

## 2022-01-30 RX ADMIN — SODIUM CHLORIDE, PRESERVATIVE FREE 10 ML: 5 INJECTION INTRAVENOUS at 08:41

## 2022-01-30 RX ADMIN — CHLORDIAZEPOXIDE HYDROCHLORIDE 25 MG: 25 CAPSULE ORAL at 00:34

## 2022-01-30 RX ADMIN — CHLORDIAZEPOXIDE HYDROCHLORIDE 25 MG: 25 CAPSULE ORAL at 12:39

## 2022-01-30 RX ADMIN — CHLORDIAZEPOXIDE HYDROCHLORIDE 25 MG: 25 CAPSULE ORAL at 06:12

## 2022-01-30 NOTE — CONSULTS
[address]                 [address] [facility]                                  CONSULTATION    PATIENT NAME: Camron Stoddard                    :        1975  MED REC NO:   [id number]                          ROOM:       [id number]  ACCOUNT NO:   [de-identified]                           ADMIT DATE: 2022  PROVIDER:     Jamey Fregoso MD    CONSULT DATE:  2022    IDENTIFICATION:  This is a domiciled, never , unemployed  58-year-old with a history of severe alcohol use disorder, depression,  and anxiety, who was admitted to Medicine on 2022, for alcohol  detoxification. Psychiatry was consulted given ongoing confusion and  paranoia. SOURCES OF INFORMATION:  The patient. Focused record review. CHIEF COMPLAINT:  \"This is the third time I have done this. I am ready  to go to [facility]. \"    HISTORY OF PRESENT ILLNESS:  The patient was admitted to Medicine on  2022, for alcohol detoxification. He has required higher doses of  Librium and Ativan to treat his withdrawal.  He was eventually put on  Precedex given the level of his agitation. The Precedex was  stopped on the 2022, and he was continued on scheduled Librium and  Ativan as needed. He was recently transferred from the ICU to [address] [place] [address].    Notes show that he has made statements about believing he is in some  sort of  experiment, and has been intermittently confused and paranoid. I met with him today, he showed some insight into his symptoms. He  reported the ICU was a strange place for him. He believed various  things were controlling his body and he was in some sort of  experiment. He no longer believes this. He acknowledgd  feeling a little disoriented today and wobbly on his feet. Despite this, he  reported feeling safe here and is interested in treatment. He did not endorse or voice thoughts of wanting to harm himself or  others.   He was oriented to the [facility], the date, and  circumstances of his admission, but believed he had been here for about  12 days (instead of 5). He was mildly tremulous. He had some  nystagmus. STRESSORS:  Severe alcohol use. Unemployed. Limited social support. PSYCHIATRIC REVIEW OF SYSTEMS:  No symptoms of joann. Mood lability. PAST PSYCHIATRIC HISTORY:  Reported some outpatient treatment in early  80s \"for typical teenager's stuff. \"  He was hospitalized in 2017, after  significant alcohol detoxification, [facility]. (It sounds more like delirium tremens to me than psychotic  Disorder.)  Medication trials include Lexapro. He said he has taken it  for anxiety and depression. He believes he may have been prescribed  Other antidepressants, but cannot remember which. SUBSTANCE USE HISTORY:  Alcohol use started at 17. His peak use was in  his mid 25s. More recently he has been drinking 17-18 beers a day. He  receives treatment [facility]. He has never been through residential  treatment program.  No other substances. He does have a history of  severe alcohol withdrawal.    PAST MEDICAL HISTORY:  No chronic conditions, traumatic brain injuries,  seizures, or major surgeries. FAMILY PSYCHIATRIC HISTORY:  Anxiety and depression run in the family. No suicides. CURRENT MEDICATIONS:  He is prescribed Lexapro, but stopped taking it  several months ago when he started drinking again. ALLERGIES:  No known drug allergies. SOCIAL HISTORY:  Born in [place]. One sibling. Parents . He  says he had \"typical\" upbringing. He graduated high school and had some  college. He has never been . He has no kids. He lives in [place] by himself. He is unemployed. LEGAL HISTORY:  DUI in 2009. REVIEW OF SYSTEMS:  He is not vaccinated for COVID.     MENTAL STATUS EXAM:  The patient was in bed in personal clothes. He was  pleasant, spoke freely. He made fair eye contact. He described his  mood as \"okay\" and had a labile affect. At times he was tearful. He  was mildly tremulous and had some nystagmus. He spoke softly. He was not pressured. He was oriented to the date,  place, and context of this evaluation. His memory was grossly intact. Chart notes indicate he has had waxing and waning orientation over the  last few days or so. His use of language, speech, and educational attainment suggested an  average level of intellectual functioning. His thought processes were goal directed. He did not describe or  endorse delusions, suicidal or homicidal ideation. Chart notes indicate  a number of delirium-related symptoms including paranoia,  misperceptions, illusions, and hallucinations. Despite this, he  reported feeling safe here. His ability for abstract thought was fair based on his interpretation of  simple proverbs. His insight and judgment were fair. PHYSICAL EXAMINATION:  VITAL SIGNS:  Temperature 97.3, pulse 98, respiratory 18, blood pressure  122/73. Chart notes indicate vital instability over the last few days. LABORATORY DATA:  Reviewed. FORMULATION:  This is a domiciled, never , unemployed 43-year-old  with a history of severe alcohol use disorder and withdrawal, and a  self-reported history of anxiety and depression who was admitted for  alcohol detoxification. Psychiatry was consulted given ongoing confusion  and psychotic-like symptoms. In my opinion, this is the tail-end of delirium  tremens. His presentation and history are consistent. This is in the setting of  being on Precedex as well, which was discontinued on the 01/27/2022. PSYCHIATRIC DIAGNOSES:  1. Alcohol withdrawal with delirium tremens. 2.  Severe alcohol use disorder in a controlled environment. 3.  History of anxiety and depression. PLAN:  1.   Continue to treat alcohol withdrawal as you are with  scheduled Librium and p.r.n. Ativan. 2.  Resume Lexapro at discharge. 3.  He would benefit from a residential treatment program for alcohol use disorder. Although he is ambivalent about a residential program, he is  interested and willing to do outpatient treatment through [facility]. He says he has an  appointment point with them Monday morning. 4.  He does not require inpatient psychiatric stabilization. 5.  Agree with continuing perlita-sitter given recent symptoms of  delirium including paranoia and disorientation. Thank you very much for the consult. Please contact the on-call  provider if you have questions. A total of 110 minutes were spent with the patient completing this  evaluation and more than 50% of the time was spent completing this  evaluation, providing counseling, and planning treatment with the  patient.       Perri Drew, MD    D: 01/29/2022 16:56:26       T: 01/29/2022 17:03:43     [email or web address]  Job#: [id number]     Doc#: [id number]    CC:

## 2022-01-30 NOTE — PLAN OF CARE
Problem: Discharge Planning:  Goal: Discharged to appropriate level of care  Description: Discharged to appropriate level of care  1/30/2022 1020 by vEelia Miguel RN  Outcome: Completed     Problem: Fluid Volume - Deficit:  Goal: Absence of fluid volume deficit signs and symptoms  Description: Absence of fluid volume deficit signs and symptoms  1/30/2022 1020 by Evelia Miguel RN  Outcome: Completed     Problem: Nutrition Deficit:  Goal: Ability to achieve adequate nutritional intake will improve  Description: Ability to achieve adequate nutritional intake will improve  1/30/2022 1020 by Evelia Miguel RN  Outcome: Completed     Problem: Sleep Pattern Disturbance:  Goal: Appears well-rested  Description: Appears well-rested  1/30/2022 1020 by Evelia Miguel RN  Outcome: Completed     Problem: Violence - Risk of, Self/Other-Directed:  Goal: Knowledge of developmental care interventions  Description: Absence of violence  1/30/2022 1020 by Evelia Miguel RN  Outcome: Completed     Problem: Falls - Risk of:  Goal: Will remain free from falls  Description: Will remain free from falls  1/30/2022 1020 by Evelia Miguel RN  Outcome: Completed     Problem: Falls - Risk of:  Goal: Absence of physical injury  Description: Absence of physical injury  1/30/2022 1020 by Evelia Miguel RN  Outcome: Completed     Problem: Non-Violent Restraints  Goal: Removal from restraints as soon as assessed to be safe  1/30/2022 1020 by Evelia Miguel RN  Outcome: Completed     Problem: Non-Violent Restraints  Goal: No harm/injury to patient while restraints in use  1/30/2022 1020 by Evelia Miguel RN  Outcome: Completed     Problem: Non-Violent Restraints  Goal: Patient's dignity will be maintained  1/30/2022 1020 by Evelia Miguel RN  Outcome: Completed

## 2022-01-30 NOTE — DISCHARGE SUMMARY
Hospital Medicine Discharge Summary    Patient ID: Muna Hendricks      Patient's PCP: Graciela Tiwari DO    Admit Date: 1/24/2022     Discharge Date:   1/30/2022    Admitting Provider: Harshil Jack MD     Discharge Provider: Pollo Velazquez MD     Discharge Diagnoses: Active Hospital Problems    Diagnosis     Alcohol use disorder, severe, in controlled environment ([facility] [address] [F10.20]     Alcohol dependence with withdrawal ([facility] [address] [F10.239]     Hypertension, essential [I10]        The patient was seen and examined on day of discharge and this discharge summary is in conjunction with any daily progress note from day of discharge. Hospital Course: 54yo man 20+ year Hx of alcohol abuse, presented requesting alcohol detox. Alcohol dependence  Alcohol withdrawal  - admitted to med-surg.   - fall/seizure precautions  - folic acid, thiamine, MV  - CIWA protocol- PRN Ativan  - IVF's  - added scheduled Librium  Patient with increased agitation   Started on Precedex drip. Precedex DC'd 1/27  Continue scheduled Librium and as needed Ativan  Ss/p psych eval.    - no indication to IP psych hospitalization. - will resume lexapro on discharge. - [facility] close follow up for alcohol rehab.        Elevated LFTs  - likely related to alcohol use.  Monitor LFTs       Leukopenia   - likely related to alcohol use.  Monitor CBC       Hypertension - BP stable now.    - BP elevated.  Likely related to alcohol withdrawal  - does not take any home medications.    - PRN Hydralazine, Clonidine added, currently transdermal       Depression, anxiety  - was on Lexapro.  Does not take when he drinks alcohol.   - resume lexapro as per psych recs.        Tobacco use  - recommend cessation.   - Nicotine patch.             Physical Exam Performed:     BP 98/62   Pulse 82   Temp 97 °F (36.1 °C) (Oral)   Resp 16   Wt 153 lb 12.8 oz (69.8 kg)   SpO2 95%   BMI 24.09 kg/m²       General appearance:  No apparent distress, appears stated age and cooperative. HEENT:  Normal cephalic, atraumatic without obvious deformity. Pupils equal, round, and reactive to light. Extra ocular muscles intact. Conjunctivae/corneas clear. Neck: Supple, with full range of motion. No jugular venous distention. Trachea midline. Respiratory:  Normal respiratory effort. Clear to auscultation, bilaterally without Rales/Wheezes/Rhonchi. Cardiovascular:  Regular rate and rhythm with normal S1/S2 without murmurs, rubs or gallops. Abdomen: Soft, non-tender, non-distended with normal bowel sounds. Musculoskeletal:  No clubbing, cyanosis or edema bilaterally. Full range of motion without deformity. Skin: Skin color, texture, turgor normal.  No rashes or lesions. Neurologic:  Neurovascularly intact without any focal sensory/motor deficits. Cranial nerves: II-XII intact, grossly non-focal.  Psychiatric:  Alert and oriented, thought content appropriate, normal insight  Capillary Refill: Brisk,< 3 seconds   Peripheral Pulses: +2 palpable, equal bilaterally       Labs: For convenience and continuity at follow-up the following most recent labs are provided:      CBC:    Lab Results   Component Value Date    WBC 4.3 01/28/2022    HGB 14.1 01/28/2022    HCT 42.0 01/28/2022     01/28/2022       Renal:    Lab Results   Component Value Date     01/29/2022    K 3.3 01/29/2022    K 3.9 01/27/2022     01/29/2022    CO2 24 01/29/2022    BUN 6 01/29/2022    CREATININE <0.5 01/29/2022    CALCIUM 9.3 01/29/2022    PHOS 2.5 01/28/2022         Significant Diagnostic Studies    Radiology:   No orders to display          Consults:     IP CONSULT TO SOCIAL WORK  IP CONSULT TO HOSPITALIST  IP CONSULT TO SOCIAL WORK  IP CONSULT TO PULMONOLOGY  IP CONSULT TO PSYCHIATRY    Disposition:  home     Condition at Discharge: Stable    Discharge Instructions/Follow-up:  [facility] Monday.      Code Status:  Full Code     Activity: activity as tolerated    Diet: regular diet      Discharge Medications:     Current Discharge Medication List           Details   escitalopram (LEXAPRO) 20 MG tablet Take 1 tablet by mouth daily  Qty: 30 tablet, Refills: 1              Details   lodoxamide (ALOMIDE) 0.1 % ophthalmic solution 1 drop 4 times daily      Multiple Vitamins-Minerals (THERAPEUTIC MULTIVITAMIN-MINERALS) tablet Take 1 tablet by mouth daily             Time Spent on discharge is more than 30 minutes in the examination, evaluation, counseling and review of medications and discharge plan. Signed:    Jones Mcardle, MD   1/30/2022      Thank you Ary Rubio DO for the opportunity to be involved in this patient's care. If you have any questions or concerns please feel free to contact me at [phone].

## 2022-01-30 NOTE — CARE COORDINATION
DISCHARGE ORDER  Date/Time 2022 10:58 AM  Completed by: Opal Fong RN, Case Management    Patient Name: Gonzalo Kerr      : 1975  Admitting Diagnosis: Alcohol dependence with withdrawal ([facility] [address] [F10.239]  Alcohol dependence with uncomplicated withdrawal ([facility] [address] [F10.230]      Admit order Date and Status:inpt  (verify MD's last order for status of admission)      Noted discharge order. If applicable PT/OT recommendation at Discharge: tbd  DME recommendation by PT/OT:tbd  Confirmed discharge plan  (pt): Yes      Discharge Plan: Reviewed chart. Writer spoke with pt's mom,Mary, and she will be here to transport home today. Pt has resource folder and plans to f/u with [facility] as out-pt. Reviewed chart. Role of discharge planner explained and patient verbalized understanding. Discharge order is noted. Has Home O2 in place on admit:  No  Informed of need to bring portable home O2 tank on day of discharge for nursing to connect prior to leaving:   Not Indicated  Verbalized agreement/Understanding:   Not Indicated  Pt is being d/c'd to home today. Pt's O2 sats are 95% on RA. Discharge timeout done with JEANINE Huitron. All discharge needs and concerns addressed.

## 2022-01-30 NOTE — PLAN OF CARE
Problem: Discharge Planning:  Goal: Discharged to appropriate level of care  Description: Discharged to appropriate level of care  1/29/2022 2232 by Mane Lopez RN  Outcome: Ongoing  1/29/2022 1258 by Praveena Wood RN  Outcome: Ongoing     Problem: Fluid Volume - Deficit:  Goal: Absence of fluid volume deficit signs and symptoms  Description: Absence of fluid volume deficit signs and symptoms  1/29/2022 2232 by Mane Lopez RN  Outcome: Ongoing  1/29/2022 1258 by Praveena Wood RN  Outcome: Ongoing     Problem: Nutrition Deficit:  Goal: Ability to achieve adequate nutritional intake will improve  Description: Ability to achieve adequate nutritional intake will improve  1/29/2022 2232 by Mane Lopez RN  Outcome: Ongoing  1/29/2022 1258 by Praveena Wood RN  Outcome: Ongoing     Problem: Sleep Pattern Disturbance:  Goal: Appears well-rested  Description: Appears well-rested  1/29/2022 2232 by Mane Lopez RN  Outcome: Ongoing  1/29/2022 1258 by Praveena Wood RN  Outcome: Ongoing     Problem: Violence - Risk of, Self/Other-Directed:  Goal: Knowledge of developmental care interventions  Description: Absence of violence  1/29/2022 2232 by Mane Lopez RN  Outcome: Ongoing  1/29/2022 1258 by Praveena Wood RN  Outcome: Ongoing     Problem: Falls - Risk of:  Goal: Will remain free from falls  Description: Will remain free from falls  1/29/2022 2232 by Mane Lopez RN  Outcome: Ongoing  1/29/2022 1258 by Praveena Wood RN  Outcome: Ongoing  Goal: Absence of physical injury  Description: Absence of physical injury  1/29/2022 2232 by Mane Lopez RN  Outcome: Ongoing  1/29/2022 1258 by Praveena Wood RN  Outcome: Ongoing     Problem: Non-Violent Restraints  Goal: Removal from restraints as soon as assessed to be safe  1/29/2022 2232 by Mane Lopez RN  Outcome: Ongoing  1/29/2022 1258 by Praveena Wood RN  Outcome: Ongoing  Goal: No harm/injury to patient while restraints in use  1/29/2022 2232 by [facility] Grayson Fofana RN  Outcome: Ongoing  1/29/2022 1258 by Kyung Ta RN  Outcome: Ongoing  Goal: Patient's dignity will be maintained  1/29/2022 2232 by Tatiana Liz RN  Outcome: Ongoing  1/29/2022 1258 by Kyung Ta RN  Outcome: Ongoing

## 2025-02-26 ENCOUNTER — APPOINTMENT (OUTPATIENT)
Dept: CT IMAGING | Age: 50
End: 2025-02-26
Payer: COMMERCIAL

## 2025-02-26 ENCOUNTER — HOSPITAL ENCOUNTER (EMERGENCY)
Age: 50
Discharge: HOME | End: 2025-02-27
Attending: EMERGENCY MEDICINE
Payer: COMMERCIAL

## 2025-02-26 DIAGNOSIS — T14.8XXA HEMATOMA: ICD-10-CM

## 2025-02-26 DIAGNOSIS — G93.0 SUBARACHNOID CYST: ICD-10-CM
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
## 2025-02-26 DIAGNOSIS — S09.90XA CLOSED HEAD INJURY, INITIAL ENCOUNTER: ICD-10-CM

## 2025-02-26 DIAGNOSIS — F10.929 ACUTE ALCOHOLIC INTOXICATION WITH COMPLICATION: Primary | ICD-10-CM

## 2025-02-26 DIAGNOSIS — F10.930 ALCOHOL WITHDRAWAL SYNDROME WITHOUT COMPLICATION (HCC): ICD-10-CM

## 2025-02-26 LAB
ALBUMIN SERPL-MCNC: 4.4 G/DL (ref 3.4–5)
ALBUMIN/GLOB SERPL: 1.7 {RATIO} (ref 1.1–2.2)
ALP SERPL-CCNC: 70 U/L (ref 40–129)
ALT SERPL-CCNC: 24 U/L (ref 10–40)
AMPHETAMINES UR QL SCN>1000 NG/ML: NORMAL
ANION GAP SERPL CALCULATED.3IONS-SCNC: 17 MMOL/L (ref 3–16)
APAP SERPL-MCNC: <5 UG/ML (ref 10–30)
AST SERPL-CCNC: 40 U/L (ref 15–37)
BARBITURATES UR QL SCN>200 NG/ML: NORMAL
BASOPHILS # BLD: 0 K/UL (ref 0–0.2)
BASOPHILS NFR BLD: 0.5 %
BENZODIAZ UR QL SCN>200 NG/ML: NORMAL
BILIRUB SERPL-MCNC: 0.4 MG/DL (ref 0–1)
BILIRUB UR QL STRIP.AUTO: NEGATIVE
BUN SERPL-MCNC: 7 MG/DL (ref 7–20)
CALCIUM SERPL-MCNC: 8.7 MG/DL (ref 8.3–10.6)
CANNABINOIDS UR QL SCN>50 NG/ML: NORMAL
CHLORIDE SERPL-SCNC: 94 MMOL/L (ref 99–110)
CLARITY UR: CLEAR
CO2 SERPL-SCNC: 27 MMOL/L (ref 21–32)
COCAINE UR QL SCN: NORMAL
COLOR UR: ABNORMAL
CREAT SERPL-MCNC: 0.7 MG/DL (ref 0.9–1.3)
DEPRECATED RDW RBC AUTO: 13.7 % (ref 12.4–15.4)
DRUG SCREEN COMMENT UR-IMP: NORMAL
EOSINOPHIL # BLD: 0 K/UL (ref 0–0.6)
EOSINOPHIL NFR BLD: 0.1 %
ETHANOLAMINE SERPL-MCNC: 299 MG/DL (ref 0–0.08)
FENTANYL SCREEN, URINE: NORMAL
GFR SERPLBLD CREATININE-BSD FMLA CKD-EPI: >90 ML/MIN/{1.73_M2}
GLUCOSE SERPL-MCNC: 103 MG/DL (ref 70–99)
GLUCOSE UR STRIP.AUTO-MCNC: 250 MG/DL
HCT VFR BLD AUTO: 43.6 % (ref 40.5–52.5)
HGB BLD-MCNC: 14.9 G/DL (ref 13.5–17.5)
HGB UR QL STRIP.AUTO: NEGATIVE
INR PPP: 0.83 (ref 0.85–1.15)
KETONES UR STRIP.AUTO-MCNC: NEGATIVE MG/DL
LEUKOCYTE ESTERASE UR QL STRIP.AUTO: NEGATIVE
LYMPHOCYTES # BLD: 0.6 K/UL (ref 1–5.1)
LYMPHOCYTES NFR BLD: 15.3 %
MCH RBC QN AUTO: 29.7 PG (ref 26–34)
MCHC RBC AUTO-ENTMCNC: 34.3 G/DL (ref 31–36)
MCV RBC AUTO: 86.6 FL (ref 80–100)
METHADONE UR QL SCN>300 NG/ML: NORMAL
MONOCYTES # BLD: 0.3 K/UL (ref 0–1.3)
MONOCYTES NFR BLD: 6.3 %
NEUTROPHILS # BLD: 3.2 K/UL (ref 1.7–7.7)
NEUTROPHILS NFR BLD: 77.8 %
NITRITE UR QL STRIP.AUTO: NEGATIVE
OPIATES UR QL SCN>300 NG/ML: NORMAL
OXYCODONE UR QL SCN: NORMAL
PCP UR QL SCN>25 NG/ML: NORMAL
PH UR STRIP.AUTO: 6 [PH] (ref 5–8)
PH UR STRIP: 6 [PH]
PLATELET # BLD AUTO: 170 K/UL (ref 135–450)
PMV BLD AUTO: 7.1 FL (ref 5–10.5)
POTASSIUM SERPL-SCNC: 4.3 MMOL/L (ref 3.5–5.1)
PROT SERPL-MCNC: 7 G/DL (ref 6.4–8.2)
PROT UR STRIP.AUTO-MCNC: NEGATIVE MG/DL
PROTHROMBIN TIME: 11.7 SEC (ref 11.9–14.9)
RBC # BLD AUTO: 5.03 M/UL (ref 4.2–5.9)
REASON FOR REJECTION: NORMAL
REJECTED TEST: NORMAL
SALICYLATES SERPL-MCNC: <0.5 MG/DL (ref 15–30)
SODIUM SERPL-SCNC: 138 MMOL/L (ref 136–145)
SP GR UR STRIP.AUTO: <=1.005 (ref 1–1.03)
UA COMPLETE W REFLEX CULTURE PNL UR: ABNORMAL
UA DIPSTICK W REFLEX MICRO PNL UR: ABNORMAL
URN SPEC COLLECT METH UR: ABNORMAL
UROBILINOGEN UR STRIP-ACNC: 0.2 E.U./DL
WBC # BLD AUTO: 4.1 K/UL (ref 4–11)

## 2025-02-26 PROCEDURE — 81003 URINALYSIS AUTO W/O SCOPE: CPT

## 2025-02-26 PROCEDURE — 6370000000 HC RX 637 (ALT 250 FOR IP): Performed by: NURSE PRACTITIONER

## 2025-02-26 PROCEDURE — 99284 EMERGENCY DEPT VISIT MOD MDM: CPT

## 2025-02-26 PROCEDURE — 90715 TDAP VACCINE 7 YRS/> IM: CPT | Performed by: NURSE PRACTITIONER

## 2025-02-26 PROCEDURE — 85025 COMPLETE CBC W/AUTO DIFF WBC: CPT

## 2025-02-26 PROCEDURE — 82077 ASSAY SPEC XCP UR&BREATH IA: CPT

## 2025-02-26 PROCEDURE — 85610 PROTHROMBIN TIME: CPT

## 2025-02-26 PROCEDURE — 80143 DRUG ASSAY ACETAMINOPHEN: CPT

## 2025-02-26 PROCEDURE — 72125 CT NECK SPINE W/O DYE: CPT

## 2025-02-26 PROCEDURE — 80053 COMPREHEN METABOLIC PANEL: CPT

## 2025-02-26 PROCEDURE — 80179 DRUG ASSAY SALICYLATE: CPT

## 2025-02-26 PROCEDURE — 6360000002 HC RX W HCPCS: Performed by: NURSE PRACTITIONER

## 2025-02-26 PROCEDURE — 70450 CT HEAD/BRAIN W/O DYE: CPT

## 2025-02-26 PROCEDURE — 90471 IMMUNIZATION ADMIN: CPT | Performed by: NURSE PRACTITIONER

## 2025-02-26 PROCEDURE — 80307 DRUG TEST PRSMV CHEM ANLYZR: CPT

## 2025-02-26 RX ORDER — LORAZEPAM 2 MG/ML
1 INJECTION INTRAMUSCULAR
Status: DISCONTINUED | OUTPATIENT
Start: 2025-02-26 | End: 2025-02-27 | Stop reason: HOSPADM

## 2025-02-26 RX ORDER — LORAZEPAM 2 MG/ML
2 INJECTION INTRAMUSCULAR
Status: DISCONTINUED | OUTPATIENT
Start: 2025-02-26 | End: 2025-02-27 | Stop reason: HOSPADM

## 2025-02-26 RX ORDER — LANOLIN ALCOHOL/MO/W.PET/CERES
100 CREAM (GRAM) TOPICAL DAILY
Status: DISCONTINUED | OUTPATIENT
Start: 2025-02-26 | End: 2025-02-27 | Stop reason: HOSPADM

## 2025-02-26 RX ORDER — MULTIVITAMIN WITH IRON
1 TABLET ORAL DAILY
Status: DISCONTINUED | OUTPATIENT
Start: 2025-02-26 | End: 2025-02-27 | Stop reason: HOSPADM

## 2025-02-26 RX ORDER — SODIUM CHLORIDE 0.9 % (FLUSH) 0.9 %
5-40 SYRINGE (ML) INJECTION EVERY 12 HOURS SCHEDULED
Status: DISCONTINUED | OUTPATIENT
Start: 2025-02-26 | End: 2025-02-27 | Stop reason: HOSPADM

## 2025-02-26 RX ORDER — FOLIC ACID 1 MG/1
1 TABLET ORAL DAILY
Status: DISCONTINUED | OUTPATIENT
Start: 2025-02-26 | End: 2025-02-27 | Stop reason: HOSPADM

## 2025-02-26 RX ORDER — SODIUM CHLORIDE 0.9 % (FLUSH) 0.9 %
5-40 SYRINGE (ML) INJECTION PRN
Status: DISCONTINUED | OUTPATIENT
Start: 2025-02-26 | End: 2025-02-27 | Stop reason: HOSPADM

## 2025-02-26 RX ORDER — LORAZEPAM 2 MG/1
4 TABLET ORAL
Status: DISCONTINUED | OUTPATIENT
Start: 2025-02-26 | End: 2025-02-27 | Stop reason: HOSPADM

## 2025-02-26 RX ORDER — CHLORDIAZEPOXIDE HYDROCHLORIDE 25 MG/1
50 CAPSULE, GELATIN COATED ORAL ONCE
Status: COMPLETED | OUTPATIENT
Start: 2025-02-26 | End: 2025-02-26

## 2025-02-26 RX ORDER — LANOLIN ALCOHOL/MO/W.PET/CERES
100 CREAM (GRAM) TOPICAL DAILY
Status: DISCONTINUED | OUTPATIENT
Start: 2025-02-27 | End: 2025-02-27 | Stop reason: HOSPADM

## 2025-02-26 RX ORDER — LORAZEPAM 2 MG/1
2 TABLET ORAL
Status: DISCONTINUED | OUTPATIENT
Start: 2025-02-26 | End: 2025-02-27 | Stop reason: HOSPADM

## 2025-02-26 RX ORDER — LORAZEPAM 1 MG/1
1 TABLET ORAL
Status: DISCONTINUED | OUTPATIENT
Start: 2025-02-26 | End: 2025-02-27 | Stop reason: HOSPADM

## 2025-02-26 RX ORDER — SODIUM CHLORIDE 9 MG/ML
INJECTION, SOLUTION INTRAVENOUS PRN
Status: DISCONTINUED | OUTPATIENT
Start: 2025-02-26 | End: 2025-02-27 | Stop reason: HOSPADM

## 2025-02-26 RX ORDER — LORAZEPAM 2 MG/ML
3 INJECTION INTRAMUSCULAR
Status: DISCONTINUED | OUTPATIENT
Start: 2025-02-26 | End: 2025-02-27 | Stop reason: HOSPADM

## 2025-02-26 RX ORDER — LORAZEPAM 2 MG/ML
4 INJECTION INTRAMUSCULAR
Status: DISCONTINUED | OUTPATIENT
Start: 2025-02-26 | End: 2025-02-27 | Stop reason: HOSPADM

## 2025-02-26 RX ADMIN — THERA TABS 1 TABLET: TAB at 16:12

## 2025-02-26 RX ADMIN — FOLIC ACID 1 MG: 1 TABLET ORAL at 16:12

## 2025-02-26 RX ADMIN — CHLORDIAZEPOXIDE HYDROCHLORIDE 50 MG: 25 CAPSULE ORAL at 20:32

## 2025-02-26 RX ADMIN — Medication 100 MG: at 16:12

## 2025-02-26 RX ADMIN — TETANUS TOXOID, REDUCED DIPHTHERIA TOXOID AND ACELLULAR PERTUSSIS VACCINE, ADSORBED 0.5 ML: 5; 2.5; 8; 8; 2.5 SUSPENSION INTRAMUSCULAR at 20:34

## 2025-02-26 ASSESSMENT — ENCOUNTER SYMPTOMS
SHORTNESS OF BREATH: 0
FACIAL SWELLING: 0
SORE THROAT: 0
COLOR CHANGE: 0
ABDOMINAL PAIN: 0
RHINORRHEA: 0

## 2025-02-26 ASSESSMENT — PAIN - FUNCTIONAL ASSESSMENT
PAIN_FUNCTIONAL_ASSESSMENT: NONE - DENIES PAIN
PAIN_FUNCTIONAL_ASSESSMENT: 0-10

## 2025-02-26 ASSESSMENT — PAIN DESCRIPTION - LOCATION: LOCATION: HEAD

## 2025-02-26 ASSESSMENT — PAIN DESCRIPTION - PAIN TYPE: TYPE: ACUTE PAIN

## 2025-02-26 ASSESSMENT — PAIN DESCRIPTION - ORIENTATION: ORIENTATION: POSTERIOR

## 2025-02-26 ASSESSMENT — PAIN DESCRIPTION - DESCRIPTORS: DESCRIPTORS: ACHING

## 2025-02-26 ASSESSMENT — PAIN SCALES - GENERAL: PAINLEVEL_OUTOF10: 5

## 2025-02-26 NOTE — ED NOTES
1538 - Called ED Case Management, Aditya, and notified her of the need for Social Work consult for consideration of rehab.

## 2025-02-26 NOTE — ED PROVIDER NOTES
[facility]    Interpersonal Safety    Received from [facility] and [facility]    Housing/Utilities         SCREENINGS    [place] Coma Scale  Eye Opening: Spontaneous  Best Verbal Response: Oriented  Best Motor Response: Obeys commands  [place] Coma Scale Score: 15      CIWA Assessment  BP: (!) 128/90  Pulse: (!) 104  Nausea and Vomiting: mild nausea with no vomiting  Tactile Disturbances: none  Tremor: not visible, but can be felt fingertip to fingertip  Auditory Disturbances: not present  Paroxysmal Sweats: barely perceptible sweating, palms moist  Visual Disturbances: not present  Anxiety: no anxiety, at ease  Headache, Fullness in Head: none present  Agitation: normal activity  Orientation and Clouding of Sensorium: oriented and can do serial additions  CIWA-Ar Total: 3          PHYSICAL EXAM     ED Triage Vitals [02/26/25 1505]   BP Systolic BP Percentile Diastolic BP Percentile Temp Temp Source Pulse Respirations SpO2   (!) 153/90 -- -- 98.1 °F (36.7 °C) Oral (!) 115 18 98 %      Height Weight - Scale         1.702 m (5' 7\") 72.6 kg (160 lb)             Physical Exam  Constitutional:       Appearance: Normal appearance. He is well-developed.   HENT:      Head: Normocephalic and atraumatic.   Cardiovascular:      Rate and Rhythm: Tachycardia present.   Pulmonary:      Effort: Pulmonary effort is normal. No respiratory distress.   Abdominal:      General: There is no distension.      Palpations: Abdomen is soft.   Musculoskeletal:         General: Normal range of motion.      Cervical back: Normal range of motion.   Skin:     General: Skin is warm and dry.   Neurological:      Mental Status: He is alert and oriented to person, place, and time.   Psychiatric:         Thought Content: Thought content does not include homicidal or suicidal ideation. Thought content does not include homicidal or suicidal plan.         DIAGNOSTIC RESULTS   LABS:    Labs Reviewed   CBC WITH AUTO  bedtime for 1 day, THEN 2 capsules in the morning and at bedtime for 1 day, THEN 2 capsules daily for 1 day, THEN 1 capsule daily for 1 day. Max Daily Amount: 30 mg., Disp-13 capsule, R-0Normal             DISCONTINUED MEDICATIONS:  Discharge Medication List as of 2/27/2025  7:20 AM                 (Please note that portions of this note were completed with a voice recognition program.  Efforts were made to edit the dictations but occasionally words are mis-transcribed.)    Patient was seen during the time of the COVID pandemic.  N95 and appropriate PPE was worn during the visit.      CARLA Velazquez - CNP (electronically signed)        Gladis Gleason APRN - CNP  02/26/25 9339       Gladis Gleason APRN - CNP  03/04/25 0906

## 2025-02-27 VITALS
HEIGHT: 67 IN | OXYGEN SATURATION: 96 % | RESPIRATION RATE: 16 BRPM | BODY MASS INDEX: 25.11 KG/M2 | DIASTOLIC BLOOD PRESSURE: 97 MMHG | SYSTOLIC BLOOD PRESSURE: 159 MMHG | WEIGHT: 160 LBS | TEMPERATURE: 98.1 F | HEART RATE: 91 BPM

## 2025-02-27 LAB — ETHANOLAMINE SERPL-MCNC: 35 MG/DL (ref 0–0.08)

## 2025-02-27 PROCEDURE — 6370000000 HC RX 637 (ALT 250 FOR IP): Performed by: STUDENT IN AN ORGANIZED HEALTH CARE EDUCATION/TRAINING PROGRAM

## 2025-02-27 PROCEDURE — 36415 COLL VENOUS BLD VENIPUNCTURE: CPT

## 2025-02-27 PROCEDURE — 82077 ASSAY SPEC XCP UR&BREATH IA: CPT

## 2025-02-27 RX ORDER — CHLORDIAZEPOXIDE HYDROCHLORIDE 25 MG/1
25 CAPSULE, GELATIN COATED ORAL ONCE
Status: COMPLETED | OUTPATIENT
Start: 2025-02-27 | End: 2025-02-27

## 2025-02-27 RX ORDER — CHLORDIAZEPOXIDE HYDROCHLORIDE 5 MG/1
CAPSULE, GELATIN COATED ORAL
Qty: 13 CAPSULE | Refills: 0 | Status: SHIPPED | OUTPATIENT
Start: 2025-02-27 | End: 2025-03-03

## 2025-02-27 RX ADMIN — CHLORDIAZEPOXIDE HYDROCHLORIDE 25 MG: 25 CAPSULE ORAL at 07:24

## 2025-02-27 NOTE — DISCHARGE INSTRUCTIONS
You were evaluated in the emergency department for alcohol intoxication and concern for withdrawal. Assessments and testing completed during your visit were reassuring and at this time there is no indication for further testing, treatment or admission to the hospital. Given this it is appropriate to discharge you from the emergency department. At the time of discharge we discussed the following:    Please use the Librium as prescribed and I expect this to safely facilitate your alcohol withdrawal and abstinence from alcohol.  If you are having any difficulties with your condition despite this therapy you may return anytime to the emergency department for evaluation.  Please do not use alcohol while taking this medication.  Please discuss further with your primary doctor in the next few days your condition and further recommendations.    Please note that sometimes it is difficult to diagnose a medical condition early in the disease process before the disease is fully manifest. Because of this, should you develop any new or worsening symptoms, you may return at any time to the emergency department for another evaluation. If available you are also recommended to review this visit with your primary care physician or other medical provider in the next 7 days. Thank you for allowing us to care for you today.

## 2025-02-27 NOTE — ED PROVIDER NOTES
Emergency Department Attending Provider Note  Location: [facility] EMERGENCY DEPARTMENT  2/26/2025     Patient Identification  Evangelist Ortega is a 49 y.o. male evaluated in the Emergency Department for Alcohol Problem (Daily drinker.  States she wants help with detox.  No hx of seizures.)      HPI: as noted above    Physical Exam:   Stable vital signs, mild sinus tachycardia, clear breath sounds, normal speech, normal strength in 4 extremities, mild tremor      Patient seen and evaluated.  Relevant records reviewed.  Patient presents with symptoms noted above.   Labs are largely reassuring.  Initial alcohol 299.  Patient was ambulating in the ER and he fell in the bathroom, and struck his occiput  CT head/C-spine without acute changes    Patient has been observed in the ER for many hours.  He drinks 24 beers daily, and has a history of severe withdrawals, but no history of seizures or DTs.  Social work consulted, but not available this evening.    We had planned to admit to the hospital, and our hospitalist declined admission.    Patient appears improved after Librium.  CIWA 3.  Heart rate near 100  Patient prefers to stay in the ER overnight where he can receive medications, as the pharmacy is closed.  At this time plan for discharge in the morning with Librium    Although initial history and physical exam information was obtained by ANIBAL/NPP/MD/DO (who also dictated a record of this visit), I personally saw the patient and made/approved the management plan and take responsibility for patient management. I, Dr. Barnes, am the primary clinician of record.     This chart was generated in part by using Dragon Dictation system and may contain errors related to that system including errors in grammar, punctuation, and spelling, as well as words and phrases that may be inappropriate. If there are any questions or concerns please feel free to contact the dictating provider for clarification.     Cas Barnes,  MD   [facility]       Cas Barnes MD  02/26/25 7937

## 2025-02-27 NOTE — ED PROVIDER NOTES
This patient was signed out to me at 2200. Please reference the off going provider's note for initial assessment and plan. This patient is signed out pending the following:    - reassess clinical condition  - review workup  - determine disposition    Special discussion: Patient with acute alcohol intoxication presenting with concern for withdrawal syndrome with a history of alcohol use disorder.  The patient has required hospitalization in the past.      The remainder of the patient's ED course is as follows:    ED Course as of 02/27/25 0707   Thu Feb 27, 2025   0336 Shortly after signout I reassessed the patient I found the patient afebrile hemodynamically stable in no acute distress.  He is clinically sober.  He is alert and oriented.  His breath sounds are clear his abdomen is soft nontender nondistended extremities are warm well-perfused.  He has no tongue fasciculations or tremor.  His presenting tachycardia is resolved.  His presenting hypertension is resolved.  He did suffer a fall in the context of intoxication with a wound to the right scalp hemostatic we will washout and reassess for repair.  Otherwise his clinical examination is reassuring I find no evidence of a significant withdrawal state.  His lab for evaluation notable for ethanol 299 otherwise laboratory evaluation is reassuring.  The patient has been started on CIWA and received Librium prior to my evaluation.  The patient CT imaging is noncontributory in the context of his fall.  There is no evidence of other acute trauma. [NG]   0338 The patient continues to do well boarding in the emergency department under observation status.  Should his symptoms remain well-controlled and he be found clinically sober I expect he will be appropriate for a trial of outpatient management on Librium taper. [NG]   0704 Please note reassessed the patient scalp wound simple abrasion hemostatic does not require primary repair. [NG]   0704 Ethanol Lvl: 35  At this time

## 2025-02-27 NOTE — ED NOTES
The patient did fine when I got him up and walked him. MD Parada said that I am able to discharge him. He was medicated and discharged. He was sent to the lobby where he is waiting for his uber. The patient verbalized understanding to his discharge instructions and had no further questions for me.

## 2025-02-28 ENCOUNTER — OFFICE VISIT (OUTPATIENT)
Dept: FAMILY MEDICINE CLINIC | Age: 50
End: 2025-02-28
Payer: COMMERCIAL

## 2025-02-28 VITALS
WEIGHT: 157 LBS | HEART RATE: 150 BPM | DIASTOLIC BLOOD PRESSURE: 110 MMHG | SYSTOLIC BLOOD PRESSURE: 166 MMHG | RESPIRATION RATE: 16 BRPM | TEMPERATURE: 97.1 F | OXYGEN SATURATION: 97 % | BODY MASS INDEX: 24.59 KG/M2

## 2025-02-28 DIAGNOSIS — I10 PRIMARY HYPERTENSION: ICD-10-CM

## 2025-02-28 DIAGNOSIS — F10.21 ALCOHOL USE DISORDER, SEVERE, IN EARLY REMISSION (HCC): Primary | ICD-10-CM

## 2025-02-28 DIAGNOSIS — F33.0 MILD EPISODE OF RECURRENT MAJOR DEPRESSIVE DISORDER: ICD-10-CM

## 2025-02-28 DIAGNOSIS — F41.1 GAD (GENERALIZED ANXIETY DISORDER): ICD-10-CM

## 2025-02-28 PROCEDURE — 3077F SYST BP >= 140 MM HG: CPT

## 2025-02-28 PROCEDURE — 3080F DIAST BP >= 90 MM HG: CPT

## 2025-02-28 PROCEDURE — 99204 OFFICE O/P NEW MOD 45 MIN: CPT

## 2025-02-28 RX ORDER — AMLODIPINE BESYLATE 5 MG/1
5 TABLET ORAL DAILY
Qty: 30 TABLET | Refills: 1 | Status: SHIPPED | OUTPATIENT
Start: 2025-02-28

## 2025-02-28 RX ORDER — ESCITALOPRAM OXALATE 20 MG/1
20 TABLET ORAL DAILY
Qty: 30 TABLET | Refills: 1 | Status: SHIPPED | OUTPATIENT
Start: 2025-02-28

## 2025-02-28 SDOH — ECONOMIC STABILITY: FOOD INSECURITY: WITHIN THE PAST 12 MONTHS, YOU WORRIED THAT YOUR FOOD WOULD RUN OUT BEFORE YOU GOT MONEY TO BUY MORE.: NEVER TRUE

## 2025-02-28 SDOH — ECONOMIC STABILITY: FOOD INSECURITY: WITHIN THE PAST 12 MONTHS, THE FOOD YOU BOUGHT JUST DIDN'T LAST AND YOU DIDN'T HAVE MONEY TO GET MORE.: NEVER TRUE

## 2025-02-28 ASSESSMENT — ANXIETY QUESTIONNAIRES
6. BECOMING EASILY ANNOYED OR IRRITABLE: NOT AT ALL
2. NOT BEING ABLE TO STOP OR CONTROL WORRYING: NEARLY EVERY DAY
7. FEELING AFRAID AS IF SOMETHING AWFUL MIGHT HAPPEN: NEARLY EVERY DAY
3. WORRYING TOO MUCH ABOUT DIFFERENT THINGS: SEVERAL DAYS
1. FEELING NERVOUS, ANXIOUS, OR ON EDGE: MORE THAN HALF THE DAYS
4. TROUBLE RELAXING: MORE THAN HALF THE DAYS
IF YOU CHECKED OFF ANY PROBLEMS ON THIS QUESTIONNAIRE, HOW DIFFICULT HAVE THESE PROBLEMS MADE IT FOR YOU TO DO YOUR WORK, TAKE CARE OF THINGS AT HOME, OR GET ALONG WITH OTHER PEOPLE: NOT DIFFICULT AT ALL
5. BEING SO RESTLESS THAT IT IS HARD TO SIT STILL: NEARLY EVERY DAY
GAD7 TOTAL SCORE: 14

## 2025-02-28 ASSESSMENT — PATIENT HEALTH QUESTIONNAIRE - PHQ9
3. TROUBLE FALLING OR STAYING ASLEEP: SEVERAL DAYS
SUM OF ALL RESPONSES TO PHQ QUESTIONS 1-9: 7
SUM OF ALL RESPONSES TO PHQ QUESTIONS 1-9: 7
2. FEELING DOWN, DEPRESSED OR HOPELESS: NOT AT ALL
9. THOUGHTS THAT YOU WOULD BE BETTER OFF DEAD, OR OF HURTING YOURSELF: NOT AT ALL
SUM OF ALL RESPONSES TO PHQ9 QUESTIONS 1 & 2: 0
10. IF YOU CHECKED OFF ANY PROBLEMS, HOW DIFFICULT HAVE THESE PROBLEMS MADE IT FOR YOU TO DO YOUR WORK, TAKE CARE OF THINGS AT HOME, OR GET ALONG WITH OTHER PEOPLE: EXTREMELY DIFFICULT
8. MOVING OR SPEAKING SO SLOWLY THAT OTHER PEOPLE COULD HAVE NOTICED. OR THE OPPOSITE, BEING SO FIGETY OR RESTLESS THAT YOU HAVE BEEN MOVING AROUND A LOT MORE THAN USUAL: NOT AT ALL
6. FEELING BAD ABOUT YOURSELF - OR THAT YOU ARE A FAILURE OR HAVE LET YOURSELF OR YOUR FAMILY DOWN: MORE THAN HALF THE DAYS
4. FEELING TIRED OR HAVING LITTLE ENERGY: NEARLY EVERY DAY
SUM OF ALL RESPONSES TO PHQ QUESTIONS 1-9: 7
5. POOR APPETITE OR OVEREATING: SEVERAL DAYS
DEPRESSION UNABLE TO ASSESS: FUNCTIONAL CAPACITY MOTIVATION LIMITS ACCURACY
SUM OF ALL RESPONSES TO PHQ QUESTIONS 1-9: 7
1. LITTLE INTEREST OR PLEASURE IN DOING THINGS: NOT AT ALL

## 2025-02-28 ASSESSMENT — ENCOUNTER SYMPTOMS: SHORTNESS OF BREATH: 0

## 2025-02-28 NOTE — PROGRESS NOTES
2/28/25    Chief Complaint   Patient presents with    Follow-up     Follow up from ED. Alcohol abuse.     Depression     Would like to get back on anti- depressant      HPI: Evangelist Ortega is a 49 y.o. male who presents for a ER follow up for alcohol intoxication. He would like help with detox. Pt also has high blood pressure today and would like to get back on lexapro for anxiety/depression.   Prior to today, pt last drank alcohol Wednesday afternoon. Was prescribed librium in the ED and still taking. Pt had some shakes but have resolved since second dose of librium. Pt is not having cravings. Pt has not tried naltrexone in the past. Pt is going to start going back to AA.     Pt has a hx of anxiety and depression and feels like he is drinking more due to this. Would like to get back on lexapro. Pt was sober on lexapro for approx 1 year. Stopped taking and started drinking again. Last took lexapro over a year ago.     Pt's BP is elevated today. No symptoms at this time. Pt had high blood pressure in the ED as well.     Past Medical History:   Diagnosis Date    Alcohol abuse     Allergic rhinitis     Anxiety     Depression     Hypertension        No past surgical history on file.    Social History     Tobacco Use    Smoking status: Never    Smokeless tobacco: Current     Types: Chew   Vaping Use    Vaping status: Never Used   Substance Use Topics    Alcohol use: Yes     Alcohol/week: 105.0 standard drinks of alcohol     Types: 105 Cans of beer per week     Comment: 15 beers/ day    Drug use: No       Family History   Problem Relation Age of Onset    Depression Mother     Depression Father     High Blood Pressure Father     Depression Brother      Review of Systems   Eyes:  Negative for visual disturbance.   Respiratory:  Negative for shortness of breath.    Cardiovascular:  Negative for chest pain.   Neurological:  Negative for tremors, seizures and headaches.   Psychiatric/Behavioral:  Negative for agitation

## 2025-04-23 DIAGNOSIS — F33.0 MILD EPISODE OF RECURRENT MAJOR DEPRESSIVE DISORDER: ICD-10-CM

## 2025-04-23 DIAGNOSIS — F41.1 GAD (GENERALIZED ANXIETY DISORDER): ICD-10-CM

## 2025-04-23 DIAGNOSIS — I10 PRIMARY HYPERTENSION: ICD-10-CM

## 2025-04-23 RX ORDER — AMLODIPINE BESYLATE 5 MG/1
5 TABLET ORAL DAILY
Qty: 30 TABLET | Refills: 1 | Status: SHIPPED | OUTPATIENT
Start: 2025-04-23

## 2025-04-23 RX ORDER — ESCITALOPRAM OXALATE 20 MG/1
20 TABLET ORAL DAILY
Qty: 30 TABLET | Refills: 1 | Status: SHIPPED | OUTPATIENT
Start: 2025-04-23

## 2025-05-31 ENCOUNTER — APPOINTMENT (OUTPATIENT)
Dept: CT IMAGING | Age: 50
End: 2025-05-31
Payer: COMMERCIAL

## 2025-05-31 ENCOUNTER — APPOINTMENT (OUTPATIENT)
Dept: CT IMAGING | Age: 50
DRG: 082 | End: 2025-05-31
Attending: STUDENT IN AN ORGANIZED HEALTH CARE EDUCATION/TRAINING PROGRAM

## 2025-05-31 ENCOUNTER — APPOINTMENT (OUTPATIENT)
Dept: GENERAL RADIOLOGY | Age: 50
End: 2025-05-31
Payer: COMMERCIAL

## 2025-05-31 ENCOUNTER — HOSPITAL ENCOUNTER (EMERGENCY)
Age: 50
Discharge: ANOTHER HOSPITAL | End: 2025-05-31
Attending: EMERGENCY MEDICINE
Payer: COMMERCIAL

## 2025-05-31 ENCOUNTER — HOSPITAL ENCOUNTER (INPATIENT)
Age: 50
LOS: 13 days | Discharge: HOME | DRG: 082 | End: 2025-06-13
Attending: STUDENT IN AN ORGANIZED HEALTH CARE EDUCATION/TRAINING PROGRAM | Admitting: INTERNAL MEDICINE
Payer: COMMERCIAL

## 2025-05-31 VITALS
OXYGEN SATURATION: 99 % | WEIGHT: 200 LBS | RESPIRATION RATE: 20 BRPM | DIASTOLIC BLOOD PRESSURE: 93 MMHG | HEART RATE: 86 BPM | SYSTOLIC BLOOD PRESSURE: 149 MMHG

## 2025-05-31 DIAGNOSIS — R56.9 SEIZURE (HCC): ICD-10-CM

## 2025-05-31 DIAGNOSIS — I62.00 SUBDURAL BLEEDING (HCC): ICD-10-CM

## 2025-05-31 DIAGNOSIS — E87.1 HYPONATREMIA: ICD-10-CM

## 2025-05-31 DIAGNOSIS — S06.5XAA SDH (SUBDURAL HEMATOMA) (HCC): Primary | ICD-10-CM

## 2025-05-31 DIAGNOSIS — F10.939 ALCOHOL WITHDRAWAL SYNDROME WITH COMPLICATION (HCC): Primary | ICD-10-CM

## 2025-05-31 DIAGNOSIS — R94.31 QT PROLONGATION: ICD-10-CM

## 2025-05-31 PROBLEM — E87.20 METABOLIC ACIDOSIS: Status: ACTIVE | Noted: 2025-05-31

## 2025-05-31 PROBLEM — E87.20 LACTIC ACIDOSIS: Status: ACTIVE | Noted: 2025-05-31

## 2025-05-31 PROBLEM — E87.6 HYPOKALEMIA: Status: ACTIVE | Noted: 2025-05-31

## 2025-05-31 PROBLEM — G40.901 STATUS EPILEPTICUS (HCC): Status: ACTIVE | Noted: 2025-05-31

## 2025-05-31 LAB
ALBUMIN SERPL-MCNC: 4 G/DL (ref 3.4–5)
ALBUMIN SERPL-MCNC: 4.1 G/DL (ref 3.4–5)
ALBUMIN SERPL-MCNC: 4.1 G/DL (ref 3.4–5)
ALBUMIN/GLOB SERPL: 1.8 {RATIO} (ref 1.1–2.2)
ALP SERPL-CCNC: 68 U/L (ref 40–129)
ALT SERPL-CCNC: 36 U/L (ref 10–40)
AMMONIA PLAS-SCNC: 24 UMOL/L (ref 16–60)
AMMONIA PLAS-SCNC: 24 UMOL/L (ref 16–60)
AMPHETAMINES UR QL SCN>1000 NG/ML: ABNORMAL
AMPHETAMINES UR QL SCN>1000 NG/ML: ABNORMAL
ANION GAP SERPL CALCULATED.3IONS-SCNC: 16 MMOL/L (ref 3–16)
ANION GAP SERPL CALCULATED.3IONS-SCNC: 16 MMOL/L (ref 3–16)
ANION GAP SERPL CALCULATED.3IONS-SCNC: 24 MMOL/L (ref 3–16)
ANION GAP SERPL CALCULATED.3IONS-SCNC: 30 MMOL/L (ref 3–16)
APAP SERPL-MCNC: <5 UG/ML (ref 10–30)
APTT BLD: 25.6 SEC (ref 22.1–36.4)
APTT BLD: 25.6 SEC (ref 22.1–36.4)
AST SERPL-CCNC: 65 U/L (ref 15–37)
BARBITURATES UR QL SCN>200 NG/ML: ABNORMAL
BARBITURATES UR QL SCN>200 NG/ML: ABNORMAL
BASE EXCESS BLDV CALC-SCNC: -10.5 MMOL/L (ref -3–3)
BASOPHILS # BLD: 0 K/UL (ref 0–0.2)
BASOPHILS # BLD: 0 K/UL (ref 0–0.2)
BASOPHILS # BLD: 0.1 K/UL (ref 0–0.2)
BASOPHILS NFR BLD: 0.4 %
BASOPHILS NFR BLD: 0.4 %
BASOPHILS NFR BLD: 1.3 %
BENZODIAZ UR QL SCN>200 NG/ML: POSITIVE
BENZODIAZ UR QL SCN>200 NG/ML: POSITIVE
BILIRUB SERPL-MCNC: 0.7 MG/DL (ref 0–1)
BUN SERPL-MCNC: <2 MG/DL (ref 7–20)
CALCIUM SERPL-MCNC: 7.5 MG/DL (ref 8.3–10.6)
CALCIUM SERPL-MCNC: 8.2 MG/DL (ref 8.3–10.6)
CANNABINOIDS UR QL SCN>50 NG/ML: ABNORMAL
CANNABINOIDS UR QL SCN>50 NG/ML: ABNORMAL
CHLORIDE SERPL-SCNC: 72 MMOL/L (ref 99–110)
CHLORIDE SERPL-SCNC: 79 MMOL/L (ref 99–110)
CHLORIDE SERPL-SCNC: 84 MMOL/L (ref 99–110)
CHLORIDE SERPL-SCNC: 84 MMOL/L (ref 99–110)
CK SERPL-CCNC: 673 U/L (ref 39–308)
CK SERPL-CCNC: 673 U/L (ref 39–308)
CO2 BLDV-SCNC: 15 MMOL/L
CO2 SERPL-SCNC: 13 MMOL/L (ref 21–32)
CO2 SERPL-SCNC: 15 MMOL/L (ref 21–32)
CO2 SERPL-SCNC: 23 MMOL/L (ref 21–32)
CO2 SERPL-SCNC: 23 MMOL/L (ref 21–32)
COCAINE UR QL SCN: ABNORMAL
COCAINE UR QL SCN: ABNORMAL
COHGB MFR BLDV: 0.7 % (ref 0–1.5)
CREAT SERPL-MCNC: 0.5 MG/DL (ref 0.9–1.3)
CREAT SERPL-MCNC: 0.5 MG/DL (ref 0.9–1.3)
CREAT SERPL-MCNC: 0.6 MG/DL (ref 0.9–1.3)
CREAT SERPL-MCNC: 0.6 MG/DL (ref 0.9–1.3)
DEPRECATED RDW RBC AUTO: 14.8 % (ref 12.4–15.4)
DEPRECATED RDW RBC AUTO: 14.8 % (ref 12.4–15.4)
DEPRECATED RDW RBC AUTO: 15 % (ref 12.4–15.4)
DRUG SCREEN COMMENT UR-IMP: ABNORMAL
DRUG SCREEN COMMENT UR-IMP: ABNORMAL
EOSINOPHIL # BLD: 0 K/UL (ref 0–0.6)
EOSINOPHIL NFR BLD: 0.1 %
ETHANOLAMINE SERPL-MCNC: 72 MG/DL (ref 0–0.08)
FENTANYL SCREEN, URINE: ABNORMAL
FENTANYL SCREEN, URINE: ABNORMAL
GFR SERPLBLD CREATININE-BSD FMLA CKD-EPI: >90 ML/MIN/{1.73_M2}
GLUCOSE BLD-MCNC: 167 MG/DL (ref 70–99)
GLUCOSE SERPL-MCNC: 109 MG/DL (ref 70–99)
GLUCOSE SERPL-MCNC: 109 MG/DL (ref 70–99)
GLUCOSE SERPL-MCNC: 117 MG/DL (ref 70–99)
GLUCOSE SERPL-MCNC: 143 MG/DL (ref 70–99)
HCO3 BLDV-SCNC: 13.8 MMOL/L (ref 23–29)
HCT VFR BLD AUTO: 33.5 % (ref 40.5–52.5)
HCT VFR BLD AUTO: 33.5 % (ref 40.5–52.5)
HCT VFR BLD AUTO: 35.8 % (ref 40.5–52.5)
HGB BLD-MCNC: 12 G/DL (ref 13.5–17.5)
HGB BLD-MCNC: 12 G/DL (ref 13.5–17.5)
HGB BLD-MCNC: 12.2 G/DL (ref 13.5–17.5)
INR PPP: 0.96 (ref 0.85–1.15)
INR PPP: 0.96 (ref 0.85–1.15)
LACTATE BLDV-SCNC: 14.3 MMOL/L (ref 0.4–1.9)
LACTATE BLDV-SCNC: 2.7 MMOL/L (ref 0.4–2)
LACTATE BLDV-SCNC: 2.7 MMOL/L (ref 0.4–2)
LYMPHOCYTES # BLD: 0.4 K/UL (ref 1–5.1)
LYMPHOCYTES NFR BLD: 7.6 %
LYMPHOCYTES NFR BLD: 7.9 %
LYMPHOCYTES NFR BLD: 7.9 %
MAGNESIUM SERPL-MCNC: 1.41 MG/DL (ref 1.8–2.4)
MAGNESIUM SERPL-MCNC: 1.79 MG/DL (ref 1.8–2.4)
MAGNESIUM SERPL-MCNC: 1.79 MG/DL (ref 1.8–2.4)
MAGNESIUM SERPL-MCNC: >9.6 MG/DL (ref 1.8–2.4)
MCH RBC QN AUTO: 29.4 PG (ref 26–34)
MCH RBC QN AUTO: 29.5 PG (ref 26–34)
MCH RBC QN AUTO: 29.5 PG (ref 26–34)
MCHC RBC AUTO-ENTMCNC: 34.3 G/DL (ref 31–36)
MCHC RBC AUTO-ENTMCNC: 35.9 G/DL (ref 31–36)
MCHC RBC AUTO-ENTMCNC: 35.9 G/DL (ref 31–36)
MCV RBC AUTO: 82.3 FL (ref 80–100)
MCV RBC AUTO: 82.3 FL (ref 80–100)
MCV RBC AUTO: 86 FL (ref 80–100)
METHADONE UR QL SCN>300 NG/ML: ABNORMAL
METHADONE UR QL SCN>300 NG/ML: ABNORMAL
METHGB MFR BLDV: 0.3 %
MONOCYTES # BLD: 0.4 K/UL (ref 0–1.3)
MONOCYTES NFR BLD: 8.3 %
MONOCYTES NFR BLD: 8.3 %
MONOCYTES NFR BLD: 8.7 %
NEUTROPHILS # BLD: 3.8 K/UL (ref 1.7–7.7)
NEUTROPHILS # BLD: 3.8 K/UL (ref 1.7–7.7)
NEUTROPHILS # BLD: 4.2 K/UL (ref 1.7–7.7)
NEUTROPHILS NFR BLD: 82.3 %
NEUTROPHILS NFR BLD: 83.3 %
NEUTROPHILS NFR BLD: 83.3 %
O2 CT VFR BLDV CALC: 18 VOL %
O2 THERAPY: ABNORMAL
OPIATES UR QL SCN>300 NG/ML: ABNORMAL
OPIATES UR QL SCN>300 NG/ML: ABNORMAL
OXYCODONE UR QL SCN: ABNORMAL
OXYCODONE UR QL SCN: ABNORMAL
PCO2 BLDV: 26.9 MMHG (ref 40–50)
PCP UR QL SCN>25 NG/ML: ABNORMAL
PCP UR QL SCN>25 NG/ML: ABNORMAL
PERFORMED ON: ABNORMAL
PH BLDV: 7.33 [PH] (ref 7.35–7.45)
PH UR STRIP: 6 [PH]
PH UR STRIP: 6 [PH]
PHOSPHATE SERPL-MCNC: 2.4 MG/DL (ref 2.5–4.9)
PHOSPHATE SERPL-MCNC: 2.9 MG/DL (ref 2.5–4.9)
PHOSPHATE SERPL-MCNC: 2.9 MG/DL (ref 2.5–4.9)
PLATELET # BLD AUTO: 129 K/UL (ref 135–450)
PLATELET # BLD AUTO: 129 K/UL (ref 135–450)
PLATELET # BLD AUTO: 154 K/UL (ref 135–450)
PMV BLD AUTO: 6.5 FL (ref 5–10.5)
PO2 BLDV: 131.8 MMHG (ref 25–40)
POTASSIUM SERPL-SCNC: 3.1 MMOL/L (ref 3.5–5.1)
POTASSIUM SERPL-SCNC: 3.3 MMOL/L (ref 3.5–5.1)
POTASSIUM SERPL-SCNC: 3.6 MMOL/L (ref 3.5–5.1)
POTASSIUM UR-SCNC: 17.5 MMOL/L
POTASSIUM UR-SCNC: 17.5 MMOL/L
PROT SERPL-MCNC: 6.2 G/DL (ref 6.4–8.2)
PROTHROMBIN TIME: 13 SEC (ref 11.9–14.9)
PROTHROMBIN TIME: 13 SEC (ref 11.9–14.9)
RBC # BLD AUTO: 4.07 M/UL (ref 4.2–5.9)
RBC # BLD AUTO: 4.07 M/UL (ref 4.2–5.9)
RBC # BLD AUTO: 4.16 M/UL (ref 4.2–5.9)
SALICYLATES SERPL-MCNC: <0.5 MG/DL (ref 15–30)
SAO2 % BLDV: 99 %
SODIUM SERPL-SCNC: 115 MMOL/L (ref 136–145)
SODIUM SERPL-SCNC: 118 MMOL/L (ref 136–145)
SODIUM SERPL-SCNC: 123 MMOL/L (ref 136–145)
SODIUM SERPL-SCNC: 123 MMOL/L (ref 136–145)
SODIUM UR-SCNC: 86 MMOL/L
SODIUM UR-SCNC: <20 MMOL/L
SODIUM UR-SCNC: <20 MMOL/L
WBC # BLD AUTO: 4.5 K/UL (ref 4–11)
WBC # BLD AUTO: 4.5 K/UL (ref 4–11)
WBC # BLD AUTO: 5.1 K/UL (ref 4–11)

## 2025-05-31 PROCEDURE — 85025 COMPLETE CBC W/AUTO DIFF WBC: CPT

## 2025-05-31 PROCEDURE — 99291 CRITICAL CARE FIRST HOUR: CPT

## 2025-05-31 PROCEDURE — 82803 BLOOD GASES ANY COMBINATION: CPT

## 2025-05-31 PROCEDURE — 2000000000 HC ICU R&B

## 2025-05-31 PROCEDURE — 82140 ASSAY OF AMMONIA: CPT

## 2025-05-31 PROCEDURE — 80069 RENAL FUNCTION PANEL: CPT

## 2025-05-31 PROCEDURE — 84300 ASSAY OF URINE SODIUM: CPT

## 2025-05-31 PROCEDURE — 83605 ASSAY OF LACTIC ACID: CPT

## 2025-05-31 PROCEDURE — 0BH17EZ INSERTION OF ENDOTRACHEAL AIRWAY INTO TRACHEA, VIA NATURAL OR ARTIFICIAL OPENING: ICD-10-PCS

## 2025-05-31 PROCEDURE — 71045 X-RAY EXAM CHEST 1 VIEW: CPT

## 2025-05-31 PROCEDURE — 2500000003 HC RX 250 WO HCPCS

## 2025-05-31 PROCEDURE — 85730 THROMBOPLASTIN TIME PARTIAL: CPT

## 2025-05-31 PROCEDURE — 96366 THER/PROPH/DIAG IV INF ADDON: CPT

## 2025-05-31 PROCEDURE — 85610 PROTHROMBIN TIME: CPT

## 2025-05-31 PROCEDURE — 82550 ASSAY OF CK (CPK): CPT

## 2025-05-31 PROCEDURE — 6360000002 HC RX W HCPCS: Performed by: INTERNAL MEDICINE

## 2025-05-31 PROCEDURE — 31500 INSERT EMERGENCY AIRWAY: CPT

## 2025-05-31 PROCEDURE — 2580000003 HC RX 258: Performed by: EMERGENCY MEDICINE

## 2025-05-31 PROCEDURE — 2580000003 HC RX 258

## 2025-05-31 PROCEDURE — 5A1945Z RESPIRATORY VENTILATION, 24-96 CONSECUTIVE HOURS: ICD-10-PCS

## 2025-05-31 PROCEDURE — 96374 THER/PROPH/DIAG INJ IV PUSH: CPT

## 2025-05-31 PROCEDURE — 6360000002 HC RX W HCPCS: Performed by: EMERGENCY MEDICINE

## 2025-05-31 PROCEDURE — 80307 DRUG TEST PRSMV CHEM ANLYZR: CPT

## 2025-05-31 PROCEDURE — 2700000000 HC OXYGEN THERAPY PER DAY

## 2025-05-31 PROCEDURE — 86850 RBC ANTIBODY SCREEN: CPT

## 2025-05-31 PROCEDURE — 36415 COLL VENOUS BLD VENIPUNCTURE: CPT

## 2025-05-31 PROCEDURE — 99285 EMERGENCY DEPT VISIT HI MDM: CPT

## 2025-05-31 PROCEDURE — 82077 ASSAY SPEC XCP UR&BREATH IA: CPT

## 2025-05-31 PROCEDURE — 93005 ELECTROCARDIOGRAM TRACING: CPT

## 2025-05-31 PROCEDURE — 70450 CT HEAD/BRAIN W/O DYE: CPT

## 2025-05-31 PROCEDURE — 86901 BLOOD TYPING SEROLOGIC RH(D): CPT

## 2025-05-31 PROCEDURE — 6360000002 HC RX W HCPCS

## 2025-05-31 PROCEDURE — 84100 ASSAY OF PHOSPHORUS: CPT

## 2025-05-31 PROCEDURE — 80143 DRUG ASSAY ACETAMINOPHEN: CPT

## 2025-05-31 PROCEDURE — 83935 ASSAY OF URINE OSMOLALITY: CPT

## 2025-05-31 PROCEDURE — 94761 N-INVAS EAR/PLS OXIMETRY MLT: CPT

## 2025-05-31 PROCEDURE — 83735 ASSAY OF MAGNESIUM: CPT

## 2025-05-31 PROCEDURE — 80053 COMPREHEN METABOLIC PANEL: CPT

## 2025-05-31 PROCEDURE — 83930 ASSAY OF BLOOD OSMOLALITY: CPT

## 2025-05-31 PROCEDURE — 99223 1ST HOSP IP/OBS HIGH 75: CPT | Performed by: INTERNAL MEDICINE

## 2025-05-31 PROCEDURE — 86900 BLOOD TYPING SEROLOGIC ABO: CPT

## 2025-05-31 PROCEDURE — 94002 VENT MGMT INPAT INIT DAY: CPT

## 2025-05-31 PROCEDURE — 72125 CT NECK SPINE W/O DYE: CPT

## 2025-05-31 PROCEDURE — 96375 TX/PRO/DX INJ NEW DRUG ADDON: CPT

## 2025-05-31 PROCEDURE — 84133 ASSAY OF URINE POTASSIUM: CPT

## 2025-05-31 PROCEDURE — HZ2ZZZZ DETOXIFICATION SERVICES FOR SUBSTANCE ABUSE TREATMENT: ICD-10-PCS | Performed by: INTERNAL MEDICINE

## 2025-05-31 PROCEDURE — 80179 DRUG ASSAY SALICYLATE: CPT

## 2025-05-31 PROCEDURE — 96365 THER/PROPH/DIAG IV INF INIT: CPT

## 2025-05-31 RX ORDER — ROCURONIUM BROMIDE 10 MG/ML
INJECTION, SOLUTION INTRAVENOUS
Status: COMPLETED
Start: 2025-05-31 | End: 2025-05-31

## 2025-05-31 RX ORDER — LORAZEPAM 2 MG/ML
1 INJECTION INTRAMUSCULAR
Status: DISCONTINUED | OUTPATIENT
Start: 2025-05-31 | End: 2025-06-13 | Stop reason: HOSPADM

## 2025-05-31 RX ORDER — SODIUM CHLORIDE 0.9 % (FLUSH) 0.9 %
5-40 SYRINGE (ML) INJECTION PRN
Status: DISCONTINUED | OUTPATIENT
Start: 2025-05-31 | End: 2025-05-31 | Stop reason: HOSPADM

## 2025-05-31 RX ORDER — LORAZEPAM 2 MG/ML
2 INJECTION INTRAMUSCULAR
Status: DISCONTINUED | OUTPATIENT
Start: 2025-05-31 | End: 2025-05-31 | Stop reason: HOSPADM

## 2025-05-31 RX ORDER — MIDAZOLAM HYDROCHLORIDE 1 MG/ML
1-10 INJECTION, SOLUTION INTRAVENOUS CONTINUOUS
Status: DISCONTINUED | OUTPATIENT
Start: 2025-05-31 | End: 2025-05-31 | Stop reason: HOSPADM

## 2025-05-31 RX ORDER — LORAZEPAM 1 MG/1
2 TABLET ORAL
Status: DISCONTINUED | OUTPATIENT
Start: 2025-05-31 | End: 2025-06-13 | Stop reason: HOSPADM

## 2025-05-31 RX ORDER — DEXMEDETOMIDINE HYDROCHLORIDE 4 UG/ML
.1-1.5 INJECTION, SOLUTION INTRAVENOUS CONTINUOUS
Status: DISCONTINUED | OUTPATIENT
Start: 2025-05-31 | End: 2025-06-04

## 2025-05-31 RX ORDER — MIDAZOLAM HYDROCHLORIDE 5 MG/ML
INJECTION, SOLUTION INTRAMUSCULAR; INTRAVENOUS
Status: COMPLETED
Start: 2025-05-31 | End: 2025-05-31

## 2025-05-31 RX ORDER — LORAZEPAM 1 MG/1
3 TABLET ORAL
Status: DISCONTINUED | OUTPATIENT
Start: 2025-05-31 | End: 2025-06-13 | Stop reason: HOSPADM

## 2025-05-31 RX ORDER — PROPOFOL 10 MG/ML
INJECTION, EMULSION INTRAVENOUS
Status: DISCONTINUED
Start: 2025-05-31 | End: 2025-05-31 | Stop reason: HOSPADM

## 2025-05-31 RX ORDER — MAGNESIUM SULFATE IN WATER 40 MG/ML
2000 INJECTION, SOLUTION INTRAVENOUS ONCE
Status: COMPLETED | OUTPATIENT
Start: 2025-05-31 | End: 2025-06-01

## 2025-05-31 RX ORDER — MAGNESIUM SULFATE IN WATER 40 MG/ML
2000 INJECTION, SOLUTION INTRAVENOUS ONCE
Status: COMPLETED | OUTPATIENT
Start: 2025-05-31 | End: 2025-05-31

## 2025-05-31 RX ORDER — LORAZEPAM 2 MG/ML
3 INJECTION INTRAMUSCULAR
Status: DISCONTINUED | OUTPATIENT
Start: 2025-05-31 | End: 2025-05-31 | Stop reason: HOSPADM

## 2025-05-31 RX ORDER — LORAZEPAM 1 MG/1
4 TABLET ORAL
Status: DISCONTINUED | OUTPATIENT
Start: 2025-05-31 | End: 2025-06-13 | Stop reason: HOSPADM

## 2025-05-31 RX ORDER — ACETAMINOPHEN 325 MG/1
650 TABLET ORAL EVERY 6 HOURS PRN
Status: DISCONTINUED | OUTPATIENT
Start: 2025-05-31 | End: 2025-06-13 | Stop reason: HOSPADM

## 2025-05-31 RX ORDER — SODIUM CHLORIDE 0.9 % (FLUSH) 0.9 %
5-40 SYRINGE (ML) INJECTION EVERY 12 HOURS SCHEDULED
Status: DISCONTINUED | OUTPATIENT
Start: 2025-05-31 | End: 2025-06-13 | Stop reason: HOSPADM

## 2025-05-31 RX ORDER — GLUCAGON 1 MG/ML
1 KIT INJECTION PRN
Status: DISCONTINUED | OUTPATIENT
Start: 2025-05-31 | End: 2025-06-13 | Stop reason: HOSPADM

## 2025-05-31 RX ORDER — 3% SODIUM CHLORIDE 3 G/100ML
300 INJECTION, SOLUTION INTRAVENOUS EVERY 20 MIN
Status: COMPLETED | OUTPATIENT
Start: 2025-05-31 | End: 2025-05-31

## 2025-05-31 RX ORDER — PANTOPRAZOLE SODIUM 40 MG/10ML
40 INJECTION, POWDER, LYOPHILIZED, FOR SOLUTION INTRAVENOUS ONCE
Status: COMPLETED | OUTPATIENT
Start: 2025-05-31 | End: 2025-05-31

## 2025-05-31 RX ORDER — 0.9 % SODIUM CHLORIDE 0.9 %
1000 INTRAVENOUS SOLUTION INTRAVENOUS ONCE
Status: DISCONTINUED | OUTPATIENT
Start: 2025-05-31 | End: 2025-05-31 | Stop reason: HOSPADM

## 2025-05-31 RX ORDER — GAUZE BANDAGE 2" X 2"
100 BANDAGE TOPICAL DAILY
Status: DISCONTINUED | OUTPATIENT
Start: 2025-05-31 | End: 2025-05-31

## 2025-05-31 RX ORDER — LORAZEPAM 2 MG/ML
4 INJECTION INTRAMUSCULAR EVERY 5 MIN PRN
Status: DISCONTINUED | OUTPATIENT
Start: 2025-05-31 | End: 2025-05-31

## 2025-05-31 RX ORDER — DESMOPRESSIN ACETATE 4 UG/ML
0.5 INJECTION, SOLUTION INTRAVENOUS; SUBCUTANEOUS ONCE
Status: COMPLETED | OUTPATIENT
Start: 2025-05-31 | End: 2025-05-31

## 2025-05-31 RX ORDER — LORAZEPAM 2 MG/1
2 TABLET ORAL
Status: DISCONTINUED | OUTPATIENT
Start: 2025-05-31 | End: 2025-05-31 | Stop reason: HOSPADM

## 2025-05-31 RX ORDER — LORAZEPAM 2 MG/ML
4 INJECTION INTRAMUSCULAR
Status: DISCONTINUED | OUTPATIENT
Start: 2025-05-31 | End: 2025-06-13 | Stop reason: HOSPADM

## 2025-05-31 RX ORDER — THIAMINE HYDROCHLORIDE 100 MG/ML
100 INJECTION, SOLUTION INTRAMUSCULAR; INTRAVENOUS DAILY
Status: DISCONTINUED | OUTPATIENT
Start: 2025-05-31 | End: 2025-05-31 | Stop reason: HOSPADM

## 2025-05-31 RX ORDER — MIDAZOLAM HYDROCHLORIDE 1 MG/ML
1-10 INJECTION, SOLUTION INTRAVENOUS CONTINUOUS
Status: CANCELLED | OUTPATIENT
Start: 2025-05-31

## 2025-05-31 RX ORDER — LEVETIRACETAM 500 MG/5ML
2000 INJECTION, SOLUTION, CONCENTRATE INTRAVENOUS ONCE
Status: COMPLETED | OUTPATIENT
Start: 2025-05-31 | End: 2025-05-31

## 2025-05-31 RX ORDER — MIDAZOLAM HYDROCHLORIDE 1 MG/ML
5 INJECTION, SOLUTION INTRAMUSCULAR; INTRAVENOUS ONCE
Status: CANCELLED | OUTPATIENT
Start: 2025-05-31 | End: 2025-05-31

## 2025-05-31 RX ORDER — 3% SODIUM CHLORIDE 3 G/100ML
450 INJECTION, SOLUTION INTRAVENOUS EVERY 20 MIN
Status: DISPENSED | OUTPATIENT
Start: 2025-05-31 | End: 2025-05-31

## 2025-05-31 RX ORDER — ACETAMINOPHEN 650 MG/1
650 SUPPOSITORY RECTAL EVERY 6 HOURS PRN
Status: DISCONTINUED | OUTPATIENT
Start: 2025-05-31 | End: 2025-06-13 | Stop reason: HOSPADM

## 2025-05-31 RX ORDER — LORAZEPAM 2 MG/1
4 TABLET ORAL
Status: DISCONTINUED | OUTPATIENT
Start: 2025-05-31 | End: 2025-05-31 | Stop reason: HOSPADM

## 2025-05-31 RX ORDER — POTASSIUM CHLORIDE 7.45 MG/ML
10 INJECTION INTRAVENOUS
Status: COMPLETED | OUTPATIENT
Start: 2025-05-31 | End: 2025-06-01

## 2025-05-31 RX ORDER — LORAZEPAM 1 MG/1
1 TABLET ORAL
Status: DISCONTINUED | OUTPATIENT
Start: 2025-05-31 | End: 2025-06-13 | Stop reason: HOSPADM

## 2025-05-31 RX ORDER — SODIUM CHLORIDE 0.9 % (FLUSH) 0.9 %
5-40 SYRINGE (ML) INJECTION EVERY 12 HOURS SCHEDULED
Status: DISCONTINUED | OUTPATIENT
Start: 2025-05-31 | End: 2025-05-31 | Stop reason: HOSPADM

## 2025-05-31 RX ORDER — LABETALOL HYDROCHLORIDE 5 MG/ML
10 INJECTION, SOLUTION INTRAVENOUS EVERY 4 HOURS PRN
Status: DISCONTINUED | OUTPATIENT
Start: 2025-05-31 | End: 2025-06-13 | Stop reason: HOSPADM

## 2025-05-31 RX ORDER — POLYETHYLENE GLYCOL 3350 17 G/17G
17 POWDER, FOR SOLUTION ORAL DAILY PRN
Status: DISCONTINUED | OUTPATIENT
Start: 2025-05-31 | End: 2025-06-13 | Stop reason: HOSPADM

## 2025-05-31 RX ORDER — LORAZEPAM 2 MG/ML
4 INJECTION INTRAMUSCULAR
Status: DISCONTINUED | OUTPATIENT
Start: 2025-05-31 | End: 2025-05-31 | Stop reason: HOSPADM

## 2025-05-31 RX ORDER — DEXTROSE MONOHYDRATE 100 MG/ML
INJECTION, SOLUTION INTRAVENOUS CONTINUOUS PRN
Status: DISCONTINUED | OUTPATIENT
Start: 2025-05-31 | End: 2025-06-13 | Stop reason: HOSPADM

## 2025-05-31 RX ORDER — SODIUM CHLORIDE 9 MG/ML
INJECTION, SOLUTION INTRAVENOUS PRN
Status: DISCONTINUED | OUTPATIENT
Start: 2025-05-31 | End: 2025-06-13 | Stop reason: HOSPADM

## 2025-05-31 RX ORDER — CALCIUM GLUCONATE 98 MG/ML
1000 INJECTION, SOLUTION INTRAVENOUS ONCE
Status: DISCONTINUED | OUTPATIENT
Start: 2025-05-31 | End: 2025-05-31

## 2025-05-31 RX ORDER — LORAZEPAM 2 MG/ML
3 INJECTION INTRAMUSCULAR
Status: DISCONTINUED | OUTPATIENT
Start: 2025-05-31 | End: 2025-06-13 | Stop reason: HOSPADM

## 2025-05-31 RX ORDER — PROPOFOL 10 MG/ML
5-50 INJECTION, EMULSION INTRAVENOUS CONTINUOUS
Status: DISCONTINUED | OUTPATIENT
Start: 2025-05-31 | End: 2025-06-03 | Stop reason: ALTCHOICE

## 2025-05-31 RX ORDER — DEXTROSE MONOHYDRATE AND SODIUM CHLORIDE 5; .9 G/100ML; G/100ML
1000 INJECTION, SOLUTION INTRAVENOUS CONTINUOUS
Status: DISCONTINUED | OUTPATIENT
Start: 2025-05-31 | End: 2025-05-31 | Stop reason: HOSPADM

## 2025-05-31 RX ORDER — LORAZEPAM 2 MG/ML
2 INJECTION INTRAMUSCULAR
Status: DISCONTINUED | OUTPATIENT
Start: 2025-05-31 | End: 2025-06-13 | Stop reason: HOSPADM

## 2025-05-31 RX ORDER — SODIUM CHLORIDE 0.9 % (FLUSH) 0.9 %
5-40 SYRINGE (ML) INJECTION PRN
Status: DISCONTINUED | OUTPATIENT
Start: 2025-05-31 | End: 2025-06-13 | Stop reason: HOSPADM

## 2025-05-31 RX ORDER — SODIUM CHLORIDE 9 MG/ML
INJECTION, SOLUTION INTRAVENOUS PRN
Status: DISCONTINUED | OUTPATIENT
Start: 2025-05-31 | End: 2025-05-31

## 2025-05-31 RX ORDER — LORAZEPAM 1 MG/1
1 TABLET ORAL
Status: DISCONTINUED | OUTPATIENT
Start: 2025-05-31 | End: 2025-05-31 | Stop reason: HOSPADM

## 2025-05-31 RX ORDER — LORAZEPAM 2 MG/ML
1 INJECTION INTRAMUSCULAR
Status: DISCONTINUED | OUTPATIENT
Start: 2025-05-31 | End: 2025-05-31 | Stop reason: HOSPADM

## 2025-05-31 RX ORDER — PROPOFOL 10 MG/ML
5-50 INJECTION, EMULSION INTRAVENOUS CONTINUOUS
Status: DISCONTINUED | OUTPATIENT
Start: 2025-05-31 | End: 2025-05-31 | Stop reason: HOSPADM

## 2025-05-31 RX ORDER — POTASSIUM CHLORIDE 7.45 MG/ML
10 INJECTION INTRAVENOUS
Status: DISCONTINUED | OUTPATIENT
Start: 2025-05-31 | End: 2025-05-31 | Stop reason: HOSPADM

## 2025-05-31 RX ORDER — ETOMIDATE 2 MG/ML
INJECTION INTRAVENOUS
Status: COMPLETED
Start: 2025-05-31 | End: 2025-05-31

## 2025-05-31 RX ORDER — THIAMINE HYDROCHLORIDE 100 MG/ML
100 INJECTION, SOLUTION INTRAMUSCULAR; INTRAVENOUS DAILY
Status: DISCONTINUED | OUTPATIENT
Start: 2025-06-01 | End: 2025-06-02

## 2025-05-31 RX ORDER — LEVETIRACETAM 500 MG/5ML
500 INJECTION, SOLUTION, CONCENTRATE INTRAVENOUS EVERY 12 HOURS
Status: DISCONTINUED | OUTPATIENT
Start: 2025-06-01 | End: 2025-06-06 | Stop reason: SDUPTHER

## 2025-05-31 RX ADMIN — LEVETIRACETAM 2000 MG: 100 INJECTION INTRAVENOUS at 18:52

## 2025-05-31 RX ADMIN — MIDAZOLAM IN SODIUM CHLORIDE 4 MG/HR: 1 INJECTION INTRAVENOUS at 19:09

## 2025-05-31 RX ADMIN — MIDAZOLAM 5 MG: 5 INJECTION INTRAMUSCULAR; INTRAVENOUS at 17:00

## 2025-05-31 RX ADMIN — SODIUM CHLORIDE 300 ML/HR: 3 INJECTION, SOLUTION INTRAVENOUS at 18:18

## 2025-05-31 RX ADMIN — SODIUM CHLORIDE, PRESERVATIVE FREE 10 ML: 5 INJECTION INTRAVENOUS at 21:50

## 2025-05-31 RX ADMIN — PANTOPRAZOLE SODIUM 40 MG: 40 INJECTION, POWDER, LYOPHILIZED, FOR SOLUTION INTRAVENOUS at 18:41

## 2025-05-31 RX ADMIN — PROPOFOL 25 MCG/KG/MIN: 10 INJECTION, EMULSION INTRAVENOUS at 21:49

## 2025-05-31 RX ADMIN — LORAZEPAM 1 MG: 2 INJECTION INTRAMUSCULAR; INTRAVENOUS at 22:51

## 2025-05-31 RX ADMIN — MAGNESIUM SULFATE HEPTAHYDRATE 2000 MG: 40 INJECTION, SOLUTION INTRAVENOUS at 18:37

## 2025-05-31 RX ADMIN — LORAZEPAM 2 MG: 2 INJECTION, SOLUTION INTRAMUSCULAR; INTRAVENOUS at 16:56

## 2025-05-31 RX ADMIN — SODIUM CHLORIDE: 0.9 INJECTION, SOLUTION INTRAVENOUS at 23:03

## 2025-05-31 RX ADMIN — ETOMIDATE 40 MG: 2 INJECTION, SOLUTION INTRAVENOUS at 17:00

## 2025-05-31 RX ADMIN — POTASSIUM CHLORIDE 10 MEQ: 7.45 INJECTION INTRAVENOUS at 18:39

## 2025-05-31 RX ADMIN — LEVETIRACETAM 2500 MG: 100 INJECTION INTRAVENOUS at 21:45

## 2025-05-31 RX ADMIN — POTASSIUM CHLORIDE 10 MEQ: 10 INJECTION, SOLUTION INTRAVENOUS at 23:04

## 2025-05-31 RX ADMIN — MIDAZOLAM IN SODIUM CHLORIDE 1 MG/HR: 1 INJECTION INTRAVENOUS at 17:26

## 2025-05-31 RX ADMIN — MAGNESIUM SULFATE HEPTAHYDRATE 2000 MG: 40 INJECTION, SOLUTION INTRAVENOUS at 23:04

## 2025-05-31 RX ADMIN — LORAZEPAM 4 MG: 2 INJECTION INTRAMUSCULAR; INTRAVENOUS at 20:46

## 2025-05-31 RX ADMIN — THIAMINE HYDROCHLORIDE 100 MG: 100 INJECTION, SOLUTION INTRAMUSCULAR; INTRAVENOUS at 19:00

## 2025-05-31 RX ADMIN — ROCURONIUM BROMIDE 50 MG: 10 INJECTION, SOLUTION INTRAVENOUS at 17:00

## 2025-05-31 RX ADMIN — DEXMEDETOMIDINE HYDROCHLORIDE 0.2 MCG/KG/HR: 400 INJECTION, SOLUTION INTRAVENOUS at 21:49

## 2025-05-31 RX ADMIN — DESMOPRESSIN ACETATE 0.52 MCG: 4 INJECTION, SOLUTION INTRAVENOUS; SUBCUTANEOUS at 23:59

## 2025-05-31 ASSESSMENT — PULMONARY FUNCTION TESTS
PIF_VALUE: 15
PIF_VALUE: 16
PIF_VALUE: 17
PIF_VALUE: 15
PIF_VALUE: 15
PIF_VALUE: 16
PIF_VALUE: 15
PIF_VALUE: 15
PIF_VALUE: 16
PIF_VALUE: 15
PIF_VALUE: 16
PIF_VALUE: 18
PIF_VALUE: 16
PIF_VALUE: 14
PIF_VALUE: 17
PIF_VALUE: 18

## 2025-05-31 NOTE — ED NOTES
5mg Versed bolus given from bag per verbal order Dr. Gillespie for active seizure.Yin Hidalgo RN

## 2025-05-31 NOTE — PROCEDURES
PROCEDURE NOTE  Date: 5/31/2025   Name: Jaya Sherwood  YOB: 1881    Central Line    Date/Time: 5/31/2025 6:33 PM    Performed by: Ken Francisco APRN - CNP  Authorized by: Arthur Gillespie MD    Consent:     Consent obtained:  Emergent situation    Risks discussed:  Arterial puncture, incorrect placement and infection    Alternatives discussed:  No treatment and delayed treatment  Universal protocol:     Patient identity confirmed:  Arm band  Pre-procedure details:     Indication(s): central venous access      Hand hygiene: Hand hygiene performed prior to insertion      Sterile barrier technique: All elements of maximal sterile technique followed      Skin preparation:  Chlorhexidine    Skin preparation agent: Skin preparation agent completely dried prior to procedure    Anesthesia:     Anesthesia method:  Local infiltration    Local anesthetic:  Lidocaine 1% w/o epi  Procedure details:     Location:  R femoral    Patient position:  Supine    Procedural supplies:  Triple lumen    Landmarks identified: yes      Ultrasound guidance: yes      Ultrasound guidance timing: prior to insertion and real time      Sterile ultrasound techniques: Sterile gel and sterile probe covers were used      Number of attempts:  1    Successful placement: yes    Post-procedure details:     Post-procedure:  Dressing applied and line sutured    Assessment:  Blood return through all ports    Procedure completion:  Tolerated well, no immediate complications

## 2025-05-31 NOTE — ED NOTES
Pt to [facility] via  [employer]. Pt was previously a \"Jay Jo\". Pt was identified by charge RN prior to leaving. Face sheet for Jaya Sherwood sent with pt to [facility] for family notification. Pt's belongings of clothes and cell phone sent with aircare. Report called to Angelique ECHAVARRIA at [facility].Yin Hidalgo RN

## 2025-05-31 NOTE — ED NOTES
Patient started seizing with writer and another RN at bedside. Provider came in, charge RN and Ken NP put in IO. 2mg ativan given via IO per verbal order. Patient continuing to decline, being taken to room 1 for intubation

## 2025-05-31 NOTE — PLAN OF CARE
[facility] Hospitalist Transfer accept note  Transfer center PS received  Case reviewed     Reason for Transfer:  Addy Alvarado Xxmaclily 89 y.o. male unidentified male patient arrived to [place] via EMS due to concerns for seizure. Per EMS report patient apparently called EMS himself reporting concerns for alcohol withdrawal. Patient unresponsive initially on EMS arrival with some recovery en route and overall concerning for post-ictal state. He then had multiple seizures in the ED despite Ativan administration concerning for status epilepticus and ultimately intubated and started on Versed gtt. His labs are primarily significant for severe hyponatremia with Na of 115 and has lactic acidosis with initial lactate of ~14. Imaging significant for R frontal SDH. Neuro contacted and agreed with transfer to [facility] for cvEEG.    Consults needed on arrival: Neurology, Neurosurgery, Nephrology      Patient has been accepted for transfer to [facility].   Once patient arrive please page ON CALL HOSPITALIST so patient can be seen.   If unable to reach physician on [employer] please call hospitalist phone [phone])     PCP: No primary care provider on file.     Thanks  Ronny An MD  Hospitalist

## 2025-05-31 NOTE — ED NOTES
Another RN trying for IV access. Patient continuing to fight staff, pulling off cords. Patient still not answering questions.

## 2025-05-31 NOTE — ED NOTES
Pt moved from room 8 to room 1, unable to protect airway. Dr. Gillespie at bedside for intubation. Pt actively seizing 5mg versed given 1700. Additional 5mg versed given for a total of 10mg Versed. 1702 20mg Etomidate given, 100mg Rocuronium given. RT at bedside for intubation. Size 8 ET tube placed, 25 at lip. CXR order placed for tube verification. Yin Hidalgo RN

## 2025-05-31 NOTE — ED NOTES
Provider at bedside assessing patient upon arrival. Seizure pads applied, suction hooked up if needed. Bed alarm in place and plugged in due to high fall risk

## 2025-05-31 NOTE — ED PROVIDER NOTES
Emergency Department Provider Note  Location: [facility] EMERGENCY DEPARTMENT  5/31/2025     Patient Identification  Jaya Sherwood is a 49 y.o. male    Chief Complaint  Altered Mental Status (Patient called EMS for poss alcohol withdrawal. Patient not answering questions in triage, EMS states possible seizure. )          HPI  (History provided by EMS)  This patient is a middle-age male unclear exact age Julius Jo on arrival presents by EMS for altered mental status and concern for alcohol withdrawal and seizure.  Per EMS report they were called by the patient with concerns for alcohol withdrawal.  They entered the man's residence and found beer cans all over and [employer] paraphernalia.  Patient was verbal on the EMS call but was found unresponsive on their arrival.  Fingerstick glucose in the 100s.  En route EMS reports that his mental status has improved he is now localizing to pain and making eye contact with verbal stimuli.  Concern for postictal state.  Patient is altered on arrival here he is a poor historian    Just after arrival on initial evaluation patient developed tonic-clonic seizure and received 2 mg of Ativan via IO.  This did appear to halt seizure activity and his pupils were sluggishly reactive and he had purposeful movements however within 10 minutes had another tonic clonic seizure.  He received 5 mg of Versed as we are on a critical Ativan shortage.  He was moved to our resuscitation bay and due to failure to protect his airway and concern for aspiration, he was intubated      Nursing Notes were all reviewed and agreed with, or any disagreements were addressed in the HPI:  Allergies: Not on File    Past medical history:  has no past medical history on file.    Past surgical history:  has no past surgical history on file.    Home medications:   Prior to Admission medications    Not on File       Social history:      Family history:  No family history on file.          Exam  ED Triage Vitals  MAR action: New Bag - by MP HERNANDEZ on 05/31/25 at 1839 ÓSCAR CHÁVEZ    05/31/25 1745 05/31/25 1743  pantoprazole (PROTONIX) injection 40 mg  ONCE         Last MAR action: Given - by MP HERNANDEZ on 05/31/25 at 1841 ÓSCAR CHÁVEZ    05/31/25 1730 05/31/25 1727  3% sodium chloride (HYPERTONIC) IV infusion  EVERY 20 MIN         Ordered ÓSCAR CHÁVEZ    05/31/25 1730 05/31/25 1729  magnesium sulfate 2000 mg in 50 mL IVPB premix  ONCE         Last MAR action: New Bag - by MP HERNANDEZ on 05/31/25 at 1837 ÓSCAR CHÁVEZ    05/31/25 1715 05/31/25 1711  levETIRAcetam (KEPPRA) injection 2,000 mg  Once         Last MAR action: Given - by MP HERNANDEZ on 05/31/25 at 1852 ÓSCAR CHÁVEZ    05/31/25 1715 05/31/25 1712  midazolam (VERSED) 100mg/100mL in NS infusion  CONTINUOUS        Question Answer Comment   Titrate Infusion? Yes    Initial Infusion Dose: 2 mg/hr    Goal of Therapy is: RASS of 0 to -1    Contact Provider if: New onset SBP less than 90 mmHg    Contact Provider if: Patient is receiving the maximum dose and is not achieving the goal of therapy        Last MAR action: Rate/Dose Change - by MP HERNANDEZ on 05/31/25 at 1837 ÓSCAR CHÁVEZ    05/31/25 1659 05/31/25 1659  midazolam (VERSED) 5 MG/ML injection        Note to Pharmacy: Jaleel Malone: cabinet override    Last MAR action: Given - by MP HERNANDEZ on 05/31/25 at 1700     05/31/25 1656 05/31/25 1656  midazolam (VERSED) 5 MG/ML injection        Note to Pharmacy: Jaleel Malone: cabinet override    Last MAR action: Given - by MP HERNANDEZ on 05/31/25 at 1700     05/31/25 1655 05/31/25 1655  rocuronium (ZEMURON) 50 MG/5ML injection        Note to Pharmacy: Mp Hernandez: cabinet override    Last MAR action: Given - by MP HERNANDEZ D on 05/31/25 at 1700     05/31/25 1654 05/31/25 1654  etomidate (AMIDATE) 2 MG/ML injection        Note to Pharmacy: Mp Hernandez: cabinet override    Last MAR action: Given - by MARY

## 2025-06-01 ENCOUNTER — APPOINTMENT (OUTPATIENT)
Dept: GENERAL RADIOLOGY | Age: 50
DRG: 082 | End: 2025-06-01
Attending: STUDENT IN AN ORGANIZED HEALTH CARE EDUCATION/TRAINING PROGRAM

## 2025-06-01 PROBLEM — R56.9 SEIZURE (HCC): Status: ACTIVE | Noted: 2025-06-01

## 2025-06-01 LAB
ALBUMIN SERPL-MCNC: 3.7 G/DL (ref 3.4–5)
ALBUMIN SERPL-MCNC: 3.7 G/DL (ref 3.4–5)
AMORPH SED URNS QL MICRO: NORMAL /HPF
AMORPH SED URNS QL MICRO: NORMAL /HPF
ANION GAP SERPL CALCULATED.3IONS-SCNC: 12 MMOL/L (ref 3–16)
ANION GAP SERPL CALCULATED.3IONS-SCNC: 12 MMOL/L (ref 3–16)
BACTERIA URNS QL MICRO: ABNORMAL /HPF
BACTERIA URNS QL MICRO: ABNORMAL /HPF
BASOPHILS # BLD: 0 K/UL (ref 0–0.2)
BASOPHILS # BLD: 0 K/UL (ref 0–0.2)
BASOPHILS NFR BLD: 0.8 %
BASOPHILS NFR BLD: 0.8 %
BILIRUB UR QL STRIP.AUTO: NEGATIVE
BUN SERPL-MCNC: 4 MG/DL (ref 7–20)
BUN SERPL-MCNC: 4 MG/DL (ref 7–20)
CALCIUM SERPL-MCNC: 8.3 MG/DL (ref 8.3–10.6)
CALCIUM SERPL-MCNC: 8.3 MG/DL (ref 8.3–10.6)
CHLORIDE SERPL-SCNC: 86 MMOL/L (ref 99–110)
CHLORIDE SERPL-SCNC: 86 MMOL/L (ref 99–110)
CLARITY UR: CLEAR
CO2 SERPL-SCNC: 24 MMOL/L (ref 21–32)
CO2 SERPL-SCNC: 24 MMOL/L (ref 21–32)
COLOR UR: YELLOW
CREAT SERPL-MCNC: 0.7 MG/DL (ref 0.9–1.3)
CREAT SERPL-MCNC: 0.7 MG/DL (ref 0.9–1.3)
DEPRECATED RDW RBC AUTO: 14.8 % (ref 12.4–15.4)
DEPRECATED RDW RBC AUTO: 14.8 % (ref 12.4–15.4)
EKG ATRIAL RATE: 105 BPM
EKG DIAGNOSIS: NORMAL
EKG P AXIS: 53 DEGREES
EKG P-R INTERVAL: 144 MS
EKG Q-T INTERVAL: 412 MS
EKG QRS DURATION: 88 MS
EKG QTC CALCULATION (BAZETT): 544 MS
EKG R AXIS: 58 DEGREES
EKG T AXIS: 68 DEGREES
EKG VENTRICULAR RATE: 105 BPM
EOSINOPHIL # BLD: 0 K/UL (ref 0–0.6)
EOSINOPHIL # BLD: 0 K/UL (ref 0–0.6)
EOSINOPHIL NFR BLD: 0.2 %
EOSINOPHIL NFR BLD: 0.2 %
EPI CELLS #/AREA URNS HPF: ABNORMAL /HPF (ref 0–5)
EPI CELLS #/AREA URNS HPF: ABNORMAL /HPF (ref 0–5)
GFR SERPLBLD CREATININE-BSD FMLA CKD-EPI: >90 ML/MIN/{1.73_M2}
GFR SERPLBLD CREATININE-BSD FMLA CKD-EPI: >90 ML/MIN/{1.73_M2}
GLUCOSE SERPL-MCNC: 108 MG/DL (ref 70–99)
GLUCOSE SERPL-MCNC: 108 MG/DL (ref 70–99)
GLUCOSE UR STRIP.AUTO-MCNC: NEGATIVE MG/DL
HCT VFR BLD AUTO: 30.8 % (ref 40.5–52.5)
HCT VFR BLD AUTO: 30.8 % (ref 40.5–52.5)
HGB BLD-MCNC: 11.3 G/DL (ref 13.5–17.5)
HGB BLD-MCNC: 11.3 G/DL (ref 13.5–17.5)
HGB UR QL STRIP.AUTO: NEGATIVE
KETONES UR STRIP.AUTO-MCNC: NEGATIVE MG/DL
LEUKOCYTE ESTERASE UR QL STRIP.AUTO: ABNORMAL
LEUKOCYTE ESTERASE UR QL STRIP.AUTO: ABNORMAL
LEUKOCYTE ESTERASE UR QL STRIP.AUTO: NEGATIVE
LEUKOCYTE ESTERASE UR QL STRIP.AUTO: NEGATIVE
LYMPHOCYTES # BLD: 0.3 K/UL (ref 1–5.1)
LYMPHOCYTES # BLD: 0.3 K/UL (ref 1–5.1)
LYMPHOCYTES NFR BLD: 8.4 %
LYMPHOCYTES NFR BLD: 8.4 %
MAGNESIUM SERPL-MCNC: 2.61 MG/DL (ref 1.8–2.4)
MAGNESIUM SERPL-MCNC: 2.61 MG/DL (ref 1.8–2.4)
MCH RBC QN AUTO: 30.4 PG (ref 26–34)
MCH RBC QN AUTO: 30.4 PG (ref 26–34)
MCHC RBC AUTO-ENTMCNC: 36.5 G/DL (ref 31–36)
MCHC RBC AUTO-ENTMCNC: 36.5 G/DL (ref 31–36)
MCV RBC AUTO: 83.3 FL (ref 80–100)
MCV RBC AUTO: 83.3 FL (ref 80–100)
MONOCYTES # BLD: 0.4 K/UL (ref 0–1.3)
MONOCYTES # BLD: 0.4 K/UL (ref 0–1.3)
MONOCYTES NFR BLD: 9.3 %
MONOCYTES NFR BLD: 9.3 %
NEUTROPHILS # BLD: 3.1 K/UL (ref 1.7–7.7)
NEUTROPHILS # BLD: 3.1 K/UL (ref 1.7–7.7)
NEUTROPHILS NFR BLD: 81.3 %
NEUTROPHILS NFR BLD: 81.3 %
NITRITE UR QL STRIP.AUTO: NEGATIVE
NITRITE UR QL STRIP.AUTO: NEGATIVE
NITRITE UR QL STRIP.AUTO: POSITIVE
NITRITE UR QL STRIP.AUTO: POSITIVE
OSMOLALITY SERPL: 263 MOSM/KG (ref 275–295)
OSMOLALITY SERPL: 263 MOSM/KG (ref 275–295)
PH UR STRIP.AUTO: 6.5 [PH] (ref 5–8)
PHOSPHATE SERPL-MCNC: 2.9 MG/DL (ref 2.5–4.9)
PHOSPHATE SERPL-MCNC: 2.9 MG/DL (ref 2.5–4.9)
PLATELET # BLD AUTO: 145 K/UL (ref 135–450)
PLATELET # BLD AUTO: 145 K/UL (ref 135–450)
PMV BLD AUTO: 7.4 FL (ref 5–10.5)
PMV BLD AUTO: 7.4 FL (ref 5–10.5)
POTASSIUM SERPL-SCNC: 3.1 MMOL/L (ref 3.5–5.1)
POTASSIUM SERPL-SCNC: 3.1 MMOL/L (ref 3.5–5.1)
POTASSIUM SERPL-SCNC: 3.7 MMOL/L (ref 3.5–5.1)
POTASSIUM SERPL-SCNC: 3.7 MMOL/L (ref 3.5–5.1)
POTASSIUM SERPL-SCNC: ABNORMAL MMOL/L (ref 3.5–5.1)
POTASSIUM SERPL-SCNC: ABNORMAL MMOL/L (ref 3.5–5.1)
PROT UR STRIP.AUTO-MCNC: ABNORMAL MG/DL
PROT UR STRIP.AUTO-MCNC: ABNORMAL MG/DL
PROT UR STRIP.AUTO-MCNC: NEGATIVE MG/DL
PROT UR STRIP.AUTO-MCNC: NEGATIVE MG/DL
RBC # BLD AUTO: 3.7 M/UL (ref 4.2–5.9)
RBC # BLD AUTO: 3.7 M/UL (ref 4.2–5.9)
RBC #/AREA URNS HPF: ABNORMAL /HPF (ref 0–4)
RBC #/AREA URNS HPF: ABNORMAL /HPF (ref 0–4)
RBC #/AREA URNS HPF: NORMAL /HPF (ref 0–4)
RBC #/AREA URNS HPF: NORMAL /HPF (ref 0–4)
SODIUM SERPL-SCNC: 119 MMOL/L (ref 136–145)
SODIUM SERPL-SCNC: 119 MMOL/L (ref 136–145)
SODIUM SERPL-SCNC: 122 MMOL/L (ref 136–145)
SODIUM SERPL-SCNC: 122 MMOL/L (ref 136–145)
SODIUM SERPL-SCNC: 123 MMOL/L (ref 136–145)
SODIUM SERPL-SCNC: 123 MMOL/L (ref 136–145)
SODIUM SERPL-SCNC: 124 MMOL/L (ref 136–145)
SODIUM SERPL-SCNC: 124 MMOL/L (ref 136–145)
SODIUM SERPL-SCNC: 125 MMOL/L (ref 136–145)
SODIUM SERPL-SCNC: 125 MMOL/L (ref 136–145)
SODIUM SERPL-SCNC: 129 MMOL/L (ref 136–145)
SODIUM SERPL-SCNC: 129 MMOL/L (ref 136–145)
SODIUM SERPL-SCNC: 130 MMOL/L (ref 136–145)
SODIUM SERPL-SCNC: 130 MMOL/L (ref 136–145)
SP GR UR STRIP.AUTO: 1.01 (ref 1–1.03)
SP GR UR STRIP.AUTO: 1.01 (ref 1–1.03)
SP GR UR STRIP.AUTO: 1.02 (ref 1–1.03)
SP GR UR STRIP.AUTO: 1.02 (ref 1–1.03)
TRIGL SERPL-MCNC: 86 MG/DL (ref 0–150)
TRIGL SERPL-MCNC: 86 MG/DL (ref 0–150)
UA COMPLETE W REFLEX CULTURE PNL UR: ABNORMAL
UA DIPSTICK W REFLEX MICRO PNL UR: YES
URN SPEC COLLECT METH UR: ABNORMAL
UROBILINOGEN UR STRIP-ACNC: 0.2 E.U./DL
WBC # BLD AUTO: 3.8 K/UL (ref 4–11)
WBC # BLD AUTO: 3.8 K/UL (ref 4–11)
WBC #/AREA URNS HPF: ABNORMAL /HPF (ref 0–5)
WBC #/AREA URNS HPF: ABNORMAL /HPF (ref 0–5)
WBC #/AREA URNS HPF: NORMAL /HPF (ref 0–5)
WBC #/AREA URNS HPF: NORMAL /HPF (ref 0–5)

## 2025-06-01 PROCEDURE — 6360000002 HC RX W HCPCS: Performed by: INTERNAL MEDICINE

## 2025-06-01 PROCEDURE — 95700 EEG CONT REC W/VID EEG TECH: CPT

## 2025-06-01 PROCEDURE — 6360000002 HC RX W HCPCS

## 2025-06-01 PROCEDURE — 74018 RADEX ABDOMEN 1 VIEW: CPT

## 2025-06-01 PROCEDURE — 36415 COLL VENOUS BLD VENIPUNCTURE: CPT

## 2025-06-01 PROCEDURE — 94003 VENT MGMT INPAT SUBQ DAY: CPT

## 2025-06-01 PROCEDURE — 99291 CRITICAL CARE FIRST HOUR: CPT | Performed by: INTERNAL MEDICINE

## 2025-06-01 PROCEDURE — 99233 SBSQ HOSP IP/OBS HIGH 50: CPT | Performed by: PSYCHIATRY & NEUROLOGY

## 2025-06-01 PROCEDURE — 84478 ASSAY OF TRIGLYCERIDES: CPT

## 2025-06-01 PROCEDURE — 94761 N-INVAS EAR/PLS OXIMETRY MLT: CPT

## 2025-06-01 PROCEDURE — 99233 SBSQ HOSP IP/OBS HIGH 50: CPT | Performed by: INTERNAL MEDICINE

## 2025-06-01 PROCEDURE — 84132 ASSAY OF SERUM POTASSIUM: CPT

## 2025-06-01 PROCEDURE — 2580000003 HC RX 258: Performed by: INTERNAL MEDICINE

## 2025-06-01 PROCEDURE — 51702 INSERT TEMP BLADDER CATH: CPT

## 2025-06-01 PROCEDURE — 2500000003 HC RX 250 WO HCPCS

## 2025-06-01 PROCEDURE — 2000000000 HC ICU R&B

## 2025-06-01 PROCEDURE — 6370000000 HC RX 637 (ALT 250 FOR IP)

## 2025-06-01 PROCEDURE — 06PYX3Z REMOVAL OF INFUSION DEVICE FROM LOWER VEIN, EXTERNAL APPROACH: ICD-10-PCS

## 2025-06-01 PROCEDURE — 85025 COMPLETE CBC W/AUTO DIFF WBC: CPT

## 2025-06-01 PROCEDURE — 51798 US URINE CAPACITY MEASURE: CPT

## 2025-06-01 PROCEDURE — 80069 RENAL FUNCTION PANEL: CPT

## 2025-06-01 PROCEDURE — 95713 VEEG 2-12 HR CONT MNTR: CPT

## 2025-06-01 PROCEDURE — 83735 ASSAY OF MAGNESIUM: CPT

## 2025-06-01 PROCEDURE — 84295 ASSAY OF SERUM SODIUM: CPT

## 2025-06-01 PROCEDURE — 81001 URINALYSIS AUTO W/SCOPE: CPT

## 2025-06-01 PROCEDURE — 2700000000 HC OXYGEN THERAPY PER DAY

## 2025-06-01 PROCEDURE — 2580000003 HC RX 258

## 2025-06-01 RX ORDER — POTASSIUM CHLORIDE 29.8 MG/ML
20 INJECTION INTRAVENOUS
Status: DISCONTINUED | OUTPATIENT
Start: 2025-06-01 | End: 2025-06-01

## 2025-06-01 RX ORDER — 0.9 % SODIUM CHLORIDE 0.9 %
500 INTRAVENOUS SOLUTION INTRAVENOUS ONCE
Status: COMPLETED | OUTPATIENT
Start: 2025-06-01 | End: 2025-06-01

## 2025-06-01 RX ORDER — ESCITALOPRAM OXALATE 20 MG/1
20 TABLET ORAL DAILY
Status: DISCONTINUED | OUTPATIENT
Start: 2025-06-01 | End: 2025-06-13 | Stop reason: HOSPADM

## 2025-06-01 RX ORDER — DEXTROSE MONOHYDRATE 50 MG/ML
INJECTION, SOLUTION INTRAVENOUS CONTINUOUS
Status: ACTIVE | OUTPATIENT
Start: 2025-06-01 | End: 2025-06-01

## 2025-06-01 RX ORDER — FOLIC ACID 1 MG/1
1 TABLET ORAL DAILY
Status: DISCONTINUED | OUTPATIENT
Start: 2025-06-01 | End: 2025-06-13 | Stop reason: HOSPADM

## 2025-06-01 RX ORDER — LORAZEPAM 2 MG/ML
2 INJECTION INTRAMUSCULAR EVERY 6 HOURS
Status: DISCONTINUED | OUTPATIENT
Start: 2025-06-01 | End: 2025-06-01

## 2025-06-01 RX ORDER — DESMOPRESSIN ACETATE 4 UG/ML
0.5 INJECTION, SOLUTION INTRAVENOUS; SUBCUTANEOUS ONCE
Status: COMPLETED | OUTPATIENT
Start: 2025-06-01 | End: 2025-06-01

## 2025-06-01 RX ORDER — DESMOPRESSIN ACETATE 4 UG/ML
1 INJECTION, SOLUTION INTRAVENOUS; SUBCUTANEOUS ONCE
Status: COMPLETED | OUTPATIENT
Start: 2025-06-01 | End: 2025-06-01

## 2025-06-01 RX ORDER — DEXTROSE MONOHYDRATE 50 MG/ML
INJECTION, SOLUTION INTRAVENOUS CONTINUOUS
Status: DISCONTINUED | OUTPATIENT
Start: 2025-06-01 | End: 2025-06-02

## 2025-06-01 RX ORDER — LORAZEPAM 2 MG/ML
2 INJECTION INTRAMUSCULAR EVERY 6 HOURS
Status: DISCONTINUED | OUTPATIENT
Start: 2025-06-01 | End: 2025-06-04

## 2025-06-01 RX ORDER — AMLODIPINE BESYLATE 5 MG/1
5 TABLET ORAL DAILY
Status: DISCONTINUED | OUTPATIENT
Start: 2025-06-01 | End: 2025-06-11

## 2025-06-01 RX ORDER — DESMOPRESSIN ACETATE 4 UG/ML
0.5 INJECTION, SOLUTION INTRAVENOUS; SUBCUTANEOUS 2 TIMES DAILY
Status: DISCONTINUED | OUTPATIENT
Start: 2025-06-01 | End: 2025-06-01

## 2025-06-01 RX ADMIN — POTASSIUM CHLORIDE 10 MEQ: 10 INJECTION, SOLUTION INTRAVENOUS at 00:05

## 2025-06-01 RX ADMIN — DEXMEDETOMIDINE HYDROCHLORIDE 0.4 MCG/KG/HR: 400 INJECTION, SOLUTION INTRAVENOUS at 18:51

## 2025-06-01 RX ADMIN — LORAZEPAM 2 MG: 2 INJECTION INTRAMUSCULAR; INTRAVENOUS at 12:05

## 2025-06-01 RX ADMIN — LEVETIRACETAM 500 MG: 100 INJECTION INTRAVENOUS at 08:05

## 2025-06-01 RX ADMIN — POTASSIUM CHLORIDE 20 MEQ: 400 INJECTION, SOLUTION INTRAVENOUS at 10:54

## 2025-06-01 RX ADMIN — DEXMEDETOMIDINE HYDROCHLORIDE 0.6 MCG/KG/HR: 400 INJECTION, SOLUTION INTRAVENOUS at 10:48

## 2025-06-01 RX ADMIN — LORAZEPAM 1 MG: 2 INJECTION INTRAMUSCULAR; INTRAVENOUS at 09:27

## 2025-06-01 RX ADMIN — FOLIC ACID 1 MG: 1 TABLET ORAL at 08:04

## 2025-06-01 RX ADMIN — LORAZEPAM 1 MG: 2 INJECTION INTRAMUSCULAR; INTRAVENOUS at 00:56

## 2025-06-01 RX ADMIN — LORAZEPAM 2 MG: 2 INJECTION INTRAMUSCULAR; INTRAVENOUS at 17:26

## 2025-06-01 RX ADMIN — DEXTROSE: 5 SOLUTION INTRAVENOUS at 22:24

## 2025-06-01 RX ADMIN — SODIUM CHLORIDE, PRESERVATIVE FREE 10 ML: 5 INJECTION INTRAVENOUS at 20:02

## 2025-06-01 RX ADMIN — THIAMINE HYDROCHLORIDE 100 MG: 100 INJECTION, SOLUTION INTRAMUSCULAR; INTRAVENOUS at 08:04

## 2025-06-01 RX ADMIN — DESMOPRESSIN ACETATE 1 MCG: 4 INJECTION, SOLUTION INTRAVENOUS; SUBCUTANEOUS at 22:28

## 2025-06-01 RX ADMIN — PROPOFOL 10 MCG/KG/MIN: 10 INJECTION, EMULSION INTRAVENOUS at 16:08

## 2025-06-01 RX ADMIN — SODIUM CHLORIDE, PRESERVATIVE FREE 10 ML: 5 INJECTION INTRAVENOUS at 08:17

## 2025-06-01 RX ADMIN — SODIUM CHLORIDE 500 ML: 0.9 INJECTION, SOLUTION INTRAVENOUS at 09:39

## 2025-06-01 RX ADMIN — DEXTROSE: 5 SOLUTION INTRAVENOUS at 17:46

## 2025-06-01 RX ADMIN — DESMOPRESSIN ACETATE 0.52 MCG: 4 INJECTION, SOLUTION INTRAVENOUS; SUBCUTANEOUS at 08:06

## 2025-06-01 RX ADMIN — LEVETIRACETAM 500 MG: 100 INJECTION INTRAVENOUS at 20:41

## 2025-06-01 RX ADMIN — DESMOPRESSIN ACETATE 0.52 MCG: 4 INJECTION, SOLUTION INTRAVENOUS; SUBCUTANEOUS at 20:06

## 2025-06-01 ASSESSMENT — PULMONARY FUNCTION TESTS
PIF_VALUE: 15
PIF_VALUE: 16
PIF_VALUE: 17
PIF_VALUE: 16
PIF_VALUE: 15
PIF_VALUE: 15
PIF_VALUE: 16
PIF_VALUE: 15
PIF_VALUE: 16
PIF_VALUE: 15
PIF_VALUE: 16
PIF_VALUE: 15
PIF_VALUE: 16
PIF_VALUE: 15
PIF_VALUE: 16
PIF_VALUE: 15

## 2025-06-01 NOTE — PROGRESS NOTES
Right femoral CVC removed per order. Pressure held for 15 minutes, no complications patient tolerated well. Occlusive dressing applied.

## 2025-06-01 NOTE — CONSULTS
ICU CONSULT       Hospital Day:   ICU Day:                                                          Code:Full Code  Admit Date: 5/31/2025  PCP: Evangelist Schulz DO                                  CC: Unresponsive    HISTORY OF PRESENT ILLNESS:     Mr. Evangelist Ortega is a 49 y.o. male with a medical hx significant for alcohol use disorder (20+yrs), HTN, depression otherwise as listed in the MHx table below, who presented unresponsive via EMS to [facility]. Transferred to [facility].     Per ED note and EMS run sheet, pt. Called 911 stating concern for possible alcohol withdrawal. When EMS entered residence he was found unresponsive surrounded by many empty beer cans. Mental status improved en route with localization of pain, spontaneous movement, and arousing to verbal stimuli.      Shortly after arrival to ED patient experienced tonic-clonic seizure receiving 2mg ativan with aborted seizure activity. Within 10 minutes experienced another tonic clonic seizure requiring 5mg versed x2 and versed gtt and subsequently intubated for airway protection.      Per chart review patient seen in ED 2/26/2025 for alcohol detoxification and reportedly at that time drinking approximately 24 beers a day. He was discharged on 3 day supply of librium. Multiple hospitalizations in the past for alcohol withdrawal most recently 2022 requiring precedex gtt. Withdrawals with hallucinations in past no reported seizures.     ED Course:  Afebrile 130/81, pulse (!) 105, resp. rate 20, SpO2 99%   AG 30 bicarb 13 pH 7.329 pCO2 26.9 Lactic 14.3 ethanol 72 mag 1.4 Na 115  ALT 36 AST 65   GCS 11 with tremor on exam  CT Head: R subdural hemorrhage with 3mm leftward midline shift likely acute/subacute given mixed attenuation. Fluid collection in the middle cranial fossa on the L likely to represent arachnoid cyst.  Nephro consulted and received 3% hypertonic saline prior to transfer to [facility]  Received 2mg ativan, 2g keppra, versed 5mg x2 -> versed  subdural hematoma. Previous ED visit 2/2025 pt. Had fall with R scalp hematoma would highly suspect traumatic SDH given frequent intoxication. Ammonia non-elevated. No azotemia. No suspected underlying infection.  - NCC and NSGY consulted  - q1hr neuro checks  - stable 6hr scan  - Keppra 2.5g x1 followed by 500mg BID  - UA  - UDS (received benzos prior to collection)  - Ethanol .072%  -   - No AC/antiplatelet   - SBP < 160  - PLT goal > 100K      Reason for intubation: Airway protection status epilepticus  Vent Day: 0  Vent Settings:  Vent Mode: AC/PRVC Resp Rate (Set): 16 bpm/Vt (Set, mL): 500 mL/ /FiO2 : 50 %  Sedated on Propofol and precedex gtt.      Alcohol withdrawal  AUD 20+ years  Tachycardic and hypertensive on presentation with diaphoresis and bilateral tremor. Unknown time of last drink.  ED 2/2025 pt reported drinking 18-24 beers daily. Multiple admissions for withdrawal in past few years requiring precedex gtt. No previous withdrawal seizures.  - CIWA w/ ativan   - On propofol and precedex. Wean propofol as tolerated  - IV thiamine  - folic acid  - AST 65 ALT 36 INR .96     Severe Hyponatremia   115 -> 118 -> 123 -> 124 ddavp given  Received 3% saline now overcorrecting requiring DDAVP  Consider beer potomania given consumption of 18+ beers daily. SIADH 2/2 SDH also possible but Urine Na < 20 awaiting serum osm and urine osm. Appears euvolemic on exam.  - Nephro consulted   - goal sodium 6/1 1700 121-123   - q3hr Na  - max correction 6-8mEq in 24 hours  - caution with hypotonic fluids in setting of SDH     Pure AGMA (resolving)  AG 30 pH 7.329 pCO2 26.9 bicarb 13 delta ratio 1.6. Salicylate negative. Compensating appropriately prior to intubation.  Likely due to lactic acidosis improved 14.3 -> 2.7. Ethanol level .072%.   - trend lactic until normalization   - RFP      Prolonged qTc (544)  Likely secondary to hypokalemia and hypomagnesemia   - avoid QT prolonging meds      HTN  Hold home

## 2025-06-01 NOTE — CONSULTS
Department of Neurosurgery  Attending Consult Note        Reason for Consult:  Subdural hematoma    CHIEF COMPLAINT:     HISTORY OF PRESENT ILLNESS:                The patient is a 49 y.o. y.o. male who presents with alcohol use disorder who presented with likely acute alcohol withdrawal with seizures and severe hyponatremia. CT scan shows a small right greater than left mixed density subdural hematomas. Admitted to [facility] for further care. Currently patient is intubated and sedated. Unable to obtain ROS. No family at bedside.    Past Medical History:    History reviewed. No pertinent past medical history.  Past Surgical History:    No past surgical history on file.  Current Medications:   Current Facility-Administered Medications: folic acid (FOLVITE) tablet 1 mg, 1 mg, Oral, Daily  [Held by provider] escitalopram (LEXAPRO) tablet 20 mg, 20 mg, Oral, Daily  [Held by provider] amLODIPine (NORVASC) tablet 5 mg, 5 mg, Oral, Daily  sodium chloride 0.9 % bolus 500 mL, 500 mL, IntraVENous, Once  LORazepam (ATIVAN) injection 2 mg, 2 mg, IntraVENous, Q6H  potassium chloride 20 mEq/50 mL IVPB (Central Line), 20 mEq, IntraVENous, Q1H  sodium chloride flush 0.9 % injection 5-40 mL, 5-40 mL, IntraVENous, 2 times per day  sodium chloride flush 0.9 % injection 5-40 mL, 5-40 mL, IntraVENous, PRN  0.9 % sodium chloride infusion, , IntraVENous, PRN  propofol infusion, 5-50 mcg/kg/min, IntraVENous, Continuous  labetalol (NORMODYNE;TRANDATE) injection 10 mg, 10 mg, IntraVENous, Q4H PRN  thiamine (B-1) injection 100 mg, 100 mg, IntraVENous, Daily  levETIRAcetam (KEPPRA) injection 500 mg, 500 mg, IntraVENous, Q12H  dexmedeTOMIDine (PRECEDEX) 400 mcg in sodium chloride 0.9 % 100 mL infusion, 0.1-1.5 mcg/kg/hr, IntraVENous, Continuous  LORazepam (ATIVAN) tablet 1 mg, 1 mg, Oral, Q1H PRN **OR** LORazepam (ATIVAN) injection 1 mg, 1 mg, IntraVENous, Q1H PRN **OR** LORazepam (ATIVAN) tablet 2 mg, 2 mg, Oral, Q1H PRN **OR** LORazepam (ATIVAN)

## 2025-06-01 NOTE — PROGRESS NOTES
V2.0    [facility] Progress Note      Name:  Evangelist Ortega /Age/Sex: 1975  (49 y.o. male)   MRN & CSN:  [id number] & [id number] Encounter Date/Time: 2025 10:33 AM EDT   Location:  [id number]- PCP: Evangelist Schulz DO     Attending:Ronny An MD       Hospital Day: 2    Assessment and Recommendations   Evangelist Ortega is a 49 y.o. male with pmh of alcohol use disorder, HTN, Mood Disorder who presents with Status epilepticus (HCC)      Status epilepticus  R Subdural hematoma w/ 3mm midline shift  - NCC and NSGY consulted  - q1hr neuro checks  - Keppra 500mg BID  - No AC/antiplatelet   - SBP < 160  - PLT goal > 100K        Alcohol withdrawal  - CIWA w/ ativan   - On propofol and precedex. Wean propofol per Critical Care  - IV thiamine  - folic acid  - AST 65 ALT 36 INR .96     Severe Hyponatremia   - Nephro consulted   - goal sodium 121-123   - q3hr Na  - max correction 6-8mEq in 24 hours  - caution with hypotonic fluids in setting of SDH     Prolonged qTc (544)  Likely secondary to hypokalemia and hypomagnesemia   - avoid QT prolonging meds      HTN  Hold home amlodipine 5mg resume as BP allows     Depression   - hold home lexapro due to qTc prolongation       Diet Diet NPO   DVT Prophylaxis [] Lovenox, []  Heparin, [] SCDs, [] Ambulation,  [] Eliquis, [] Xarelto  [] Coumadin   Code Status Full Code   Disposition From: Home  Expected Disposition: TBD  Estimated Date of Discharge: TBD  Patient requires continued admission due to ventilator   Surrogate Decision Maker/ POA  Mary Ortega, parent     Personally reviewed Lab Studies and Imaging       Subjective:     Chief Complaint: Seizures    49 y.o. male who presented to the ED on 2025 found unresponsive by EMS after receiving 911 call from patient stating he believed he was going into alcohol withdrawal. Intubated due to repeated seizures. Found to have severe hyponatremia and R SDH as well.    No acute events reported overnight. Remains in

## 2025-06-01 NOTE — H&P
ICU HISTORY & PHYSICAL       Admit Date:  5/31/2025                            Hospital Day: 0  ICU Day: 0      CC: Unresponsive    History obtained from:  chart review    SUBJECTIVE   HPI:  Mr. Evangelist Ortega is a 49 y.o. male with a medical hx significant for alcohol use disorder (20+yrs), HTN, depression otherwise as listed in the MHx table below, who presented unresponsive via EMS to [place] [facility]. Transferred to [facility].    Per ED note and EMS run sheet, pt. Called 911 stating concern for possible alcohol withdrawal. When EMS entered residence he was found unresponsive surrounded by many empty beer cans. Mental status improved en route with localization of pain, spontaneous movement, and arousing to verbal stimuli.     Shortly after arrival to ED patient experienced tonic-clonic seizure receiving 2mg ativan with aborted seizure activity. Within 10 minutes experienced another tonic clonic seizure requiring 5mg versed x2 and versed gtt and subsequently intubated for airway protection.     Per chart review patient seen in ED 2/26/2025 for alcohol detoxification and reportedly at that time drinking approximately 24 beers a day. He was discharged on 3 day supply of librium. Multiple hospitalizations in the past for alcohol withdrawal most recently 2022 requiring precedex gtt. Withdrawals with hallucinations in past no reported seizures.    ED Course:  Afebrile 130/81, pulse (!) 105, resp. rate 20, SpO2 99%   AG 30 bicarb 13 pH 7.329 pCO2 26.9 Lactic 14.3 ethanol 72 mag 1.4 Na 115  ALT 36 AST 65   GCS 11 with tremor on exam  CT Head: R subdural hemorrhage with 3mm leftward midline shift likely acute/subacute given mixed attenuation. Fluid collection in the middle cranial fossa on the L likely to represent arachnoid cyst.  Nephro consulted and received 3% hypertonic saline prior to transfer to [facility]  Received 2mg ativan, 2g keppra, versed 5mg x2 -> versed gtt prior to intubation.    Social History:   Housing:  followed by 500mg BID  - UA  - UDS (received benzos prior to collection)  - Ethanol .072%  -   - No AC/antiplatelet   - SBP < 160  - PLT goal > 100K     Reason for intubation: Airway protection status epilepticus  Vent Day: 0  Vent Settings:  Vent Mode: AC/PRVC Resp Rate (Set): 16 bpm/Vt (Set, mL): 500 mL/ /FiO2 : 50 %  Sedated on Propofol and precedex gtt.     Alcohol withdrawal  AUD 20+ years  Tachycardic and hypertensive on presentation with diaphoresis and bilateral tremor. Unknown time of last drink.  ED 2/2025 pt reported drinking 18-24 beers daily. Multiple admissions for withdrawal in past few years requiring precedex gtt. No previous withdrawal seizures.  - CIWA w/ ativan   - On propofol and precedex. Wean propofol as tolerated  - IV thiamine  - folic acid  - AST 65 ALT 36 INR .96    Severe Hyponatremia   115 -> 118 -> 123   Received 3% saline now overcorrecting requiring DDAVP  Consider beer potomania given consumption of 18+ beers daily. SIADH 2/2 SDH also possible but Urine Na < 20 awaiting serum osm and urine osm. Appears euvolemic on exam.  - Nephro consulted   - goal sodium 6/1 1700 121-123   - q3hr Na  - max correction 6-8mEq in 24 hours  - caution with hypotonic fluids in setting of SDH    Pure AGMA (resolving)  AG 30 pH 7.329 pCO2 26.9 bicarb 13 delta ratio 1.6. Salicylate negative. Compensating appropriately prior to intubation.  Likely due to lactic acidosis improved 14.3 -> 2.7. Ethanol level .072%.   - trend lactic until normalization   - RFP     Prolonged qTc (544)  Likely secondary to hypokalemia and hypomagnesemia   - avoid QT prolonging meds     HTN  Hold home amlodipine 5mg soft pressures    Depression   - hold home lexapro due to qTc prolongation     Glycemic goal:  140-180 mg/dL  LDAs:    Infusions: Propofol and precedex  Abx: None   Diet:  Diet NPO  GI PPx:  Protonix  Bowel Regimen: glycolax prn  DVT PPx: holding lovenox bleed risk      Code Status:  Full Code  Disposition:

## 2025-06-01 NOTE — PROGRESS NOTES
Pt was rapidly correcting Na ( 6 meq in a 6 hr)   Given ddavp low dose-discussed with neurosurgery- critical care. If needed-repeat and careful use of d5w

## 2025-06-01 NOTE — PROGRESS NOTES
Latest Reference Range & Units 06/01/25 09:55   Sodium 136 - 145 mmol/L 119 (LL)   Potassium 3.5 - 5.1 mmol/L 3.1 (L)   (LL): Data is critically low  (L): Data is abnormally low    ICU residents made aware of results below. See new orders.

## 2025-06-01 NOTE — PROGRESS NOTES
Current NIHSS 16 Pt scored on Intubated NIHSS    Nursing Core Measures for Stroke:   [x]   Education template documentation (STROKE/TIA). Select only risk factors that are applicable to patient when selecting risk factors.  [x]   Care Plan template documentation (Physiologic Instability - Neurosensory). Selecting this will add care plan rows to the flowsheet under the Neuro section of Head to Toe.  [x]   Verified Swallow Screen completed prior to PO intake of food, drink, medications.          Please verify correct medication route prior to administration for intubated patients, patients who can not swallow or have alternative routes of intake (NG, OG, NM), etc  [x]   VTE Prophylaxis: SCDs ordered/addressed; SCDs: On           (As a reminder, ASA, Plavix, and TPA/TNK are not VTE prophylaxis.)    Reviewed the Following Education with Patient and/or Family:   - Personalized risk factors for patient, along with changes, modifications that will help prevent stroke.  - Signs and Symptoms of Stroke: (Facial droop, weakness/numbness especially on one side, speech difficulty, sudden confusion, sudden loss of vision, sudden severe headache, sudden loss of balance or having difficulty walking, syncope, or seizure)  - How to activate EMS (911)   - Importance of Follow Up Appointments at Discharge   - Importance of Compliance with Medications Prescribed at Discharge  - Available community resources and stroke advocacy groups if needed    Patient and/or family member: with no evidence of learning.     Stroke Education booklet given to patient/family (or verified, if given already), which reviews above information. yes     Electronically signed by Liliana Zelaya RN on 6/1/2025 at 12:47 AM

## 2025-06-01 NOTE — PROGRESS NOTES
Neurology Progress Note    Patient: Evangelist Ortega MRN: [id number]    YOB: 1975  Age: 49 y.o.  Sex: male   Unit: [facility] ICU TOWER Room/Bed: 4512/4512-01 Location: [facility]'s Date: 6/1/2025  Date of Admission: 5/31/2025  7:59 PM  Admitting Physician: LINA MEDINA    Primary Care Physician: Evangelist Schulz DO          LOS: 1 day      ASSESSMENT & RECOMMENDATIONS     Assessment  50yo man with ETOH abuse presented to ER with encephalopathy, apparently found essentially unconscious by EMS after pt had initiated call to 911, and began to spontaneously improve until having a GTC-type seizure en route and then another in the ER for which he was given Ativan, Versed, Keppra 2000mg, intubated, and started on a Versed gtt but found to have an acute right frontal convexity SDH  Upon arrival, pt was having tremor in both arms but was able to follow commands, making at least that particular episode less likely to be seizure  This raises the question as to whether any of what was seen was seizure but, at the same time, what caused him to be essentially unconscious at the time EMS arrived is unclear  The SDH is not enough to have done that  It may actually be that he had a seizure, fell, and that is how he sustained the SDH  Alternatively, he fell, sustained the SDH, and then seized  Either way, acute management does not change  Although he has no exam largely because of sedation, cannot fully exclude ongoing seizure and should go on cvEEG    Recommendations  Continue Keppra 500mg bid  cvEEG, as ordered  Minimize sedation  Work to extubate at ICU Team discretion  No antiplatelets/anticoagulants; SCDs for DVT proph  Q1 neuro checks  SBP < 160  MRI brain, when able  Sodium as per Nephrology  ETOH withdrawal as per ICU/Hospitalist      SUBJECTIVE     Meeting patient for the first time. Initial consultation note was completed by Neurology CNP yesterday (5/31/25) evening, which I    Sodium, urine, random    Collection Time: 05/31/25  7:23 PM   Result Value Ref Range    Sodium, Ur 86 Not Established mmol/L   Renal Function Panel    Collection Time: 05/31/25  9:33 PM   Result Value Ref Range    Sodium 123 (L) 136 - 145 mmol/L    Potassium 3.6 3.5 - 5.1 mmol/L    Chloride 84 (L) 99 - 110 mmol/L    CO2 23 21 - 32 mmol/L    Anion Gap 16 3 - 16    Glucose 109 (H) 70 - 99 mg/dL    BUN <2 (L) 7 - 20 mg/dL    Creatinine 0.5 (L) 0.9 - 1.3 mg/dL    Est, Glom Filt Rate >90 >60    Calcium 8.2 (L) 8.3 - 10.6 mg/dL    Phosphorus 2.9 2.5 - 4.9 mg/dL    Albumin 4.1 3.4 - 5.0 g/dL   Magnesium    Collection Time: 05/31/25  9:33 PM   Result Value Ref Range    Magnesium 1.79 (L) 1.80 - 2.40 mg/dL   Lactic Acid    Collection Time: 05/31/25  9:33 PM   Result Value Ref Range    Lactic Acid 2.7 (H) 0.4 - 2.0 mmol/L   Protime-INR    Collection Time: 05/31/25  9:33 PM   Result Value Ref Range    Protime 13.0 11.9 - 14.9 sec    INR 0.96 0.85 - 1.15   APTT    Collection Time: 05/31/25  9:33 PM   Result Value Ref Range    aPTT 25.6 22.1 - 36.4 sec   Ammonia    Collection Time: 05/31/25  9:33 PM   Result Value Ref Range    Ammonia 24 16 - 60 umol/L   CBC with Auto Differential    Collection Time: 05/31/25  9:33 PM   Result Value Ref Range    WBC 4.5 4.0 - 11.0 K/uL    RBC 4.07 (L) 4.20 - 5.90 M/uL    Hemoglobin 12.0 (L) 13.5 - 17.5 g/dL    Hematocrit 33.5 (L) 40.5 - 52.5 %    MCV 82.3 80.0 - 100.0 fL    MCH 29.5 26.0 - 34.0 pg    MCHC 35.9 31.0 - 36.0 g/dL    RDW 14.8 12.4 - 15.4 %    Platelets 129 (L) 135 - 450 K/uL    MPV 6.5 5.0 - 10.5 fL    Neutrophils % 83.3 %    Lymphocytes % 7.9 %    Monocytes % 8.3 %    Eosinophils % 0.1 %    Basophils % 0.4 %    Neutrophils Absolute 3.8 1.7 - 7.7 K/uL    Lymphocytes Absolute 0.4 (L) 1.0 - 5.1 K/uL    Monocytes Absolute 0.4 0.0 - 1.3 K/uL    Eosinophils Absolute 0.0 0.0 - 0.6 K/uL    Basophils Absolute 0.0 0.0 - 0.2 K/uL   Basic Metabolic Panel w/ Reflex to MG    Collection Time:

## 2025-06-01 NOTE — PROGRESS NOTES
4 Eyes Skin Assessment     NAME:  Evangelist Ortega  YOB: 1975  MEDICAL RECORD NUMBER:  [id number]    The patient is being assessed for  Admission    I agree that at least one RN has performed a thorough Head to Toe Skin Assessment on the patient. ALL assessment sites listed below have been assessed.      Areas assessed by both nurses:    Head, Face, Ears, Shoulders, Back, Chest, Arms, Elbows, Hands, Sacrum. Buttock, Coccyx, Ischium, Legs. Feet and Heels, and Under Medical Devices         Does the Patient have a Wound? No noted wound(s)       Mateusz Prevention initiated by RN: Yes  Wound Care Orders initiated by RN: No    Pressure Injury (Stage 3,4, Unstageable, DTI, NWPT, and Complex wounds) if present, place Wound referral order by RN under : No    New Ostomies, if present place, Ostomy referral order under : No     Nurse 1 eSignature: Electronically signed by Liliana Zelaya RN on 6/1/25 at 12:46 AM EDT      Nurse 2 eSignature: Electronically signed by Moriah Awan RN on 6/1/25 at 5:28 AM EDT

## 2025-06-01 NOTE — PLAN OF CARE
Problem: Discharge Planning  Goal: Discharge to home or other facility with appropriate resources  Outcome: Progressing     Problem: Safety - Medical Restraint  Goal: Remains free of injury from restraints (Restraint for Interference with Medical Device)  Description: INTERVENTIONS:1. Determine that other, less restrictive measures have been tried or would not be effective before applying the restraint2. Evaluate the patient's condition at the time of restraint application3. Inform patient/family regarding the reason for restraint4. Q2H: Monitor safety, psychosocial status, comfort, nutrition and hydration  Outcome: Progressing  Flowsheets  Taken 6/1/2025 1600 by Jc Gaming RN  Remains free of injury from restraints (restraint for interference with medical device): Every 2 hours: Monitor safety, psychosocial status, comfort, nutrition and hydration  Taken 6/1/2025 1400 by Jc Gaming RN  Remains free of injury from restraints (restraint for interference with medical device): Every 2 hours: Monitor safety, psychosocial status, comfort, nutrition and hydration  Taken 6/1/2025 1200 by Jc Gaming RN  Remains free of injury from restraints (restraint for interference with medical device): Every 2 hours: Monitor safety, psychosocial status, comfort, nutrition and hydration  Taken 6/1/2025 1000 by Jc Gaming RN  Remains free of injury from restraints (restraint for interference with medical device): Every 2 hours: Monitor safety, psychosocial status, comfort, nutrition and hydration  Taken 6/1/2025 0800 by Jc Gaming RN  Remains free of injury from restraints (restraint for interference with medical device): Every 2 hours: Monitor safety, psychosocial status, comfort, nutrition and hydration  Taken 6/1/2025 0600 by Liliana Zelaya RN  Remains free of injury from restraints (restraint for interference with medical device):   Determine that other, less restrictive measures have been tried or would not be  intact  Description: 1.  Monitor for areas of redness and/or skin breakdown2.  Assess vascular access sites hourly3.  Every 4-6 hours minimum:  Change oxygen saturation probe site4.  Every 4-6 hours:  If on nasal continuous positive airway pressure, respiratory therapy assess nares and determine need for appliance change or resting period  Outcome: Progressing

## 2025-06-01 NOTE — PLAN OF CARE
Problem: Safety - Medical Restraint  Goal: Remains free of injury from restraints (Restraint for Interference with Medical Device)  Description: INTERVENTIONS:1. Determine that other, less restrictive measures have been tried or would not be effective before applying the restraint2. Evaluate the patient's condition at the time of restraint application3. Inform patient/family regarding the reason for restraint4. Q2H: Monitor safety, psychosocial status, comfort, nutrition and hydration  Outcome: Progressing  Flowsheets  Taken 6/1/2025 0000  Remains free of injury from restraints (restraint for interference with medical device):   Determine that other, less restrictive measures have been tried or would not be effective before applying the restraint   Evaluate the patient's condition at the time of restraint application   Inform patient/family regarding the reason for restraint   Every 2 hours: Monitor safety, psychosocial status, comfort, nutrition and hydration  Taken 5/31/2025 2200  Remains free of injury from restraints (restraint for interference with medical device):   Determine that other, less restrictive measures have been tried or would not be effective before applying the restraint   Evaluate the patient's condition at the time of restraint application   Inform patient/family regarding the reason for restraint   Every 2 hours: Monitor safety, psychosocial status, comfort, nutrition and hydration  Taken 5/31/2025 2023  Remains free of injury from restraints (restraint for interference with medical device):   Determine that other, less restrictive measures have been tried or would not be effective before applying the restraint   Evaluate the patient's condition at the time of restraint application   Inform patient/family regarding the reason for restraint   Every 2 hours: Monitor safety, psychosocial status, comfort, nutrition and hydration     Problem: Discharge Planning  Goal: Discharge to home or other  facility with appropriate resources  Outcome: Progressing     Problem: Safety - Adult  Goal: Free from fall injury  Outcome: Progressing     Problem: Neurosensory - Adult  Goal: Achieves stable or improved neurological status  Outcome: Progressing  Goal: Absence of seizures  Outcome: Progressing  Goal: Remains free of injury related to seizures activity  Outcome: Progressing  Goal: Achieves maximal functionality and self care  Outcome: Progressing     Problem: Respiratory - Adult  Goal: Achieves optimal ventilation and oxygenation  Outcome: Progressing     Problem: Cardiovascular - Adult  Goal: Maintains optimal cardiac output and hemodynamic stability  Outcome: Progressing  Goal: Absence of cardiac dysrhythmias or at baseline  Outcome: Progressing     Problem: Skin/Tissue Integrity - Adult  Goal: Skin integrity remains intact  Outcome: Progressing  Goal: Incisions, wounds, or drain sites healing without S/S of infection  Outcome: Progressing  Goal: Oral mucous membranes remain intact  Outcome: Progressing     Problem: Musculoskeletal - Adult  Goal: Return mobility to safest level of function  Outcome: Progressing  Goal: Maintain proper alignment of affected body part  Outcome: Progressing  Goal: Return ADL status to a safe level of function  Outcome: Progressing     Problem: Gastrointestinal - Adult  Goal: Minimal or absence of nausea and vomiting  Outcome: Progressing  Goal: Maintains or returns to baseline bowel function  Outcome: Progressing  Goal: Maintains adequate nutritional intake  Outcome: Progressing  Goal: Establish and maintain optimal ostomy function  Outcome: Progressing     Problem: Genitourinary - Adult  Goal: Absence of urinary retention  Outcome: Progressing  Goal: Urinary catheter remains patent  Outcome: Progressing

## 2025-06-01 NOTE — PROGRESS NOTES
Nephrology Consult Note                                                                                                                                                                                                                                                                                                                                                               Office : [phone]     Fax :[phone]    Patient's Name: Evangelist Ortega  8:40 AM  6/1/2025    Reason for Consult:  Hyponatremia   Requesting Physician:  Evangelist Schulz DO  Chief Complaint:  No chief complaint on file.      Assessment/Plan     # Hyponatremia   Severe   Better rapid correction   Symptomatic   Possible etiologies:  Beer potomania , seizure, SDH   Na 115--> 118--> 125  in 18 hours  Plan:  Urine lytes   Strict I/O   Na q3   Goal Na tomorrow 17:00 130  Given another dose of 0.5 mcg DDAVP   6. If rapid correction- call me   7. I will give a small fluid bolus for hemodynamic support - he is not producing much urine so effect on Na will be minimal     # Hypokalemia   K replete   Avoid further repletion for now     # Hypermagnesemia   Levels do not make sender- iatrogenic   Repeat abs pending     # lactic acidosis   2/2 seizure   R/o infectious process   Will use bicarb as needed   Not now     # SDH   Likely traumatic   NS following     # seizure   Alcohol ? SDH?   Check UDS  Serum osm to r/o osm gap   Neurology following     History of Present Ilness:    Evangelist Ortega is a 49 y.o. male with       This patient is a 50 y/o male by EMS for altered mental status and concern for alcohol withdrawal and seizure.  Per EMS report they were called by the patient with concerns for alcohol withdrawal. Patient was verbal on the EMS call but was found unresponsive on their arrival.  Fingerstick glucose in the 100s.  En route EMS reports that his mental status has improved he is now localizing to pain and making eye contact with verbal  injection 1 mg, PRN  dextrose 10 % infusion, Continuous PRN        Review of Systems:   14 point ROS obtained but were negative except mentioned in HPI      Physical exam:     Vitals:  /78   Pulse 83   Temp 100.3 °F (37.9 °C) (Temporal)   Resp 16   Wt 65.8 kg (145 lb 1 oz)   SpO2 100%   Constitutional: sedated , following commands  Skin: no rash, turgor wnl  Heent:  eomi, mmm  Neck: no bruits or jvd noted  Cardiovascular:  S1, S2 without m/r/g  Respiratory: CTA B without w/r/r  Abdomen:  , soft, distended , nd  Ext:  lower extremity edema No    Data:   Labs:  CBC:   Recent Labs     05/31/25 1653 05/31/25 2133 06/01/25  0335   WBC 5.1 4.5 3.8*   HGB 12.2* 12.0* 11.3*    129* 145     BMP:    Recent Labs     05/31/25 1916 05/31/25 2133 06/01/25  0011 06/01/25  0335 06/01/25  0600 06/01/25  0630   * 123* 123* 122* 124*  --    K 3.3* 3.6  3.6  --  see below  --  3.7   CL 79* 84*  --  86*  --   --    CO2 15* 23  --  24  --   --    BUN <2* <2*  --  4*  --   --    CREATININE 0.6* 0.5*  --  0.7*  --   --    GLUCOSE 117* 109*  --  108*  --   --      Ca/Mg/Phos:   Recent Labs     05/31/25 1653 05/31/25 1916 05/31/25 2133 06/01/25  0335   CALCIUM 8.2* 7.5* 8.2* 8.3   MG 1.41* >9.60* 1.79* 2.61*   PHOS 2.4*  --  2.9 2.9     Hepatic:   Recent Labs     05/31/25 1653   AST 65*   ALT 36   BILITOT 0.7   ALKPHOS 68     Troponin: No results for input(s): \"TROPONINI\" in the last 72 hours.  BNP: No results for input(s): \"BNP\" in the last 72 hours.  Lipids: No results for input(s): \"CHOL\", \"TRIG\", \"HDL\" in the last 72 hours.    Invalid input(s): \"LDLCALC\", \"LABVLDL\"  ABGs: No results for input(s): \"PHART\", \"PO2ART\", \"HDE9OUW\" in the last 72 hours.  INR:   Recent Labs     05/31/25  2133   INR 0.96     UA:  Recent Labs     06/01/25  0032   COLORU Yellow   CLARITYU Clear   GLUCOSEU Negative   BILIRUBINUR Negative   KETUA Negative   BLOODU Negative   PHUR 6.5   PROTEINU TRACE*   UROBILINOGEN 0.2   NITRU  POSITIVE*   LEUKOCYTESUR Negative   URINETYPE Voided      Urine Microscopic:   Recent Labs     06/01/25  0032   BACTERIA 2+*   WBCUA 0-2   RBCUA 0-2     Urine Culture: No results for input(s): \"LABURIN\" in the last 72 hours.  Urine Chemistry:   Recent Labs     05/31/25  1923 05/31/25  2234   NAUR 86 <20         IMAGING:  XR ABDOMEN (KUB) (SINGLE AP VIEW)   Final Result      NG tube in the body of the stomach.      Electronically signed by Mckayla Carter MD      CT HEAD WO CONTRAST   Final Result      1. Compared with scan earlier same day, there is a stable right-sided mixed attenuation subdural hematoma with mass effect and approximately 3 mm leftward midline shift      2. Fluid attenuation collection in the middle cranial fossa on the left probably representing an arachnoid cyst or remote insult. This is similar to the prior study. Adjacent curvilinear high attenuation could represent a bridging vessel or an additional    small subdural hematoma.      Electronically signed by Jose David Youssef      MRI BRAIN W WO CONTRAST    (Results Pending)         Medical Decision Making:  The following items were considered in medical decision making:  Discussion of patient care with other providers  Reviewed clinical lab tests  Reviewed radiology tests  Reviewed other diagnostic tests/interventions    Will be discussed w/  Primary team     Thank you for allowing us to participate in care of Evangelist Ortega   Feel free to contact me,     Endy Gomez MD   [facility]  Office : [phone] or through [employer]  Fax :[phone]

## 2025-06-01 NOTE — CONSULTS
NEUROCRITICAL CARE CONSULT NOTE     Patient: Evangelist Ortega MRN: [id number]    YOB: 1975  Age: 49 y.o.  Sex: male   Unit: [facility] ICU TOWER  Room/Bed: 4512/4512-01 Location: [facility]    Date of Consultation: 5/31/2025  Date of Admission: 5/31/2025  7:59 PM ( LOS: 0 days )  Primary Care Physician: Evangelist Schulz DO   Consult Requested By: Venessa Lee MD    Reason for Consult: Seizure    IMPRESSION & RECOMMENDATIONS     IMPRESSION:   49 y.o. y/o male with history significant for alcohol abuse, HTN, anxiety and depression who presents to [facility] as a transfer from [facility] with chief complaint of alcohol withdrawal, was found to be altered by EMS and on ED arrival he experienced 2 generalized tonic-clonic seizures.  Both seizures were aborted with benzodiazepine treatment.  He was intubated for airway protection.  Head CT revealing for right cerebral convexity moderate-sized SDH.    Patient seizures multifactorial, likely secondary to alcohol withdrawal, hyponatremia, and potentially from arachnoid cyst and SDH.    RECOMMENDATIONS:   NEURO:  Neurologic Exams Q1H  NIHSS on admission & Qshift  Cerebral Edema  Avoid hypotonic fluids  HOB >30  Avoid IJ unless cleared by NCC team    DIAGNOSTIC IMAGING / LABS  Head CT  STAT given anisocoria   Check PT/INR, aPTT STAT  MRI brain w/ & w/ when able  Routine EEG    ICU MANAGEMENT  Airway Management  Supplemental O2 to maintain SaO2 > 95%  Aspiration Precautions (HOB elevated, Up for meals, mouth care)  Ventilator management per ICU critical care team  Prefer RSI Protocol for intubation  Titrate ventilator to maintain PaO2 >100 mm Hg  Keep PaCO2 Normalized  Sedatives and analgesics as indicated for mechanical ventilation  Okay for Propofol and Precedex  Some patients may NOT require sedatives due to mental status  Okay to discontinue sedation / analgesia if patient's mental status does not require.  Monitor effects of sedation /  analgesia on MAP   Sedation Vacations Q4H for neurologic exams  Q1H GCS / cranial nerve checks  Hemodynamic management  SBP <= 160 mmHg   IV Intermittent dose: Labetalol 10 mg  IV continuous infusion: Nicardipine 2.5mg - 20mg, titrate Q5 minutes to desired effect  Seizure prophylaxis  Keppra 2500 mg x 1 dose now followed by 500 mg BID starting 6/1/25  Hyponatremia  Management per nephrology team.  Spoke with Dr. Quiroz, on-call neurosurgeon. Okay for hypertonic saline infusion if absolutely necessary to correct hyponatremia. Discussed with Dr. Gomez and ICU critical care team  ETOH withdrawal  Management per ICU team  Okay for propofol and Precedex  Okay for Ativan/Librium  Start thiamine 100 mg IV daily  DVT Prophylaxis  SCDs bilateral Lower Extremities   General Care Issues  Hold full-strength anticoagulation and antiplatelet medications at this   Glucose: Goal glucose 110-180 mg/dL.    Sodium:  Maintain in normal range (135 - 146 Yaritza/L)  Magnesium: Maintain > 1.8 mg/dL.  Heme: Keep Plts >= 100K, Keep INR <= 1.4  Temperature: Goal is normothermia.  Culture for fever > 101.5 F  Nutrition: Place NG if unable to pass SLP swallow evaluation   PT/OT/SLP & PMR consult as indicated      Management and plan discussed with:   Bedside nurse  Dr. Sid Ortiz, APRN - CNP   NEUROCRITICAL CARE  5/31/2025 9:11 PM  [facility]: [facility] NEUROCRITICAL CARE      History of Present Illness     Evangelist Ortega is a 49 y.o. y/o male with history significant for alcohol abuse, HTN, anxiety and depression who initially presented to [facility] with chief complaint of altered mental status.  Patient reportedly called EMS with concerns for alcohol withdrawal.  On EMS arrival to patient's residence, he was found unresponsive.  There was also multiple beer cans scattered throughout the house.  His blood glucose was in the 100s.  While en route to [facility] ED, patient's mental status had reportedly improved

## 2025-06-01 NOTE — CONSULTS
Nephrology Consult Note                                                                                                                                                                                                                                                                                                                                                               Office : [phone]     Fax :[phone]    Patient's Name: Evangelist Ortega  9:22 PM  5/31/2025    Reason for Consult:  Hyponatremia   Requesting Physician:  Evangelist Schulz DO  Chief Complaint:  No chief complaint on file.      Assessment/Plan     # Hyponatremia   Severe   Symptomatic   Possible etiologies:  Beer potomania , seizure, SDH   Na 115--> 118 in 2-3 hours ( decent correction)   Plan:  Urine lytes   Strict I/O   Na q3   Goal Na tomorrow 17:00 121-123   If more seizure and sodium less 119: give Na Cl 3% 100 cc over one hour while discussing with NS (NS have concerns about hypertonic saline and SDH)   6. If rapid correction- call me     # Hypokalemia   K 3.3   Use potassium if needed to improve sodium levels     # Hypermagnesemia   Levels do not make sender- iatrogenic   Repeat abs pending     # lactic acidosis   2/2 seizure   R/o infectious process   Will use bicarb as needed   Not now     # SDH   Likely traumatic   NS following     # seizure   Alcohol ? SDH?   Check UDS  Serum osm to r/o osm gap   Neurology following     History of Present Ilness:    Evangelist Ortega is a 49 y.o. male with       This patient is a 48 y/o male by EMS for altered mental status and concern for alcohol withdrawal and seizure.  Per EMS report they were called by the patient with concerns for alcohol withdrawal. Patient was verbal on the EMS call but was found unresponsive on their arrival.  Fingerstick glucose in the 100s.  En route EMS reports that his mental status has improved he is now localizing to pain and making eye contact with verbal stimuli.

## 2025-06-02 ENCOUNTER — RESULTS FOLLOW-UP (OUTPATIENT)
Dept: EMERGENCY DEPT | Age: 50
End: 2025-06-02

## 2025-06-02 ENCOUNTER — APPOINTMENT (OUTPATIENT)
Dept: MRI IMAGING | Age: 50
DRG: 082 | End: 2025-06-02
Attending: STUDENT IN AN ORGANIZED HEALTH CARE EDUCATION/TRAINING PROGRAM

## 2025-06-02 PROBLEM — E44.0 MODERATE MALNUTRITION: Status: ACTIVE | Noted: 2025-06-02

## 2025-06-02 PROBLEM — J96.90 RESPIRATORY FAILURE REQUIRING INTUBATION (HCC): Status: ACTIVE | Noted: 2025-06-02

## 2025-06-02 LAB
ABO/RH: NORMAL
ALBUMIN SERPL-MCNC: 3.4 G/DL (ref 3.4–5)
ALBUMIN SERPL-MCNC: 3.4 G/DL (ref 3.4–5)
ANION GAP SERPL CALCULATED.3IONS-SCNC: 10 MMOL/L (ref 3–16)
ANION GAP SERPL CALCULATED.3IONS-SCNC: 10 MMOL/L (ref 3–16)
ANTIBODY SCREEN: NORMAL
BASOPHILS # BLD: 0 K/UL (ref 0–0.2)
BASOPHILS # BLD: 0 K/UL (ref 0–0.2)
BASOPHILS NFR BLD: 0.4 %
BASOPHILS NFR BLD: 0.4 %
BUN SERPL-MCNC: 4 MG/DL (ref 7–20)
BUN SERPL-MCNC: 4 MG/DL (ref 7–20)
CALCIUM SERPL-MCNC: 8.5 MG/DL (ref 8.3–10.6)
CALCIUM SERPL-MCNC: 8.5 MG/DL (ref 8.3–10.6)
CHLORIDE SERPL-SCNC: 94 MMOL/L (ref 99–110)
CHLORIDE SERPL-SCNC: 94 MMOL/L (ref 99–110)
CO2 SERPL-SCNC: 24 MMOL/L (ref 21–32)
CO2 SERPL-SCNC: 24 MMOL/L (ref 21–32)
CREAT SERPL-MCNC: 0.6 MG/DL (ref 0.9–1.3)
CREAT SERPL-MCNC: 0.6 MG/DL (ref 0.9–1.3)
DEPRECATED RDW RBC AUTO: 15 % (ref 12.4–15.4)
DEPRECATED RDW RBC AUTO: 15 % (ref 12.4–15.4)
EOSINOPHIL # BLD: 0 K/UL (ref 0–0.6)
EOSINOPHIL # BLD: 0 K/UL (ref 0–0.6)
EOSINOPHIL NFR BLD: 0.8 %
EOSINOPHIL NFR BLD: 0.8 %
GFR SERPLBLD CREATININE-BSD FMLA CKD-EPI: >90 ML/MIN/{1.73_M2}
GFR SERPLBLD CREATININE-BSD FMLA CKD-EPI: >90 ML/MIN/{1.73_M2}
GLUCOSE SERPL-MCNC: 117 MG/DL (ref 70–99)
GLUCOSE SERPL-MCNC: 117 MG/DL (ref 70–99)
HCT VFR BLD AUTO: 35.6 % (ref 40.5–52.5)
HCT VFR BLD AUTO: 35.6 % (ref 40.5–52.5)
HGB BLD-MCNC: 12.5 G/DL (ref 13.5–17.5)
HGB BLD-MCNC: 12.5 G/DL (ref 13.5–17.5)
LYMPHOCYTES # BLD: 0.5 K/UL (ref 1–5.1)
LYMPHOCYTES # BLD: 0.5 K/UL (ref 1–5.1)
LYMPHOCYTES NFR BLD: 10 %
LYMPHOCYTES NFR BLD: 10 %
MAGNESIUM SERPL-MCNC: 1.94 MG/DL (ref 1.8–2.4)
MAGNESIUM SERPL-MCNC: 1.94 MG/DL (ref 1.8–2.4)
MCH RBC QN AUTO: 29.9 PG (ref 26–34)
MCH RBC QN AUTO: 29.9 PG (ref 26–34)
MCHC RBC AUTO-ENTMCNC: 35.2 G/DL (ref 31–36)
MCHC RBC AUTO-ENTMCNC: 35.2 G/DL (ref 31–36)
MCV RBC AUTO: 84.8 FL (ref 80–100)
MCV RBC AUTO: 84.8 FL (ref 80–100)
MONOCYTES # BLD: 0.7 K/UL (ref 0–1.3)
MONOCYTES # BLD: 0.7 K/UL (ref 0–1.3)
MONOCYTES NFR BLD: 12.9 %
MONOCYTES NFR BLD: 12.9 %
NEUTROPHILS # BLD: 4.1 K/UL (ref 1.7–7.7)
NEUTROPHILS # BLD: 4.1 K/UL (ref 1.7–7.7)
NEUTROPHILS NFR BLD: 75.9 %
NEUTROPHILS NFR BLD: 75.9 %
OSMOLALITY UR: 299 MOSM/KG (ref 390–1070)
PHOSPHATE SERPL-MCNC: 2.4 MG/DL (ref 2.5–4.9)
PHOSPHATE SERPL-MCNC: 2.4 MG/DL (ref 2.5–4.9)
PLATELET # BLD AUTO: 83 K/UL (ref 135–450)
PLATELET # BLD AUTO: 83 K/UL (ref 135–450)
PMV BLD AUTO: 6.9 FL (ref 5–10.5)
PMV BLD AUTO: 6.9 FL (ref 5–10.5)
POTASSIUM SERPL-SCNC: 3.2 MMOL/L (ref 3.5–5.1)
POTASSIUM SERPL-SCNC: 3.2 MMOL/L (ref 3.5–5.1)
RBC # BLD AUTO: 4.19 M/UL (ref 4.2–5.9)
RBC # BLD AUTO: 4.19 M/UL (ref 4.2–5.9)
SODIUM SERPL-SCNC: 128 MMOL/L (ref 136–145)
SODIUM SERPL-SCNC: 128 MMOL/L (ref 136–145)
SODIUM SERPL-SCNC: 129 MMOL/L (ref 136–145)
SODIUM SERPL-SCNC: 130 MMOL/L (ref 136–145)
SODIUM SERPL-SCNC: 130 MMOL/L (ref 136–145)
SODIUM SERPL-SCNC: 132 MMOL/L (ref 136–145)
SODIUM SERPL-SCNC: 132 MMOL/L (ref 136–145)
SODIUM SERPL-SCNC: 133 MMOL/L (ref 136–145)
WBC # BLD AUTO: 5.4 K/UL (ref 4–11)
WBC # BLD AUTO: 5.4 K/UL (ref 4–11)

## 2025-06-02 PROCEDURE — 80069 RENAL FUNCTION PANEL: CPT

## 2025-06-02 PROCEDURE — 99233 SBSQ HOSP IP/OBS HIGH 50: CPT | Performed by: STUDENT IN AN ORGANIZED HEALTH CARE EDUCATION/TRAINING PROGRAM

## 2025-06-02 PROCEDURE — 85025 COMPLETE CBC W/AUTO DIFF WBC: CPT

## 2025-06-02 PROCEDURE — 83735 ASSAY OF MAGNESIUM: CPT

## 2025-06-02 PROCEDURE — A9576 INJ PROHANCE MULTIPACK: HCPCS

## 2025-06-02 PROCEDURE — 6360000002 HC RX W HCPCS

## 2025-06-02 PROCEDURE — 2700000000 HC OXYGEN THERAPY PER DAY

## 2025-06-02 PROCEDURE — 6370000000 HC RX 637 (ALT 250 FOR IP)

## 2025-06-02 PROCEDURE — 99233 SBSQ HOSP IP/OBS HIGH 50: CPT | Performed by: INTERNAL MEDICINE

## 2025-06-02 PROCEDURE — 95716 VEEG EA 12-26HR CONT MNTR: CPT

## 2025-06-02 PROCEDURE — 36415 COLL VENOUS BLD VENIPUNCTURE: CPT

## 2025-06-02 PROCEDURE — 70553 MRI BRAIN STEM W/O & W/DYE: CPT

## 2025-06-02 PROCEDURE — 94761 N-INVAS EAR/PLS OXIMETRY MLT: CPT

## 2025-06-02 PROCEDURE — 2000000000 HC ICU R&B

## 2025-06-02 PROCEDURE — 95720 EEG PHY/QHP EA INCR W/VEEG: CPT | Performed by: PSYCHIATRY & NEUROLOGY

## 2025-06-02 PROCEDURE — 94003 VENT MGMT INPAT SUBQ DAY: CPT

## 2025-06-02 PROCEDURE — 2500000003 HC RX 250 WO HCPCS

## 2025-06-02 PROCEDURE — 99291 CRITICAL CARE FIRST HOUR: CPT | Performed by: INTERNAL MEDICINE

## 2025-06-02 PROCEDURE — 6360000004 HC RX CONTRAST MEDICATION

## 2025-06-02 PROCEDURE — 2580000003 HC RX 258

## 2025-06-02 PROCEDURE — 84295 ASSAY OF SERUM SODIUM: CPT

## 2025-06-02 RX ORDER — POTASSIUM CHLORIDE 7.45 MG/ML
10 INJECTION INTRAVENOUS
Status: DISCONTINUED | OUTPATIENT
Start: 2025-06-02 | End: 2025-06-02

## 2025-06-02 RX ORDER — POTASSIUM CHLORIDE 7.45 MG/ML
10 INJECTION INTRAVENOUS
Status: COMPLETED | OUTPATIENT
Start: 2025-06-02 | End: 2025-06-02

## 2025-06-02 RX ORDER — GADOTERIDOL 279.3 MG/ML
15 INJECTION INTRAVENOUS
Status: COMPLETED | OUTPATIENT
Start: 2025-06-02 | End: 2025-06-02

## 2025-06-02 RX ORDER — THIAMINE HYDROCHLORIDE 100 MG/ML
100 INJECTION, SOLUTION INTRAMUSCULAR; INTRAVENOUS DAILY
Status: DISCONTINUED | OUTPATIENT
Start: 2025-06-08 | End: 2025-06-02

## 2025-06-02 RX ORDER — THIAMINE HYDROCHLORIDE 100 MG/ML
100 INJECTION, SOLUTION INTRAMUSCULAR; INTRAVENOUS ONCE
Status: COMPLETED | OUTPATIENT
Start: 2025-06-02 | End: 2025-06-02

## 2025-06-02 RX ORDER — PANTOPRAZOLE SODIUM 40 MG/10ML
40 INJECTION, POWDER, LYOPHILIZED, FOR SOLUTION INTRAVENOUS DAILY
Status: DISCONTINUED | OUTPATIENT
Start: 2025-06-02 | End: 2025-06-04

## 2025-06-02 RX ORDER — THIAMINE HYDROCHLORIDE 100 MG/ML
200 INJECTION, SOLUTION INTRAMUSCULAR; INTRAVENOUS DAILY
Status: COMPLETED | OUTPATIENT
Start: 2025-06-03 | End: 2025-06-06

## 2025-06-02 RX ORDER — GAUZE BANDAGE 2" X 2"
200 BANDAGE TOPICAL DAILY
Status: DISCONTINUED | OUTPATIENT
Start: 2025-06-07 | End: 2025-06-13 | Stop reason: HOSPADM

## 2025-06-02 RX ORDER — MULTIVIT-MIN/FERROUS GLUCONATE 9 MG/15 ML
15 LIQUID (ML) ORAL DAILY
Status: DISCONTINUED | OUTPATIENT
Start: 2025-06-02 | End: 2025-06-13 | Stop reason: HOSPADM

## 2025-06-02 RX ADMIN — PANTOPRAZOLE SODIUM 40 MG: 40 INJECTION, POWDER, FOR SOLUTION INTRAVENOUS at 12:48

## 2025-06-02 RX ADMIN — PROPOFOL 15 MCG/KG/MIN: 10 INJECTION, EMULSION INTRAVENOUS at 09:49

## 2025-06-02 RX ADMIN — LORAZEPAM 2 MG: 2 INJECTION INTRAMUSCULAR; INTRAVENOUS at 05:59

## 2025-06-02 RX ADMIN — THIAMINE HYDROCHLORIDE 100 MG: 100 INJECTION, SOLUTION INTRAMUSCULAR; INTRAVENOUS at 12:49

## 2025-06-02 RX ADMIN — POTASSIUM CHLORIDE 10 MEQ: 10 INJECTION, SOLUTION INTRAVENOUS at 15:38

## 2025-06-02 RX ADMIN — PROPOFOL 10 MCG/KG/MIN: 10 INJECTION, EMULSION INTRAVENOUS at 19:32

## 2025-06-02 RX ADMIN — LORAZEPAM 2 MG: 2 INJECTION INTRAMUSCULAR; INTRAVENOUS at 17:18

## 2025-06-02 RX ADMIN — LEVETIRACETAM 500 MG: 100 INJECTION INTRAVENOUS at 08:40

## 2025-06-02 RX ADMIN — DEXMEDETOMIDINE HYDROCHLORIDE 0.6 MCG/KG/HR: 400 INJECTION, SOLUTION INTRAVENOUS at 17:04

## 2025-06-02 RX ADMIN — LORAZEPAM 2 MG: 2 INJECTION INTRAMUSCULAR; INTRAVENOUS at 23:49

## 2025-06-02 RX ADMIN — LORAZEPAM 1 MG: 2 INJECTION INTRAMUSCULAR; INTRAVENOUS at 20:43

## 2025-06-02 RX ADMIN — PROPOFOL 15 MCG/KG/MIN: 10 INJECTION, EMULSION INTRAVENOUS at 03:12

## 2025-06-02 RX ADMIN — LORAZEPAM 1 MG: 2 INJECTION INTRAMUSCULAR; INTRAVENOUS at 04:19

## 2025-06-02 RX ADMIN — DEXMEDETOMIDINE HYDROCHLORIDE 0.6 MCG/KG/HR: 400 INJECTION, SOLUTION INTRAVENOUS at 23:31

## 2025-06-02 RX ADMIN — LORAZEPAM 2 MG: 2 INJECTION INTRAMUSCULAR; INTRAVENOUS at 12:47

## 2025-06-02 RX ADMIN — SODIUM CHLORIDE, PRESERVATIVE FREE 10 ML: 5 INJECTION INTRAVENOUS at 20:39

## 2025-06-02 RX ADMIN — DEXMEDETOMIDINE HYDROCHLORIDE 0.6 MCG/KG/HR: 400 INJECTION, SOLUTION INTRAVENOUS at 09:49

## 2025-06-02 RX ADMIN — LORAZEPAM 2 MG: 2 INJECTION INTRAMUSCULAR; INTRAVENOUS at 00:02

## 2025-06-02 RX ADMIN — LEVETIRACETAM 500 MG: 100 INJECTION INTRAVENOUS at 20:38

## 2025-06-02 RX ADMIN — GADOTERIDOL 15 ML: 279.3 INJECTION, SOLUTION INTRAVENOUS at 18:14

## 2025-06-02 RX ADMIN — Medication 15 ML: at 13:02

## 2025-06-02 RX ADMIN — DEXMEDETOMIDINE HYDROCHLORIDE 0.6 MCG/KG/HR: 400 INJECTION, SOLUTION INTRAVENOUS at 03:59

## 2025-06-02 RX ADMIN — POTASSIUM PHOSPHATE, MONOBASIC AND POTASSIUM PHOSPHATE, DIBASIC 10 MMOL: 224; 236 INJECTION, SOLUTION, CONCENTRATE INTRAVENOUS at 20:03

## 2025-06-02 RX ADMIN — THIAMINE HYDROCHLORIDE 100 MG: 100 INJECTION, SOLUTION INTRAMUSCULAR; INTRAVENOUS at 08:38

## 2025-06-02 RX ADMIN — POTASSIUM CHLORIDE 10 MEQ: 10 INJECTION, SOLUTION INTRAVENOUS at 13:12

## 2025-06-02 RX ADMIN — FOLIC ACID 1 MG: 1 TABLET ORAL at 08:38

## 2025-06-02 RX ADMIN — POTASSIUM CHLORIDE 10 MEQ: 10 INJECTION, SOLUTION INTRAVENOUS at 14:21

## 2025-06-02 RX ADMIN — POTASSIUM CHLORIDE 10 MEQ: 10 INJECTION, SOLUTION INTRAVENOUS at 16:43

## 2025-06-02 ASSESSMENT — PULMONARY FUNCTION TESTS
PIF_VALUE: 15
PIF_VALUE: 16
PIF_VALUE: 15
PIF_VALUE: 15
PIF_VALUE: 16
PIF_VALUE: 15
PIF_VALUE: 16
PIF_VALUE: 15
PIF_VALUE: 15

## 2025-06-02 NOTE — PROCEDURES
PROCEDURE NOTE  Date: 6/2/2025   Name: Evangelist Ortega  YOB: 1975    Procedures              Referring physician: Dr. Lau  Date: 6/2/2025  Start Time: 6/1/2025 @ 1500  End Time: 6/2/2025 @ 1000    Indication  Patient with encephalopathy, EEG done to rule out subclinical seizures.       Introduction  This continuous video-EEG was acquired using a [employer] workstation at 256 samples/s. Electrodes were placed according to the International 10-20 system. Automated spike and seizure detection algorithms were applied. Video was recorded during this study.    Description  The background consistent of diffuse none reactive polymorphic delta and theta slowing. Intermittent right frontal/anterior temporal focal slowing or interhemispheric asymmetry was noted. Normal sleep structures were not observed. There were no clear or definitive interictal discharges.      Events  6/2/2025 @ 0348  Clinically: no semiology.  EEG: Breif ~5 seconds right anterior temporal focal slowing/rythmic delta.         Impression  Abnormal continuous vEEG recording, the slowing mentioned above suggests moderate non specific encephalopathy.     Breif interhemispheric asymmetry suggest focal lesion on the right.    NO clear electrographic seizure noted.       Shashank Santacruz MD  Epilepsy Board Certified.  Neurology Board Certified.    Electronically Signed

## 2025-06-02 NOTE — PLAN OF CARE
Problem: Safety - Medical Restraint  Goal: Remains free of injury from restraints (Restraint for Interference with Medical Device)  Description: INTERVENTIONS:1. Determine that other, less restrictive measures have been tried or would not be effective before applying the restraint2. Evaluate the patient's condition at the time of restraint application3. Inform patient/family regarding the reason for restraint4. Q2H: Monitor safety, psychosocial status, comfort, nutrition and hydration  Outcome: Progressing  Flowsheets  Taken 6/2/2025 1800 by Antonio Ortega RN  Remains free of injury from restraints (restraint for interference with medical device):   Determine that other, less restrictive measures have been tried or would not be effective before applying the restraint   Evaluate the patient's condition at the time of restraint application   Inform patient/family regarding the reason for restraint   Every 2 hours: Monitor safety, psychosocial status, comfort, nutrition and hydration  Taken 6/2/2025 1600 by Antonio Ortega RN  Remains free of injury from restraints (restraint for interference with medical device):   Determine that other, less restrictive measures have been tried or would not be effective before applying the restraint   Evaluate the patient's condition at the time of restraint application   Inform patient/family regarding the reason for restraint   Every 2 hours: Monitor safety, psychosocial status, comfort, nutrition and hydration  Taken 6/2/2025 1400 by Antonio Ortega RN  Remains free of injury from restraints (restraint for interference with medical device):   Determine that other, less restrictive measures have been tried or would not be effective before applying the restraint   Evaluate the patient's condition at the time of restraint application   Inform patient/family regarding the reason for restraint   Every 2 hours: Monitor safety, psychosocial status, comfort, nutrition and hydration  Taken  Achieves stable or improved neurological status  Outcome: Progressing  Goal: Absence of seizures  Outcome: Progressing  Goal: Remains free of injury related to seizures activity  Outcome: Progressing  Goal: Achieves maximal functionality and self care  Outcome: Progressing     Problem: Skin/Tissue Integrity - Adult  Goal: Skin integrity remains intact  Description: 1.  Monitor for areas of redness and/or skin breakdown2.  Assess vascular access sites hourly3.  Every 4-6 hours minimum:  Change oxygen saturation probe site4.  Every 4-6 hours:  If on nasal continuous positive airway pressure, respiratory therapy assess nares and determine need for appliance change or resting period  Outcome: Progressing     Problem: Skin/Tissue Integrity  Goal: Skin integrity remains intact  Description: 1.  Monitor for areas of redness and/or skin breakdown2.  Assess vascular access sites hourly3.  Every 4-6 hours minimum:  Change oxygen saturation probe site4.  Every 4-6 hours:  If on nasal continuous positive airway pressure, respiratory therapy assess nares and determine need for appliance change or resting period  Outcome: Progressing

## 2025-06-02 NOTE — PROGRESS NOTES
NeuroCrit Progress Note    Patient: Evangelist Ortega MRN: [id number]    YOB: 1975  Age: 49 y.o.  Sex: male   Unit: [facility] ICU TOWER Room/Bed: 4512/4512-01 Location: [facility]'s Date: 6/2/2025  Date of Admission: 5/31/2025  7:59 PM  Admitting Physician: LINA MEDINA    Primary Care Physician: Evangelist Schulz DO          LOS: 2 days      ASSESSMENT & RECOMMENDATIONS     Assessment  50yo man with ETOH abuse presented to ER with encephalopathy, apparently found essentially unconscious by EMS after pt had initiated call to 911, and began to spontaneously improve until having a GTC-type seizure en route and then another in the ER for which he was given Ativan, Versed, Keppra 2000mg, intubated, and started on a Versed gtt but found to have an acute right frontal convexity SDH. Repeat CT head showed stable SDH  cEEG neg    Recommendations  Continue Keppra 500mg bid  cEEG  Minimize sedation  Work to extubate at ICU Team discretion  No antiplatelets/anticoagulants; SCDs for DVT proph  Q1 neuro checks  SBP < 160  MRI brain, when able  Sodium as per Nephrology  ETOH withdrawal as per ICU/Hospitalist      SUBJECTIVE     Meeting patient for the first time. Initial consultation note was completed by Neurology CNP yesterday (5/31/25) evening, which I have reviewed.    50yo man with ETOH abuse; HTN; anxiety/depression. Presented to OSH with altered mental status.    As documented elsewhere, he called EMS because he was concerned he was withdrawing from alcohol. EMS arrived to find him, essentially, unconscious with beer cans scattered throughout the house. Glucose was in 100s. He began to wake a bit in the ambulance and was localizing to pain and making eye contact with verbal stimuli. In the ER he was observed to have a GTC-type seizure and was given Ativan 2mg IO. About 10 minutes later he had another seizure, was given Versed 5mg, and intubated.    Head CT revealed a right  frontal convexity SDH with a large left temporal lobe arachnoid cyst. He as given Keppra 2000mg, started on hypertonic saline gtt, given two more milligrams of Ativan, 10mg of IV Versed and started on Versed infusion, thiamine, and transferred here.     Upon arrival, he was assessed by [facility] NP who noted that he was having bilateral upper extremity shaking that was suppressible with touch. He was also alert and tracking and following commands in all 4 extremities during this shaking.     No acute events overnight. Afebrile. Pt remains intubated and heavily sedated (ETOH withdrawal)    Review of Systems  No changes since pt last seen other than those noted above.    PFSH  No change since the original history and note.     OBJECTIVE     Patient Vitals for the past 24 hrs:   BP Temp Temp src Pulse Resp SpO2 Weight   06/02/25 0838 -- -- -- 67 13 96 % --   06/02/25 0800 -- 97.4 °F (36.3 °C) Temporal -- -- -- --   06/02/25 0700 (!) 128/91 -- -- 68 17 100 % --   06/02/25 0645 132/86 -- -- 68 16 100 % --   06/02/25 0630 132/89 -- -- 69 16 100 % --   06/02/25 0615 131/88 -- -- 69 14 100 % --   06/02/25 0600 122/86 -- -- 72 16 100 % --   06/02/25 0545 119/83 -- -- 71 19 100 % --   06/02/25 0530 116/81 -- -- 70 20 100 % --   06/02/25 0519 116/81 -- -- 70 -- -- --   06/02/25 0515 117/83 -- -- 70 17 100 % --   06/02/25 0500 119/81 -- -- 69 16 100 % --   06/02/25 0459 -- -- -- 70 24 100 % --   06/02/25 0445 120/82 -- -- 71 11 100 % --   06/02/25 0430 123/82 -- -- 70 14 100 % --   06/02/25 0415 117/80 -- -- 70 13 100 % --   06/02/25 0400 101/77 97.6 °F (36.4 °C) Temporal 71 12 100 % --   06/02/25 0354 -- -- -- -- -- -- 67.5 kg (148 lb 13 oz)   06/02/25 0345 116/79 -- -- 71 12 100 % --   06/02/25 0330 109/80 -- -- 72 12 100 % --   06/02/25 0315 114/81 -- -- 71 12 100 % --   06/02/25 0300 112/77 -- -- 72 12 100 % --   06/02/25 0245 118/81 -- -- 69 12 100 % --   06/02/25 0230 118/81 -- -- 70 12 100 % --   06/02/25 0215 119/79 -- -- 69  Basophils Absolute 0.0 0.0 - 0.2 K/uL   Magnesium    Collection Time: 06/02/25  5:55 AM   Result Value Ref Range    Magnesium 1.94 1.80 - 2.40 mg/dL   Renal Function Panel    Collection Time: 06/02/25  5:55 AM   Result Value Ref Range    Sodium 128 (L) 136 - 145 mmol/L    Potassium 3.2 (L) 3.5 - 5.1 mmol/L    Chloride 94 (L) 99 - 110 mmol/L    CO2 24 21 - 32 mmol/L    Anion Gap 10 3 - 16    Glucose 117 (H) 70 - 99 mg/dL    BUN 4 (L) 7 - 20 mg/dL    Creatinine 0.6 (L) 0.9 - 1.3 mg/dL    Est, Glom Filt Rate >90 >60    Calcium 8.5 8.3 - 10.6 mg/dL    Phosphorus 2.4 (L) 2.5 - 4.9 mg/dL    Albumin 3.4 3.4 - 5.0 g/dL       Scheduled Meds:   folic acid  1 mg Oral Daily    [Held by provider] escitalopram  20 mg Oral Daily    [Held by provider] amLODIPine  5 mg Oral Daily    LORazepam  2 mg IntraVENous Q6H    sodium chloride flush  5-40 mL IntraVENous 2 times per day    thiamine  100 mg IntraVENous Daily    levETIRAcetam  500 mg IntraVENous Q12H       Continuous Infusions:  sodium chloride, Last Rate: Stopped (06/01/25 1746)  propofol, Last Rate: 15 mcg/kg/min (06/02/25 0600)  dexmedeTOMIDine, Last Rate: 0.6 mcg/kg/hr (06/02/25 0600)  dextrose        PRN Meds:  sodium chloride flush, 5-40 mL, PRN  sodium chloride, , PRN  labetalol, 10 mg, Q4H PRN  LORazepam, 1 mg, Q1H PRN   Or  LORazepam, 1 mg, Q1H PRN   Or  LORazepam, 2 mg, Q1H PRN   Or  LORazepam, 2 mg, Q1H PRN   Or  LORazepam, 3 mg, Q1H PRN   Or  LORazepam, 3 mg, Q1H PRN   Or  LORazepam, 4 mg, Q1H PRN   Or  LORazepam, 4 mg, Q1H PRN  polyethylene glycol, 17 g, Daily PRN  acetaminophen, 650 mg, Q6H PRN   Or  acetaminophen, 650 mg, Q6H PRN  glucose, 4 tablet, PRN  dextrose bolus, 125 mL, PRN   Or  dextrose bolus, 250 mL, PRN  glucagon (rDNA), 1 mg, PRN  dextrose, , Continuous PRN                Discussed at length with nursing    Time:50 mins      [place] ( Esteban Coleman DO  Neurocritical Care  [phone]    June 2, 2025

## 2025-06-02 NOTE — PROGRESS NOTES
Comprehensive Nutrition Assessment    RECOMMENDATIONS:  Initiate nutrition support w/in 24 hours if patient remains intubated  Vital AF 1.2 @ 10 mL/hr, advance 10 mL q8h until goal of 50 mL/hr is met  Flushes: 30 mL q4h  Nutrition Education: Education/Counseling not appropriate     NUTRITION ASSESSMENT:   Nutritional summary & status: Pt presented to [facility] ED after being found unresponsive w/ concern for EtOH withdrawal. Witnessed tonic clonic seizure and intubated. No pressor requirements, sedated on propofol providing 216 kcal/day. Plan to wean off propofol today. Hx of EtOH withdrawals. Pt w/ 7.5% weight loss in the past 3 months. Muscle mass/fat wasting noted and overall poor nutritional status can be assumed from his heavy drinking (18-24 beers/day). Modified B1 to 200 mg daily, multivitamin added. Nephrology on board for suspected beer potomania. SBT planned for today, if unable to wean from ventilator nutrition support should be started within the next 24 hours, discussed w/ MD.   Admission // PMH: Status epilepticus//EtOH misuse, beer potomania, SDH    MALNUTRITION ASSESSMENT  Context of Malnutrition: Social/Environmental Circumstances   Malnutrition Status: Moderate malnutrition  Findings of the 6 clinical characteristics of malnutrition (Minimum of 2 out of 6 clinical characteristics is required to make the diagnosis of moderate or severe Protein Calorie Malnutrition based on AND/ASPEN Guidelines):  Energy Intake:  Unable to assess (Predicted poor intakes)  Weight Loss:  7.5% over 3 months     Body Fat Loss:  Mild body fat loss Orbital   Muscle Mass Loss:  Mild muscle mass loss Clavicles (pectoralis & deltoids), Temples (temporalis)  Fluid Accumulation:  No fluid accumulation      NUTRITION DIAGNOSIS   Moderate malnutrition, in context of social or environmental circumstances related to inadequate protein-energy intake as evidenced by criteria as identified in malnutrition  assessment    Nutrition Monitoring and Evaluation:   Food/Nutrient Intake Outcomes:  Progression of Nutrition  Physical Signs/Symptoms Outcomes:  Biochemical Data, Nutrition Focused Physical Findings, Weight, Hemodynamic Status     OBJECTIVE DATA: Significant to nutrition assessment  Nutrition Related Findings: Na 128. K 3.2. Phos 2.4.  Wounds: None  Nutrition Goals: Initiation of nutrition, within 2 days     CURRENT NUTRITION THERAPIES  Current Tube Feeding (TF) Orders:  Feeding Route: Orogastric  Formula: Peptide Based  Schedule: Continuous  Additives/Modulars: None  Water Flushes: 30 mL q4h  Current TF Provides: Vital AF 1.2 @ 50 mL/hr to provide: 1200 mL TV, 1440 kcal, 90 g/pro and 973 mL FW  PO Intake: NPO   PO Supplement Intake:NPO  Additional Sources of Calories/IVF:216 kcal via propofol     COMPARATIVE STANDARDS  Energy (kcal):  8608-4943 (20-25 kcal/kg CBW)     Protein (g):   (1.2-2.0 g/kg CBW)       Fluid (ml/day):  per free water deficit    ANTHROPOMETRICS  Current Height: 170.2 cm (5' 7\")  Current Weight - Scale: 67.5 kg (148 lb 13 oz)    Admission weight: 65.8 kg (145 lb 1 oz)    The patient will be monitored per nutrition standards of care. Consult dietitian if additional nutrition interventions are needed prior to RD reassessment.     Ashlee Santizo RD, LD  [place]:  [phone]

## 2025-06-02 NOTE — PLAN OF CARE
Brief note:  48yo man with ETOH abuse presented to ER with encephalopathy, apparently found essentially unconscious by EMS after pt had initiated call to 911, and began to spontaneously improve until having a GTC-type seizure en route and then another in the ER for which he was given Ativan, Versed, Keppra 2000mg, intubated, and started on a Versed gtt but found to have an acute right frontal convexity SDH  Upon arrival, pt was having tremor in both arms but was able to follow commands, making at least that particular episode less likely to be seizure  This raises the question as to whether any of what was seen was seizure but, at the same time, what caused him to be essentially unconscious at the time EMS arrived is unclear  The SDH is not enough to have done that  It may actually be that he had a seizure, fell, and that is how he sustained the SDH  Alternatively, he fell, sustained the SDH, and then seized  Either way, acute management does not change  Although he has no exam largely because of sedation, cannot fully exclude ongoing seizure and should go on cvEEG      Intubated on sedation with 0.4 mcg/kg/hr of Precedex and 10 mcg/kg/in Propofol    PHYSICAL EXAM:  Vitals:    06/01/25 2015 06/01/25 2030 06/01/25 2038 06/01/25 2045   BP: 110/78 117/84  124/88   Pulse: 62 61 62 69   Resp: 12 16 15 22   Temp:       TempSrc:       SpO2: 97% 97% 97% 100%   Weight:             General: Intubated on sedation (not paused for exam), no distress, chronically ill appearing.   Neurologic  Mental status:   OE to loud name calling. Able to maintain wakefulness.  No attempts to speak around ETT. Does not not to yes/no questions  Requires repeated verbal cues to get him to follow commands, albeit sedation was not paused. Follows commands in all four extremities and responses to command delayed. Right side slightly stronger than the left.     Cranial nerves:   CN2: Blinks to threat  CN 3,4,6: Pupils 33 mm> 2 mm equal and reactive  to light, extraocular muscles intact  CN5: + corneal reflexes bilaterally  CN7: Face symmetric  CN8: Hearing symmetric to spoken voice  CN9: + weak cough, no gag with suction.     Motor Exam:  RUE: Squeezes hand to command.  Some spontaneous nonpurposeful movement  LUE:  Squeezes hand to command.  Some spontaneous nonpurposeful movement   RLE: wiggles toes.   LLE: wiggles toes.   Movements to command are very delayed.       Sensory: Withdraws to pain in all 4 extremities.  Tone: normal in all 4 extremities  Gait: Deferred for safety    OTHER SYSTEMS:  Cardiovascular: Warm, appears well perfused   Respiratory: Easy, non-labored respiratory pattern   Abdominal: Abdomen is without distention   Extremities: Upper and lower extremities are atraumatic in appearance without deformity. No swelling or erythema.

## 2025-06-02 NOTE — PROGRESS NOTES
ICU Progress Note    Admit Date: 5/31/2025  Day: 2  Vent Day: 2  IV Access:Peripheral  IV Fluids:None  Vasopressors:None                Antibiotics: None   Diet: Diet NPO    CC: Seizure    Interval history:   Overnight his Na overcorrected to 130, received 2 doses of DDAVP, and D5. VSS.  Labs with hypokalemia to 3.2, phosphorus to 2.4, replacement order in place. Na this morning 129, that was within the goal. Goal is 130 by 5PM today.   Patient intermittently awakening to voice and painful stimuli. On Propofol 10 and precedex of 0.6.       HPI: \"Mr. Evangelist Ortega is a 49 y.o. male with a medical hx significant for alcohol use disorder (20+yrs), HTN, depression otherwise as listed in the MHx table below, who presented unresponsive via EMS to [place] ED. Transferred to [facility].     Per ED note and EMS run sheet, pt. Called 911 stating concern for possible alcohol withdrawal. When EMS entered residence he was found unresponsive surrounded by many empty beer cans. Mental status improved en route with localization of pain, spontaneous movement, and arousing to verbal stimuli.      Shortly after arrival to ED patient experienced tonic-clonic seizure receiving 2mg ativan with aborted seizure activity. Within 10 minutes experienced another tonic clonic seizure requiring 5mg versed x2 and versed gtt and subsequently intubated for airway protection.      Per chart review patient seen in ED 2/26/2025 for alcohol detoxification and reportedly at that time drinking approximately 24 beers a day. He was discharged on 3 day supply of librium. Multiple hospitalizations in the past for alcohol withdrawal most recently 2022 requiring precedex gtt. Withdrawals with hallucinations in past no reported seizures.     ED Course:  Afebrile 130/81, pulse (!) 105, resp. rate 20, SpO2 99%   AG 30 bicarb 13 pH 7.329 pCO2 26.9 Lactic 14.3 ethanol 72 mag 1.4 Na 115  ALT 36 AST 65   GCS 11 with tremor on exam  CT Head: R subdural hemorrhage  with 3mm leftward midline shift likely acute/subacute given mixed attenuation. Fluid collection in the middle cranial fossa on the L likely to represent arachnoid cyst.  Nephro consulted and received 3% hypertonic saline prior to [facility] to [facility]  Received 2mg ativan, 2g keppra, versed 5mg x2 -> versed gtt prior to intubation.\"    Medications:     Scheduled Meds:   folic acid  1 mg Oral Daily    [Held by provider] escitalopram  20 mg Oral Daily    [Held by provider] amLODIPine  5 mg Oral Daily    LORazepam  2 mg IntraVENous Q6H    sodium chloride flush  5-40 mL IntraVENous 2 times per day    thiamine  100 mg IntraVENous Daily    levETIRAcetam  500 mg IntraVENous Q12H     Continuous Infusions:   sodium chloride Stopped (06/01/25 1746)    propofol 15 mcg/kg/min (06/02/25 0600)    dexmedeTOMIDine 0.6 mcg/kg/hr (06/02/25 0600)    dextrose       PRN Meds:sodium chloride flush, sodium chloride, labetalol, LORazepam **OR** LORazepam **OR** LORazepam **OR** LORazepam **OR** LORazepam **OR** LORazepam **OR** LORazepam **OR** LORazepam, polyethylene glycol, acetaminophen **OR** acetaminophen, glucose, dextrose bolus **OR** dextrose bolus, glucagon (rDNA), dextrose    Objective:   Vitals:   T-max:  Patient Vitals for the past 8 hrs:   BP Temp Temp src Pulse Resp SpO2 Weight   06/02/25 0700 (!) 128/91 -- -- 68 17 100 % --   06/02/25 0645 132/86 -- -- 68 16 100 % --   06/02/25 0630 132/89 -- -- 69 16 100 % --   06/02/25 0615 131/88 -- -- 69 14 100 % --   06/02/25 0600 122/86 -- -- 72 16 100 % --   06/02/25 0545 119/83 -- -- 71 19 100 % --   06/02/25 0530 116/81 -- -- 70 20 100 % --   06/02/25 0519 116/81 -- -- 70 -- -- --   06/02/25 0515 117/83 -- -- 70 17 100 % --   06/02/25 0500 119/81 -- -- 69 16 100 % --   06/02/25 0459 -- -- -- 70 24 100 % --   06/02/25 0445 120/82 -- -- 71 11 100 % --   06/02/25 0430 123/82 -- -- 70 14 100 % --   06/02/25 0415 117/80 -- -- 70 13 100 % --   06/02/25 0400 101/77 97.6 °F (36.4 °C) Temporal 71  underlying infection.  - NCC and NSGY consulted  - q1hr neuro checks  - stable 6hr scan  - Keppra 2.5g x1 followed by 500mg BID  - UA negative  - UDS (received benzos prior to collection)  - Ethanol .072%  -   - No AC/antiplatelet   - SBP < 160  - PLT goal > 100K   - SBT today.     Alcohol withdrawal  AUD 20+ years  Tachycardic and hypertensive on presentation with diaphoresis and bilateral tremor. Unknown time of last drink.  ED 2/2025 pt reported drinking 18-24 beers daily. Multiple admissions for withdrawal in past few years requiring precedex gtt. No previous withdrawal seizures.  - CIWA w/ ativan, receiving ativan per CIWA  - On propofol and precedex. Wean propofol as tolerated  - IV thiamine 200mg daily  - folic acid  - AST 65 ALT 36 INR .96      Pulmonary  Vent Day:   Vent: , RR 12, FiO2 30, PEEP 5  Reason for intubation: Airway protection status epilepticus  Vent Day: 0  Vent Settings:  Vent Mode: AC/PRVC Resp Rate (Set): 12 bpm/Vt (Set, mL): 450 mL/ /FiO2 : 30, PEEP 5  Sedated on Propofol and precedex gtt.         Cardiovascular  Prolonged qTc (544)  Likely secondary to hypokalemia and hypomagnesemia   - avoid QT prolonging meds   Remained vitally stable.    HTN  Hold home amlodipine 5mg soft pressures     GI  Diet: Diet NPO  GI ppx: Protonix     Constipation  PRN glycolax    Renal   Severe Hyponatremia   115 ->>> 124 >> 130> 125>129>132   Received 3% saline now overcorrecting requiring DDAVP  Consider beer potomania given consumption of 18+ beers daily. SIADH 2/2 SDH also possible but Urine Na < 20 awaiting serum osm and urine osm. Appears euvolemic on exam.  - Nephro on board  - Goal Na 137 by 6/3 5 PM  - q3hr Na  - max correction 6-8mEq in 24 hours  - caution with hypotonic fluids in setting of SDH     Pure AGMA (resolving)  AG 30 pH 7.329 pCO2 26.9 bicarb 13 delta ratio 1.6. Salicylate negative. Compensating appropriately prior to intubation.  Likely due to lactic acidosis improved 14.3 ->  2.7. Ethanol level .072%.   - trend lactic until normalization   - RFP     Code Status: Full Code   PPX: PPI, SCDs  DISPO:  TBD    Glycemic goal:  140-180 mg/dL  LDAs:    Infusions: Propofol and precedex  Abx: None   Diet:  Diet NPO  GI PPx:  Protonix  Bowel Regimen: glycolax prn  DVT PPx: holding lovenox bleed risk    Marek Cole MD, PGY-1  Internal Medicine Resident  Contact via [employer]  06/02/25  8:06 AM    This patient has been staffed and discussed with Dr. Beach    Patient examined, findings as discussed with .  Agree with assessment and plan.  Acute seizure disorder likely secondary to acute subdural hematoma.  Seizure activity controlled with ACT; sedation can be withdrawn to allow liberation from mechanical ventilation support.  Respiratory failure is secondary to acute seizure activity and inability to maintain airway.  Level of support required is quite modest, 6-7 L, with FiO2 30%.  Withdrawing propofol, expect to maintain dexmedetomidine infusion, given likelihood of alcohol withdrawal syndrome.  Time spent in management of critical illness 40 minutes

## 2025-06-02 NOTE — PROGRESS NOTES
Nephrology Consult Note                                                                                                                                                                                                                                                                                                                                                               Office : [phone]     Fax :[phone]    Patient's Name: Evangelist Ortega  9:38 AM  6/2/2025    Reason for Consult:  Hyponatremia   Requesting Physician:  Evangelist Schulz DO  Chief Complaint:  No chief complaint on file.      Assessment/Plan     # Hyponatremia   - Severe   - Possible etiologies: Beer potomania , seizure, SDH   - Na 115--> 118--> 125 --> 128  - Currently NPO  - Continue close monitor of sodium     # Hypokalemia   - Replace PRN  - Monitor    # Lactic acidosis   - 2/2 seizure     # SDH   - Likely traumatic   - Neurosurgery following     # Seizure   - Alcohol ? SDH?   - Neurology following     # Alcohol withdrawal  - [facility] protocol   - IV thiamine & folic acid       History of Present Ilness:    Evangelist Ortega is a 49 y.o. male with     This patient is a 48 y/o male found by EMS for altered mental status and concern for alcohol withdrawal and seizure.  Per EMS report they were called by the patient with concerns for alcohol withdrawal. Patient was verbal on the EMS call but was found unresponsive on their arrival.  Fingerstick glucose in the 100s.  En route EMS reports that his mental status has improved he is now localizing to pain and making eye contact with verbal stimuli.  Concern for postictal state.  Patient is altered on arrival here he is a poor historian     Just after arrival on initial evaluation patient developed tonic-clonic seizure and received 2 mg of Ativan via IO.       Na 115 16:53--> 118 19:16   Mg 1.4-> 9.6 (!)   Ptis intubated   On propofol    Interval hx:    Intubated on vent support  Sodium 129 this

## 2025-06-02 NOTE — PLAN OF CARE
Problem: Metabolic/Fluid and Electrolytes - Adult  Goal: Electrolytes maintained within normal limits  6/1/2025 2113 by Priya Lopez, RN  Outcome: Not Progressing  Flowsheets (Taken 6/1/2025 2000)  Electrolytes maintained within normal limits:   Monitor labs and assess patient for signs and symptoms of electrolyte imbalances   Administer electrolyte replacement as ordered   Monitor response to electrolyte replacements, including repeat lab results as appropriate  6/1/2025 1800 by Jc Gaming, RN  Outcome: Progressing    Currently correcting sodium levels with Desmopressin and D5

## 2025-06-02 NOTE — PROGRESS NOTES
CONTINUOUS EEG    Name:  Evangelist Ortega  Medical Record Number:  [id number]  Age: 49 y.o.   Gender: male  : 1975  Today's Date:  2025  Room:  [address] [place] [address]  Vital Signs   BP 98/74   Pulse 68   Temp 97.3 °F (36.3 °C) (Temporal)   Resp 12   Ht 1.702 m (5' 7\")   Wt 67.5 kg (148 lb 13 oz)   SpO2 97%   BMI 23.31 kg/m²       Patient currently on continuous EEG monitoring.  All EEG leads are currently in place with no current issues.  Verified [employer] connection via team viewer.  Checked in with patient RN for current plan of care.    Comments: Reconnected and checked leads after completed pt test. Continue monitoring. All leads within 10K limit. Today is day 1 of cvEEG.    Electronically signed by Cha Encinas on 2025 at 7:06 PM

## 2025-06-02 NOTE — PROGRESS NOTES
V2.0    [facility] Progress Note      Name:  Evangelist Ortega /Age/Sex: 1975  (49 y.o. male)   MRN & CSN:  [id number] & [id number] Encounter Date/Time: 2025 10:33 AM EDT   Location:  [facility]- PCP: Evangelist Schulz DO     Attending:Ronny An MD       Hospital Day: 3    Assessment and Recommendations   Evangelist Ortega is a 49 y.o. male with pmh of alcohol use disorder, HTN, Mood Disorder who presents with Status epilepticus (HCC)      Status epilepticus  R Subdural hematoma w/ 3mm midline shift  - NCC and NSGY consulted  - continue neuro checks  - Keppra 500mg BID  - No anticoagulation/antiplatelet agents  - SBP < 160  - PLT goal > 100K        Alcohol withdrawal  - CIWA w/ ativan   - On propofol and precedex. Wean propofol per Critical Care  - Continue thiamine and folic acid     Severe Hyponatremia, improving  -115 on admission, corrected to 128 today  - Nephro consulted   - continue to rend RFP     Prolonged qTc (544)  Likely secondary to hypokalemia and hypomagnesemia   - avoid QT prolonging meds      HTN  Hold home amlodipine 5mg resume as BP allows     Depression   - hold home lexapro due to qTc prolongation       Diet Diet NPO   DVT Prophylaxis [] Lovenox, []  Heparin, [] SCDs, [] Ambulation,  [] Eliquis, [] Xarelto  [] Coumadin   Code Status Full Code   Disposition From: Home  Expected Disposition: TBD  Estimated Date of Discharge: TBD  Patient requires continued admission due to ventilator   Surrogate Decision Maker/ POA  Mary Ortega, parent     Personally reviewed Lab Studies and Imaging       Subjective:     Chief Complaint: Seizures    49 y.o. male who presented to the ED on 2025 found unresponsive by EMS after receiving 911 call from patient stating he believed he was going into alcohol withdrawal. Intubated due to repeated seizures. Found to have severe hyponatremia and R SDH as well.    No acute clinical changes reported overnight. Remains in ICU sedated on ventilator.  frontal, temporal, and parietal lobes.  Approximately 3 mm of right to left midline shift.  The anterior left temporal lobe is displaced by a 4.3 x 3.7 cm fluid attenuating lesion.  The gray-white differentiation is maintained without evidence of an acute infarct.  There is no evidence of hydrocephalus. ORBITS: The visualized portion of the orbits demonstrate no acute abnormality. SINUSES: The visualized paranasal sinuses and mastoid air cells demonstrate no acute abnormality. SOFT TISSUES/SKULL:  No acute abnormality of the visualized skull or soft tissues.  Endotracheal and enteric tubes, incompletely assessed.  Debris in the right external auditory canal. CERVICAL SPINE BONES/ALIGNMENT: There is no acute fracture or traumatic malalignment. DEGENERATIVE CHANGES: No severe osseous spinal canal stenosis.  Moderate degenerative changes C5-C7. SOFT TISSUES: There is no prevertebral soft tissue swelling.  Endotracheal tube terminates 1.7 cm above the glenn.  Enteric tube courses inferiorly through the esophagus, beyond field of view. Multiple dental caries.     1. Moderate right subdural hemorrhage with 3 mm leftward midline shift, likely acute. 2. Cystic lesion abuts/displaces anterior left temporal lobe, favor arachnoid cyst.  Finding may be further characterized with brain MRI on a nonemergent basis. Critical results were called by Dr. Levy Vsaquez to ÓSCAR CHÁVEZ on 5/31/2025 at 18:29.     CT CERVICAL SPINE WO CONTRAST  Result Date: 5/31/2025  EXAMINATION: CT OF THE HEAD WITHOUT CONTRAST; CT OF THE CERVICAL SPINE WITHOUT CONTRAST 5/31/2025 5:58 pm TECHNIQUE: CT of the head was performed without the administration of intravenous contrast. Automated exposure control, iterative reconstruction, and/or weight based adjustment of the mA/kV was utilized to reduce the radiation dose to as low as reasonably achievable.; CT of the cervical spine was performed without the administration of intravenous contrast.  found for: \"TSH\"  Troponin: No results found for: \"TROPONINT\"  Lactic Acid:   Recent Labs     05/31/25 2133   LACTA 2.7*     BNP: No results for input(s): \"PROBNP\" in the last 72 hours.  UA:  Lab Results   Component Value Date/Time    NITRU Negative 06/01/2025 09:35 AM    COLORU Yellow 06/01/2025 09:35 AM    PHUR 6.5 06/01/2025 09:35 AM    WBCUA 0-2 06/01/2025 09:35 AM    RBCUA 0-2 06/01/2025 09:35 AM    BACTERIA 2+ 06/01/2025 12:32 AM    CLARITYU Clear 06/01/2025 09:35 AM    LEUKOCYTESUR TRACE 06/01/2025 09:35 AM    UROBILINOGEN 0.2 06/01/2025 09:35 AM    BILIRUBINUR Negative 06/01/2025 09:35 AM    BLOODU Negative 06/01/2025 09:35 AM    GLUCOSEU Negative 06/01/2025 09:35 AM    KETUA Negative 06/01/2025 09:35 AM    AMORPHOUS 2+ 06/01/2025 09:35 AM     Urine Cultures: No results found for: \"LABURIN\"  Blood Cultures: No results found for: \"BC\"  No results found for: \"BLOODCULT2\"  Organism: No results found for: \"ORG\"      Electronically signed by Ronyn An MD on 6/2/2025 at 10:48 AM

## 2025-06-03 LAB
ALBUMIN SERPL-MCNC: 3.4 G/DL (ref 3.4–5)
ALBUMIN SERPL-MCNC: 3.4 G/DL (ref 3.4–5)
ANION GAP SERPL CALCULATED.3IONS-SCNC: 17 MMOL/L (ref 3–16)
ANION GAP SERPL CALCULATED.3IONS-SCNC: 17 MMOL/L (ref 3–16)
BASE EXCESS BLDA CALC-SCNC: -1 MMOL/L (ref -3–3)
BASE EXCESS BLDA CALC-SCNC: -1 MMOL/L (ref -3–3)
BASOPHILS # BLD: 0 K/UL (ref 0–0.2)
BASOPHILS # BLD: 0 K/UL (ref 0–0.2)
BASOPHILS NFR BLD: 0 %
BASOPHILS NFR BLD: 0 %
BUN SERPL-MCNC: 5 MG/DL (ref 7–20)
BUN SERPL-MCNC: 5 MG/DL (ref 7–20)
CALCIUM SERPL-MCNC: 8.7 MG/DL (ref 8.3–10.6)
CALCIUM SERPL-MCNC: 8.7 MG/DL (ref 8.3–10.6)
CHLORIDE SERPL-SCNC: 100 MMOL/L (ref 99–110)
CHLORIDE SERPL-SCNC: 100 MMOL/L (ref 99–110)
CO2 BLDA-SCNC: 25 MMOL/L
CO2 BLDA-SCNC: 25 MMOL/L
CO2 SERPL-SCNC: 16 MMOL/L (ref 21–32)
CO2 SERPL-SCNC: 16 MMOL/L (ref 21–32)
CREAT SERPL-MCNC: 0.6 MG/DL (ref 0.9–1.3)
CREAT SERPL-MCNC: 0.6 MG/DL (ref 0.9–1.3)
DEPRECATED RDW RBC AUTO: 15.4 % (ref 12.4–15.4)
DEPRECATED RDW RBC AUTO: 15.4 % (ref 12.4–15.4)
EOSINOPHIL # BLD: 0.1 K/UL (ref 0–0.6)
EOSINOPHIL # BLD: 0.1 K/UL (ref 0–0.6)
EOSINOPHIL NFR BLD: 1 %
EOSINOPHIL NFR BLD: 1 %
GFR SERPLBLD CREATININE-BSD FMLA CKD-EPI: >90 ML/MIN/{1.73_M2}
GFR SERPLBLD CREATININE-BSD FMLA CKD-EPI: >90 ML/MIN/{1.73_M2}
GLUCOSE BLD-MCNC: 103 MG/DL (ref 70–99)
GLUCOSE BLD-MCNC: 103 MG/DL (ref 70–99)
GLUCOSE SERPL-MCNC: 73 MG/DL (ref 70–99)
GLUCOSE SERPL-MCNC: 73 MG/DL (ref 70–99)
HCO3 BLDA-SCNC: 23.7 MMOL/L (ref 21–29)
HCO3 BLDA-SCNC: 23.7 MMOL/L (ref 21–29)
HCT VFR BLD AUTO: 38 % (ref 40.5–52.5)
HCT VFR BLD AUTO: 38 % (ref 40.5–52.5)
HGB BLD-MCNC: 13.2 G/DL (ref 13.5–17.5)
HGB BLD-MCNC: 13.2 G/DL (ref 13.5–17.5)
LACTATE BLD-SCNC: 0.85 MMOL/L (ref 0.4–2)
LACTATE BLD-SCNC: 0.85 MMOL/L (ref 0.4–2)
LACTATE BLDV-SCNC: 1 MMOL/L (ref 0.4–2)
LACTATE BLDV-SCNC: 1 MMOL/L (ref 0.4–2)
LYMPHOCYTES # BLD: 1.1 K/UL (ref 1–5.1)
LYMPHOCYTES # BLD: 1.1 K/UL (ref 1–5.1)
LYMPHOCYTES NFR BLD: 13 %
LYMPHOCYTES NFR BLD: 13 %
MAGNESIUM SERPL-MCNC: 1.89 MG/DL (ref 1.8–2.4)
MAGNESIUM SERPL-MCNC: 1.89 MG/DL (ref 1.8–2.4)
MCH RBC QN AUTO: 29.7 PG (ref 26–34)
MCH RBC QN AUTO: 29.7 PG (ref 26–34)
MCHC RBC AUTO-ENTMCNC: 34.7 G/DL (ref 31–36)
MCHC RBC AUTO-ENTMCNC: 34.7 G/DL (ref 31–36)
MCV RBC AUTO: 85.6 FL (ref 80–100)
MCV RBC AUTO: 85.6 FL (ref 80–100)
MONOCYTES # BLD: 0.8 K/UL (ref 0–1.3)
MONOCYTES # BLD: 0.8 K/UL (ref 0–1.3)
MONOCYTES NFR BLD: 9 %
MONOCYTES NFR BLD: 9 %
NEUTROPHILS # BLD: 6.5 K/UL (ref 1.7–7.7)
NEUTROPHILS # BLD: 6.5 K/UL (ref 1.7–7.7)
NEUTROPHILS NFR BLD: 76 %
NEUTROPHILS NFR BLD: 76 %
NEUTS BAND NFR BLD MANUAL: 1 % (ref 0–7)
NEUTS BAND NFR BLD MANUAL: 1 % (ref 0–7)
PATH INTERP BLD-IMP: NO
PATH INTERP BLD-IMP: NO
PCO2 BLDA: 35.9 MM HG (ref 35–45)
PCO2 BLDA: 35.9 MM HG (ref 35–45)
PERFORMED ON: ABNORMAL
PERFORMED ON: ABNORMAL
PH BLDA: 7.43 [PH] (ref 7.35–7.45)
PH BLDA: 7.43 [PH] (ref 7.35–7.45)
PHOSPHATE SERPL-MCNC: 2.6 MG/DL (ref 2.5–4.9)
PHOSPHATE SERPL-MCNC: 2.6 MG/DL (ref 2.5–4.9)
PLATELET # BLD AUTO: 113 K/UL (ref 135–450)
PLATELET # BLD AUTO: 113 K/UL (ref 135–450)
PLATELET BLD QL SMEAR: ABNORMAL
PLATELET BLD QL SMEAR: ABNORMAL
PMV BLD AUTO: 7.5 FL (ref 5–10.5)
PMV BLD AUTO: 7.5 FL (ref 5–10.5)
PO2 BLDA: 80 MM HG (ref 75–108)
PO2 BLDA: 80 MM HG (ref 75–108)
POC SAMPLE TYPE: ABNORMAL
POC SAMPLE TYPE: ABNORMAL
POTASSIUM SERPL-SCNC: 4.6 MMOL/L (ref 3.5–5.1)
POTASSIUM SERPL-SCNC: 4.6 MMOL/L (ref 3.5–5.1)
RBC # BLD AUTO: 4.44 M/UL (ref 4.2–5.9)
RBC # BLD AUTO: 4.44 M/UL (ref 4.2–5.9)
RBC MORPH BLD: NORMAL
RBC MORPH BLD: NORMAL
REASON FOR REJECTION: NORMAL
REASON FOR REJECTION: NORMAL
REJECTED TEST: NORMAL
REJECTED TEST: NORMAL
SAO2 % BLDA: 96 % (ref 93–100)
SAO2 % BLDA: 96 % (ref 93–100)
SLIDE REVIEW: ABNORMAL
SLIDE REVIEW: ABNORMAL
SODIUM SERPL-SCNC: 132 MMOL/L (ref 136–145)
SODIUM SERPL-SCNC: 132 MMOL/L (ref 136–145)
SODIUM SERPL-SCNC: 133 MMOL/L (ref 136–145)
SODIUM SERPL-SCNC: 133 MMOL/L (ref 136–145)
SODIUM SERPL-SCNC: 134 MMOL/L (ref 136–145)
SODIUM SERPL-SCNC: 134 MMOL/L (ref 136–145)
SODIUM SERPL-SCNC: 135 MMOL/L (ref 136–145)
SODIUM SERPL-SCNC: 135 MMOL/L (ref 136–145)
SODIUM SERPL-SCNC: 137 MMOL/L (ref 136–145)
SODIUM SERPL-SCNC: 137 MMOL/L (ref 136–145)
WBC # BLD AUTO: 8.4 K/UL (ref 4–11)
WBC # BLD AUTO: 8.4 K/UL (ref 4–11)

## 2025-06-03 PROCEDURE — 6370000000 HC RX 637 (ALT 250 FOR IP)

## 2025-06-03 PROCEDURE — 36600 WITHDRAWAL OF ARTERIAL BLOOD: CPT

## 2025-06-03 PROCEDURE — 99233 SBSQ HOSP IP/OBS HIGH 50: CPT | Performed by: INTERNAL MEDICINE

## 2025-06-03 PROCEDURE — 6360000002 HC RX W HCPCS

## 2025-06-03 PROCEDURE — 83735 ASSAY OF MAGNESIUM: CPT

## 2025-06-03 PROCEDURE — 6360000002 HC RX W HCPCS: Performed by: STUDENT IN AN ORGANIZED HEALTH CARE EDUCATION/TRAINING PROGRAM

## 2025-06-03 PROCEDURE — 82803 BLOOD GASES ANY COMBINATION: CPT

## 2025-06-03 PROCEDURE — 95720 EEG PHY/QHP EA INCR W/VEEG: CPT | Performed by: PSYCHIATRY & NEUROLOGY

## 2025-06-03 PROCEDURE — 83605 ASSAY OF LACTIC ACID: CPT

## 2025-06-03 PROCEDURE — 2000000000 HC ICU R&B

## 2025-06-03 PROCEDURE — 2700000000 HC OXYGEN THERAPY PER DAY

## 2025-06-03 PROCEDURE — 94761 N-INVAS EAR/PLS OXIMETRY MLT: CPT

## 2025-06-03 PROCEDURE — 36415 COLL VENOUS BLD VENIPUNCTURE: CPT

## 2025-06-03 PROCEDURE — 85025 COMPLETE CBC W/AUTO DIFF WBC: CPT

## 2025-06-03 PROCEDURE — 99232 SBSQ HOSP IP/OBS MODERATE 35: CPT | Performed by: STUDENT IN AN ORGANIZED HEALTH CARE EDUCATION/TRAINING PROGRAM

## 2025-06-03 PROCEDURE — 84295 ASSAY OF SERUM SODIUM: CPT

## 2025-06-03 PROCEDURE — 99291 CRITICAL CARE FIRST HOUR: CPT | Performed by: INTERNAL MEDICINE

## 2025-06-03 PROCEDURE — 95716 VEEG EA 12-26HR CONT MNTR: CPT

## 2025-06-03 PROCEDURE — 82947 ASSAY GLUCOSE BLOOD QUANT: CPT

## 2025-06-03 PROCEDURE — 2500000003 HC RX 250 WO HCPCS

## 2025-06-03 PROCEDURE — 94003 VENT MGMT INPAT SUBQ DAY: CPT

## 2025-06-03 PROCEDURE — 80069 RENAL FUNCTION PANEL: CPT

## 2025-06-03 RX ORDER — HEPARIN SODIUM 5000 [USP'U]/ML
5000 INJECTION, SOLUTION INTRAVENOUS; SUBCUTANEOUS EVERY 8 HOURS SCHEDULED
Status: DISCONTINUED | OUTPATIENT
Start: 2025-06-03 | End: 2025-06-13 | Stop reason: HOSPADM

## 2025-06-03 RX ADMIN — LORAZEPAM 2 MG: 2 INJECTION INTRAMUSCULAR; INTRAVENOUS at 17:44

## 2025-06-03 RX ADMIN — PROPOFOL 20 MCG/KG/MIN: 10 INJECTION, EMULSION INTRAVENOUS at 06:21

## 2025-06-03 RX ADMIN — DEXMEDETOMIDINE HYDROCHLORIDE 0.8 MCG/KG/HR: 400 INJECTION, SOLUTION INTRAVENOUS at 06:20

## 2025-06-03 RX ADMIN — DEXMEDETOMIDINE HYDROCHLORIDE 1 MCG/KG/HR: 400 INJECTION, SOLUTION INTRAVENOUS at 11:43

## 2025-06-03 RX ADMIN — PANTOPRAZOLE SODIUM 40 MG: 40 INJECTION, POWDER, FOR SOLUTION INTRAVENOUS at 08:48

## 2025-06-03 RX ADMIN — Medication 15 ML: at 08:48

## 2025-06-03 RX ADMIN — LEVETIRACETAM 500 MG: 100 INJECTION INTRAVENOUS at 19:51

## 2025-06-03 RX ADMIN — HEPARIN SODIUM 5000 UNITS: 5000 INJECTION INTRAVENOUS; SUBCUTANEOUS at 20:07

## 2025-06-03 RX ADMIN — DEXMEDETOMIDINE HYDROCHLORIDE 0.8 MCG/KG/HR: 400 INJECTION, SOLUTION INTRAVENOUS at 17:44

## 2025-06-03 RX ADMIN — SODIUM CHLORIDE, PRESERVATIVE FREE 10 ML: 5 INJECTION INTRAVENOUS at 08:57

## 2025-06-03 RX ADMIN — THIAMINE HYDROCHLORIDE 200 MG: 100 INJECTION, SOLUTION INTRAMUSCULAR; INTRAVENOUS at 08:49

## 2025-06-03 RX ADMIN — LORAZEPAM 1 MG: 2 INJECTION INTRAMUSCULAR; INTRAVENOUS at 12:09

## 2025-06-03 RX ADMIN — AMLODIPINE BESYLATE 5 MG: 5 TABLET ORAL at 09:06

## 2025-06-03 RX ADMIN — LEVETIRACETAM 500 MG: 100 INJECTION INTRAVENOUS at 08:49

## 2025-06-03 RX ADMIN — LORAZEPAM 2 MG: 2 INJECTION INTRAMUSCULAR; INTRAVENOUS at 11:08

## 2025-06-03 RX ADMIN — FOLIC ACID 1 MG: 1 TABLET ORAL at 08:49

## 2025-06-03 RX ADMIN — LORAZEPAM 2 MG: 2 INJECTION INTRAMUSCULAR; INTRAVENOUS at 23:35

## 2025-06-03 RX ADMIN — LORAZEPAM 2 MG: 2 INJECTION INTRAMUSCULAR; INTRAVENOUS at 05:12

## 2025-06-03 ASSESSMENT — PULMONARY FUNCTION TESTS
PIF_VALUE: 15
PIF_VALUE: 16
PIF_VALUE: 16
PIF_VALUE: 15
PIF_VALUE: 16
PIF_VALUE: 15
PIF_VALUE: 17
PIF_VALUE: 15
PIF_VALUE: 16
PIF_VALUE: 15
PIF_VALUE: 16
PIF_VALUE: 15
PIF_VALUE: 16
PIF_VALUE: 15
PIF_VALUE: 16

## 2025-06-03 NOTE — PROGRESS NOTES
Lab personnel unable to collect labs this morning at 0730 after multiple attempts. Will send another tech to try.

## 2025-06-03 NOTE — PROGRESS NOTES
Nephrology Consult Note                                                                                                                                                                                                                                                                                                                                                               Office : [phone]     Fax :[phone]    Patient's Name: Evangelist Ortega  10:33 AM  6/3/2025    Reason for Consult:  Hyponatremia   Requesting Physician:  Evangelist Schulz DO  Chief Complaint:  No chief complaint on file.      Assessment/Plan     # Hyponatremia   - Severe   - Possible etiologies: Beer potomania , seizure, SDH   - Na 115--> 118--> 125 --> 128--->133  - Continue close monitor of sodium     # Hypokalemia   - Replace PRN  - Monitor    # Lactic acidosis   - 2/2 seizure     # SDH   - Likely traumatic   - Neurosurgery following     # Seizure   - Alcohol ? SDH?   - Neurology following     # Alcohol withdrawal  - [facility] protocol   - IV thiamine & folic acid       History of Present Ilness:    Evangelist Ortega is a 49 y.o. male with     This patient is a 48 y/o male found by EMS for altered mental status and concern for alcohol withdrawal and seizure.  Per EMS report they were called by the patient with concerns for alcohol withdrawal. Patient was verbal on the EMS call but was found unresponsive on their arrival.  Fingerstick glucose in the 100s.  En route EMS reports that his mental status has improved he is now localizing to pain and making eye contact with verbal stimuli.  Concern for postictal state.  Patient is altered on arrival here he is a poor historian     Just after arrival on initial evaluation patient developed tonic-clonic seizure and received 2 mg of Ativan via IO.       Na 115 16:53--> 118 19:16   Mg 1.4-> 9.6 (!)   Ptis intubated   On propofol    Interval hx:    Na stable now   Good Uo   BP in good range       History

## 2025-06-03 NOTE — PROGRESS NOTES
ICU Progress Note    Admit Date: 5/31/2025  Day: 2  Vent Day: 2  IV Access:Peripheral  IV Fluids:None  Vasopressors:None                Antibiotics: None   Diet: Diet NPO    CC: Seizure    Interval history:   Patient seen and examined this morning.  No overnight events.  Sodium remains within the goal.  VSS.  Labs with sodium of 133, potassium was hemolyzed, repeat pending.  Bicarb mildly low to 16, and anion gap mildly elevated to 17, appears starvation ketosis.  No CBC, she was with the blood draw this morning.  Failed SBT yesterday.  Mildly agitated yesterday evening, restarted on propofol.    This morning patient is on propofol of 20 and Precedex of 0.8, intermittently opening eyes, responding to pain.  During sedation holiday this morning patient was more alert, wanted right something on piece of paper.     Will turn off the propofol today, give SBT, blood gas after an hour on SBT.    If he fails an SBT, will start him on TF. If he passes, extubate and get SLP done and will start oral feeding.     cvEEG read pending for today. Discussed with NCC at bedside, if no seizure, plant to dc EEG.     HPI: \"Mr. Evangelist Ortega is a 49 y.o. male with a medical hx significant for alcohol use disorder (20+yrs), HTN, depression otherwise as listed in the MHx table below, who presented unresponsive via EMS to [place] ED. Transferred to [facility].     Per ED note and EMS run sheet, pt. Called 911 stating concern for possible alcohol withdrawal. When EMS entered residence he was found unresponsive surrounded by many empty beer cans. Mental status improved en route with localization of pain, spontaneous movement, and arousing to verbal stimuli.      Shortly after arrival to ED patient experienced tonic-clonic seizure receiving 2mg ativan with aborted seizure activity. Within 10 minutes experienced another tonic clonic seizure requiring 5mg versed x2 and versed gtt and subsequently intubated for airway protection.      Per chart

## 2025-06-03 NOTE — PLAN OF CARE
Problem: Discharge Planning  Goal: Discharge to home or other facility with appropriate resources  6/3/2025 0744 by Chey Dahl RN  Outcome: Progressing     Problem: Safety - Medical Restraint  Goal: Remains free of injury from restraints (Restraint for Interference with Medical Device)  Description: INTERVENTIONS:1. Determine that other, less restrictive measures have been tried or would not be effective before applying the restraint2. Evaluate the patient's condition at the time of restraint application3. Inform patient/family regarding the reason for restraint4. Q2H: Monitor safety, psychosocial status, comfort, nutrition and hydration  6/3/2025 0744 by Chey Dahl RN  Outcome: Progressing    Problem: Safety - Adult  Goal: Free from fall injury  6/3/2025 0744 by Chey Dahl RN  Outcome: Progressing     Problem: Neurosensory - Adult  Goal: Achieves stable or improved neurological status  6/3/2025 0744 by Chey Dahl RN  Outcome: Progressing     Problem: Neurosensory - Adult  Goal: Absence of seizures  6/3/2025 0744 by Chey Dahl RN  Outcome: Progressing     Problem: Neurosensory - Adult  Goal: Remains free of injury related to seizures activity  6/3/2025 0744 by Chey Dahl RN  Outcome: Progressing     Problem: Neurosensory - Adult  Goal: Achieves maximal functionality and self care  6/3/2025 0744 by Chey Dahl RN  Outcome: Progressing     Problem: Respiratory - Adult  Goal: Achieves optimal ventilation and oxygenation  6/3/2025 0744 by Chey Dahl RN  Outcome: Progressing     Problem: Cardiovascular - Adult  Goal: Maintains optimal cardiac output and hemodynamic stability  6/3/2025 0744 by Chey Dahl RN  Outcome: Progressing     Problem: Cardiovascular - Adult  Goal: Absence of cardiac dysrhythmias or at baseline  6/3/2025 0744 by Chey Dahl RN  Outcome: Progressing     Problem: Skin/Tissue Integrity - Adult  Goal: Skin integrity remains intact  Description:

## 2025-06-03 NOTE — PROCEDURES
PROCEDURE NOTE  Date: 6/3/2025   Name: Evangelist Ortega  YOB: 1975    Procedures              Referring physician: Dr. Lau  Date: 6/3/2025  Start Time: 6/2/2025 @ 1500  End Time: 6/3/2025 @ 1500    Indication  Patient with encephalopathy, EEG done to rule out subclinical seizures.       Introduction  This continuous video-EEG was acquired using a [employer] workstation at 256 samples/s. Electrodes were placed according to the [employer] 10-20 system. Automated spike and seizure detection algorithms were applied. Video was recorded during this study.    Description  The background consistent of diffuse none reactive polymorphic delta and theta slowing, at times seem more reactive briefly, could be due to sedation level. No clear focal slowing or interhemispheric asymmetry was noted. Normal sleep structures were not observed. There were no clear or definitive interictal discharges.      Events  6/2/2025 @ 0348  Clinically: no semiology.  EEG: Breif ~5 seconds right anterior temporal focal slowing/rythmic delta.         6/2-3/2025  No events reported.     Impression  Abnormal continuous vEEG recording, the slowing mentioned above suggests moderate non specific encephalopathy.     No clear asymmetry noted on this study    No clear electrographic seizure noted.     By the end became not reliable for read, leads off.       Shashank Santacruz MD  Epilepsy Board Certified.  Neurology Board Certified.    Electronically Signed

## 2025-06-03 NOTE — PROGRESS NOTES
NeuroCrit Progress Note    Patient: Evangelist Ortega MRN: [id number]    YOB: 1975  Age: 49 y.o.  Sex: male   Unit: [facility] ICU TOWER Room/Bed: 4512/4512-01 Location: [facility]    Today's Date: 6/3/2025  Date of Admission: 5/31/2025  7:59 PM  Admitting Physician: LINA MEDINA    Primary Care Physician: Evangelist Schulz DO          LOS: 3 days      ASSESSMENT & RECOMMENDATIONS     Assessment  50yo man with ETOH abuse presented to ER with encephalopathy, apparently found essentially unconscious by EMS after pt had initiated call to 911, and began to spontaneously improve until having a GTC-type seizure en route and then another in the ER for which he was given Ativan, Versed, Keppra 2000mg, intubated, and started on a Versed gtt but found to have an acute right frontal convexity SDH. Repeat CT head showed stable SDH  cEEG neg  Overnight, reportedly, while the sedation were held, patient was awake and able to write on paper.  He asked for what happened.     Recommendations  Continue Keppra 500mg bid  DC cEEG  Minimize sedation  Work to extubate at ICU Team discretion  No antiplatelets/anticoagulants; hep SQ tonight  Q1 neuro checks  SBP < 160  MRI brain, when able  Sodium as per Nephrology  ETOH withdrawal as per ICU/Hospitalist      SUBJECTIVE     Meeting patient for the first time. Initial consultation note was completed by Neurology CNP yesterday (5/31/25) evening, which I have reviewed.    50yo man with ETOH abuse; HTN; anxiety/depression. Presented to OSH with altered mental status.    As documented elsewhere, he called EMS because he was concerned he was withdrawing from alcohol. EMS arrived to find him, essentially, unconscious with beer cans scattered throughout the house. Glucose was in 100s. He began to wake a bit in the ambulance and was localizing to pain and making eye contact with verbal stimuli. In the ER he was observed to have a GTC-type seizure and was given    Sodium    Collection Time: 06/02/25  1:21 AM   Result Value Ref Range    Sodium 130 (L) 136 - 145 mmol/L   CBC with Auto Differential    Collection Time: 06/02/25  5:55 AM   Result Value Ref Range    WBC 5.4 4.0 - 11.0 K/uL    RBC 4.19 (L) 4.20 - 5.90 M/uL    Hemoglobin 12.5 (L) 13.5 - 17.5 g/dL    Hematocrit 35.6 (L) 40.5 - 52.5 %    MCV 84.8 80.0 - 100.0 fL    MCH 29.9 26.0 - 34.0 pg    MCHC 35.2 31.0 - 36.0 g/dL    RDW 15.0 12.4 - 15.4 %    Platelets 83 (L) 135 - 450 K/uL    MPV 6.9 5.0 - 10.5 fL    Neutrophils % 75.9 %    Lymphocytes % 10.0 %    Monocytes % 12.9 %    Eosinophils % 0.8 %    Basophils % 0.4 %    Neutrophils Absolute 4.1 1.7 - 7.7 K/uL    Lymphocytes Absolute 0.5 (L) 1.0 - 5.1 K/uL    Monocytes Absolute 0.7 0.0 - 1.3 K/uL    Eosinophils Absolute 0.0 0.0 - 0.6 K/uL    Basophils Absolute 0.0 0.0 - 0.2 K/uL   Magnesium    Collection Time: 06/02/25  5:55 AM   Result Value Ref Range    Magnesium 1.94 1.80 - 2.40 mg/dL   Renal Function Panel    Collection Time: 06/02/25  5:55 AM   Result Value Ref Range    Sodium 128 (L) 136 - 145 mmol/L    Potassium 3.2 (L) 3.5 - 5.1 mmol/L    Chloride 94 (L) 99 - 110 mmol/L    CO2 24 21 - 32 mmol/L    Anion Gap 10 3 - 16    Glucose 117 (H) 70 - 99 mg/dL    BUN 4 (L) 7 - 20 mg/dL    Creatinine 0.6 (L) 0.9 - 1.3 mg/dL    Est, Glom Filt Rate >90 >60    Calcium 8.5 8.3 - 10.6 mg/dL    Phosphorus 2.4 (L) 2.5 - 4.9 mg/dL    Albumin 3.4 3.4 - 5.0 g/dL   Sodium    Collection Time: 06/02/25  8:32 AM   Result Value Ref Range    Sodium 129 (L) 136 - 145 mmol/L   Sodium    Collection Time: 06/02/25 11:57 AM   Result Value Ref Range    Sodium 129 (L) 136 - 145 mmol/L   Sodium    Collection Time: 06/02/25  3:45 PM   Result Value Ref Range    Sodium 132 (L) 136 - 145 mmol/L   Sodium    Collection Time: 06/02/25  6:30 PM   Result Value Ref Range    Sodium 133 (L) 136 - 145 mmol/L   Sodium    Collection Time: 06/02/25 11:17 PM   Result Value Ref Range    Sodium 133 (L) 136 - 145

## 2025-06-03 NOTE — PROGRESS NOTES
Patient has been successfully weaned from Mechanical Ventilation.  RSBI before extubation was 11 with EtCO2 of 22 and SpO2 of 98 on 30% FiO2.  Patient extubated and placed on 3 liters/min via nasal cannula.  Post extubation SpO2 is 98% with HR  75 bpm and RR 30 breaths/min.  Patient had strong cough that was productive of clear  sputum.  Extubation Well tolerated by patient..

## 2025-06-03 NOTE — PROGRESS NOTES
V2.0    [facility] Progress Note      Name:  Evangelist Ortega /Age/Sex: 1975  (49 y.o. male)   MRN & CSN:  [id number] & [id number] Encounter Date/Time: 6/3/2025 10:33 AM EDT   Location:  [facility]/4512-01 PCP: Evangelist Schulz DO     Attending:Seema Teixeira MD       Hospital Day: 4    Assessment and Recommendations   Per chart review, \"49 y.o. male with a medical hx significant for alcohol use disorder (20+yrs), HTN, depression otherwise as listed in the MHx table below, who presented unresponsive via EMS to [place] ED. Transferred to [facility].     Per ED note and EMS run sheet, pt. Called 911 stating concern for possible alcohol withdrawal. When EMS entered residence he was found unresponsive surrounded by many empty beer cans. Mental status improved en route with localization of pain, spontaneous movement, and arousing to verbal stimuli.      Shortly after arrival to ED patient experienced tonic-clonic seizure receiving 2mg ativan with aborted seizure activity. Within 10 minutes experienced another tonic clonic seizure requiring 5mg versed x2 and versed gtt and subsequently intubated for airway protection.      Per chart review patient seen in ED 2025 for alcohol detoxification and reportedly at that time drinking approximately 24 beers a day. He was discharged on 3 day supply of librium. Multiple hospitalizations in the past for alcohol withdrawal most recently  requiring precedex gtt. Withdrawals with hallucinations in past no reported seizures.     ED Course:  Afebrile 130/81, pulse (!) 105, resp. rate 20, SpO2 99%   AG 30 bicarb 13 pH 7.329 pCO2 26.9 Lactic 14.3 ethanol 72 mag 1.4 Na 115  ALT 36 AST 65   GCS 11 with tremor on exam  CT Head: R subdural hemorrhage with 3mm leftward midline shift likely acute/subacute given mixed attenuation. Fluid collection in the middle cranial fossa on the L likely to represent arachnoid cyst.  Nephro consulted and received 3% hypertonic saline prior to transfer to

## 2025-06-03 NOTE — PROGRESS NOTES
Pt more awake and alert this morning, adjusting himself in bed, nothing aggressive. Asking to write on paper. Did not know where he was or what happened. RN asked if he had any family to contact and he shook his head no.

## 2025-06-03 NOTE — PROGRESS NOTES
CONTINUOUS EEG    Name:  Evangelist Ortega  Medical Record Number:  [id number]  Age: 49 y.o.   Gender: male  : 1975  Today's Date:  6/3/2025  Room:  [address] [place] [address]  Vital Signs   /80   Pulse 70   Temp 97 °F (36.1 °C) (Temporal)   Resp 20   Ht 1.702 m (5' 7\")   Wt 67.5 kg (148 lb 13 oz)   SpO2 98%   BMI 23.31 kg/m²           Continuous EEG Testing End Time:   1500      Comments:  Per Chery Becerra patient test is completed. [facility] contacted 150 via team viewer. Scalp assessment performed by this EEG techChey R.N.       Plan of care:Leads removed, hair washed, towel dried and combed.      Electronically signed by Cecily Nunes on 6/3/2025 at 3:34 PM

## 2025-06-03 NOTE — PROGRESS NOTES
CONTINUOUS EEG    Name:  Evangelist Ortega  Medical Record Number:  [id number]  Age: 49 y.o.   Gender: male  : 1975  Today's Date:  6/3/2025  Room:  [address] [place] [address]  Vital Signs   /78   Pulse 77   Temp 98 °F (36.7 °C) (Temporal)   Resp 11   Ht 1.702 m (5' 7\")   Wt 67.5 kg (148 lb 13 oz)   SpO2 99%   BMI 23.31 kg/m²       Patient currently on continuous EEG monitoring.  All EEG leads are currently in place with no current issues.  Verified [facility] connection via team viewer.  Checked in with patient RN for current plan of care.    Comments: Continue monitoring. All leads within 10K limit. Today is day 2 of cvEEG.    Electronically signed by Cecily Nunes on 6/3/2025 at 11:59 AM

## 2025-06-03 NOTE — PLAN OF CARE
Problem: Neurosensory - Adult  Goal: Achieves stable or improved neurological status  6/2/2025 2027 by Priya Lopez RN  Outcome: Progressing  Flowsheets (Taken 6/2/2025 2000)  Achieves stable or improved neurological status:   Assess for and report changes in neurological status   Initiate measures to prevent increased intracranial pressure   Maintain blood pressure and fluid volume within ordered parameters to optimize cerebral perfusion and minimize risk of hemorrhage   Monitor temperature, glucose, and sodium. Initiate appropriate interventions as ordered  6/2/2025 1918 by Pedro Woodson RN  Outcome: Progressing     Problem: Neurosensory - Adult  Goal: Absence of seizures  6/2/2025 2027 by Priya Lopez RN  Outcome: Progressing  Flowsheets (Taken 6/2/2025 2000)  Absence of seizures:   Monitor for seizure activity.  If seizure occurs, document type and location of movements and any associated apnea   If seizure occurs, turn head to side and suction secretions as needed  6/2/2025 1918 by Pedro Woodson RN  Outcome: Progressing     Problem: Respiratory - Adult  Goal: Achieves optimal ventilation and oxygenation  Outcome: Progressing  Flowsheets (Taken 6/2/2025 2000)  Achieves optimal ventilation and oxygenation:   Assess for changes in respiratory status   Assess for changes in mentation and behavior   Position to facilitate oxygenation and minimize respiratory effort   Assess the need for suctioning and aspirate as needed     Problem: Genitourinary - Adult  Goal: Urinary catheter remains patent  Outcome: Progressing  Flowsheets (Taken 6/2/2025 2000)  Urinary catheter remains patent: Assess patency of urinary catheter     Problem: Infection - Adult  Goal: Absence of infection during hospitalization  Outcome: Progressing  Flowsheets (Taken 6/2/2025 2000)  Absence of infection during hospitalization:   Assess and monitor for signs and symptoms of infection   Monitor lab/diagnostic results

## 2025-06-04 LAB
ALBUMIN SERPL-MCNC: 3.4 G/DL (ref 3.4–5)
ALBUMIN SERPL-MCNC: 3.4 G/DL (ref 3.4–5)
ANION GAP SERPL CALCULATED.3IONS-SCNC: 17 MMOL/L (ref 3–16)
ANION GAP SERPL CALCULATED.3IONS-SCNC: 17 MMOL/L (ref 3–16)
BASOPHILS # BLD: 0.1 K/UL (ref 0–0.2)
BASOPHILS # BLD: 0.1 K/UL (ref 0–0.2)
BASOPHILS NFR BLD: 1.4 %
BASOPHILS NFR BLD: 1.4 %
BUN SERPL-MCNC: 6 MG/DL (ref 7–20)
BUN SERPL-MCNC: 6 MG/DL (ref 7–20)
CALCIUM SERPL-MCNC: 9 MG/DL (ref 8.3–10.6)
CALCIUM SERPL-MCNC: 9 MG/DL (ref 8.3–10.6)
CHLORIDE SERPL-SCNC: 98 MMOL/L (ref 99–110)
CHLORIDE SERPL-SCNC: 98 MMOL/L (ref 99–110)
CO2 SERPL-SCNC: 20 MMOL/L (ref 21–32)
CO2 SERPL-SCNC: 20 MMOL/L (ref 21–32)
CREAT SERPL-MCNC: 0.6 MG/DL (ref 0.9–1.3)
CREAT SERPL-MCNC: 0.6 MG/DL (ref 0.9–1.3)
DEPRECATED RDW RBC AUTO: 15 % (ref 12.4–15.4)
DEPRECATED RDW RBC AUTO: 15 % (ref 12.4–15.4)
EKG ATRIAL RATE: 66 BPM
EKG ATRIAL RATE: 66 BPM
EKG DIAGNOSIS: NORMAL
EKG DIAGNOSIS: NORMAL
EKG P AXIS: 19 DEGREES
EKG P AXIS: 19 DEGREES
EKG P-R INTERVAL: 144 MS
EKG P-R INTERVAL: 144 MS
EKG Q-T INTERVAL: 432 MS
EKG Q-T INTERVAL: 432 MS
EKG QRS DURATION: 76 MS
EKG QRS DURATION: 76 MS
EKG QTC CALCULATION (BAZETT): 452 MS
EKG QTC CALCULATION (BAZETT): 452 MS
EKG R AXIS: 50 DEGREES
EKG R AXIS: 50 DEGREES
EKG T AXIS: 47 DEGREES
EKG T AXIS: 47 DEGREES
EKG VENTRICULAR RATE: 66 BPM
EKG VENTRICULAR RATE: 66 BPM
EOSINOPHIL # BLD: 0.1 K/UL (ref 0–0.6)
EOSINOPHIL # BLD: 0.1 K/UL (ref 0–0.6)
EOSINOPHIL NFR BLD: 1.3 %
EOSINOPHIL NFR BLD: 1.3 %
GFR SERPLBLD CREATININE-BSD FMLA CKD-EPI: >90 ML/MIN/{1.73_M2}
GFR SERPLBLD CREATININE-BSD FMLA CKD-EPI: >90 ML/MIN/{1.73_M2}
GLUCOSE SERPL-MCNC: 77 MG/DL (ref 70–99)
GLUCOSE SERPL-MCNC: 77 MG/DL (ref 70–99)
HCT VFR BLD AUTO: 36.6 % (ref 40.5–52.5)
HCT VFR BLD AUTO: 36.6 % (ref 40.5–52.5)
HGB BLD-MCNC: 12.6 G/DL (ref 13.5–17.5)
HGB BLD-MCNC: 12.6 G/DL (ref 13.5–17.5)
LYMPHOCYTES # BLD: 0.8 K/UL (ref 1–5.1)
LYMPHOCYTES # BLD: 0.8 K/UL (ref 1–5.1)
LYMPHOCYTES NFR BLD: 13.9 %
LYMPHOCYTES NFR BLD: 13.9 %
MAGNESIUM SERPL-MCNC: 1.66 MG/DL (ref 1.8–2.4)
MAGNESIUM SERPL-MCNC: 1.66 MG/DL (ref 1.8–2.4)
MCH RBC QN AUTO: 29.5 PG (ref 26–34)
MCH RBC QN AUTO: 29.5 PG (ref 26–34)
MCHC RBC AUTO-ENTMCNC: 34.4 G/DL (ref 31–36)
MCHC RBC AUTO-ENTMCNC: 34.4 G/DL (ref 31–36)
MCV RBC AUTO: 85.5 FL (ref 80–100)
MCV RBC AUTO: 85.5 FL (ref 80–100)
MONOCYTES # BLD: 0.8 K/UL (ref 0–1.3)
MONOCYTES # BLD: 0.8 K/UL (ref 0–1.3)
MONOCYTES NFR BLD: 13.6 %
MONOCYTES NFR BLD: 13.6 %
NEUTROPHILS # BLD: 3.9 K/UL (ref 1.7–7.7)
NEUTROPHILS # BLD: 3.9 K/UL (ref 1.7–7.7)
NEUTROPHILS NFR BLD: 69.8 %
NEUTROPHILS NFR BLD: 69.8 %
PHOSPHATE SERPL-MCNC: 2.8 MG/DL (ref 2.5–4.9)
PHOSPHATE SERPL-MCNC: 2.8 MG/DL (ref 2.5–4.9)
PLATELET # BLD AUTO: 162 K/UL (ref 135–450)
PLATELET # BLD AUTO: 162 K/UL (ref 135–450)
PMV BLD AUTO: 7.2 FL (ref 5–10.5)
PMV BLD AUTO: 7.2 FL (ref 5–10.5)
POTASSIUM SERPL-SCNC: 3.3 MMOL/L (ref 3.5–5.1)
POTASSIUM SERPL-SCNC: 3.3 MMOL/L (ref 3.5–5.1)
RBC # BLD AUTO: 4.28 M/UL (ref 4.2–5.9)
RBC # BLD AUTO: 4.28 M/UL (ref 4.2–5.9)
SODIUM SERPL-SCNC: 130 MMOL/L (ref 136–145)
SODIUM SERPL-SCNC: 130 MMOL/L (ref 136–145)
SODIUM SERPL-SCNC: 133 MMOL/L (ref 136–145)
SODIUM SERPL-SCNC: 133 MMOL/L (ref 136–145)
SODIUM SERPL-SCNC: 135 MMOL/L (ref 136–145)
SODIUM SERPL-SCNC: 135 MMOL/L (ref 136–145)
WBC # BLD AUTO: 5.6 K/UL (ref 4–11)
WBC # BLD AUTO: 5.6 K/UL (ref 4–11)

## 2025-06-04 PROCEDURE — 6360000002 HC RX W HCPCS

## 2025-06-04 PROCEDURE — 6360000002 HC RX W HCPCS: Performed by: STUDENT IN AN ORGANIZED HEALTH CARE EDUCATION/TRAINING PROGRAM

## 2025-06-04 PROCEDURE — 85025 COMPLETE CBC W/AUTO DIFF WBC: CPT

## 2025-06-04 PROCEDURE — 80069 RENAL FUNCTION PANEL: CPT

## 2025-06-04 PROCEDURE — 6370000000 HC RX 637 (ALT 250 FOR IP)

## 2025-06-04 PROCEDURE — 99233 SBSQ HOSP IP/OBS HIGH 50: CPT | Performed by: INTERNAL MEDICINE

## 2025-06-04 PROCEDURE — 2060000000 HC ICU INTERMEDIATE R&B

## 2025-06-04 PROCEDURE — 83735 ASSAY OF MAGNESIUM: CPT

## 2025-06-04 PROCEDURE — 2500000003 HC RX 250 WO HCPCS

## 2025-06-04 PROCEDURE — 36415 COLL VENOUS BLD VENIPUNCTURE: CPT

## 2025-06-04 PROCEDURE — 84295 ASSAY OF SERUM SODIUM: CPT

## 2025-06-04 PROCEDURE — 99232 SBSQ HOSP IP/OBS MODERATE 35: CPT | Performed by: STUDENT IN AN ORGANIZED HEALTH CARE EDUCATION/TRAINING PROGRAM

## 2025-06-04 PROCEDURE — 93005 ELECTROCARDIOGRAM TRACING: CPT | Performed by: STUDENT IN AN ORGANIZED HEALTH CARE EDUCATION/TRAINING PROGRAM

## 2025-06-04 PROCEDURE — 93010 ELECTROCARDIOGRAM REPORT: CPT | Performed by: INTERNAL MEDICINE

## 2025-06-04 PROCEDURE — 6370000000 HC RX 637 (ALT 250 FOR IP): Performed by: STUDENT IN AN ORGANIZED HEALTH CARE EDUCATION/TRAINING PROGRAM

## 2025-06-04 RX ORDER — ESCITALOPRAM OXALATE 20 MG/1
20 TABLET ORAL DAILY
Status: CANCELLED | OUTPATIENT
Start: 2025-06-05

## 2025-06-04 RX ORDER — LORAZEPAM 1 MG/1
2 TABLET ORAL EVERY 6 HOURS
Status: DISCONTINUED | OUTPATIENT
Start: 2025-06-04 | End: 2025-06-06

## 2025-06-04 RX ORDER — MAGNESIUM SULFATE IN WATER 40 MG/ML
4000 INJECTION, SOLUTION INTRAVENOUS ONCE
Status: COMPLETED | OUTPATIENT
Start: 2025-06-04 | End: 2025-06-04

## 2025-06-04 RX ORDER — ESCITALOPRAM OXALATE 20 MG/1
20 TABLET ORAL DAILY
COMMUNITY

## 2025-06-04 RX ORDER — AMLODIPINE BESYLATE 5 MG/1
5 TABLET ORAL DAILY
COMMUNITY

## 2025-06-04 RX ORDER — PANTOPRAZOLE SODIUM 40 MG/1
40 TABLET, DELAYED RELEASE ORAL
Status: DISCONTINUED | OUTPATIENT
Start: 2025-06-05 | End: 2025-06-13 | Stop reason: HOSPADM

## 2025-06-04 RX ADMIN — DEXMEDETOMIDINE HYDROCHLORIDE 0.6 MCG/KG/HR: 400 INJECTION, SOLUTION INTRAVENOUS at 02:56

## 2025-06-04 RX ADMIN — FOLIC ACID 1 MG: 1 TABLET ORAL at 09:06

## 2025-06-04 RX ADMIN — HEPARIN SODIUM 5000 UNITS: 5000 INJECTION INTRAVENOUS; SUBCUTANEOUS at 20:04

## 2025-06-04 RX ADMIN — LORAZEPAM 2 MG: 1 TABLET ORAL at 17:59

## 2025-06-04 RX ADMIN — LORAZEPAM 2 MG: 1 TABLET ORAL at 23:24

## 2025-06-04 RX ADMIN — POTASSIUM BICARBONATE 40 MEQ: 782 TABLET, EFFERVESCENT ORAL at 20:03

## 2025-06-04 RX ADMIN — AMLODIPINE BESYLATE 5 MG: 5 TABLET ORAL at 09:10

## 2025-06-04 RX ADMIN — SODIUM CHLORIDE, PRESERVATIVE FREE 10 ML: 5 INJECTION INTRAVENOUS at 20:04

## 2025-06-04 RX ADMIN — LORAZEPAM 2 MG: 2 INJECTION INTRAMUSCULAR; INTRAVENOUS at 05:40

## 2025-06-04 RX ADMIN — LEVETIRACETAM 500 MG: 100 INJECTION INTRAVENOUS at 20:03

## 2025-06-04 RX ADMIN — HEPARIN SODIUM 5000 UNITS: 5000 INJECTION INTRAVENOUS; SUBCUTANEOUS at 15:35

## 2025-06-04 RX ADMIN — PANTOPRAZOLE SODIUM 40 MG: 40 INJECTION, POWDER, FOR SOLUTION INTRAVENOUS at 12:29

## 2025-06-04 RX ADMIN — Medication 15 ML: at 15:36

## 2025-06-04 RX ADMIN — THIAMINE HYDROCHLORIDE 200 MG: 100 INJECTION, SOLUTION INTRAMUSCULAR; INTRAVENOUS at 12:28

## 2025-06-04 RX ADMIN — LORAZEPAM 2 MG: 2 INJECTION INTRAMUSCULAR; INTRAVENOUS at 20:03

## 2025-06-04 RX ADMIN — LORAZEPAM 2 MG: 1 TABLET ORAL at 12:49

## 2025-06-04 RX ADMIN — LEVETIRACETAM 500 MG: 100 INJECTION INTRAVENOUS at 12:29

## 2025-06-04 RX ADMIN — MAGNESIUM SULFATE HEPTAHYDRATE 4000 MG: 40 INJECTION, SOLUTION INTRAVENOUS at 12:47

## 2025-06-04 RX ADMIN — HEPARIN SODIUM 5000 UNITS: 5000 INJECTION INTRAVENOUS; SUBCUTANEOUS at 05:41

## 2025-06-04 RX ADMIN — POTASSIUM BICARBONATE 40 MEQ: 782 TABLET, EFFERVESCENT ORAL at 09:20

## 2025-06-04 NOTE — PROGRESS NOTES
V2.0    [facility] Progress Note      Name:  Evangelist Ortega /Age/Sex: 1975  (49 y.o. male)   MRN & CSN:  [id number] & [id number] Encounter Date/Time: 2025 10:33 AM EDT   Location:  [facility]/4512-01 PCP: Evangelist Schulz DO     Attending:Seema Teixeira MD       Hospital Day: 5    Assessment and Recommendations   Per chart review, \"49 y.o. male with a medical hx significant for alcohol use disorder (20+yrs), HTN, depression otherwise as listed in the MHx table below, who presented unresponsive via EMS to [place] ED. Transferred to [facility].     Per ED note and EMS run sheet, pt. Called 911 stating concern for possible alcohol withdrawal. When EMS entered residence he was found unresponsive surrounded by many empty beer cans. Mental status improved en route with localization of pain, spontaneous movement, and arousing to verbal stimuli.      Shortly after arrival to ED patient experienced tonic-clonic seizure receiving 2mg ativan with aborted seizure activity. Within 10 minutes experienced another tonic clonic seizure requiring 5mg versed x2 and versed gtt and subsequently intubated for airway protection.      Per chart review patient seen in ED 2025 for alcohol detoxification and reportedly at that time drinking approximately 24 beers a day. He was discharged on 3 day supply of librium. Multiple hospitalizations in the past for alcohol withdrawal most recently  requiring precedex gtt. Withdrawals with hallucinations in past no reported seizures.     ED Course:  Afebrile 130/81, pulse (!) 105, resp. rate 20, SpO2 99%   AG 30 bicarb 13 pH 7.329 pCO2 26.9 Lactic 14.3 ethanol 72 mag 1.4 Na 115  ALT 36 AST 65   GCS 11 with tremor on exam  CT Head: R subdural hemorrhage with 3mm leftward midline shift likely acute/subacute given mixed attenuation. Fluid collection in the middle cranial fossa on the L likely to represent arachnoid cyst.  Nephro consulted and received 3% hypertonic saline prior to transfer to  PROVIDED HISTORY: AMS TECHNOLOGIST PROVIDED HISTORY: Reason for exam:->AMS Has a \"code stroke\" or \"stroke alert\" been called?->No Decision Support Exception - unselect if not a suspected or confirmed emergency medical condition->Emergency Medical Condition (MA) Reason for Exam: AMS; ORDERING SYSTEM PROVIDED HISTORY: seizure TECHNOLOGIST PROVIDED HISTORY: Reason for exam:->seizure Decision Support Exception - unselect if not a suspected or confirmed emergency medical condition->Emergency Medical Condition (MA) Reason for Exam: seizure FINDINGS: BRAIN/VENTRICLES: Moderate subdural hemorrhage overlies the right frontal, temporal, and parietal lobes.  Approximately 3 mm of right to left midline shift.  The anterior left temporal lobe is displaced by a 4.3 x 3.7 cm fluid attenuating lesion.  The gray-white differentiation is maintained without evidence of an acute infarct.  There is no evidence of hydrocephalus. ORBITS: The visualized portion of the orbits demonstrate no acute abnormality. SINUSES: The visualized paranasal sinuses and mastoid air cells demonstrate no acute abnormality. SOFT TISSUES/SKULL:  No acute abnormality of the visualized skull or soft tissues.  Endotracheal and enteric tubes, incompletely assessed.  Debris in the right external auditory canal. CERVICAL SPINE BONES/ALIGNMENT: There is no acute fracture or traumatic malalignment. DEGENERATIVE CHANGES: No severe osseous spinal canal stenosis.  Moderate degenerative changes C5-C7. SOFT TISSUES: There is no prevertebral soft tissue swelling.  Endotracheal tube terminates 1.7 cm above the glenn.  Enteric tube courses inferiorly through the esophagus, beyond field of view. Multiple dental caries.     1. Moderate right subdural hemorrhage with 3 mm leftward midline shift, likely acute. 2. Cystic lesion abuts/displaces anterior left temporal lobe, favor arachnoid cyst.  Finding may be further characterized with brain MRI on a nonemergent basis. Critical  results were called by Dr. Levy Vasquez to ÓSCAR CHÁVEZ on 5/31/2025 at 18:29.     CT CERVICAL SPINE WO CONTRAST  Result Date: 5/31/2025  EXAMINATION: CT OF THE HEAD WITHOUT CONTRAST; CT OF THE CERVICAL SPINE WITHOUT CONTRAST 5/31/2025 5:58 pm TECHNIQUE: CT of the head was performed without the administration of intravenous contrast. Automated exposure control, iterative reconstruction, and/or weight based adjustment of the mA/kV was utilized to reduce the radiation dose to as low as reasonably achievable.; CT of the cervical spine was performed without the administration of intravenous contrast. Multiplanar reformatted images are provided for review. Automated exposure control, iterative reconstruction, and/or weight based adjustment of the mA/kV was utilized to reduce the radiation dose to as low as reasonably achievable. COMPARISON: None. HISTORY: ORDERING SYSTEM PROVIDED HISTORY: AMS TECHNOLOGIST PROVIDED HISTORY: Reason for exam:->AMS Has a \"code stroke\" or \"stroke alert\" been called?->No Decision Support Exception - unselect if not a suspected or confirmed emergency medical condition->Emergency Medical Condition (MA) Reason for Exam: AMS; ORDERING SYSTEM PROVIDED HISTORY: seizure TECHNOLOGIST PROVIDED HISTORY: Reason for exam:->seizure Decision Support Exception - unselect if not a suspected or confirmed emergency medical condition->Emergency Medical Condition (MA) Reason for Exam: seizure FINDINGS: BRAIN/VENTRICLES: Moderate subdural hemorrhage overlies the right frontal, temporal, and parietal lobes.  Approximately 3 mm of right to left midline shift.  The anterior left temporal lobe is displaced by a 4.3 x 3.7 cm fluid attenuating lesion.  The gray-white differentiation is maintained without evidence of an acute infarct.  There is no evidence of hydrocephalus. ORBITS: The visualized portion of the orbits demonstrate no acute abnormality. SINUSES: The visualized paranasal sinuses and mastoid air cells  --  16*  --   --  20*  --    BUN 4*  --  5*  --   --  6*  --    CREATININE 0.6*  --  0.6*  --   --  0.6*  --    GLUCOSE 117*  --  73  --   --  77  --     < > = values in this interval not displayed.     Hepatic:   No results for input(s): \"AST\", \"ALT\", \"BILITOT\", \"ALKPHOS\" in the last 72 hours.    Invalid input(s): \"ALB\"    Lipids:   Lab Results   Component Value Date/Time    TRIG 86 06/01/2025 03:35 AM     Hemoglobin A1C: No results found for: \"LABA1C\"  TSH: No results found for: \"TSH\"  Troponin: No results found for: \"TROPONINT\"  Lactic Acid:   Recent Labs     06/03/25  1357   LACTA 1.0     BNP: No results for input(s): \"PROBNP\" in the last 72 hours.  UA:  Lab Results   Component Value Date/Time    NITRU Negative 06/01/2025 09:35 AM    COLORU Yellow 06/01/2025 09:35 AM    PHUR 6.5 06/01/2025 09:35 AM    WBCUA 0-2 06/01/2025 09:35 AM    RBCUA 0-2 06/01/2025 09:35 AM    BACTERIA 2+ 06/01/2025 12:32 AM    CLARITYU Clear 06/01/2025 09:35 AM    LEUKOCYTESUR TRACE 06/01/2025 09:35 AM    UROBILINOGEN 0.2 06/01/2025 09:35 AM    BILIRUBINUR Negative 06/01/2025 09:35 AM    BLOODU Negative 06/01/2025 09:35 AM    GLUCOSEU Negative 06/01/2025 09:35 AM    KETUA Negative 06/01/2025 09:35 AM    AMORPHOUS 2+ 06/01/2025 09:35 AM     Urine Cultures: No results found for: \"LABURIN\"  Blood Cultures: No results found for: \"BC\"  No results found for: \"BLOODCULT2\"  Organism: No results found for: \"ORG\"      Electronically signed by Seema Teixeira MD on 6/4/2025 at 3:54 PM

## 2025-06-04 NOTE — PROGRESS NOTES
Nephrology Progress Note                                                                                                                                                                                                                                                                                                                                                               Office : [phone]     Fax :[phone]    Patient's Name: Evangelist Ortega  9:57 AM  6/4/2025    Reason for Consult:  Hyponatremia   Requesting Physician:  Evangelist Shculz DO  Chief Complaint:  No chief complaint on file.      Assessment/Plan     # Hyponatremia   - Severe   - Possible etiologies: Beer potomania , seizure, SDH   - Na 115--> 118--> 125 --> 128--->133-->135  - Continue close monitor of sodium     # Hypokalemia   - Replace PRN  - Monitor    # Lactic acidosis   - 2/2 seizure     # SDH   - Likely traumatic   - Neurosurgery following     # Seizure   - Alcohol ? SDH?   - Neurology following     # Alcohol withdrawal  # Delirium tremens   - [facility] protocol   - IV thiamine & folic acid       History of Present Ilness:    Evangelist Ortega is a 49 y.o. male with     This patient is a 50 y/o male found by EMS for altered mental status and concern for alcohol withdrawal and seizure.  Per EMS report they were called by the patient with concerns for alcohol withdrawal. Patient was verbal on the EMS call but was found unresponsive on their arrival.  Fingerstick glucose in the 100s.  En route EMS reports that his mental status has improved he is now localizing to pain and making eye contact with verbal stimuli.  Concern for postictal state.  Patient is altered on arrival here he is a poor historian     Just after arrival on initial evaluation patient developed tonic-clonic seizure and received 2 mg of Ativan via IO.       Na 115 16:53--> 118 19:16   Mg 1.4-> 9.6 (!)   Ptis intubated   On propofol    Interval hx:    Extubated yesterday  Sodium  °C) (Temporal)   Resp 25   Ht 1.702 m (5' 7\")   Wt 68.1 kg (150 lb 2.1 oz)   SpO2 100%   BMI 23.51 kg/m²   Skin: no rash, turgor wnl  Heent:  eomi, mmm  Neck: no bruits or jvd noted  Cardiovascular:  S1, S2 without m/r/g  Respiratory: CTA B without w/r/r  Abdomen:  soft, distended , nd  Ext:  lower extremity edema No    Data:   Labs:  CBC:   Recent Labs     06/02/25  0555 06/03/25 1000 06/04/25  0524   WBC 5.4 8.4 5.6   HGB 12.5* 13.2* 12.6*   PLT 83* 113* 162     BMP:    Recent Labs     06/02/25  0555 06/02/25  0832 06/03/25  0431 06/03/25  1000 06/03/25  1357 06/03/25  1823 06/04/25  0524   *   < > 133*   < > 134* 135* 135*   K 3.2*  --  4.6  --   --   --  3.3*   CL 94*  --  100  --   --   --  98*   CO2 24  --  16*  --   --   --  20*   BUN 4*  --  5*  --   --   --  6*   CREATININE 0.6*  --  0.6*  --   --   --  0.6*   GLUCOSE 117*  --  73  --   --   --  77    < > = values in this interval not displayed.     Ca/Mg/Phos:   Recent Labs     06/02/25  0555 06/03/25 0431 06/04/25  0524   CALCIUM 8.5 8.7 9.0   MG 1.94 1.89 1.66*   PHOS 2.4* 2.6 2.8     Hepatic:   No results for input(s): \"AST\", \"ALT\", \"BILITOT\", \"ALKPHOS\" in the last 72 hours.    Invalid input(s): \"ALB\"    Troponin: No results for input(s): \"TROPONINI\" in the last 72 hours.  BNP: No results for input(s): \"BNP\" in the last 72 hours.  Lipids:   No results for input(s): \"CHOL\", \"TRIG\", \"HDL\" in the last 72 hours.    Invalid input(s): \"LDLCALC\", \"LABVLDL\"    ABGs:   Recent Labs     06/03/25  1144   PHART 7.427   PO2ART 80.0   SWI3DEX 35.9     INR:   No results for input(s): \"INR\" in the last 72 hours.    UA:  No results for input(s): \"COLORU\", \"CLARITYU\", \"GLUCOSEU\", \"BILIRUBINUR\", \"KETUA\", \"SPECGRAV\", \"BLOODU\", \"PHUR\", \"PROTEINU\", \"UROBILINOGEN\", \"NITRU\", \"LEUKOCYTESUR\", \"URINETYPE\" in the last 72 hours.    Invalid input(s): \"LABMICR\"     Urine Microscopic:   No results for input(s): \"LABCAST\", \"BACTERIA\", \"COMU\", \"HYALCAST\", \"WBCUA\", \"RBCUA\" in

## 2025-06-04 NOTE — PROGRESS NOTES
Clinical Pharmacy Progress Note  Medication History     Admit Date: 5/31/2025    List of of current medications patient is taking is complete. Home Medication list in EPIC updated to reflect changes noted below.    Source of information: pharmacy fills, pt interview at bedside    Patient's home pharmacy: [facility]     All medications added to the Medication List today.         Other notes:   Amlodipine and Escitalopram - pt appears behind in fills (last filled in March for 30 day supply).  Recommend Meds 2 Beds on discharge.     Current Outpatient Medications   Medication Instructions    amLODIPine (NORVASC) 5 mg, DAILY    escitalopram (LEXAPRO) 20 mg, DAILY       Please call with any questions.  Brooke WilkesD., BCPS   6/4/2025 10:12 AM  Wireless: [phone]

## 2025-06-04 NOTE — PROGRESS NOTES
ICU Progress Note    Admit Date: 5/31/2025  Day: 5  IV Access:Peripheral  IV Fluids:None  Vasopressors:None                Antibiotics: None   Diet: ADULT DIET; Regular; 1800 ml  ADULT ORAL NUTRITION SUPPLEMENT; Lunch, Breakfast; Standard High Calorie/High Protein Oral Supplement    CC: Seizure    Interval history:   No overnight events. Patient sleeping in the bed while I went in to the room, arousable.  VSS.  Sodium 135 this morning, potassium 3.3, magnesium 1.66, replacement order for potassium and mg in place.  Still needing Ativan for withdrawal.  Will start him on diet. Switch IV ativan to PO.     Okay to be downgraded from the ICU after transitioning from Precedex infusion to Ativan orally.    HPI: \"Mr. Evangelist Ortega is a 49 y.o. male with a medical hx significant for alcohol use disorder (20+yrs), HTN, depression otherwise as listed in the MHx table below, who presented unresponsive via EMS to [place] [facility]. Transferred to [facility].     Per ED note and EMS run sheet, pt. Called 911 stating concern for possible alcohol withdrawal. When EMS entered residence he was found unresponsive surrounded by many empty beer cans. Mental status improved en route with localization of pain, spontaneous movement, and arousing to verbal stimuli.      Shortly after arrival to ED patient experienced tonic-clonic seizure receiving 2mg ativan with aborted seizure activity. Within 10 minutes experienced another tonic clonic seizure requiring 5mg versed x2 and versed gtt and subsequently intubated for airway protection.      Per chart review patient seen in ED 2/26/2025 for alcohol detoxification and reportedly at that time drinking approximately 24 beers a day. He was discharged on 3 day supply of librium. Multiple hospitalizations in the past for alcohol withdrawal most recently 2022 requiring precedex gtt. Withdrawals with hallucinations in past no reported seizures.     ED Course:  Afebrile 130/81, pulse (!) 105, resp. rate 20,      Hepatic:   No results for input(s): \"AST\", \"ALT\", \"BILITOT\", \"BILIDIR\", \"LABALBU\", \"ALKPHOS\" in the last 72 hours.    Invalid input(s): \"PROT\"    Troponin: No results for input(s): \"TROPONINI\" in the last 72 hours.  BNP: No results for input(s): \"BNP\" in the last 72 hours.  Lipids: No results for input(s): \"CHOL\", \"HDL\" in the last 72 hours.    Invalid input(s): \"LDLCALCU\", \"TRIGLYCERIDE\"  ABGs:    Recent Labs     06/03/25  1144   PHART 7.427   BKA3SLL 35.9   PO2ART 80.0   TBR4PNF 23.7   BEART -1   V4HYBAXU 96   KFX4AXW 25       INR:   No results for input(s): \"INR\" in the last 72 hours.    Lactate:   Recent Labs     06/03/25  1144   LACTATE 0.85     Cultures:  -----------------------------------------------------------------  RAD:   MRI BRAIN W WO CONTRAST   Final Result   1. Stable right-sided subdural hematoma with mild right frontal subarachnoid hemorrhage and trace left-sided subdural hematoma.   2. 5.0 x 4.0 cm left middle cranial fossa arachnoid cyst.   3. No abnormal enhancement or restricted diffusion to suggest acute infarct or mass.      Electronically signed by Omkar Prajapati MD      XR ABDOMEN (KUB) (SINGLE AP VIEW)   Final Result      NG tube in the body of the stomach.      Electronically signed by Mckayla Carter MD      CT HEAD WO CONTRAST   Final Result      1. Compared with scan earlier same day, there is a stable right-sided mixed attenuation subdural hematoma with mass effect and approximately 3 mm leftward midline shift      2. Fluid attenuation collection in the middle cranial fossa on the left probably representing an arachnoid cyst or remote insult. This is similar to the prior study. Adjacent curvilinear high attenuation could represent a bridging vessel or an additional    small subdural hematoma.      Electronically signed by Jose David Youssef            Assessment/Plan:   Evangelist Ortega is a 49 y.o. male     Neuro  Status epilepticus  R Subdural hematoma w/ 3mm midline shift  Acute  encephalopathy  At [place] ED tonic clonic seizure x2 within 10 minutes unresolved AMS. Received 2mg ativan and 5mg versed x2 then placed on versed gtt Intubated for airway protection  CT Head: R subdural hemorrhage with 3mm leftward midline shift likely acute/subacute given mixed attenuation. Fluid collection in the middle cranial fossa on the L likely to represent arachnoid cyst. Not on AC at home.  UDS unremarkable (Received benzos in ED). Initial electrolyte disturbances hypomagnesemia 1.41 and hyponatremia 115. Initial glucose 105. Upon arrival to [facility] noted 20-30 seconds of rhythmic jerking movement of the LUE which ceased with no further intervention. Etiology for seizure likely include one or combination of the following: alcohol withdrawal, severe hyponatremia, and subdural hematoma. Previous ED visit 2/2025 pt. Had fall with R scalp hematoma would highly suspect traumatic SDH given frequent intoxication. Ammonia non-elevated. No azotemia. No suspected underlying infection. UA negative. UDS (received benzos prior to collection). Ethanol .072%  - NCC and NSGY following   - stable 6hr scan  - Keppra 2.5g x1 followed by 500mg BID  -   - No AC/antiplatelet   - SBP < 160  - PLT goal > 100K   - Extubated on 6/3/25.    Alcohol withdrawal  AUD 20+ years  Tachycardic and hypertensive on presentation with diaphoresis and bilateral tremor. Unknown time of last drink.  ED 2/2025 pt reported drinking 18-24 beers daily. Multiple admissions for withdrawal in past few years requiring precedex gtt. No previous withdrawal seizures.  - CIWA w/ ativan, receiving ativan per CIWA om top of scheduled 2mg q6hr. IV schedule ativan to PO.   - IV thiamine 200mg daily  - folic acid  - AST 65 ALT 36 INR .96    Pulmonary  Vent Day:   Vent: , RR 12, FiO2 30, PEEP 5  Reason for intubation: Airway protection status epilepticus  Vent Day: 0  Vent Settings:  Vent Mode: AC/PRVC Resp Rate (Set): 12 bpm/Vt (Set, mL): 450 mL/ /FiO2 :  every 6 hours, with as needed dosing based on CIWA protocol.  Current condition is reasonably well-controlled, and he may be transferred to PCU.

## 2025-06-04 NOTE — PLAN OF CARE
Problem: Discharge Planning  Goal: Discharge to home or other facility with appropriate resources  Outcome: Progressing     Problem: Safety - Medical Restraint  Goal: Remains free of injury from restraints (Restraint for Interference with Medical Device)  Description: INTERVENTIONS:1. Determine that other, less restrictive measures have been tried or would not be effective before applying the restraint2. Evaluate the patient's condition at the time of restraint application3. Inform patient/family regarding the reason for restraint4. Q2H: Monitor safety, psychosocial status, comfort, nutrition and hydration  Outcome: Progressing     Problem: Safety - Adult  Goal: Free from fall injury  Outcome: Progressing     Problem: Neurosensory - Adult  Goal: Achieves stable or improved neurological status  Outcome: Progressing  Goal: Absence of seizures  Outcome: Progressing  Goal: Remains free of injury related to seizures activity  Outcome: Progressing  Goal: Achieves maximal functionality and self care  Outcome: Progressing     Problem: Respiratory - Adult  Goal: Achieves optimal ventilation and oxygenation  Outcome: Progressing     Problem: Cardiovascular - Adult  Goal: Maintains optimal cardiac output and hemodynamic stability  Outcome: Progressing  Goal: Absence of cardiac dysrhythmias or at baseline  Outcome: Progressing     Problem: Skin/Tissue Integrity - Adult  Goal: Skin integrity remains intact  Description: 1.  Monitor for areas of redness and/or skin breakdown2.  Assess vascular access sites hourly3.  Every 4-6 hours minimum:  Change oxygen saturation probe site4.  Every 4-6 hours:  If on nasal continuous positive airway pressure, respiratory therapy assess nares and determine need for appliance change or resting period  Outcome: Progressing  Goal: Incisions, wounds, or drain sites healing without S/S of infection  Outcome: Progressing  Goal: Oral mucous membranes remain intact  Outcome: Progressing     Problem:

## 2025-06-04 NOTE — DISCHARGE INSTRUCTIONS
No driving for at least 3 months until he has been cleared by neurologist    We have started you on some new medications. Several of these are vitamins that we would like you to take. We have also started on a seizure medication called Keppra, please continue taking this until cleared by your neurologist.     Please continue with a 1.2 liter fluid restriction, please try to abstain from alcohol.     Please follow up with neurology following discharge.    Please follow up with your primary care physician within 1 week following discharge.     Please return to the hospital with any new or worsening symptoms.

## 2025-06-04 NOTE — PROGRESS NOTES
NeuroCrit Progress Note    Patient: Evangelist Ortega MRN: [id number]    YOB: 1975  Age: 49 y.o.  Sex: male   Unit: [facility] ICU TOWER Room/Bed: 4512/4512-01 Location: [facility]    Today's Date: 6/4/2025  Date of Admission: 5/31/2025  7:59 PM  Admitting Physician: LINA MEDINA    Primary Care Physician: Evangelist Schulz DO          LOS: 4 days      ASSESSMENT & RECOMMENDATIONS     Assessment  48yo man with ETOH abuse presented to ER with encephalopathy, apparently found essentially unconscious by EMS after pt had initiated call to 911, and began to spontaneously improve until having a GTC-type seizure en route and then another in the ER for which he was given Ativan, Versed, Keppra 2000mg, intubated, and started on a Versed gtt but found to have an acute right frontal convexity SDH. Repeat CT head showed stable SDH  Extubated. He admitted he fell and hit hiss head while drinking a few days ago prior to coming in.  However, he could not give me additional detail.  MRI show stable right subdural hematoma, and left middle cranial fossa arachnoid cyst without additional abnormal enhancement.      Recommendations  Continue Keppra 500mg bid  Q4 neuro checks  SBP < 160  Sodium as per Nephrology  ETOH withdrawal as per ICU/Hospitalist    No driving for at least 3 months until he has been cleared by a neurologist.  Discussed with patient  Follow-up with neurologist and neurosurgery    NCC will signed off            SUBJECTIVE     Meeting patient for the first time. Initial consultation note was completed by Neurology CNP yesterday (5/31/25) evening, which I have reviewed.    48yo man with ETOH abuse; HTN; anxiety/depression. Presented to OSH with altered mental status.    As documented elsewhere, he called EMS because he was concerned he was withdrawing from alcohol. EMS arrived to find him, essentially, unconscious with beer cans scattered throughout the house. Glucose was in 100s. He  began to wake a bit in the ambulance and was localizing to pain and making eye contact with verbal stimuli. In the ER he was observed to have a GTC-type seizure and was given Ativan 2mg IO. About 10 minutes later he had another seizure, was given Versed 5mg, and intubated.    Head CT revealed a right frontal convexity SDH with a large left temporal lobe arachnoid cyst. He as given Keppra 2000mg, started on hypertonic saline gtt, given two more milligrams of Ativan, 10mg of IV Versed and started on Versed infusion, thiamine, and transferred here.     Upon arrival, he was assessed by NCC NP who noted that he was having bilateral upper extremity shaking that was suppressible with touch. He was also alert and tracking and following commands in all 4 extremities during this shaking.     No acute events overnight. Afebrile. Pt remains intubated and heavily sedated (ETOH withdrawal)    Review of Systems  No changes since pt last seen other than those noted above.    PFSH  No change since the original history and note.     OBJECTIVE     Patient Vitals for the past 24 hrs:   BP Temp Temp src Pulse Resp SpO2 Weight   06/04/25 0600 124/81 -- -- 72 25 100 % --   06/04/25 0545 101/70 -- -- 77 16 100 % --   06/04/25 0530 112/80 -- -- 75 18 100 % --   06/04/25 0515 120/84 -- -- 74 25 100 % --   06/04/25 0500 123/87 -- -- 71 22 100 % --   06/04/25 0445 135/82 -- -- 69 20 100 % --   06/04/25 0430 (!) 146/82 -- -- 64 20 100 % --   06/04/25 0415 (!) 145/82 -- -- 64 20 100 % --   06/04/25 0400 131/85 98.8 °F (37.1 °C) Temporal 66 21 97 % 68.1 kg (150 lb 2.1 oz)   06/04/25 0345 122/80 -- -- 69 22 96 % --   06/04/25 0330 120/83 -- -- 67 20 95 % --   06/04/25 0315 120/80 -- -- 70 21 94 % --   06/04/25 0300 115/76 -- -- 69 21 92 % --   06/04/25 0245 124/80 -- -- 67 22 97 % --   06/04/25 0230 -- -- -- 73 18 97 % --   06/04/25 0215 -- -- -- 74 (!) 0 95 % --   06/04/25 0200 -- -- -- 73 (!) 7 94 % --   06/04/25 0145 125/82 -- -- 71 18 94 % --  Normal throughout  -Reflexes:  & symmetric throughout  -Coordination: FNF intact  -Gait & Station: deferred for pt safety  -Other: no adventitious movements noted    All reports below, not included in previous notes, personally reviewed & actual images reviewed where indicated. Pertinent positives & negatives are addressed in Assessment & Plan section of note  CT HEAD WO CONTRAST (5/31/25, 9901)   My Read: Unchanged  Final Read:    1. Compared with scan earlier same day, there is a stable right-sided mixed attenuation subdural hematoma with mass effect and approximately 3 mm leftward midline shift   2. Fluid attenuation collection in the middle cranial fossa on the left probably representing an arachnoid cyst or remote insult. This is similar to the prior study. Adjacent curvilinear high attenuation could represent a bridging vessel or an additional    small subdural hematoma.     CT HEAD WO CONTRAST (5/31/25, 0999)   My Read: right frontal SDH with mild leftward midline shift; left anterior temporal lobe subarachnoid cyst  Final Read:    1. Moderate right subdural hemorrhage with 3 mm leftward midline shift,  likely acute.  2. Cystic lesion abuts/displaces anterior left temporal lobe, favor arachnoid  cyst.  Finding may be further characterized with brain MRI on a nonemergent  basis.           Laboratory Review:   All results below, not included in previous notes, personally reviewed. Pertinent positives & negatives are addressed in Assessment & Plan section of note  Recent Results (from the past 36 hours)   Sodium    Collection Time: 06/02/25 11:17 PM   Result Value Ref Range    Sodium 133 (L) 136 - 145 mmol/L   Sodium    Collection Time: 06/03/25  2:29 AM   Result Value Ref Range    Sodium 132 (L) 136 - 145 mmol/L   Magnesium    Collection Time: 06/03/25  4:31 AM   Result Value Ref Range    Magnesium 1.89 1.80 - 2.40 mg/dL   Renal Function Panel    Collection Time: 06/03/25  4:31 AM   Result Value Ref Range     Sodium 133 (L) 136 - 145 mmol/L    Potassium 4.6 3.5 - 5.1 mmol/L    Chloride 100 99 - 110 mmol/L    CO2 16 (L) 21 - 32 mmol/L    Anion Gap 17 (H) 3 - 16    Glucose 73 70 - 99 mg/dL    BUN 5 (L) 7 - 20 mg/dL    Creatinine 0.6 (L) 0.9 - 1.3 mg/dL    Est, Glom Filt Rate >90 >60    Calcium 8.7 8.3 - 10.6 mg/dL    Phosphorus 2.6 2.5 - 4.9 mg/dL    Albumin 3.4 3.4 - 5.0 g/dL   SPECIMEN REJECTION    Collection Time: 06/03/25  6:12 AM   Result Value Ref Range    Rejected Test cbcwd     Reason for Rejection see below    Sodium    Collection Time: 06/03/25 10:00 AM   Result Value Ref Range    Sodium 137 136 - 145 mmol/L   CBC with Auto Differential    Collection Time: 06/03/25 10:00 AM   Result Value Ref Range    WBC 8.4 4.0 - 11.0 K/uL    RBC 4.44 4.20 - 5.90 M/uL    Hemoglobin 13.2 (L) 13.5 - 17.5 g/dL    Hematocrit 38.0 (L) 40.5 - 52.5 %    MCV 85.6 80.0 - 100.0 fL    MCH 29.7 26.0 - 34.0 pg    MCHC 34.7 31.0 - 36.0 g/dL    RDW 15.4 12.4 - 15.4 %    Platelets 113 (L) 135 - 450 K/uL    MPV 7.5 5.0 - 10.5 fL    PLATELET SLIDE REVIEW Decreased     SLIDE REVIEW see below     Path Consult No     Neutrophils % 76.0 %    Lymphocytes % 13.0 %    Monocytes % 9.0 %    Eosinophils % 1.0 %    Basophils % 0.0 %    Neutrophils Absolute 6.5 1.7 - 7.7 K/uL    Lymphocytes Absolute 1.1 1.0 - 5.1 K/uL    Monocytes Absolute 0.8 0.0 - 1.3 K/uL    Eosinophils Absolute 0.1 0.0 - 0.6 K/uL    Basophils Absolute 0.0 0.0 - 0.2 K/uL    Bands Relative 1 0 - 7 %    RBC Morphology Normal    POCT Arterial    Collection Time: 06/03/25 11:44 AM   Result Value Ref Range    POC Glucose 103 (H) 70 - 99 mg/dl    pH, Arterial 7.427 7.350 - 7.450    pCO2, Arterial 35.9 35.0 - 45.0 mm Hg    pO2, Arterial 80.0 75.0 - 108.0 mm Hg    HCO3, Arterial 23.7 21.0 - 29.0 mmol/L    Base Excess, Arterial -1 -3 - 3    O2 Sat, Arterial 96 93 - 100 %    TCO2, Arterial 25 Not Established mmol/L    Lactate 0.85 0.40 - 2.00 mmol/L    Sample Type ART     Performed on SEE BELOW

## 2025-06-04 NOTE — PLAN OF CARE
Problem: Discharge Planning  Goal: Discharge to home or other facility with appropriate resources  Outcome: Progressing     Problem: Safety - Medical Restraint  Goal: Remains free of injury from restraints (Restraint for Interference with Medical Device)  Description: INTERVENTIONS:1. Determine that other, less restrictive measures have been tried or would not be effective before applying the restraint2. Evaluate the patient's condition at the time of restraint application3. Inform patient/family regarding the reason for restraint4. Q2H: Monitor safety, psychosocial status, comfort, nutrition and hydration  Outcome: Progressing     Problem: Safety - Adult  Goal: Free from fall injury  Outcome: Progressing     Problem: Neurosensory - Adult  Goal: Achieves stable or improved neurological status  Outcome: Progressing  Goal: Absence of seizures  Outcome: Progressing  Goal: Remains free of injury related to seizures activity  Outcome: Progressing  Goal: Achieves maximal functionality and self care  Outcome: Progressing     Problem: Respiratory - Adult  Goal: Achieves optimal ventilation and oxygenation  Outcome: Progressing     Problem: Cardiovascular - Adult  Goal: Maintains optimal cardiac output and hemodynamic stability  Outcome: Progressing  Goal: Absence of cardiac dysrhythmias or at baseline  Outcome: Progressing     Problem: Skin/Tissue Integrity - Adult  Goal: Skin integrity remains intact  Description: 1.  Monitor for areas of redness and/or skin breakdown2.  Assess vascular access sites hourly3.  Every 4-6 hours minimum:  Change oxygen saturation probe site4.  Every 4-6 hours:  If on nasal continuous positive airway pressure, respiratory therapy assess nares and determine need for appliance change or resting period  Outcome: Progressing  Goal: Incisions, wounds, or drain sites healing without S/S of infection  Outcome: Progressing  Goal: Oral mucous membranes remain intact  Outcome: Progressing     Problem:  and forgiveness2. Provide emotional and spiritual support3. Provide information about the patient's health status with consideration of family and cultural values4. Communicate willingness to discuss loss and facilitate grief process with patient/family as appropriate5. Emphasize sustaining relationships within family system and community, or hao/spiritual traditions6. Initiate Spiritual Care, Psychosocial Clinical Specialist consult as needed  Outcome: Progressing     Problem: Skin/Tissue Integrity  Goal: Skin integrity remains intact  Description: 1.  Monitor for areas of redness and/or skin breakdown2.  Assess vascular access sites hourly3.  Every 4-6 hours minimum:  Change oxygen saturation probe site4.  Every 4-6 hours:  If on nasal continuous positive airway pressure, respiratory therapy assess nares and determine need for appliance change or resting period  Outcome: Progressing     Problem: Pain  Goal: Verbalizes/displays adequate comfort level or baseline comfort level  Outcome: Progressing     Problem: Nutrition Deficit:  Goal: Optimize nutritional status  Outcome: Progressing     Problem: ABCDS Injury Assessment  Goal: Absence of physical injury  Outcome: Progressing

## 2025-06-04 NOTE — PROGRESS NOTES
Comprehensive Nutrition Assessment    RECOMMENDATIONS:  PO Diet: Start Regular; 1800 mL fluid restricted diet  Nutrition Supplement: Add ensure BID  Nutrition Education: Education/Counseling not indicated     NUTRITION ASSESSMENT:   Nutritional summary & status: Pt successfully extubated yesterday. Passed bedside swallow this morning and pt cleared for regular diet. Na now at 133, increased fluid restriction to 1800 mL. Pt stating he is hungry/thirsty, presented with moderate malnutrition will add nutrition supplement BID. No BM this admission will discuss adding bowel regimen tomorrow after pt has a full day of eating.   Admission // PMH: Status epilepticus//EtOH misuse, beer potomania, SDH    MALNUTRITION ASSESSMENT  Context of Malnutrition: Social/Environmental Circumstances   Malnutrition Status: Moderate malnutrition  Findings of the 6 clinical characteristics of malnutrition (Minimum of 2 out of 6 clinical characteristics is required to make the diagnosis of moderate or severe Protein Calorie Malnutrition based on AND/ASPEN Guidelines):  Energy Intake:  Unable to assess (Predicted poor intakes)  Weight Loss:  7.5% over 3 months     Body Fat Loss:  Mild body fat loss Orbital   Muscle Mass Loss:  Mild muscle mass loss Clavicles (pectoralis & deltoids), Temples (temporalis)  Fluid Accumulation:  No fluid accumulation      NUTRITION DIAGNOSIS   Moderate malnutrition, in context of social or environmental circumstances related to inadequate protein-energy intake as evidenced by criteria as identified in malnutrition assessment    Nutrition Monitoring and Evaluation:   Food/Nutrient Intake Outcomes:  Food and Nutrient Intake, Supplement Intake  Physical Signs/Symptoms Outcomes:  Biochemical Data, Nutrition Focused Physical Findings, Weight, Hemodynamic Status     OBJECTIVE DATA: Significant to nutrition assessment  Nutrition Related Findings: Na 133. K 3.3. Mg 1.66.  Wounds: None  Nutrition Goals: Meet at least

## 2025-06-05 LAB
ALBUMIN SERPL-MCNC: 3.5 G/DL (ref 3.4–5)
ALBUMIN SERPL-MCNC: 3.5 G/DL (ref 3.4–5)
ANION GAP SERPL CALCULATED.3IONS-SCNC: 10 MMOL/L (ref 3–16)
ANION GAP SERPL CALCULATED.3IONS-SCNC: 10 MMOL/L (ref 3–16)
BASOPHILS # BLD: 0.1 K/UL (ref 0–0.2)
BASOPHILS # BLD: 0.1 K/UL (ref 0–0.2)
BASOPHILS NFR BLD: 1.1 %
BASOPHILS NFR BLD: 1.1 %
BUN SERPL-MCNC: 6 MG/DL (ref 7–20)
BUN SERPL-MCNC: 6 MG/DL (ref 7–20)
CALCIUM SERPL-MCNC: 8.9 MG/DL (ref 8.3–10.6)
CALCIUM SERPL-MCNC: 8.9 MG/DL (ref 8.3–10.6)
CHLORIDE SERPL-SCNC: 98 MMOL/L (ref 99–110)
CHLORIDE SERPL-SCNC: 98 MMOL/L (ref 99–110)
CO2 SERPL-SCNC: 28 MMOL/L (ref 21–32)
CO2 SERPL-SCNC: 28 MMOL/L (ref 21–32)
CREAT SERPL-MCNC: 0.6 MG/DL (ref 0.9–1.3)
CREAT SERPL-MCNC: 0.6 MG/DL (ref 0.9–1.3)
DEPRECATED RDW RBC AUTO: 15.2 % (ref 12.4–15.4)
DEPRECATED RDW RBC AUTO: 15.2 % (ref 12.4–15.4)
EOSINOPHIL # BLD: 0.1 K/UL (ref 0–0.6)
EOSINOPHIL # BLD: 0.1 K/UL (ref 0–0.6)
EOSINOPHIL NFR BLD: 1.1 %
EOSINOPHIL NFR BLD: 1.1 %
GFR SERPLBLD CREATININE-BSD FMLA CKD-EPI: >90 ML/MIN/{1.73_M2}
GFR SERPLBLD CREATININE-BSD FMLA CKD-EPI: >90 ML/MIN/{1.73_M2}
GLUCOSE SERPL-MCNC: 95 MG/DL (ref 70–99)
GLUCOSE SERPL-MCNC: 95 MG/DL (ref 70–99)
HCT VFR BLD AUTO: 33.6 % (ref 40.5–52.5)
HCT VFR BLD AUTO: 33.6 % (ref 40.5–52.5)
HGB BLD-MCNC: 11.8 G/DL (ref 13.5–17.5)
HGB BLD-MCNC: 11.8 G/DL (ref 13.5–17.5)
LYMPHOCYTES # BLD: 0.9 K/UL (ref 1–5.1)
LYMPHOCYTES # BLD: 0.9 K/UL (ref 1–5.1)
LYMPHOCYTES NFR BLD: 17.3 %
LYMPHOCYTES NFR BLD: 17.3 %
MAGNESIUM SERPL-MCNC: 2.06 MG/DL (ref 1.8–2.4)
MAGNESIUM SERPL-MCNC: 2.06 MG/DL (ref 1.8–2.4)
MCH RBC QN AUTO: 29.5 PG (ref 26–34)
MCH RBC QN AUTO: 29.5 PG (ref 26–34)
MCHC RBC AUTO-ENTMCNC: 35.1 G/DL (ref 31–36)
MCHC RBC AUTO-ENTMCNC: 35.1 G/DL (ref 31–36)
MCV RBC AUTO: 84 FL (ref 80–100)
MCV RBC AUTO: 84 FL (ref 80–100)
MONOCYTES # BLD: 0.8 K/UL (ref 0–1.3)
MONOCYTES # BLD: 0.8 K/UL (ref 0–1.3)
MONOCYTES NFR BLD: 14.8 %
MONOCYTES NFR BLD: 14.8 %
NEUTROPHILS # BLD: 3.4 K/UL (ref 1.7–7.7)
NEUTROPHILS # BLD: 3.4 K/UL (ref 1.7–7.7)
NEUTROPHILS NFR BLD: 65.7 %
NEUTROPHILS NFR BLD: 65.7 %
PHOSPHATE SERPL-MCNC: 1.6 MG/DL (ref 2.5–4.9)
PHOSPHATE SERPL-MCNC: 1.6 MG/DL (ref 2.5–4.9)
PLATELET # BLD AUTO: 226 K/UL (ref 135–450)
PLATELET # BLD AUTO: 226 K/UL (ref 135–450)
PMV BLD AUTO: 7 FL (ref 5–10.5)
PMV BLD AUTO: 7 FL (ref 5–10.5)
POTASSIUM SERPL-SCNC: 4 MMOL/L (ref 3.5–5.1)
POTASSIUM SERPL-SCNC: 4 MMOL/L (ref 3.5–5.1)
RBC # BLD AUTO: 4 M/UL (ref 4.2–5.9)
RBC # BLD AUTO: 4 M/UL (ref 4.2–5.9)
SODIUM SERPL-SCNC: 132 MMOL/L (ref 136–145)
SODIUM SERPL-SCNC: 132 MMOL/L (ref 136–145)
SODIUM SERPL-SCNC: 136 MMOL/L (ref 136–145)
SODIUM SERPL-SCNC: 136 MMOL/L (ref 136–145)
WBC # BLD AUTO: 5.2 K/UL (ref 4–11)
WBC # BLD AUTO: 5.2 K/UL (ref 4–11)

## 2025-06-05 PROCEDURE — 6370000000 HC RX 637 (ALT 250 FOR IP)

## 2025-06-05 PROCEDURE — 80069 RENAL FUNCTION PANEL: CPT

## 2025-06-05 PROCEDURE — 99233 SBSQ HOSP IP/OBS HIGH 50: CPT | Performed by: INTERNAL MEDICINE

## 2025-06-05 PROCEDURE — 6360000002 HC RX W HCPCS

## 2025-06-05 PROCEDURE — 6360000002 HC RX W HCPCS: Performed by: STUDENT IN AN ORGANIZED HEALTH CARE EDUCATION/TRAINING PROGRAM

## 2025-06-05 PROCEDURE — 6370000000 HC RX 637 (ALT 250 FOR IP): Performed by: STUDENT IN AN ORGANIZED HEALTH CARE EDUCATION/TRAINING PROGRAM

## 2025-06-05 PROCEDURE — 2500000003 HC RX 250 WO HCPCS: Performed by: STUDENT IN AN ORGANIZED HEALTH CARE EDUCATION/TRAINING PROGRAM

## 2025-06-05 PROCEDURE — 2580000003 HC RX 258: Performed by: STUDENT IN AN ORGANIZED HEALTH CARE EDUCATION/TRAINING PROGRAM

## 2025-06-05 PROCEDURE — 36415 COLL VENOUS BLD VENIPUNCTURE: CPT

## 2025-06-05 PROCEDURE — 83735 ASSAY OF MAGNESIUM: CPT

## 2025-06-05 PROCEDURE — 2500000003 HC RX 250 WO HCPCS

## 2025-06-05 PROCEDURE — 2060000000 HC ICU INTERMEDIATE R&B

## 2025-06-05 PROCEDURE — 85025 COMPLETE CBC W/AUTO DIFF WBC: CPT

## 2025-06-05 RX ORDER — CHLORDIAZEPOXIDE HYDROCHLORIDE 25 MG/1
50 CAPSULE, GELATIN COATED ORAL 3 TIMES DAILY
Status: COMPLETED | OUTPATIENT
Start: 2025-06-06 | End: 2025-06-06

## 2025-06-05 RX ORDER — CHLORDIAZEPOXIDE HYDROCHLORIDE 25 MG/1
50 CAPSULE, GELATIN COATED ORAL 2 TIMES DAILY
Status: COMPLETED | OUTPATIENT
Start: 2025-06-07 | End: 2025-06-07

## 2025-06-05 RX ORDER — CHLORDIAZEPOXIDE HYDROCHLORIDE 25 MG/1
25 CAPSULE, GELATIN COATED ORAL 2 TIMES DAILY
Status: COMPLETED | OUTPATIENT
Start: 2025-06-08 | End: 2025-06-08

## 2025-06-05 RX ORDER — CHLORDIAZEPOXIDE HYDROCHLORIDE 25 MG/1
50 CAPSULE, GELATIN COATED ORAL 4 TIMES DAILY
Status: COMPLETED | OUTPATIENT
Start: 2025-06-05 | End: 2025-06-05

## 2025-06-05 RX ADMIN — ESCITALOPRAM OXALATE 20 MG: 20 TABLET, FILM COATED ORAL at 09:25

## 2025-06-05 RX ADMIN — POTASSIUM & SODIUM PHOSPHATES POWDER PACK 280-160-250 MG 250 MG: 280-160-250 PACK at 12:29

## 2025-06-05 RX ADMIN — HEPARIN SODIUM 5000 UNITS: 5000 INJECTION INTRAVENOUS; SUBCUTANEOUS at 20:39

## 2025-06-05 RX ADMIN — SODIUM CHLORIDE, PRESERVATIVE FREE 10 ML: 5 INJECTION INTRAVENOUS at 08:17

## 2025-06-05 RX ADMIN — SODIUM CHLORIDE, PRESERVATIVE FREE 10 ML: 5 INJECTION INTRAVENOUS at 20:39

## 2025-06-05 RX ADMIN — LORAZEPAM 2 MG: 2 INJECTION INTRAMUSCULAR; INTRAVENOUS at 11:43

## 2025-06-05 RX ADMIN — CHLORDIAZEPOXIDE HYDROCHLORIDE 50 MG: 25 CAPSULE ORAL at 17:48

## 2025-06-05 RX ADMIN — LEVETIRACETAM 500 MG: 100 INJECTION INTRAVENOUS at 08:10

## 2025-06-05 RX ADMIN — LORAZEPAM 2 MG: 1 TABLET ORAL at 17:49

## 2025-06-05 RX ADMIN — LORAZEPAM 2 MG: 2 INJECTION INTRAMUSCULAR; INTRAVENOUS at 06:54

## 2025-06-05 RX ADMIN — HEPARIN SODIUM 5000 UNITS: 5000 INJECTION INTRAVENOUS; SUBCUTANEOUS at 05:04

## 2025-06-05 RX ADMIN — LORAZEPAM 2 MG: 1 TABLET ORAL at 13:35

## 2025-06-05 RX ADMIN — Medication 15 ML: at 09:25

## 2025-06-05 RX ADMIN — POTASSIUM PHOSPHATE, MONOBASIC AND POTASSIUM PHOSPHATE, DIBASIC 30 MMOL: 224; 236 INJECTION, SOLUTION, CONCENTRATE INTRAVENOUS at 12:39

## 2025-06-05 RX ADMIN — LORAZEPAM 2 MG: 1 TABLET ORAL at 23:18

## 2025-06-05 RX ADMIN — LORAZEPAM 1 MG: 2 INJECTION INTRAMUSCULAR; INTRAVENOUS at 09:19

## 2025-06-05 RX ADMIN — CHLORDIAZEPOXIDE HYDROCHLORIDE 50 MG: 25 CAPSULE ORAL at 20:39

## 2025-06-05 RX ADMIN — LORAZEPAM 2 MG: 2 INJECTION INTRAMUSCULAR; INTRAVENOUS at 08:06

## 2025-06-05 RX ADMIN — HEPARIN SODIUM 5000 UNITS: 5000 INJECTION INTRAVENOUS; SUBCUTANEOUS at 13:43

## 2025-06-05 RX ADMIN — POTASSIUM & SODIUM PHOSPHATES POWDER PACK 280-160-250 MG 250 MG: 280-160-250 PACK at 17:47

## 2025-06-05 RX ADMIN — FOLIC ACID 1 MG: 1 TABLET ORAL at 09:25

## 2025-06-05 RX ADMIN — PANTOPRAZOLE SODIUM 40 MG: 40 TABLET, DELAYED RELEASE ORAL at 05:04

## 2025-06-05 RX ADMIN — POTASSIUM & SODIUM PHOSPHATES POWDER PACK 280-160-250 MG 250 MG: 280-160-250 PACK at 20:39

## 2025-06-05 RX ADMIN — LEVETIRACETAM 500 MG: 100 INJECTION INTRAVENOUS at 20:39

## 2025-06-05 RX ADMIN — AMLODIPINE BESYLATE 5 MG: 5 TABLET ORAL at 09:25

## 2025-06-05 RX ADMIN — LORAZEPAM 2 MG: 1 TABLET ORAL at 05:03

## 2025-06-05 RX ADMIN — THIAMINE HYDROCHLORIDE 200 MG: 100 INJECTION, SOLUTION INTRAMUSCULAR; INTRAVENOUS at 08:10

## 2025-06-05 RX ADMIN — POTASSIUM BICARBONATE 40 MEQ: 782 TABLET, EFFERVESCENT ORAL at 09:25

## 2025-06-05 NOTE — ACP (ADVANCE CARE PLANNING)
Advance Care Planning     Advance Care Planning Inpatient Note  Spiritual Care Department    Today's Date: 6/5/2025  Unit: [facility] ICU    Received request from HealthCare Provider.  Upon review of chart and communication with care team, Spiritual Care will defer advance care planning with patient at this time.. Patient was/were present in the room during visit.    Goals of ACP Conversation:  Discuss advance care planning documents  Facilitate a discussion related to patient's goals of care as they align with the patient's values and beliefs.    Health Care Decision Makers:     No healthcare decision makers have been documented.    Summary:  No Decision Maker named by patient at this time    Advance Care Planning Documents (Patient Wishes):  Healthcare Power of /Advance Directive Appointment of Health Care Agent     Assessment:  Patient states that he has no social support network. Paperwork deferred in consultation with RN.      Interventions:  Encouraged ongoing ACP conversation with future decision makers and loved ones    Care Preferences Communicated:   No    Outcomes/Plan:  ACP Discussion: Postponed    Electronically signed by Moody Du,  Intern on 6/5/2025 at 9:54 AM

## 2025-06-05 NOTE — CARE COORDINATION
Case Management Assessment  Initial Evaluation    Date/Time of Evaluation: 6/5/2025 3:02 PM  Assessment Completed by: Adilene Lizama    If patient is discharged prior to next notation, then this note serves as note for discharge by case management.    Patient Name: Evangelist Ortega                   YOB: 1975  Diagnosis: Status epilepticus (HCC) [G40.901]                   Date / Time: 5/31/2025  7:59 PM    Patient Admission Status: Inpatient   Readmission Risk (Low < 19, Mod (19-27), High > 27): Readmission Risk Score: 11.1    Current PCP: Evangelist Schulz, DO  PCP verified by CM? No    Chart Reviewed: Yes      History Provided by: Patient, Medical Record  Patient Orientation: Other (see comment) (slightly altered mentation)    Patient Cognition: Alert    Hospitalization in the last 30 days (Readmission):  No    If yes, Readmission Assessment in CM Navigator will be completed.    Advance Directives:      Code Status: Full Code   Patient's Primary Decision Maker is: Legal Next of Kin      Discharge Planning:    Patient lives with: Family Members Type of Home: House  Primary Care Giver: Self  Patient Support Systems include: Family Members   Current Financial resources: None  Current community resources: None  Current services prior to admission: None            Current DME:              Type of Home Care services:  None    ADLS  Prior functional level: Independent in ADLs/IADLs  Current functional level: Other (see comment) (tbd)    PT AM-PAC:   /24  OT AM-PAC:   /24    Family can provide assistance at DC: No  Would you like Case Management to discuss the discharge plan with any other family members/significant others, and if so, who? Yes (parents)  Plans to Return to Present Housing: Unknown at present  Other Identified Issues/Barriers to RETURNING to current housing: none  Potential Assistance needed at discharge: N/A            Potential DME:    Patient expects to discharge to: Unknown  Plan for

## 2025-06-05 NOTE — PROGRESS NOTES
Nephrology Progress Note                                                                                                                                                                                                                                                                                                                                                               Office : [phone]     Fax :[phone]    Patient's Name: Evangelist Ortega  9:46 AM  6/5/2025    Reason for Consult:  Hyponatremia   Requesting Physician:  Evangelist Schulz DO  Chief Complaint:  No chief complaint on file.      Assessment/Plan     # Hyponatremia   - Severe   - Possible etiologies: Beer potomania , seizure, SDH   - Na 115--> 118--> 125 --> 128--->133-->135-->138  - Encourage solute intake   - Continue 1.8L fluid restriction   - Continue close monitor of sodium     # Hypokalemia   - Replace PRN  - Monitor    # Lactic acidosis   - 2/2 seizure     # SDH   - Likely traumatic   - Neurosurgery following     # Seizure   - Alcohol ? SDH?   - Neurology following     # Alcohol withdrawal  # Delirium tremens   - [facility] protocol   - IV thiamine & folic acid       History of Present Ilness:    Evangelist Ortega is a 49 y.o. male with     This patient is a 50 y/o male found by EMS for altered mental status and concern for alcohol withdrawal and seizure.  Per EMS report they were called by the patient with concerns for alcohol withdrawal. Patient was verbal on the EMS call but was found unresponsive on their arrival.  Fingerstick glucose in the 100s.  En route EMS reports that his mental status has improved he is now localizing to pain and making eye contact with verbal stimuli.  Concern for postictal state.  Patient is altered on arrival here he is a poor historian     Just after arrival on initial evaluation patient developed tonic-clonic seizure and received 2 mg of Ativan via IO.       Na 115 16:53--> 118 19:16   Mg 1.4-> 9.6 (!)   Ptis

## 2025-06-05 NOTE — PROGRESS NOTES
V2.0    [facility] Progress Note      Name:  Evangelist Ortega /Age/Sex: 1975  (49 y.o. male)   MRN & CSN:  [id number] & [id number] Encounter Date/Time: 2025 10:33 AM EDT   Location:  [facility]/4512-01 PCP: Evangelist Schulz DO     Attending:Seema Teixeira MD       Hospital Day: 6    Assessment and Recommendations   Per chart review, \"49 y.o. male with a medical hx significant for alcohol use disorder (20+yrs), HTN, depression otherwise as listed in the MHx table below, who presented unresponsive via EMS to [place] ED. Transferred to [facility].     Per ED note and EMS run sheet, pt. Called 911 stating concern for possible alcohol withdrawal. When EMS entered residence he was found unresponsive surrounded by many empty beer cans. Mental status improved en route with localization of pain, spontaneous movement, and arousing to verbal stimuli.      Shortly after arrival to ED patient experienced tonic-clonic seizure receiving 2mg ativan with aborted seizure activity. Within 10 minutes experienced another tonic clonic seizure requiring 5mg versed x2 and versed gtt and subsequently intubated for airway protection.      Per chart review patient seen in ED 2025 for alcohol detoxification and reportedly at that time drinking approximately 24 beers a day. He was discharged on 3 day supply of librium. Multiple hospitalizations in the past for alcohol withdrawal most recently  requiring precedex gtt. Withdrawals with hallucinations in past no reported seizures.     ED Course:  Afebrile 130/81, pulse (!) 105, resp. rate 20, SpO2 99%   AG 30 bicarb 13 pH 7.329 pCO2 26.9 Lactic 14.3 ethanol 72 mag 1.4 Na 115  ALT 36 AST 65   GCS 11 with tremor on exam  CT Head: R subdural hemorrhage with 3mm leftward midline shift likely acute/subacute given mixed attenuation. Fluid collection in the middle cranial fossa on the L likely to represent arachnoid cyst.  Nephro consulted and received 3% hypertonic saline prior to transfer to  times per day    thiamine  200 mg IntraVENous Daily    CENTRUM/CERTA-MABEL with minerals oral  15 mL Oral Daily    [START ON 6/7/2025] thiamine  200 mg Oral Daily    folic acid  1 mg Oral Daily    escitalopram  20 mg Oral Daily    amLODIPine  5 mg Oral Daily    sodium chloride flush  5-40 mL IntraVENous 2 times per day    levETIRAcetam  500 mg IntraVENous Q12H      Infusions:    sodium chloride Stopped (06/01/25 1746)    dextrose       PRN Meds: sodium chloride flush, 5-40 mL, PRN  sodium chloride, , PRN  labetalol, 10 mg, Q4H PRN  LORazepam, 1 mg, Q1H PRN   Or  LORazepam, 1 mg, Q1H PRN   Or  LORazepam, 2 mg, Q1H PRN   Or  LORazepam, 2 mg, Q1H PRN   Or  LORazepam, 3 mg, Q1H PRN   Or  LORazepam, 3 mg, Q1H PRN   Or  LORazepam, 4 mg, Q1H PRN   Or  LORazepam, 4 mg, Q1H PRN  polyethylene glycol, 17 g, Daily PRN  acetaminophen, 650 mg, Q6H PRN   Or  acetaminophen, 650 mg, Q6H PRN  glucose, 4 tablet, PRN  dextrose bolus, 125 mL, PRN   Or  dextrose bolus, 250 mL, PRN  glucagon (rDNA), 1 mg, PRN  dextrose, , Continuous PRN        Labs and Imaging   XR ABDOMEN (KUB) (SINGLE AP VIEW)  Result Date: 6/1/2025  PORTABLE AP ABDOMEN AT 0625 HOURS: HISTORY: Tube placement. TECHNIQUE: AP semiupright view obtained. FINDINGS: NG tube is in place, its tip projecting in the body of the stomach. Bowel gas pattern in the upper abdomen is nonspecific, with some gas seen in nondilated colon. Lower lungs appear clear.     NG tube in the body of the stomach. Electronically signed by Mckayla Carter MD    CT HEAD WO CONTRAST  Result Date: 5/31/2025  EXAM: CT HEAD WO CONTRAST CLINICAL  INDICATION: SDH, anisocoria COMPARISON: CT 5/31/2025 at 17:54 TECHNIQUE:CT HEAD WO CONTRAST  This exam was performed according to our departmental dose-optimization program, which includes automated exposure control, adjustment of the mA and/or kV according to patient size and/or use of iterative reconstruction technique.  Unless otherwise specified, incidental  displaced by a 4.3 x 3.7 cm fluid attenuating lesion.  The gray-white differentiation is maintained without evidence of an acute infarct.  There is no evidence of hydrocephalus. ORBITS: The visualized portion of the orbits demonstrate no acute abnormality. SINUSES: The visualized paranasal sinuses and mastoid air cells demonstrate no acute abnormality. SOFT TISSUES/SKULL:  No acute abnormality of the visualized skull or soft tissues.  Endotracheal and enteric tubes, incompletely assessed.  Debris in the right external auditory canal. CERVICAL SPINE BONES/ALIGNMENT: There is no acute fracture or traumatic malalignment. DEGENERATIVE CHANGES: No severe osseous spinal canal stenosis.  Moderate degenerative changes C5-C7. SOFT TISSUES: There is no prevertebral soft tissue swelling.  Endotracheal tube terminates 1.7 cm above the glenn.  Enteric tube courses inferiorly through the esophagus, beyond field of view. Multiple dental caries.     1. Moderate right subdural hemorrhage with 3 mm leftward midline shift, likely acute. 2. Cystic lesion abuts/displaces anterior left temporal lobe, favor arachnoid cyst.  Finding may be further characterized with brain MRI on a nonemergent basis. Critical results were called by Dr. Levy Vasquez to ÓSCAR CHÁVEZ on 5/31/2025 at 18:29.     XR CHEST PORTABLE  Result Date: 5/31/2025  EXAMINATION: ONE XRAY VIEW OF THE CHEST 5/31/2025 5:28 pm COMPARISON: None. HISTORY: ORDERING SYSTEM PROVIDED HISTORY: ?seizure TECHNOLOGIST PROVIDED HISTORY: Reason for exam:->?seizure Reason for Exam: tube placement FINDINGS: The endotracheal tube terminates 2.1 cm above the glenn.  The nasogastric tube is incompletely imaged reaching the stomach.  There is biapical scarring.  The cardiac silhouette is within normal limits.  There is no pneumothorax or pleural effusion.     1. Endotracheal tube terminating 2.1 cm above the glenn.       CBC:   Recent Labs     06/03/25  1000 06/04/25  0524 06/05/25  0407

## 2025-06-05 NOTE — PROGRESS NOTES
Physical Therapy/Occupational Therapy    Attempted PT/OT eval today. Pt medical hold per RN/RN supervisor, citing withdrawal symptoms. Will attempt at later time pending schedule and Pt tolerance.      Kishor Zimmerman, PT, DPT  Mikayla Singh, OTR/L

## 2025-06-06 LAB
ALBUMIN SERPL-MCNC: 3.4 G/DL (ref 3.4–5)
ALBUMIN SERPL-MCNC: 3.4 G/DL (ref 3.4–5)
ANION GAP SERPL CALCULATED.3IONS-SCNC: 13 MMOL/L (ref 3–16)
ANION GAP SERPL CALCULATED.3IONS-SCNC: 13 MMOL/L (ref 3–16)
BASOPHILS # BLD: 0.1 K/UL (ref 0–0.2)
BASOPHILS # BLD: 0.1 K/UL (ref 0–0.2)
BASOPHILS NFR BLD: 1.3 %
BASOPHILS NFR BLD: 1.3 %
BUN SERPL-MCNC: 4 MG/DL (ref 7–20)
BUN SERPL-MCNC: 4 MG/DL (ref 7–20)
CALCIUM SERPL-MCNC: 9.2 MG/DL (ref 8.3–10.6)
CALCIUM SERPL-MCNC: 9.2 MG/DL (ref 8.3–10.6)
CHLORIDE SERPL-SCNC: 97 MMOL/L (ref 99–110)
CHLORIDE SERPL-SCNC: 97 MMOL/L (ref 99–110)
CO2 SERPL-SCNC: 25 MMOL/L (ref 21–32)
CO2 SERPL-SCNC: 25 MMOL/L (ref 21–32)
CREAT SERPL-MCNC: 0.5 MG/DL (ref 0.9–1.3)
CREAT SERPL-MCNC: 0.5 MG/DL (ref 0.9–1.3)
DEPRECATED RDW RBC AUTO: 14.6 % (ref 12.4–15.4)
DEPRECATED RDW RBC AUTO: 14.6 % (ref 12.4–15.4)
EOSINOPHIL # BLD: 0.1 K/UL (ref 0–0.6)
EOSINOPHIL # BLD: 0.1 K/UL (ref 0–0.6)
EOSINOPHIL NFR BLD: 1.5 %
EOSINOPHIL NFR BLD: 1.5 %
GFR SERPLBLD CREATININE-BSD FMLA CKD-EPI: >90 ML/MIN/{1.73_M2}
GFR SERPLBLD CREATININE-BSD FMLA CKD-EPI: >90 ML/MIN/{1.73_M2}
GLUCOSE SERPL-MCNC: 90 MG/DL (ref 70–99)
GLUCOSE SERPL-MCNC: 90 MG/DL (ref 70–99)
HCT VFR BLD AUTO: 32.5 % (ref 40.5–52.5)
HCT VFR BLD AUTO: 32.5 % (ref 40.5–52.5)
HGB BLD-MCNC: 11.6 G/DL (ref 13.5–17.5)
HGB BLD-MCNC: 11.6 G/DL (ref 13.5–17.5)
LYMPHOCYTES # BLD: 0.8 K/UL (ref 1–5.1)
LYMPHOCYTES # BLD: 0.8 K/UL (ref 1–5.1)
LYMPHOCYTES NFR BLD: 17.9 %
LYMPHOCYTES NFR BLD: 17.9 %
MAGNESIUM SERPL-MCNC: 1.66 MG/DL (ref 1.8–2.4)
MAGNESIUM SERPL-MCNC: 1.66 MG/DL (ref 1.8–2.4)
MCH RBC QN AUTO: 29.8 PG (ref 26–34)
MCH RBC QN AUTO: 29.8 PG (ref 26–34)
MCHC RBC AUTO-ENTMCNC: 35.7 G/DL (ref 31–36)
MCHC RBC AUTO-ENTMCNC: 35.7 G/DL (ref 31–36)
MCV RBC AUTO: 83.5 FL (ref 80–100)
MCV RBC AUTO: 83.5 FL (ref 80–100)
MONOCYTES # BLD: 0.9 K/UL (ref 0–1.3)
MONOCYTES # BLD: 0.9 K/UL (ref 0–1.3)
MONOCYTES NFR BLD: 19.6 %
MONOCYTES NFR BLD: 19.6 %
NEUTROPHILS # BLD: 2.7 K/UL (ref 1.7–7.7)
NEUTROPHILS # BLD: 2.7 K/UL (ref 1.7–7.7)
NEUTROPHILS NFR BLD: 59.7 %
NEUTROPHILS NFR BLD: 59.7 %
PHOSPHATE SERPL-MCNC: 3.3 MG/DL (ref 2.5–4.9)
PHOSPHATE SERPL-MCNC: 3.3 MG/DL (ref 2.5–4.9)
PLATELET # BLD AUTO: 260 K/UL (ref 135–450)
PLATELET # BLD AUTO: 260 K/UL (ref 135–450)
PMV BLD AUTO: 6.7 FL (ref 5–10.5)
PMV BLD AUTO: 6.7 FL (ref 5–10.5)
POTASSIUM SERPL-SCNC: 3.2 MMOL/L (ref 3.5–5.1)
POTASSIUM SERPL-SCNC: 3.2 MMOL/L (ref 3.5–5.1)
RBC # BLD AUTO: 3.89 M/UL (ref 4.2–5.9)
RBC # BLD AUTO: 3.89 M/UL (ref 4.2–5.9)
SODIUM SERPL-SCNC: 135 MMOL/L (ref 136–145)
SODIUM SERPL-SCNC: 135 MMOL/L (ref 136–145)
WBC # BLD AUTO: 4.5 K/UL (ref 4–11)
WBC # BLD AUTO: 4.5 K/UL (ref 4–11)

## 2025-06-06 PROCEDURE — 6370000000 HC RX 637 (ALT 250 FOR IP)

## 2025-06-06 PROCEDURE — 97530 THERAPEUTIC ACTIVITIES: CPT

## 2025-06-06 PROCEDURE — 2060000000 HC ICU INTERMEDIATE R&B

## 2025-06-06 PROCEDURE — 85025 COMPLETE CBC W/AUTO DIFF WBC: CPT

## 2025-06-06 PROCEDURE — 2500000003 HC RX 250 WO HCPCS

## 2025-06-06 PROCEDURE — 97166 OT EVAL MOD COMPLEX 45 MIN: CPT

## 2025-06-06 PROCEDURE — 97162 PT EVAL MOD COMPLEX 30 MIN: CPT

## 2025-06-06 PROCEDURE — 6370000000 HC RX 637 (ALT 250 FOR IP): Performed by: STUDENT IN AN ORGANIZED HEALTH CARE EDUCATION/TRAINING PROGRAM

## 2025-06-06 PROCEDURE — 6360000002 HC RX W HCPCS: Performed by: STUDENT IN AN ORGANIZED HEALTH CARE EDUCATION/TRAINING PROGRAM

## 2025-06-06 PROCEDURE — 80069 RENAL FUNCTION PANEL: CPT

## 2025-06-06 PROCEDURE — 83735 ASSAY OF MAGNESIUM: CPT

## 2025-06-06 PROCEDURE — 36415 COLL VENOUS BLD VENIPUNCTURE: CPT

## 2025-06-06 PROCEDURE — 97535 SELF CARE MNGMENT TRAINING: CPT

## 2025-06-06 PROCEDURE — 6360000002 HC RX W HCPCS

## 2025-06-06 PROCEDURE — 97116 GAIT TRAINING THERAPY: CPT

## 2025-06-06 PROCEDURE — 99233 SBSQ HOSP IP/OBS HIGH 50: CPT | Performed by: INTERNAL MEDICINE

## 2025-06-06 RX ORDER — LEVETIRACETAM 500 MG/1
500 TABLET ORAL 2 TIMES DAILY
Status: DISCONTINUED | OUTPATIENT
Start: 2025-06-06 | End: 2025-06-13 | Stop reason: HOSPADM

## 2025-06-06 RX ORDER — MAGNESIUM SULFATE IN WATER 40 MG/ML
2000 INJECTION, SOLUTION INTRAVENOUS ONCE
Status: COMPLETED | OUTPATIENT
Start: 2025-06-06 | End: 2025-06-06

## 2025-06-06 RX ORDER — POTASSIUM CHLORIDE 1500 MG/1
40 TABLET, EXTENDED RELEASE ORAL ONCE
Status: COMPLETED | OUTPATIENT
Start: 2025-06-06 | End: 2025-06-06

## 2025-06-06 RX ADMIN — LORAZEPAM 2 MG: 1 TABLET ORAL at 00:44

## 2025-06-06 RX ADMIN — POTASSIUM & SODIUM PHOSPHATES POWDER PACK 280-160-250 MG 250 MG: 280-160-250 PACK at 10:03

## 2025-06-06 RX ADMIN — Medication 15 ML: at 10:03

## 2025-06-06 RX ADMIN — HEPARIN SODIUM 5000 UNITS: 5000 INJECTION INTRAVENOUS; SUBCUTANEOUS at 21:33

## 2025-06-06 RX ADMIN — CHLORDIAZEPOXIDE HYDROCHLORIDE 50 MG: 25 CAPSULE ORAL at 15:10

## 2025-06-06 RX ADMIN — AMLODIPINE BESYLATE 5 MG: 5 TABLET ORAL at 09:07

## 2025-06-06 RX ADMIN — THIAMINE HYDROCHLORIDE 200 MG: 100 INJECTION, SOLUTION INTRAMUSCULAR; INTRAVENOUS at 09:08

## 2025-06-06 RX ADMIN — POTASSIUM CHLORIDE 40 MEQ: 1500 TABLET, EXTENDED RELEASE ORAL at 09:07

## 2025-06-06 RX ADMIN — PANTOPRAZOLE SODIUM 40 MG: 40 TABLET, DELAYED RELEASE ORAL at 05:09

## 2025-06-06 RX ADMIN — LORAZEPAM 2 MG: 1 TABLET ORAL at 11:56

## 2025-06-06 RX ADMIN — LEVETIRACETAM 500 MG: 100 INJECTION INTRAVENOUS at 09:08

## 2025-06-06 RX ADMIN — HEPARIN SODIUM 5000 UNITS: 5000 INJECTION INTRAVENOUS; SUBCUTANEOUS at 05:09

## 2025-06-06 RX ADMIN — HEPARIN SODIUM 5000 UNITS: 5000 INJECTION INTRAVENOUS; SUBCUTANEOUS at 15:10

## 2025-06-06 RX ADMIN — CHLORDIAZEPOXIDE HYDROCHLORIDE 50 MG: 25 CAPSULE ORAL at 21:33

## 2025-06-06 RX ADMIN — LORAZEPAM 2 MG: 1 TABLET ORAL at 05:09

## 2025-06-06 RX ADMIN — ESCITALOPRAM OXALATE 20 MG: 20 TABLET, FILM COATED ORAL at 09:07

## 2025-06-06 RX ADMIN — CHLORDIAZEPOXIDE HYDROCHLORIDE 50 MG: 25 CAPSULE ORAL at 09:07

## 2025-06-06 RX ADMIN — LEVETIRACETAM 500 MG: 500 TABLET, FILM COATED ORAL at 21:33

## 2025-06-06 RX ADMIN — FOLIC ACID 1 MG: 1 TABLET ORAL at 09:07

## 2025-06-06 RX ADMIN — SODIUM CHLORIDE, PRESERVATIVE FREE 10 ML: 5 INJECTION INTRAVENOUS at 22:14

## 2025-06-06 RX ADMIN — MAGNESIUM SULFATE HEPTAHYDRATE 2000 MG: 40 INJECTION, SOLUTION INTRAVENOUS at 10:05

## 2025-06-06 NOTE — PLAN OF CARE
Problem: Discharge Planning  Goal: Discharge to home or other facility with appropriate resources  Outcome: Progressing  Flowsheets (Taken 6/6/2025 0800)  Discharge to home or other facility with appropriate resources:   Identify barriers to discharge with patient and caregiver   Arrange for needed discharge resources and transportation as appropriate   Identify discharge learning needs (meds, wound care, etc)   Arrange for interpreters to assist at discharge as needed   Refer to discharge planning if patient needs post-hospital services based on physician order or complex needs related to functional status, cognitive ability or social support system     Problem: Safety - Medical Restraint  Goal: Remains free of injury from restraints (Restraint for Interference with Medical Device)  Description: INTERVENTIONS:1. Determine that other, less restrictive measures have been tried or would not be effective before applying the restraint2. Evaluate the patient's condition at the time of restraint application3. Inform patient/family regarding the reason for restraint4. Q2H: Monitor safety, psychosocial status, comfort, nutrition and hydration  Outcome: Progressing     Problem: Safety - Adult  Goal: Free from fall injury  Outcome: Progressing     Problem: Neurosensory - Adult  Goal: Achieves stable or improved neurological status  Outcome: Progressing  Flowsheets (Taken 6/6/2025 0800)  Achieves stable or improved neurological status:   Assess for and report changes in neurological status   Initiate measures to prevent increased intracranial pressure   Maintain blood pressure and fluid volume within ordered parameters to optimize cerebral perfusion and minimize risk of hemorrhage   Monitor temperature, glucose, and sodium. Initiate appropriate interventions as ordered  Goal: Absence of seizures  Outcome: Progressing  Flowsheets (Taken 6/6/2025 0800)  Absence of seizures:   Monitor for seizure activity.  If seizure occurs,  Progressing  Flowsheets (Taken 6/6/2025 0800)  Incisions, Wounds, or Drain Sites Healing Without Sign and Symptoms of Infection:   TWICE DAILY: Assess and document skin integrity   TWICE DAILY: Assess and document dressing/incision, wound bed, drain sites and surrounding tissue   Implement wound care per orders   Initiate isolation precautions as appropriate   Initiate pressure ulcer prevention bundle as indicated  Goal: Oral mucous membranes remain intact  Outcome: Progressing  Flowsheets (Taken 6/6/2025 0800)  Oral Mucous Membranes Remain Intact:   Assess oral mucosa and hygiene practices   Implement preventative oral hygiene regimen   Implement oral medicated treatments as ordered     Problem: Musculoskeletal - Adult  Goal: Return mobility to safest level of function  Outcome: Progressing  Flowsheets (Taken 6/6/2025 0800)  Return Mobility to Safest Level of Function:   Assess patient stability and activity tolerance for standing, transferring and ambulating with or without assistive devices   Assist with transfers and ambulation using safe patient handling equipment as needed   Ensure adequate protection for wounds/incisions during mobilization   Obtain physical therapy/occupational therapy consults as needed   Apply continuous passive motion per provider or physical therapy orders to increase flexion toward goal  Goal: Maintain proper alignment of affected body part  Outcome: Progressing  Flowsheets (Taken 6/6/2025 0800)  Maintain proper alignment of affected body part:   Support and protect limb and body alignment per provider's orders   Instruct and reinforce with patient and family use of appropriate assistive device and precautions (e.g. spinal or hip dislocation precautions)  Goal: Return ADL status to a safe level of function  Outcome: Progressing  Flowsheets (Taken 6/6/2025 0800)  Return ADL Status to a Safe Level of Function:   Administer medication as ordered   Assess activities of daily living deficits  0800)  Will report anxiety at manageable levels:   Administer medication as ordered   Teach and rehearse alternative coping skills   Provide emotional support with 1:1 interaction with staff     Problem: Change in Body Image  Goal: Pt/Family communicate acceptance of loss or change in body image and feel psychological comfort and peace  Description: INTERVENTIONS:1. Assess patient/family anxiety and grief process related to change in body image, loss of functional status, loss of sense of self, and forgiveness2. Provide emotional and spiritual support3. Provide information about the patient's health status with consideration of family and cultural values4. Communicate willingness to discuss loss and facilitate grief process with patient/family as appropriate5. Emphasize sustaining relationships within family system and community, or hao/spiritual traditions6. Initiate Spiritual Care, Psychosocial Clinical Specialist consult as needed  Outcome: Progressing  Flowsheets (Taken 6/6/2025 0800)  Patient/family communicate acceptance of loss or change in body image and feel psychological comfort and peace:   Assess patient/family anxiety and grief process related to change in body image, loss of functional status, loss of sense of self, and forgiveness   Provide emotional and spiritual support   Emphasize sustaining relationships within family system and community, or hao/spiritual traditions   Initiate Spiritual Care, Psychosocial Clinical Specialist consult as needed   Communicate willingness to discuss loss and facilitate grief process with patient/family as appropriate   Provide information about the patient’s health status with consideration of family and cultural values     Problem: Skin/Tissue Integrity  Goal: Skin integrity remains intact  Description: 1.  Monitor for areas of redness and/or skin breakdown2.  Assess vascular access sites hourly3.  Every 4-6 hours minimum:  Change oxygen saturation probe site4.

## 2025-06-06 NOTE — PROGRESS NOTES
Occupational Therapy  Facility/Department: [facility] ICU  Occupational Therapy Initial Assessment/ Treatment    Name: Evangelist Ortega  : 1975  MRN: [id number]  Date of Service: 2025    Discharge Recommendations:     OT Equipment Recommendations  Other: defer to dc location       Patient Diagnosis(es): There were no encounter diagnoses.  Past Medical History:  has no past medical history on file.  Past Surgical History:  has no past surgical history on file.    Treatment Diagnosis: decreased ADLs and transfers      Assessment  Performance deficits / Impairments: Decreased ADL status;Decreased strength;Decreased balance;Decreased high-level IADLs;Decreased coordination;Decreased endurance;Decreased functional mobility ;Decreased safe awareness;Decreased cognition  Assessment: Prior to admisison pt was living at home independently. Pt now presents s/p ETOH withdrawal and seizure disorder, now well below his baseline. Now demonstrating assist x 2 for walking with RW, poor cognition and insight into deficits, and min A for sit to stand transfers. If pt returns home he is a significantly high fall risk, feel that pt would benefit from intensive IP OT prior to returning home. Continue OT POC.  Treatment Diagnosis: decreased ADLs and transfers  Prognosis: Fair  Decision Making: Medium Complexity  REQUIRES OT FOLLOW-UP: Yes  Activity Tolerance  Activity Tolerance: Patient limited by fatigue;Treatment limited secondary to medical complications (free text)  Activity Tolerance Comments: HR up to 150s with mobility and standing activities.     Plan  Occupational Therapy Plan  Times Per Week: 2-5  Times Per Day: Once a day  Current Treatment Recommendations: Strengthening, Self-Care / ADL, Balance training, Functional mobility training, Endurance training, Patient/Caregiver education & training, Neuromuscular re-education, Cognitive reorientation    Restrictions  Position Activity Restriction  Other Position/Activity

## 2025-06-06 NOTE — PROGRESS NOTES
Nephrology Progress Note                                                                                                                                                                                                                                                                                                                                                               Office : [phone]     Fax :[phone]    Patient's Name: Evangelist Ortega  3:41 PM  6/6/2025    Reason for Consult:  Hyponatremia   Requesting Physician:  Evangelist Schulz DO  Chief Complaint:  No chief complaint on file.      Assessment/Plan     # Hyponatremia   - Severe   - Possible etiologies: Beer potomania, seizure, SDH   - Na 115 on admit. Na improved and now stable   - Encourage solute intake   - Continue 1.2L fluid restriction   - Continue close monitor of sodium     # Hypokalemia   - Replace PRN  - Monitor    # Lactic acidosis   - 2/2 seizure     # SDH   - Likely traumatic   - Neurosurgery following     # Seizure   - Alcohol ? SDH?   - Neurology following     # Alcohol withdrawal  # Delirium tremens   - [facility] protocol   - IV thiamine & folic acid       History of Present Ilness:    Evangelist Ortega is a 49 y.o. male with     This patient is a 48 y/o male found by EMS for altered mental status and concern for alcohol withdrawal and seizure.  Per EMS report they were called by the patient with concerns for alcohol withdrawal. Patient was verbal on the EMS call but was found unresponsive on their arrival.  Fingerstick glucose in the 100s.  En route EMS reports that his mental status has improved he is now localizing to pain and making eye contact with verbal stimuli.  Concern for postictal state.  Patient is altered on arrival here he is a poor historian     Just after arrival on initial evaluation patient developed tonic-clonic seizure and received 2 mg of Ativan via IO.       Na 115 16:53--> 118 19:16   Mg 1.4-> 9.6 (!)   Ptis  \"LABURIN\" in the last 72 hours.  Urine Chemistry:   No results for input(s): \"CLUR\", \"LABCREA\", \"PROTEINUR\", \"NAUR\" in the last 72 hours.        IMAGING:  MRI BRAIN W WO CONTRAST   Final Result   1. Stable right-sided subdural hematoma with mild right frontal subarachnoid hemorrhage and trace left-sided subdural hematoma.   2. 5.0 x 4.0 cm left middle cranial fossa arachnoid cyst.   3. No abnormal enhancement or restricted diffusion to suggest acute infarct or mass.      Electronically signed by Omkar Prajapati MD      XR ABDOMEN (KUB) (SINGLE AP VIEW)   Final Result      NG tube in the body of the stomach.      Electronically signed by Mckayla Carter MD      CT HEAD WO CONTRAST   Final Result      1. Compared with scan earlier same day, there is a stable right-sided mixed attenuation subdural hematoma with mass effect and approximately 3 mm leftward midline shift      2. Fluid attenuation collection in the middle cranial fossa on the left probably representing an arachnoid cyst or remote insult. This is similar to the prior study. Adjacent curvilinear high attenuation could represent a bridging vessel or an additional    small subdural hematoma.      Electronically signed by Jose David Youssef          Medical Decision Making:  The following items were considered in medical decision making:  Discussion of patient care with other providers  Reviewed clinical lab tests  Reviewed radiology tests  Reviewed other diagnostic tests/interventions    Fernando Fyre MD   [facility]  Office : [phone] or through [employer]  Fax :[phone]

## 2025-06-06 NOTE — PROGRESS NOTES
Physical Therapy  Facility/Department: [facility] ICU  Physical Therapy Initial Assessment / Daily Treatment    Name: Evangelist Ortega  : 1975  MRN: [id number]  Date of Service: 2025    Discharge Recommendations:  IP Rehab, Patient able to tolerate 3 hours of therapy per day   PT Equipment Recommendations  Equipment Needed: No  Other: Defer to next level of care      Patient Diagnosis(es): There were no encounter diagnoses.  Past Medical History:  has no past medical history on file.  Past Surgical History:  has no past surgical history on file.    Assessment  Body Structures, Functions, Activity Limitations Requiring Skilled Therapeutic Intervention: Decreased functional mobility ;Decreased strength;Decreased body mechanics;Decreased safe awareness;Decreased endurance;Decreased balance  Assessment: Pt is 48 yo male who lives alone presenting w/ status epliepticus and decreased functional mobility. He reports indep PLOF and states he has family/friends who are able to provide assistance if necessary. Currently, Pt req 1 person assist for bed mobility and transfers. 2 person assist req for amb w/ RW. D/t below baseline strength/endurance/balance, Pt not safe to amb alone. Safety concerns regarding d/c to home d/t above impairments. Pt is motivated and demos ability to tolerate 3 hrs of therapy per day and is appropriate for d/c to ARU for extensive PT to promote safe return to PLOF.  Treatment Diagnosis: Decreased functional mobility  Therapy Prognosis: Good  Decision Making: Medium Complexity  Requires PT Follow-Up: Yes  Activity Tolerance  Activity Tolerance: Patient limited by fatigue;Patient limited by endurance;Treatment limited secondary to medical complications  Activity Tolerance Comments: Pt activity tolerance limited d/t tachycardia and deficits to strength/endurance/balance. Pt HR raises to 150 during standing and req seated rest break.    Plan  Physical Therapy Plan  General Plan:  (2-5 times per  entered room with pt laying sidelying on bed having transferred himself back to the bed. Required 2PA to bring LEs into bed and to scoot up in bed. Pt was returned to bed and alarm was activated, RN was informed.  Transfers  Sit to Stand: Minimal Assistance  Stand to Sit: Minimal Assistance  Comment: Pt req extra time and effort when performing STS. Completes w/ RW to/from EOB and recliner. While standing, Pt given VC to march in place which causes increase in HR to high 140s. Pt req seated rest break.  Ambulation  Surface: Level tile  Device: Rolling Walker  Assistance: 2 Person assistance (Mod + min A for RW management and guarding. VC req throughout for proper sequencing, keeping RW safe and UE placement)  Quality of Gait: Pt shuffles and req extra time and effort  Gait Deviations: Shuffles;Slow Sarah;Staggers;Decreased step length;Decreased step height  Distance: 3' from EOB to chair  Comments: Initially attempt to walk to bathroom. Pt HR raises to max of 150 bpm. PT/OT sit Pt down in chair and HR decreases to 120s. RN notified.           [facility] - Mobility    AM-PAC Basic Mobility - Inpatient   How much help is needed turning from your back to your side while in a flat bed without using bedrails?: A Little  How much help is needed moving from lying on your back to sitting on the side of a flat bed without using bedrails?: A Little  How much help is needed moving to and from a bed to a chair?: A Little  How much help is needed standing up from a chair using your arms?: A Little  How much help is needed walking in hospital room?: A Lot  How much help is needed climbing 3-5 steps with a railing?: Total  AM-PAC Inpatient Mobility Raw Score : 15  AM-PAC Inpatient T-Scale Score : 39.45  Mobility Inpatient CMS 0-100% Score: 57.7  Mobility Inpatient CMS G-Code Modifier : CK         Goals  Short Term Goals  Time Frame for Short Term Goals: Prior to d/c  Short Term Goal 1: Pt will demo sup<>sit w/ SBA  Short Term Goal  2: Pt will demo STS w/ LRAD and SBA  Short Term Goal 3: Pt will amb 25' w/ LRAD and CGA  Short Term Goal 4: Pt will demo pivot transfer from EOB<>chair w/ CGA and LRAD  Patient Goals   Patient Goals : Return home       Education  Patient Education  Education Given To: Patient;Family  Education Provided: Role of Therapy;Plan of Care;Transfer Training;Energy Conservation;Precautions;Mobility Training  Education Provided Comments: Pt educated on d/c recommendations and importance of sitting up in chair to promote trunk strength.  Education Method: Demonstration;Verbal  Barriers to Learning: None  Education Outcome: Continued education needed      Therapy Time   Individual Concurrent Group Co-treatment   Time In 0906         Time Out 0951         Minutes 45         Timed Code Treatment Minutes: 30 Minutes     Total Treatment Minutes: 45 Minutes    If Pt is discharged prior to next treatment session, let this note serve as discharge summary.    Kishor Zimmerman, PT, DPT

## 2025-06-06 NOTE — PROGRESS NOTES
V2.0    [facility] [place] Progress Note      Name:  Evangelist Ortega /Age/Sex: 1975  (49 y.o. male)   MRN & CSN:  [id number] & [id number] Encounter Date/Time: 2025 10:33 AM EDT   Location:  [facility]/4512-01 PCP: Evangelist Schulz DO     Attending:Simon Zapata DO       Hospital Day: 7    Assessment and Recommendations   Per chart review, \"49 y.o. male with a medical hx significant for alcohol use disorder (20+yrs), HTN, depression otherwise as listed in the MHx table below, who presented unresponsive via EMS to [facility] ED. Transferred to [facility].     Per ED note and EMS run sheet, pt. Called 911 stating concern for possible alcohol withdrawal. When EMS entered residence he was found unresponsive surrounded by many empty beer cans. Mental status improved en route with localization of pain, spontaneous movement, and arousing to verbal stimuli.      Shortly after arrival to ED patient experienced tonic-clonic seizure receiving 2mg ativan with aborted seizure activity. Within 10 minutes experienced another tonic clonic seizure requiring 5mg versed x2 and versed gtt and subsequently intubated for airway protection.      Per chart review patient seen in ED 2025 for alcohol detoxification and reportedly at that time drinking approximately 24 beers a day. He was discharged on 3 day supply of librium. Multiple hospitalizations in the past for alcohol withdrawal most recently  requiring precedex gtt. Withdrawals with hallucinations in past no reported seizures.     ED Course:  Afebrile 130/81, pulse (!) 105, resp. rate 20, SpO2 99%   AG 30 bicarb 13 pH 7.329 pCO2 26.9 Lactic 14.3 ethanol 72 mag 1.4 Na 115  ALT 36 AST 65   GCS 11 with tremor on exam  CT Head: R subdural hemorrhage with 3mm leftward midline shift likely acute/subacute given mixed attenuation. Fluid collection in the middle cranial fossa on the L likely to represent arachnoid cyst.  Nephro consulted and received 3% hypertonic saline prior to transfer to  terminates 1.7 cm above the glenn.  Enteric tube courses inferiorly through the esophagus, beyond field of view. Multiple dental caries.     1. Moderate right subdural hemorrhage with 3 mm leftward midline shift, likely acute. 2. Cystic lesion abuts/displaces anterior left temporal lobe, favor arachnoid cyst.  Finding may be further characterized with brain MRI on a nonemergent basis. Critical results were called by Dr. Levy Vasquez to ÓSCAR CHÁVEZ on 5/31/2025 at 18:29.     CT CERVICAL SPINE WO CONTRAST  Result Date: 5/31/2025  EXAMINATION: CT OF THE HEAD WITHOUT CONTRAST; CT OF THE CERVICAL SPINE WITHOUT CONTRAST 5/31/2025 5:58 pm TECHNIQUE: CT of the head was performed without the administration of intravenous contrast. Automated exposure control, iterative reconstruction, and/or weight based adjustment of the mA/kV was utilized to reduce the radiation dose to as low as reasonably achievable.; CT of the cervical spine was performed without the administration of intravenous contrast. Multiplanar reformatted images are provided for review. Automated exposure control, iterative reconstruction, and/or weight based adjustment of the mA/kV was utilized to reduce the radiation dose to as low as reasonably achievable. COMPARISON: None. HISTORY: ORDERING SYSTEM PROVIDED HISTORY: AMS TECHNOLOGIST PROVIDED HISTORY: Reason for exam:->AMS Has a \"code stroke\" or \"stroke alert\" been called?->No Decision Support Exception - unselect if not a suspected or confirmed emergency medical condition->Emergency Medical Condition (MA) Reason for Exam: AMS; ORDERING SYSTEM PROVIDED HISTORY: seizure TECHNOLOGIST PROVIDED HISTORY: Reason for exam:->seizure Decision Support Exception - unselect if not a suspected or confirmed emergency medical condition->Emergency Medical Condition (MA) Reason for Exam: seizure FINDINGS: BRAIN/VENTRICLES: Moderate subdural hemorrhage overlies the right frontal, temporal, and parietal lobes.  Approximately  3 mm of right to left midline shift.  The anterior left temporal lobe is displaced by a 4.3 x 3.7 cm fluid attenuating lesion.  The gray-white differentiation is maintained without evidence of an acute infarct.  There is no evidence of hydrocephalus. ORBITS: The visualized portion of the orbits demonstrate no acute abnormality. SINUSES: The visualized paranasal sinuses and mastoid air cells demonstrate no acute abnormality. SOFT TISSUES/SKULL:  No acute abnormality of the visualized skull or soft tissues.  Endotracheal and enteric tubes, incompletely assessed.  Debris in the right external auditory canal. CERVICAL SPINE BONES/ALIGNMENT: There is no acute fracture or traumatic malalignment. DEGENERATIVE CHANGES: No severe osseous spinal canal stenosis.  Moderate degenerative changes C5-C7. SOFT TISSUES: There is no prevertebral soft tissue swelling.  Endotracheal tube terminates 1.7 cm above the glenn.  Enteric tube courses inferiorly through the esophagus, beyond field of view. Multiple dental caries.     1. Moderate right subdural hemorrhage with 3 mm leftward midline shift, likely acute. 2. Cystic lesion abuts/displaces anterior left temporal lobe, favor arachnoid cyst.  Finding may be further characterized with brain MRI on a nonemergent basis. Critical results were called by Dr. Levy Vasquez to ÓSCAR CHÁVEZ on 5/31/2025 at 18:29.     XR CHEST PORTABLE  Result Date: 5/31/2025  EXAMINATION: ONE XRAY VIEW OF THE CHEST 5/31/2025 5:28 pm COMPARISON: None. HISTORY: ORDERING SYSTEM PROVIDED HISTORY: ?seizure TECHNOLOGIST PROVIDED HISTORY: Reason for exam:->?seizure Reason for Exam: tube placement FINDINGS: The endotracheal tube terminates 2.1 cm above the glenn.  The nasogastric tube is incompletely imaged reaching the stomach.  There is biapical scarring.  The cardiac silhouette is within normal limits.  There is no pneumothorax or pleural effusion.     1. Endotracheal tube terminating 2.1 cm above the glenn.        CBC:   Recent Labs     06/04/25  0524 06/05/25  0407 06/06/25  0611   WBC 5.6 5.2 4.5   HGB 12.6* 11.8* 11.6*    226 260     BMP:    Recent Labs     06/04/25  0524 06/04/25  1114 06/04/25  2340 06/05/25  0407 06/06/25  0611   *   < > 132* 136 135*   K 3.3*  --   --  4.0 3.2*   CL 98*  --   --  98* 97*   CO2 20*  --   --  28 25   BUN 6*  --   --  6* 4*   CREATININE 0.6*  --   --  0.6* 0.5*   GLUCOSE 77  --   --  95 90    < > = values in this interval not displayed.     Hepatic:   No results for input(s): \"AST\", \"ALT\", \"BILITOT\", \"ALKPHOS\" in the last 72 hours.    Invalid input(s): \"ALB\"    Lipids:   Lab Results   Component Value Date/Time    TRIG 86 06/01/2025 03:35 AM     Hemoglobin A1C: No results found for: \"LABA1C\"  TSH: No results found for: \"TSH\"  Troponin: No results found for: \"TROPONINT\"  Lactic Acid:   No results for input(s): \"LACTA\" in the last 72 hours.    BNP: No results for input(s): \"PROBNP\" in the last 72 hours.  UA:  Lab Results   Component Value Date/Time    NITRU Negative 06/01/2025 09:35 AM    COLORU Yellow 06/01/2025 09:35 AM    PHUR 6.5 06/01/2025 09:35 AM    WBCUA 0-2 06/01/2025 09:35 AM    RBCUA 0-2 06/01/2025 09:35 AM    BACTERIA 2+ 06/01/2025 12:32 AM    CLARITYU Clear 06/01/2025 09:35 AM    LEUKOCYTESUR TRACE 06/01/2025 09:35 AM    UROBILINOGEN 0.2 06/01/2025 09:35 AM    BILIRUBINUR Negative 06/01/2025 09:35 AM    BLOODU Negative 06/01/2025 09:35 AM    GLUCOSEU Negative 06/01/2025 09:35 AM    KETUA Negative 06/01/2025 09:35 AM    AMORPHOUS 2+ 06/01/2025 09:35 AM     Urine Cultures: No results found for: \"LABURIN\"  Blood Cultures: No results found for: \"BC\"  No results found for: \"BLOODCULT2\"  Organism: No results found for: \"ORG\"      Electronically signed by Simon Zapata DO on 6/6/2025 at 4:29 PM

## 2025-06-06 NOTE — PROGRESS NOTES
Notification of IV to PO Conversion     IV To Po Conversion:   Will convert levetiracetam to oral based on [facility] IV to PO policy (see below).     Please call with questions.  Brooke Pearce PharmD., [facility]   6/6/2025 1:05 PM  Wireless: [phone]      Criteria for conversion from IV to PO therapy per [facility] IV to PO Protocol:   Patients should meet all of the following inclusion criteria and none of the exclusion criteria    Criteria to initiate medication route switch (Inclusion Criteria)  IV therapy for > 24 hours (antibiotics only)  Tolerating diet more advanced than clear liquids  Tolerating oral (PO) medications  Does not require vasopressor therapy for blood pressure support  No seizures for 72hrs (antiepileptic medications only)      Criteria indicating that the patient is NOT a candidate for IV to PO conversion (Exclusion Criteria)  Infections requiring IV therapy (ie: meningitis, endocarditis, osteomyelitis, pancreatitis)  Nausea and/or vomiting or severe diarrhea within past 24 hours  Has gastrectomy, ileus, gastric outlet or bowel obstruction, or malabsorption syndromes  Has significant, painful oral ulceration  TPN with an NPO order  Active GI bleed  Unable to swallow  Nothing by Mouth (NPO) status  Febrile in the last 24 hours- (antibiotics only)  Clinical deteriorating or unstable - (antibiotics only)  Pediatric patients and patients who are not euthyroid (not on oral levothyroxine/not stabilized on oral levothyroxine) - (Levothyroxine only)  Patients being treated for myxedema coma or during the organ donation process - (Levothyroxine only)    Other notes-   Patients may be given suppositories when available for the product being ordered if no contraindications exist (ie: rectal surgery or infection)   Oral solutions/suspensions may be considered for patients with a functioning enteral tube not on continuous suction (if medication is available in this formulation)

## 2025-06-06 NOTE — CARE COORDINATION
Discharge Planning:    CM spoke with Cole in [employer]- states she needs incapacitated form signed by MD to get financial information necessary to start Medicaid pending process. CM obtained signed incapacitated form and provided copy to Cole and placed original in hard chart. CM noted therapy evals with recs for ARU. CM placed referrals to multiple local ARU's and SNF's that may be able to accept pending Medicaid, once application number is provided by Public Benefits. Awaiting responses from facilities and Public Benefits.    CM requested for therapy to see daily/as often as possible to optimize strength and independence prior to DC in case unable to obtain Medicaid pending status.    Thank you  Cat Dl RN, BSN, CM  ICU   [phone]

## 2025-06-07 LAB
ALBUMIN SERPL-MCNC: 3.3 G/DL (ref 3.4–5)
ALBUMIN SERPL-MCNC: 3.3 G/DL (ref 3.4–5)
ANION GAP SERPL CALCULATED.3IONS-SCNC: 11 MMOL/L (ref 3–16)
ANION GAP SERPL CALCULATED.3IONS-SCNC: 11 MMOL/L (ref 3–16)
BASOPHILS # BLD: 0.1 K/UL (ref 0–0.2)
BASOPHILS # BLD: 0.1 K/UL (ref 0–0.2)
BASOPHILS NFR BLD: 2.3 %
BASOPHILS NFR BLD: 2.3 %
BUN SERPL-MCNC: 4 MG/DL (ref 7–20)
BUN SERPL-MCNC: 4 MG/DL (ref 7–20)
CALCIUM SERPL-MCNC: 9 MG/DL (ref 8.3–10.6)
CALCIUM SERPL-MCNC: 9 MG/DL (ref 8.3–10.6)
CHLORIDE SERPL-SCNC: 97 MMOL/L (ref 99–110)
CHLORIDE SERPL-SCNC: 97 MMOL/L (ref 99–110)
CO2 SERPL-SCNC: 26 MMOL/L (ref 21–32)
CO2 SERPL-SCNC: 26 MMOL/L (ref 21–32)
CREAT SERPL-MCNC: 0.5 MG/DL (ref 0.9–1.3)
CREAT SERPL-MCNC: 0.5 MG/DL (ref 0.9–1.3)
DEPRECATED RDW RBC AUTO: 14.7 % (ref 12.4–15.4)
DEPRECATED RDW RBC AUTO: 14.7 % (ref 12.4–15.4)
EOSINOPHIL # BLD: 0.1 K/UL (ref 0–0.6)
EOSINOPHIL # BLD: 0.1 K/UL (ref 0–0.6)
EOSINOPHIL NFR BLD: 1.3 %
EOSINOPHIL NFR BLD: 1.3 %
GFR SERPLBLD CREATININE-BSD FMLA CKD-EPI: >90 ML/MIN/{1.73_M2}
GFR SERPLBLD CREATININE-BSD FMLA CKD-EPI: >90 ML/MIN/{1.73_M2}
GLUCOSE BLD-MCNC: 103 MG/DL (ref 70–99)
GLUCOSE BLD-MCNC: 103 MG/DL (ref 70–99)
GLUCOSE BLD-MCNC: 84 MG/DL (ref 70–99)
GLUCOSE BLD-MCNC: 84 MG/DL (ref 70–99)
GLUCOSE SERPL-MCNC: 86 MG/DL (ref 70–99)
GLUCOSE SERPL-MCNC: 86 MG/DL (ref 70–99)
HCT VFR BLD AUTO: 31.8 % (ref 40.5–52.5)
HCT VFR BLD AUTO: 31.8 % (ref 40.5–52.5)
HGB BLD-MCNC: 11.2 G/DL (ref 13.5–17.5)
HGB BLD-MCNC: 11.2 G/DL (ref 13.5–17.5)
LYMPHOCYTES # BLD: 0.9 K/UL (ref 1–5.1)
LYMPHOCYTES # BLD: 0.9 K/UL (ref 1–5.1)
LYMPHOCYTES NFR BLD: 18.2 %
LYMPHOCYTES NFR BLD: 18.2 %
MAGNESIUM SERPL-MCNC: 1.99 MG/DL (ref 1.8–2.4)
MAGNESIUM SERPL-MCNC: 1.99 MG/DL (ref 1.8–2.4)
MCH RBC QN AUTO: 29.7 PG (ref 26–34)
MCH RBC QN AUTO: 29.7 PG (ref 26–34)
MCHC RBC AUTO-ENTMCNC: 35.4 G/DL (ref 31–36)
MCHC RBC AUTO-ENTMCNC: 35.4 G/DL (ref 31–36)
MCV RBC AUTO: 83.9 FL (ref 80–100)
MCV RBC AUTO: 83.9 FL (ref 80–100)
MONOCYTES # BLD: 1 K/UL (ref 0–1.3)
MONOCYTES # BLD: 1 K/UL (ref 0–1.3)
MONOCYTES NFR BLD: 20 %
MONOCYTES NFR BLD: 20 %
NEUTROPHILS # BLD: 2.9 K/UL (ref 1.7–7.7)
NEUTROPHILS # BLD: 2.9 K/UL (ref 1.7–7.7)
NEUTROPHILS NFR BLD: 58.2 %
NEUTROPHILS NFR BLD: 58.2 %
PERFORMED ON: ABNORMAL
PERFORMED ON: ABNORMAL
PERFORMED ON: NORMAL
PERFORMED ON: NORMAL
PHOSPHATE SERPL-MCNC: 3.4 MG/DL (ref 2.5–4.9)
PHOSPHATE SERPL-MCNC: 3.4 MG/DL (ref 2.5–4.9)
PLATELET # BLD AUTO: 324 K/UL (ref 135–450)
PLATELET # BLD AUTO: 324 K/UL (ref 135–450)
PMV BLD AUTO: 6.4 FL (ref 5–10.5)
PMV BLD AUTO: 6.4 FL (ref 5–10.5)
POTASSIUM SERPL-SCNC: 3.5 MMOL/L (ref 3.5–5.1)
POTASSIUM SERPL-SCNC: 3.5 MMOL/L (ref 3.5–5.1)
RBC # BLD AUTO: 3.79 M/UL (ref 4.2–5.9)
RBC # BLD AUTO: 3.79 M/UL (ref 4.2–5.9)
SODIUM SERPL-SCNC: 134 MMOL/L (ref 136–145)
SODIUM SERPL-SCNC: 134 MMOL/L (ref 136–145)
WBC # BLD AUTO: 5 K/UL (ref 4–11)
WBC # BLD AUTO: 5 K/UL (ref 4–11)

## 2025-06-07 PROCEDURE — 80069 RENAL FUNCTION PANEL: CPT

## 2025-06-07 PROCEDURE — 6370000000 HC RX 637 (ALT 250 FOR IP)

## 2025-06-07 PROCEDURE — 6370000000 HC RX 637 (ALT 250 FOR IP): Performed by: STUDENT IN AN ORGANIZED HEALTH CARE EDUCATION/TRAINING PROGRAM

## 2025-06-07 PROCEDURE — 85025 COMPLETE CBC W/AUTO DIFF WBC: CPT

## 2025-06-07 PROCEDURE — 6360000002 HC RX W HCPCS: Performed by: STUDENT IN AN ORGANIZED HEALTH CARE EDUCATION/TRAINING PROGRAM

## 2025-06-07 PROCEDURE — 36415 COLL VENOUS BLD VENIPUNCTURE: CPT

## 2025-06-07 PROCEDURE — 99233 SBSQ HOSP IP/OBS HIGH 50: CPT | Performed by: HOSPITALIST

## 2025-06-07 PROCEDURE — 83735 ASSAY OF MAGNESIUM: CPT

## 2025-06-07 PROCEDURE — 2060000000 HC ICU INTERMEDIATE R&B

## 2025-06-07 PROCEDURE — 2500000003 HC RX 250 WO HCPCS

## 2025-06-07 RX ORDER — ONDANSETRON 4 MG/1
4 TABLET, ORALLY DISINTEGRATING ORAL EVERY 8 HOURS PRN
Status: DISCONTINUED | OUTPATIENT
Start: 2025-06-07 | End: 2025-06-13 | Stop reason: HOSPADM

## 2025-06-07 RX ADMIN — HEPARIN SODIUM 5000 UNITS: 5000 INJECTION INTRAVENOUS; SUBCUTANEOUS at 06:15

## 2025-06-07 RX ADMIN — CHLORDIAZEPOXIDE HYDROCHLORIDE 50 MG: 25 CAPSULE ORAL at 09:31

## 2025-06-07 RX ADMIN — CHLORDIAZEPOXIDE HYDROCHLORIDE 50 MG: 25 CAPSULE ORAL at 20:43

## 2025-06-07 RX ADMIN — SODIUM CHLORIDE, PRESERVATIVE FREE 10 ML: 5 INJECTION INTRAVENOUS at 10:00

## 2025-06-07 RX ADMIN — HEPARIN SODIUM 5000 UNITS: 5000 INJECTION INTRAVENOUS; SUBCUTANEOUS at 22:29

## 2025-06-07 RX ADMIN — SODIUM CHLORIDE, PRESERVATIVE FREE 10 ML: 5 INJECTION INTRAVENOUS at 20:43

## 2025-06-07 RX ADMIN — ESCITALOPRAM OXALATE 20 MG: 20 TABLET, FILM COATED ORAL at 09:32

## 2025-06-07 RX ADMIN — ACETAMINOPHEN 650 MG: 325 TABLET ORAL at 17:01

## 2025-06-07 RX ADMIN — ONDANSETRON 4 MG: 4 TABLET, ORALLY DISINTEGRATING ORAL at 09:59

## 2025-06-07 RX ADMIN — Medication 200 MG: at 09:32

## 2025-06-07 RX ADMIN — Medication 15 ML: at 16:54

## 2025-06-07 RX ADMIN — LEVETIRACETAM 500 MG: 500 TABLET, FILM COATED ORAL at 09:32

## 2025-06-07 RX ADMIN — LEVETIRACETAM 500 MG: 500 TABLET, FILM COATED ORAL at 20:43

## 2025-06-07 RX ADMIN — PANTOPRAZOLE SODIUM 40 MG: 40 TABLET, DELAYED RELEASE ORAL at 06:15

## 2025-06-07 RX ADMIN — HEPARIN SODIUM 5000 UNITS: 5000 INJECTION INTRAVENOUS; SUBCUTANEOUS at 16:55

## 2025-06-07 RX ADMIN — AMLODIPINE BESYLATE 5 MG: 5 TABLET ORAL at 09:32

## 2025-06-07 RX ADMIN — FOLIC ACID 1 MG: 1 TABLET ORAL at 09:32

## 2025-06-07 ASSESSMENT — PAIN DESCRIPTION - DESCRIPTORS: DESCRIPTORS: ACHING;SHARP

## 2025-06-07 ASSESSMENT — PAIN SCALES - GENERAL
PAINLEVEL_OUTOF10: 4
PAINLEVEL_OUTOF10: 5

## 2025-06-07 ASSESSMENT — PAIN DESCRIPTION - LOCATION: LOCATION: HEAD

## 2025-06-07 ASSESSMENT — PAIN - FUNCTIONAL ASSESSMENT: PAIN_FUNCTIONAL_ASSESSMENT: PREVENTS OR INTERFERES SOME ACTIVE ACTIVITIES AND ADLS

## 2025-06-07 ASSESSMENT — PAIN DESCRIPTION - ORIENTATION: ORIENTATION: POSTERIOR;RIGHT

## 2025-06-07 NOTE — PROGRESS NOTES
V2.0    [facility] [place] Progress Note      Name:  Evangelist Ortega /Age/Sex: 1975  (49 y.o. male)   MRN & CSN:  [id number] & [id number] Encounter Date/Time: 2025 10:33 AM EDT   Location:  5512/5512-01 PCP: Evangelist Schulz DO     Attending:Simon Zapata DO       Hospital Day: 8    Assessment and Recommendations   Per chart review, \"49 y.o. male with a medical hx significant for alcohol use disorder (20+yrs), HTN, depression otherwise as listed in the MHx table below, who presented unresponsive via EMS to [place] ED. Transferred to [facility].     Per ED note and EMS run sheet, pt. Called 911 stating concern for possible alcohol withdrawal. When EMS entered residence he was found unresponsive surrounded by many empty beer cans. Mental status improved en route with localization of pain, spontaneous movement, and arousing to verbal stimuli.      Shortly after arrival to ED patient experienced tonic-clonic seizure receiving 2mg ativan with aborted seizure activity. Within 10 minutes experienced another tonic clonic seizure requiring 5mg versed x2 and versed gtt and subsequently intubated for airway protection.      Per chart review patient seen in ED 2025 for alcohol detoxification and reportedly at that time drinking approximately 24 beers a day. He was discharged on 3 day supply of librium. Multiple hospitalizations in the past for alcohol withdrawal most recently  requiring precedex gtt. Withdrawals with hallucinations in past no reported seizures.     ED Course:  Afebrile 130/81, pulse (!) 105, resp. rate 20, SpO2 99%   AG 30 bicarb 13 pH 7.329 pCO2 26.9 Lactic 14.3 ethanol 72 mag 1.4 Na 115  ALT 36 AST 65   GCS 11 with tremor on exam  CT Head: R subdural hemorrhage with 3mm leftward midline shift likely acute/subacute given mixed attenuation. Fluid collection in the middle cranial fossa on the L likely to represent arachnoid cyst.  Nephro consulted and received 3% hypertonic saline prior to transfer to  external auditory canal. CERVICAL SPINE BONES/ALIGNMENT: There is no acute fracture or traumatic malalignment. DEGENERATIVE CHANGES: No severe osseous spinal canal stenosis.  Moderate degenerative changes C5-C7. SOFT TISSUES: There is no prevertebral soft tissue swelling.  Endotracheal tube terminates 1.7 cm above the glenn.  Enteric tube courses inferiorly through the esophagus, beyond field of view. Multiple dental caries.     1. Moderate right subdural hemorrhage with 3 mm leftward midline shift, likely acute. 2. Cystic lesion abuts/displaces anterior left temporal lobe, favor arachnoid cyst.  Finding may be further characterized with brain MRI on a nonemergent basis. Critical results were called by Dr. Levy Vasquez to ÓSCAR CHÁVEZ on 5/31/2025 at 18:29.     CT CERVICAL SPINE WO CONTRAST  Result Date: 5/31/2025  EXAMINATION: CT OF THE HEAD WITHOUT CONTRAST; CT OF THE CERVICAL SPINE WITHOUT CONTRAST 5/31/2025 5:58 pm TECHNIQUE: CT of the head was performed without the administration of intravenous contrast. Automated exposure control, iterative reconstruction, and/or weight based adjustment of the mA/kV was utilized to reduce the radiation dose to as low as reasonably achievable.; CT of the cervical spine was performed without the administration of intravenous contrast. Multiplanar reformatted images are provided for review. Automated exposure control, iterative reconstruction, and/or weight based adjustment of the mA/kV was utilized to reduce the radiation dose to as low as reasonably achievable. COMPARISON: None. HISTORY: ORDERING SYSTEM PROVIDED HISTORY: AMS TECHNOLOGIST PROVIDED HISTORY: Reason for exam:->AMS Has a \"code stroke\" or \"stroke alert\" been called?->No Decision Support Exception - unselect if not a suspected or confirmed emergency medical condition->Emergency Medical Condition (MA) Reason for Exam: AMS; ORDERING SYSTEM PROVIDED HISTORY: seizure TECHNOLOGIST PROVIDED HISTORY: Reason for

## 2025-06-07 NOTE — PLAN OF CARE
Problem: Discharge Planning  Goal: Discharge to home or other facility with appropriate resources  Outcome: Progressing     Problem: Safety - Medical Restraint  Goal: Remains free of injury from restraints (Restraint for Interference with Medical Device)  Description: INTERVENTIONS:1. Determine that other, less restrictive measures have been tried or would not be effective before applying the restraint2. Evaluate the patient's condition at the time of restraint application3. Inform patient/family regarding the reason for restraint4. Q2H: Monitor safety, psychosocial status, comfort, nutrition and hydration  Outcome: Completed     Problem: Safety - Adult  Goal: Free from fall injury  Outcome: Progressing     Problem: Skin/Tissue Integrity - Adult  Goal: Skin integrity remains intact  Description: 1.  Monitor for areas of redness and/or skin breakdown2.  Assess vascular access sites hourly3.  Every 4-6 hours minimum:  Change oxygen saturation probe site4.  Every 4-6 hours:  If on nasal continuous positive airway pressure, respiratory therapy assess nares and determine need for appliance change or resting period  Outcome: Progressing     Problem: Skin/Tissue Integrity  Goal: Skin integrity remains intact  Description: 1.  Monitor for areas of redness and/or skin breakdown2.  Assess vascular access sites hourly3.  Every 4-6 hours minimum:  Change oxygen saturation probe site4.  Every 4-6 hours:  If on nasal continuous positive airway pressure, respiratory therapy assess nares and determine need for appliance change or resting period  Outcome: Progressing     Problem: Pain  Goal: Verbalizes/displays adequate comfort level or baseline comfort level  Outcome: Progressing     Problem: Nutrition Deficit:  Goal: Optimize nutritional status  Outcome: Progressing

## 2025-06-07 NOTE — PROGRESS NOTES
4 Eyes Skin Assessment     NAME:  Evangelist Ortega  YOB: 1975  MEDICAL RECORD NUMBER:  [id number]    The patient is being assessed for  Transfer to New Unit    I agree that at least one RN has performed a thorough Head to Toe Skin Assessment on the patient. ALL assessment sites listed below have been assessed.      Areas assessed by both nurses:    Head, Face, Ears, Shoulders, Back, Chest, Arms, Elbows, Hands, Sacrum. Buttock, Coccyx, Ischium, Legs. Feet and Heels, and Under Medical Devices         Does the Patient have a Wound? No noted wound(s)     Blanchable redness to sacrum and Bilateral heels. Scattered Ecchymosis BLE.    Mateusz Prevention initiated by RN: Yes  Wound Care Orders initiated by RN: No    Pressure Injury (Stage 3,4, Unstageable, DTI, NWPT, and Complex wounds) if present, place Wound referral order by RN under : No    New Ostomies, if present place, Ostomy referral order under : No     Nurse 1 eSignature: Electronically signed by Omkar Cline RN on 6/7/25 at 6:57 AM EDT    **SHARE this note so that the co-signing nurse can place an eSignature**    Nurse 2 eSignature: Electronically signed by Rachelle Blanca RN on 6//25 at 7:09 AM EDT

## 2025-06-07 NOTE — PLAN OF CARE
Problem: Neurosensory - Adult  Goal: Achieves stable or improved neurological status  Note: Alert oriented times 3 , up to bathroom with assist , generalized weakness , moves all extremities , right head ace , relieved with tylenol , tolerates diet , mom aware of status and transfer er from ICU ,

## 2025-06-07 NOTE — PROGRESS NOTES
Nephrology Progress Note                                                                                                                                                                                                                                                                                                                                                               Office : [phone]     Fax :[phone]    Patient's Name: Evangelist Ortega  10:19 AM  6/7/2025    Reason for Consult:  Hyponatremia   Requesting Physician:  Evangelist Schulz DO  Chief Complaint:  No chief complaint on file.      Assessment/Plan     # Hyponatremia   - Severe 115 on admission --> 134 today   - Beer potomania (BUN just 4), seizure, SDH   - Na 115 on admit. Na improved and now stable   - Encourage solute intake   - Continue 1.2L fluid restriction   - Continue close monitor of sodium     # Hypokalemia   - Replace PRN  - Monitor    # Lactic acidosis   - 2/2 seizure     # SDH   - Likely traumatic   - Neurosurgery following     # Seizure   - Alcohol ? SDH?   - Neurology following     # Alcohol withdrawal  # Delirium tremens   - [facility] protocol   - IV thiamine & folic acid       History of Present Ilness:    Evangelist Ortega is a 49 y.o. male with     This patient is a 50 y/o male found by EMS for altered mental status and concern for alcohol withdrawal and seizure.  Per EMS report they were called by the patient with concerns for alcohol withdrawal. Patient was verbal on the EMS call but was found unresponsive on their arrival.  Fingerstick glucose in the 100s.  En route EMS reports that his mental status has improved he is now localizing to pain and making eye contact with verbal stimuli.  Concern for postictal state.  Patient is altered on arrival here he is a poor historian     Just after arrival on initial evaluation patient developed tonic-clonic seizure and received 2 mg of Ativan via IO.       Na 115 16:53--> 118 19:16   Mg

## 2025-06-08 LAB
ALBUMIN SERPL-MCNC: 3.3 G/DL (ref 3.4–5)
ALBUMIN SERPL-MCNC: 3.3 G/DL (ref 3.4–5)
ANION GAP SERPL CALCULATED.3IONS-SCNC: 10 MMOL/L (ref 3–16)
ANION GAP SERPL CALCULATED.3IONS-SCNC: 10 MMOL/L (ref 3–16)
BASOPHILS # BLD: 0.1 K/UL (ref 0–0.2)
BASOPHILS # BLD: 0.1 K/UL (ref 0–0.2)
BASOPHILS NFR BLD: 1.3 %
BASOPHILS NFR BLD: 1.3 %
BUN SERPL-MCNC: 6 MG/DL (ref 7–20)
BUN SERPL-MCNC: 6 MG/DL (ref 7–20)
CALCIUM SERPL-MCNC: 9.1 MG/DL (ref 8.3–10.6)
CALCIUM SERPL-MCNC: 9.1 MG/DL (ref 8.3–10.6)
CHLORIDE SERPL-SCNC: 97 MMOL/L (ref 99–110)
CHLORIDE SERPL-SCNC: 97 MMOL/L (ref 99–110)
CO2 SERPL-SCNC: 26 MMOL/L (ref 21–32)
CO2 SERPL-SCNC: 26 MMOL/L (ref 21–32)
CREAT SERPL-MCNC: 0.6 MG/DL (ref 0.9–1.3)
CREAT SERPL-MCNC: 0.6 MG/DL (ref 0.9–1.3)
DEPRECATED RDW RBC AUTO: 14.8 % (ref 12.4–15.4)
DEPRECATED RDW RBC AUTO: 14.8 % (ref 12.4–15.4)
EOSINOPHIL # BLD: 0.1 K/UL (ref 0–0.6)
EOSINOPHIL # BLD: 0.1 K/UL (ref 0–0.6)
EOSINOPHIL NFR BLD: 1.1 %
EOSINOPHIL NFR BLD: 1.1 %
GFR SERPLBLD CREATININE-BSD FMLA CKD-EPI: >90 ML/MIN/{1.73_M2}
GFR SERPLBLD CREATININE-BSD FMLA CKD-EPI: >90 ML/MIN/{1.73_M2}
GLUCOSE SERPL-MCNC: 88 MG/DL (ref 70–99)
GLUCOSE SERPL-MCNC: 88 MG/DL (ref 70–99)
HCT VFR BLD AUTO: 32.6 % (ref 40.5–52.5)
HCT VFR BLD AUTO: 32.6 % (ref 40.5–52.5)
HGB BLD-MCNC: 11.5 G/DL (ref 13.5–17.5)
HGB BLD-MCNC: 11.5 G/DL (ref 13.5–17.5)
LYMPHOCYTES # BLD: 0.9 K/UL (ref 1–5.1)
LYMPHOCYTES # BLD: 0.9 K/UL (ref 1–5.1)
LYMPHOCYTES NFR BLD: 16.6 %
LYMPHOCYTES NFR BLD: 16.6 %
MAGNESIUM SERPL-MCNC: 1.8 MG/DL (ref 1.8–2.4)
MAGNESIUM SERPL-MCNC: 1.8 MG/DL (ref 1.8–2.4)
MCH RBC QN AUTO: 30.2 PG (ref 26–34)
MCH RBC QN AUTO: 30.2 PG (ref 26–34)
MCHC RBC AUTO-ENTMCNC: 35.3 G/DL (ref 31–36)
MCHC RBC AUTO-ENTMCNC: 35.3 G/DL (ref 31–36)
MCV RBC AUTO: 85.5 FL (ref 80–100)
MCV RBC AUTO: 85.5 FL (ref 80–100)
MONOCYTES # BLD: 0.7 K/UL (ref 0–1.3)
MONOCYTES # BLD: 0.7 K/UL (ref 0–1.3)
MONOCYTES NFR BLD: 12.7 %
MONOCYTES NFR BLD: 12.7 %
NEUTROPHILS # BLD: 3.7 K/UL (ref 1.7–7.7)
NEUTROPHILS # BLD: 3.7 K/UL (ref 1.7–7.7)
NEUTROPHILS NFR BLD: 68.3 %
NEUTROPHILS NFR BLD: 68.3 %
PHOSPHATE SERPL-MCNC: 3.1 MG/DL (ref 2.5–4.9)
PHOSPHATE SERPL-MCNC: 3.1 MG/DL (ref 2.5–4.9)
PLATELET # BLD AUTO: 363 K/UL (ref 135–450)
PLATELET # BLD AUTO: 363 K/UL (ref 135–450)
PMV BLD AUTO: 6.3 FL (ref 5–10.5)
PMV BLD AUTO: 6.3 FL (ref 5–10.5)
POTASSIUM SERPL-SCNC: 4.1 MMOL/L (ref 3.5–5.1)
POTASSIUM SERPL-SCNC: 4.1 MMOL/L (ref 3.5–5.1)
RBC # BLD AUTO: 3.81 M/UL (ref 4.2–5.9)
RBC # BLD AUTO: 3.81 M/UL (ref 4.2–5.9)
SODIUM SERPL-SCNC: 133 MMOL/L (ref 136–145)
SODIUM SERPL-SCNC: 133 MMOL/L (ref 136–145)
WBC # BLD AUTO: 5.4 K/UL (ref 4–11)
WBC # BLD AUTO: 5.4 K/UL (ref 4–11)

## 2025-06-08 PROCEDURE — 6370000000 HC RX 637 (ALT 250 FOR IP)

## 2025-06-08 PROCEDURE — 85025 COMPLETE CBC W/AUTO DIFF WBC: CPT

## 2025-06-08 PROCEDURE — 6370000000 HC RX 637 (ALT 250 FOR IP): Performed by: STUDENT IN AN ORGANIZED HEALTH CARE EDUCATION/TRAINING PROGRAM

## 2025-06-08 PROCEDURE — 99232 SBSQ HOSP IP/OBS MODERATE 35: CPT | Performed by: HOSPITALIST

## 2025-06-08 PROCEDURE — 2060000000 HC ICU INTERMEDIATE R&B

## 2025-06-08 PROCEDURE — 80069 RENAL FUNCTION PANEL: CPT

## 2025-06-08 PROCEDURE — 2500000003 HC RX 250 WO HCPCS

## 2025-06-08 PROCEDURE — 36415 COLL VENOUS BLD VENIPUNCTURE: CPT

## 2025-06-08 PROCEDURE — 6360000002 HC RX W HCPCS: Performed by: STUDENT IN AN ORGANIZED HEALTH CARE EDUCATION/TRAINING PROGRAM

## 2025-06-08 PROCEDURE — 83735 ASSAY OF MAGNESIUM: CPT

## 2025-06-08 RX ORDER — IBUPROFEN 200 MG
400 TABLET ORAL EVERY 6 HOURS PRN
Status: DISCONTINUED | OUTPATIENT
Start: 2025-06-08 | End: 2025-06-13 | Stop reason: HOSPADM

## 2025-06-08 RX ADMIN — HEPARIN SODIUM 5000 UNITS: 5000 INJECTION INTRAVENOUS; SUBCUTANEOUS at 14:46

## 2025-06-08 RX ADMIN — HEPARIN SODIUM 5000 UNITS: 5000 INJECTION INTRAVENOUS; SUBCUTANEOUS at 21:30

## 2025-06-08 RX ADMIN — CHLORDIAZEPOXIDE HYDROCHLORIDE 25 MG: 25 CAPSULE ORAL at 09:48

## 2025-06-08 RX ADMIN — SODIUM CHLORIDE, PRESERVATIVE FREE 10 ML: 5 INJECTION INTRAVENOUS at 09:48

## 2025-06-08 RX ADMIN — HEPARIN SODIUM 5000 UNITS: 5000 INJECTION INTRAVENOUS; SUBCUTANEOUS at 05:36

## 2025-06-08 RX ADMIN — FOLIC ACID 1 MG: 1 TABLET ORAL at 09:48

## 2025-06-08 RX ADMIN — ESCITALOPRAM OXALATE 20 MG: 20 TABLET, FILM COATED ORAL at 09:48

## 2025-06-08 RX ADMIN — Medication 200 MG: at 09:47

## 2025-06-08 RX ADMIN — Medication 15 ML: at 09:47

## 2025-06-08 RX ADMIN — IBUPROFEN 400 MG: 200 TABLET, FILM COATED ORAL at 14:39

## 2025-06-08 RX ADMIN — ACETAMINOPHEN 650 MG: 325 TABLET ORAL at 02:27

## 2025-06-08 RX ADMIN — CHLORDIAZEPOXIDE HYDROCHLORIDE 25 MG: 25 CAPSULE ORAL at 21:30

## 2025-06-08 RX ADMIN — PANTOPRAZOLE SODIUM 40 MG: 40 TABLET, DELAYED RELEASE ORAL at 05:36

## 2025-06-08 RX ADMIN — SODIUM CHLORIDE, PRESERVATIVE FREE 10 ML: 5 INJECTION INTRAVENOUS at 21:29

## 2025-06-08 RX ADMIN — LEVETIRACETAM 500 MG: 500 TABLET, FILM COATED ORAL at 09:48

## 2025-06-08 RX ADMIN — LEVETIRACETAM 500 MG: 500 TABLET, FILM COATED ORAL at 21:30

## 2025-06-08 ASSESSMENT — PAIN DESCRIPTION - ORIENTATION: ORIENTATION: RIGHT;LEFT

## 2025-06-08 ASSESSMENT — PAIN - FUNCTIONAL ASSESSMENT: PAIN_FUNCTIONAL_ASSESSMENT: PREVENTS OR INTERFERES SOME ACTIVE ACTIVITIES AND ADLS

## 2025-06-08 ASSESSMENT — PAIN SCALES - GENERAL
PAINLEVEL_OUTOF10: 7
PAINLEVEL_OUTOF10: 4

## 2025-06-08 ASSESSMENT — PAIN DESCRIPTION - DESCRIPTORS: DESCRIPTORS: THROBBING

## 2025-06-08 ASSESSMENT — PAIN DESCRIPTION - LOCATION: LOCATION: HEAD;EYE

## 2025-06-08 NOTE — PROGRESS NOTES
V2.0    [facility] Progress Note      Name:  Evangelist Ortega /Age/Sex: 1975  (49 y.o. male)   MRN & CSN:  [id number] & [id number] Encounter Date/Time: 2025 10:33 AM EDT   Location:  5512/5512-01 PCP: Evangelist Schulz DO     Attending:Simon Zapata DO       Hospital Day: 9    Assessment and Recommendations   Per chart review, \"49 y.o. male with a medical hx significant for alcohol use disorder (20+yrs), HTN, depression otherwise as listed in the MHx table below, who presented unresponsive via EMS to [place] ED. Transferred to [facility].     Per ED note and EMS run sheet, pt. Called 911 stating concern for possible alcohol withdrawal. When EMS entered residence he was found unresponsive surrounded by many empty beer cans. Mental status improved en route with localization of pain, spontaneous movement, and arousing to verbal stimuli.      Shortly after arrival to ED patient experienced tonic-clonic seizure receiving 2mg ativan with aborted seizure activity. Within 10 minutes experienced another tonic clonic seizure requiring 5mg versed x2 and versed gtt and subsequently intubated for airway protection.      Per chart review patient seen in ED 2025 for alcohol detoxification and reportedly at that time drinking approximately 24 beers a day. He was discharged on 3 day supply of librium. Multiple hospitalizations in the past for alcohol withdrawal most recently  requiring precedex gtt. Withdrawals with hallucinations in past no reported seizures.     ED Course:  Afebrile 130/81, pulse (!) 105, resp. rate 20, SpO2 99%   AG 30 bicarb 13 pH 7.329 pCO2 26.9 Lactic 14.3 ethanol 72 mag 1.4 Na 115  ALT 36 AST 65   GCS 11 with tremor on exam  CT Head: R subdural hemorrhage with 3mm leftward midline shift likely acute/subacute given mixed attenuation. Fluid collection in the middle cranial fossa on the L likely to represent arachnoid cyst.  Nephro consulted and received 3% hypertonic saline prior to transfer to  (MA) Reason for Exam: AMS; ORDERING SYSTEM PROVIDED HISTORY: seizure TECHNOLOGIST PROVIDED HISTORY: Reason for exam:->seizure Decision Support Exception - unselect if not a suspected or confirmed emergency medical condition->Emergency Medical Condition (MA) Reason for Exam: seizure FINDINGS: BRAIN/VENTRICLES: Moderate subdural hemorrhage overlies the right frontal, temporal, and parietal lobes.  Approximately 3 mm of right to left midline shift.  The anterior left temporal lobe is displaced by a 4.3 x 3.7 cm fluid attenuating lesion.  The gray-white differentiation is maintained without evidence of an acute infarct.  There is no evidence of hydrocephalus. ORBITS: The visualized portion of the orbits demonstrate no acute abnormality. SINUSES: The visualized paranasal sinuses and mastoid air cells demonstrate no acute abnormality. SOFT TISSUES/SKULL:  No acute abnormality of the visualized skull or soft tissues.  Endotracheal and enteric tubes, incompletely assessed.  Debris in the right external auditory canal. CERVICAL SPINE BONES/ALIGNMENT: There is no acute fracture or traumatic malalignment. DEGENERATIVE CHANGES: No severe osseous spinal canal stenosis.  Moderate degenerative changes C5-C7. SOFT TISSUES: There is no prevertebral soft tissue swelling.  Endotracheal tube terminates 1.7 cm above the glenn.  Enteric tube courses inferiorly through the esophagus, beyond field of view. Multiple dental caries.     1. Moderate right subdural hemorrhage with 3 mm leftward midline shift, likely acute. 2. Cystic lesion abuts/displaces anterior left temporal lobe, favor arachnoid cyst.  Finding may be further characterized with brain MRI on a nonemergent basis. Critical results were called by Dr. Levy Vasquez to ÓSCAR CHÁVEZ on 5/31/2025 at 18:29.     XR CHEST PORTABLE  Result Date: 5/31/2025  EXAMINATION: ONE XRAY VIEW OF THE CHEST 5/31/2025 5:28 pm COMPARISON: None. HISTORY: ORDERING SYSTEM PROVIDED HISTORY: ?seizure  found for: \"ORG\"      Electronically signed by Simon Zapata DO on 6/8/2025 at 8:27 AM

## 2025-06-08 NOTE — PROGRESS NOTES
Patient is alert and oriented x3. VSS on room air. Medications given per MAR, no side effects noted. Patient ambulating x2 with gait belt and stedy. PRN  pain medications given, per MAR. Voiding well via Urinal this shift.      Patient is currently resting in bed with bed alarm on for safety. Call light within reach and all fall precautions in place. Plan of care continues.

## 2025-06-08 NOTE — PROGRESS NOTES
Nephrology Progress Note                                                                                                                                                                                                                                                                                                                                                               Office : [phone]     Fax :[phone]    Patient's Name: Evangelist Ortega  8:56 AM  6/8/2025    Reason for Consult:  Hyponatremia   Requesting Physician:  Evangelist Schulz DO  Chief Complaint:  No chief complaint on file.      Assessment/Plan     # Hyponatremia   - Severe 115 on admission --> 133 today   - Beer potomania (BUN just 4), seizure, SDH   - Na 115 on admit. Na improved and now stable   - Encourage solute intake   - Continue 1.2L fluid restriction   - Continue close monitor of sodium     # Hypokalemia   - Replace PRN  - Monitor    # Lactic acidosis   - 2/2 seizure     # SDH   - Likely traumatic   - Neurosurgery following     # Seizure   - Alcohol ? SDH?   - Neurology following     # Alcohol withdrawal  # Delirium tremens   - [facility] protocol   - IV thiamine & folic acid       History of Present Ilness:    Evangelist Ortega is a 49 y.o. male with     This patient is a 50 y/o male found by EMS for altered mental status and concern for alcohol withdrawal and seizure.  Per EMS report they were called by the patient with concerns for alcohol withdrawal. Patient was verbal on the EMS call but was found unresponsive on their arrival.  Fingerstick glucose in the 100s.  En route EMS reports that his mental status has improved he is now localizing to pain and making eye contact with verbal stimuli.  Concern for postictal state.  Patient is altered on arrival here he is a poor historian     Just after arrival on initial evaluation patient developed tonic-clonic seizure and received 2 mg of Ativan via IO.       Na 115 16:53--> 118 19:16   Mg 1.4->  and trace left-sided subdural hematoma.   2. 5.0 x 4.0 cm left middle cranial fossa arachnoid cyst.   3. No abnormal enhancement or restricted diffusion to suggest acute infarct or mass.      Electronically signed by Omkar Prajapati MD      XR ABDOMEN (KUB) (SINGLE AP VIEW)   Final Result      NG tube in the body of the stomach.      Electronically signed by Mckayla Carter MD      CT HEAD WO CONTRAST   Final Result      1. Compared with scan earlier same day, there is a stable right-sided mixed attenuation subdural hematoma with mass effect and approximately 3 mm leftward midline shift      2. Fluid attenuation collection in the middle cranial fossa on the left probably representing an arachnoid cyst or remote insult. This is similar to the prior study. Adjacent curvilinear high attenuation could represent a bridging vessel or an additional    small subdural hematoma.      Electronically signed by Jose David Youssef          Medical Decision Making:  The following items were considered in medical decision making:  Discussion of patient care with other providers  Reviewed clinical lab tests  Reviewed radiology tests  Reviewed other diagnostic tests/interventions    Ismael Muse MD   [facility]  Office : [phone] or through [employer]  Fax :[phone]

## 2025-06-08 NOTE — PLAN OF CARE
Problem: Neurosensory - Adult  Goal: Achieves stable or improved neurological status  Note: Headache resolving post , ibuprofen , continued  general weakness , up to bathroom with Angelica ballard , formed

## 2025-06-09 LAB
ALBUMIN SERPL-MCNC: 3.5 G/DL (ref 3.4–5)
ALBUMIN SERPL-MCNC: 3.5 G/DL (ref 3.4–5)
ANION GAP SERPL CALCULATED.3IONS-SCNC: 11 MMOL/L (ref 3–16)
ANION GAP SERPL CALCULATED.3IONS-SCNC: 11 MMOL/L (ref 3–16)
BASOPHILS # BLD: 0.1 K/UL (ref 0–0.2)
BASOPHILS # BLD: 0.1 K/UL (ref 0–0.2)
BASOPHILS NFR BLD: 1.5 %
BASOPHILS NFR BLD: 1.5 %
BUN SERPL-MCNC: 5 MG/DL (ref 7–20)
BUN SERPL-MCNC: 5 MG/DL (ref 7–20)
CALCIUM SERPL-MCNC: 9.3 MG/DL (ref 8.3–10.6)
CALCIUM SERPL-MCNC: 9.3 MG/DL (ref 8.3–10.6)
CHLORIDE SERPL-SCNC: 96 MMOL/L (ref 99–110)
CHLORIDE SERPL-SCNC: 96 MMOL/L (ref 99–110)
CO2 SERPL-SCNC: 27 MMOL/L (ref 21–32)
CO2 SERPL-SCNC: 27 MMOL/L (ref 21–32)
CREAT SERPL-MCNC: 0.6 MG/DL (ref 0.9–1.3)
CREAT SERPL-MCNC: 0.6 MG/DL (ref 0.9–1.3)
DEPRECATED RDW RBC AUTO: 14.8 % (ref 12.4–15.4)
DEPRECATED RDW RBC AUTO: 14.8 % (ref 12.4–15.4)
EOSINOPHIL # BLD: 0.1 K/UL (ref 0–0.6)
EOSINOPHIL # BLD: 0.1 K/UL (ref 0–0.6)
EOSINOPHIL NFR BLD: 0.9 %
EOSINOPHIL NFR BLD: 0.9 %
GFR SERPLBLD CREATININE-BSD FMLA CKD-EPI: >90 ML/MIN/{1.73_M2}
GFR SERPLBLD CREATININE-BSD FMLA CKD-EPI: >90 ML/MIN/{1.73_M2}
GLUCOSE SERPL-MCNC: 88 MG/DL (ref 70–99)
GLUCOSE SERPL-MCNC: 88 MG/DL (ref 70–99)
HCT VFR BLD AUTO: 33.6 % (ref 40.5–52.5)
HCT VFR BLD AUTO: 33.6 % (ref 40.5–52.5)
HGB BLD-MCNC: 11.7 G/DL (ref 13.5–17.5)
HGB BLD-MCNC: 11.7 G/DL (ref 13.5–17.5)
LYMPHOCYTES # BLD: 1 K/UL (ref 1–5.1)
LYMPHOCYTES # BLD: 1 K/UL (ref 1–5.1)
LYMPHOCYTES NFR BLD: 16.6 %
LYMPHOCYTES NFR BLD: 16.6 %
MAGNESIUM SERPL-MCNC: 1.77 MG/DL (ref 1.8–2.4)
MAGNESIUM SERPL-MCNC: 1.77 MG/DL (ref 1.8–2.4)
MCH RBC QN AUTO: 29.2 PG (ref 26–34)
MCH RBC QN AUTO: 29.2 PG (ref 26–34)
MCHC RBC AUTO-ENTMCNC: 34.7 G/DL (ref 31–36)
MCHC RBC AUTO-ENTMCNC: 34.7 G/DL (ref 31–36)
MCV RBC AUTO: 84 FL (ref 80–100)
MCV RBC AUTO: 84 FL (ref 80–100)
MONOCYTES # BLD: 0.7 K/UL (ref 0–1.3)
MONOCYTES # BLD: 0.7 K/UL (ref 0–1.3)
MONOCYTES NFR BLD: 11.4 %
MONOCYTES NFR BLD: 11.4 %
NEUTROPHILS # BLD: 4.2 K/UL (ref 1.7–7.7)
NEUTROPHILS # BLD: 4.2 K/UL (ref 1.7–7.7)
NEUTROPHILS NFR BLD: 69.6 %
NEUTROPHILS NFR BLD: 69.6 %
PHOSPHATE SERPL-MCNC: 3 MG/DL (ref 2.5–4.9)
PHOSPHATE SERPL-MCNC: 3 MG/DL (ref 2.5–4.9)
PLATELET # BLD AUTO: 414 K/UL (ref 135–450)
PLATELET # BLD AUTO: 414 K/UL (ref 135–450)
PMV BLD AUTO: 6.6 FL (ref 5–10.5)
PMV BLD AUTO: 6.6 FL (ref 5–10.5)
POTASSIUM SERPL-SCNC: 3.8 MMOL/L (ref 3.5–5.1)
POTASSIUM SERPL-SCNC: 3.8 MMOL/L (ref 3.5–5.1)
RBC # BLD AUTO: 3.99 M/UL (ref 4.2–5.9)
RBC # BLD AUTO: 3.99 M/UL (ref 4.2–5.9)
SODIUM SERPL-SCNC: 134 MMOL/L (ref 136–145)
SODIUM SERPL-SCNC: 134 MMOL/L (ref 136–145)
WBC # BLD AUTO: 6.1 K/UL (ref 4–11)
WBC # BLD AUTO: 6.1 K/UL (ref 4–11)

## 2025-06-09 PROCEDURE — 99232 SBSQ HOSP IP/OBS MODERATE 35: CPT | Performed by: HOSPITALIST

## 2025-06-09 PROCEDURE — 6370000000 HC RX 637 (ALT 250 FOR IP): Performed by: STUDENT IN AN ORGANIZED HEALTH CARE EDUCATION/TRAINING PROGRAM

## 2025-06-09 PROCEDURE — 2060000000 HC ICU INTERMEDIATE R&B

## 2025-06-09 PROCEDURE — 6370000000 HC RX 637 (ALT 250 FOR IP): Performed by: NURSE PRACTITIONER

## 2025-06-09 PROCEDURE — 6360000002 HC RX W HCPCS: Performed by: STUDENT IN AN ORGANIZED HEALTH CARE EDUCATION/TRAINING PROGRAM

## 2025-06-09 PROCEDURE — 80069 RENAL FUNCTION PANEL: CPT

## 2025-06-09 PROCEDURE — 36415 COLL VENOUS BLD VENIPUNCTURE: CPT

## 2025-06-09 PROCEDURE — 6360000002 HC RX W HCPCS

## 2025-06-09 PROCEDURE — 6370000000 HC RX 637 (ALT 250 FOR IP)

## 2025-06-09 PROCEDURE — 85025 COMPLETE CBC W/AUTO DIFF WBC: CPT

## 2025-06-09 PROCEDURE — 2500000003 HC RX 250 WO HCPCS

## 2025-06-09 PROCEDURE — 83735 ASSAY OF MAGNESIUM: CPT

## 2025-06-09 RX ORDER — MAGNESIUM SULFATE IN WATER 40 MG/ML
2000 INJECTION, SOLUTION INTRAVENOUS ONCE
Status: COMPLETED | OUTPATIENT
Start: 2025-06-09 | End: 2025-06-09

## 2025-06-09 RX ORDER — BUTALBITAL, ACETAMINOPHEN AND CAFFEINE 50; 325; 40 MG/1; MG/1; MG/1
1 TABLET ORAL
Status: COMPLETED | OUTPATIENT
Start: 2025-06-09 | End: 2025-06-09

## 2025-06-09 RX ADMIN — Medication 15 ML: at 08:45

## 2025-06-09 RX ADMIN — ESCITALOPRAM OXALATE 20 MG: 20 TABLET, FILM COATED ORAL at 08:37

## 2025-06-09 RX ADMIN — AMLODIPINE BESYLATE 5 MG: 5 TABLET ORAL at 08:37

## 2025-06-09 RX ADMIN — HEPARIN SODIUM 5000 UNITS: 5000 INJECTION INTRAVENOUS; SUBCUTANEOUS at 20:50

## 2025-06-09 RX ADMIN — HEPARIN SODIUM 5000 UNITS: 5000 INJECTION INTRAVENOUS; SUBCUTANEOUS at 06:18

## 2025-06-09 RX ADMIN — IBUPROFEN 400 MG: 200 TABLET, FILM COATED ORAL at 02:24

## 2025-06-09 RX ADMIN — SODIUM CHLORIDE, PRESERVATIVE FREE 10 ML: 5 INJECTION INTRAVENOUS at 20:51

## 2025-06-09 RX ADMIN — HEPARIN SODIUM 5000 UNITS: 5000 INJECTION INTRAVENOUS; SUBCUTANEOUS at 13:36

## 2025-06-09 RX ADMIN — BUTALBITAL, ACETAMINOPHEN, AND CAFFEINE 1 TABLET: 325; 50; 40 TABLET ORAL at 13:20

## 2025-06-09 RX ADMIN — LEVETIRACETAM 500 MG: 500 TABLET, FILM COATED ORAL at 20:50

## 2025-06-09 RX ADMIN — LEVETIRACETAM 500 MG: 500 TABLET, FILM COATED ORAL at 08:38

## 2025-06-09 RX ADMIN — BUTALBITAL, ACETAMINOPHEN, AND CAFFEINE 1 TABLET: 325; 50; 40 TABLET ORAL at 03:33

## 2025-06-09 RX ADMIN — BUTALBITAL, ACETAMINOPHEN, AND CAFFEINE 1 TABLET: 325; 50; 40 TABLET ORAL at 21:32

## 2025-06-09 RX ADMIN — FOLIC ACID 1 MG: 1 TABLET ORAL at 08:38

## 2025-06-09 RX ADMIN — PANTOPRAZOLE SODIUM 40 MG: 40 TABLET, DELAYED RELEASE ORAL at 06:18

## 2025-06-09 RX ADMIN — MAGNESIUM SULFATE HEPTAHYDRATE 2000 MG: 40 INJECTION, SOLUTION INTRAVENOUS at 08:55

## 2025-06-09 RX ADMIN — SODIUM CHLORIDE, PRESERVATIVE FREE 10 ML: 5 INJECTION INTRAVENOUS at 08:38

## 2025-06-09 RX ADMIN — Medication 200 MG: at 08:37

## 2025-06-09 ASSESSMENT — PAIN DESCRIPTION - PAIN TYPE
TYPE: ACUTE PAIN

## 2025-06-09 ASSESSMENT — PAIN DESCRIPTION - LOCATION
LOCATION: HEAD

## 2025-06-09 ASSESSMENT — PAIN DESCRIPTION - ORIENTATION
ORIENTATION: RIGHT
ORIENTATION: MID
ORIENTATION: MID
ORIENTATION: RIGHT

## 2025-06-09 ASSESSMENT — PAIN DESCRIPTION - DESCRIPTORS
DESCRIPTORS: ACHING;DISCOMFORT
DESCRIPTORS: ACHING;DULL
DESCRIPTORS: ACHING;DISCOMFORT
DESCRIPTORS: ACHING;DISCOMFORT

## 2025-06-09 ASSESSMENT — PAIN SCALES - GENERAL
PAINLEVEL_OUTOF10: 2
PAINLEVEL_OUTOF10: 4
PAINLEVEL_OUTOF10: 8
PAINLEVEL_OUTOF10: 2
PAINLEVEL_OUTOF10: 6
PAINLEVEL_OUTOF10: 5
PAINLEVEL_OUTOF10: 5
PAINLEVEL_OUTOF10: 8

## 2025-06-09 ASSESSMENT — PAIN DESCRIPTION - ONSET
ONSET: ON-GOING

## 2025-06-09 ASSESSMENT — PAIN DESCRIPTION - FREQUENCY
FREQUENCY: CONTINUOUS

## 2025-06-09 ASSESSMENT — PAIN - FUNCTIONAL ASSESSMENT
PAIN_FUNCTIONAL_ASSESSMENT: ACTIVITIES ARE NOT PREVENTED

## 2025-06-09 NOTE — CARE COORDINATION
CM following. Pt is from home alone in apt.   Pt has no insurance and unable to answer questions for paperwork to be filed.     Incapacitated form completed and Cole with public benefits to submit with that and has left messages for pt's parents to get further questions answered if able.     Referrals sent but denied so far due to no [facility] beds available.  Pt to continue to work with therapy to increase functional abilities prior to discharge.     Danelle Doan RN, BSN, CM  Case Management Department  [phone]

## 2025-06-09 NOTE — PROGRESS NOTES
V2.0    [facility] Progress Note      Name:  Evangelist Ortega /Age/Sex: 1975  (49 y.o. male)   MRN & CSN:  [id number] & [id number] Encounter Date/Time: 2025 10:33 AM EDT   Location:  5512/5512-01 PCP: Evangelist Schulz DO     Attending:Simon Zapata DO       Hospital Day: 10    Assessment and Recommendations   Per chart review, \"49 y.o. male with a medical hx significant for alcohol use disorder (20+yrs), HTN, depression otherwise as listed in the MHx table below, who presented unresponsive via EMS to [place] ED. Transferred to [facility].     Per ED note and EMS run sheet, pt. Called 911 stating concern for possible alcohol withdrawal. When EMS entered residence he was found unresponsive surrounded by many empty beer cans. Mental status improved en route with localization of pain, spontaneous movement, and arousing to verbal stimuli.      Shortly after arrival to ED patient experienced tonic-clonic seizure receiving 2mg ativan with aborted seizure activity. Within 10 minutes experienced another tonic clonic seizure requiring 5mg versed x2 and versed gtt and subsequently intubated for airway protection.      Per chart review patient seen in ED 2025 for alcohol detoxification and reportedly at that time drinking approximately 24 beers a day. He was discharged on 3 day supply of librium. Multiple hospitalizations in the past for alcohol withdrawal most recently  requiring precedex gtt. Withdrawals with hallucinations in past no reported seizures.     ED Course:  Afebrile 130/81, pulse (!) 105, resp. rate 20, SpO2 99%   AG 30 bicarb 13 pH 7.329 pCO2 26.9 Lactic 14.3 ethanol 72 mag 1.4 Na 115  ALT 36 AST 65   GCS 11 with tremor on exam  CT Head: R subdural hemorrhage with 3mm leftward midline shift likely acute/subacute given mixed attenuation. Fluid collection in the middle cranial fossa on the L likely to represent arachnoid cyst.  Nephro consulted and received 3% hypertonic saline prior to transfer  to [facility]  Received 2mg ativan, 2g keppra, versed 5mg x2 -> versed gtt prior to intubation.\"      Status epilepticus  R Subdural hematoma w/ 3mm midline shift  - NCC and NSGY consulted  - Per neurosurgery, 5/31/2025 follow-up CT head stable, no blood thinners for 2 weeks, repeat CT head in 2 weeks to ensure resolution  - Every 4 hours neurochecks  - cEEG negative, DC  - Keppra 500mg BID  - SBP < 160  - PLT goal > 100K   - 6/6 Patient awake, somewhat somnolent. AAOx2. PT/OT evals today  - 6/7 PT recommending IP rehab. Case management working on referrals given lack of insurance, starting medicaid pending process. Will have daily PT/OT while inpatient pending possible placement  - Pending placement. Continue with PT/OT    Acute hypoxic respiratory failure  - Intubated for airway protection in the setting of status epilepticus  - 6/3/2025 extubated,   - Tolerating diet  - On room air     Alcohol withdrawal  Delirium tremens  - CIWA w/ ativan, continues to require CIWA with tachycardia on minimal exertion  - Weaned off of propofol, and Precedex drip, off restraints  - Continue thiamine and folic acid  - IV PPI, switch to p.o.  - Librium taper ordered  - 6/6 Continue librium taper. Still requiring PO ativan per CIWA, last dose at 0044 this morning. Continue to monitor. Stopping his scheduled ativan as well.   - 6/7 Continue librium taper. Has not required any ativan since yesterday.   - 6/8 Continue librium taper, not requiring ativan since 6/6     Severe Hyponatremia - resolved  Electrolyte imbalance  Metabolic acidosis  - sodium 115 on admission,  - Status post 3% saline in the ER  - Overcorrected requiring DDAVP  - Nephro consulted, concern for beer potomania, SIADH  - Target potassium greater than 4, magnesium greater than 2  - Aggressive oral and IV electrolyte replacement  - Replace phosphorus with oral and IV supplements  - 6/6 Na normal at 135. Replacing K, Mg today. Continue to monitor  - 6/7 Na 134. K 3.5. Mg  appear clear.     NG tube in the body of the stomach. Electronically signed by Mckayla Carter MD    CT HEAD WO CONTRAST  Result Date: 5/31/2025  EXAM: CT HEAD WO CONTRAST CLINICAL  INDICATION: SDH, anisocoria COMPARISON: CT 5/31/2025 at 17:54 TECHNIQUE:CT HEAD WO CONTRAST  This exam was performed according to our departmental dose-optimization program, which includes automated exposure control, adjustment of the mA and/or kV according to patient size and/or use of iterative reconstruction technique.  Unless otherwise specified, incidental findings do not require dedicated imaging follow-up. FINDINGS: There is image degradation secondary to patient motion. There is a reidentified mixed attenuation right-sided subdural hematoma measuring up to 8mm in thickness over the right frontal region without significant interval decrease in size from the recent comparison CT same date. Associated rightward sulcus effacement. There is minimal, approximately 3 mm leftward midline shift. There is a reidentified fluid attenuation collection in the left middle cranial fossa along the anterior aspect of the temporal lobe which appears stable. Some adjacent curvilinear high attenuation could represent a bridging vessel or small adjacent subdural hematoma. Cortical sulci and ventricles are otherwise age-appropriate. The mastoid air cells are clear. The ethmoid air cells, frontal sinus, sphenoid sinus and left maxillary sinus appear well aerated. The right maxillary sinus is underdeveloped.  Calvarium is without acute changes.     1. Compared with scan earlier same day, there is a stable right-sided mixed attenuation subdural hematoma with mass effect and approximately 3 mm leftward midline shift 2. Fluid attenuation collection in the middle cranial fossa on the left probably representing an arachnoid cyst or remote insult. This is similar to the prior study. Adjacent curvilinear high attenuation could represent a bridging vessel or an  None. HISTORY: ORDERING SYSTEM PROVIDED HISTORY: ?seizure TECHNOLOGIST PROVIDED HISTORY: Reason for exam:->?seizure Reason for Exam: tube placement FINDINGS: The endotracheal tube terminates 2.1 cm above the glenn.  The nasogastric tube is incompletely imaged reaching the stomach.  There is biapical scarring.  The cardiac silhouette is within normal limits.  There is no pneumothorax or pleural effusion.     1. Endotracheal tube terminating 2.1 cm above the glenn.       CBC:   Recent Labs     06/07/25  0505 06/08/25  0654 06/09/25  0524   WBC 5.0 5.4 6.1   HGB 11.2* 11.5* 11.7*    363 414     BMP:    Recent Labs     06/07/25  0505 06/08/25  0654 06/09/25  0524   * 133* 134*   K 3.5 4.1 3.8   CL 97* 97* 96*   CO2 26 26 27   BUN 4* 6* 5*   CREATININE 0.5* 0.6* 0.6*   GLUCOSE 86 88 88     Hepatic:   No results for input(s): \"AST\", \"ALT\", \"BILITOT\", \"ALKPHOS\" in the last 72 hours.    Invalid input(s): \"ALB\"    Lipids:   Lab Results   Component Value Date/Time    TRIG 86 06/01/2025 03:35 AM     Hemoglobin A1C: No results found for: \"LABA1C\"  TSH: No results found for: \"TSH\"  Troponin: No results found for: \"TROPONINT\"  Lactic Acid:   No results for input(s): \"LACTA\" in the last 72 hours.    BNP: No results for input(s): \"PROBNP\" in the last 72 hours.  UA:  Lab Results   Component Value Date/Time    NITRU Negative 06/01/2025 09:35 AM    COLORU Yellow 06/01/2025 09:35 AM    PHUR 6.5 06/01/2025 09:35 AM    WBCUA 0-2 06/01/2025 09:35 AM    RBCUA 0-2 06/01/2025 09:35 AM    BACTERIA 2+ 06/01/2025 12:32 AM    CLARITYU Clear 06/01/2025 09:35 AM    LEUKOCYTESUR TRACE 06/01/2025 09:35 AM    UROBILINOGEN 0.2 06/01/2025 09:35 AM    BILIRUBINUR Negative 06/01/2025 09:35 AM    BLOODU Negative 06/01/2025 09:35 AM    GLUCOSEU Negative 06/01/2025 09:35 AM    KETUA Negative 06/01/2025 09:35 AM    AMORPHOUS 2+ 06/01/2025 09:35 AM     Urine Cultures: No results found for: \"LABURIN\"  Blood Cultures: No results found for:  \"BC\"  No results found for: \"BLOODCULT2\"  Organism: No results found for: \"ORG\"      Electronically signed by Simon Zapata DO on 6/9/2025 at 8:06 AM

## 2025-06-09 NOTE — PROGRESS NOTES
Nephrology Progress Note                                                                                                                                                                                                                                                                                                                                                               Office : [phone]     Fax :[phone]    Patient's Name: Evangelist Ortega  10:52 AM  6/9/2025    Reason for Consult:  Hyponatremia   Requesting Physician:  Evangelist Schulz DO  Chief Complaint:  No chief complaint on file.      Assessment/Plan     # Hyponatremia   - Severe 115 on admission --> 134 today   - Beer potomania (BUN just 4), seizure, SDH   - Encourage solute intake   - Continue 1.2L fluid restriction   - Continue close monitor of sodium     # Hypokalemia   - Replace PRN  - Monitor    # Lactic acidosis   - 2/2 seizure     # SDH   - Likely traumatic   - Neurosurgery following     # Seizure   - Alcohol ? SDH?   - Neurology following     # Alcohol withdrawal  # Delirium tremens   - [facility] protocol   - IV thiamine & folic acid       History of Present Ilness:    Evangelist Ortega is a 49 y.o. male with     This patient is a 48 y/o male found by EMS for altered mental status and concern for alcohol withdrawal and seizure.  Per EMS report they were called by the patient with concerns for alcohol withdrawal. Patient was verbal on the EMS call but was found unresponsive on their arrival.  Fingerstick glucose in the 100s.  En route EMS reports that his mental status has improved he is now localizing to pain and making eye contact with verbal stimuli.  Concern for postictal state.  Patient is altered on arrival here he is a poor historian     Just after arrival on initial evaluation patient developed tonic-clonic seizure and received 2 mg of Ativan via IO.       Na 115 16:53--> 118 19:16   Mg 1.4-> 9.6 (!)   Ptis intubated   On  \"NAUR\" in the last 72 hours.        IMAGING:  MRI BRAIN W WO CONTRAST   Final Result   1. Stable right-sided subdural hematoma with mild right frontal subarachnoid hemorrhage and trace left-sided subdural hematoma.   2. 5.0 x 4.0 cm left middle cranial fossa arachnoid cyst.   3. No abnormal enhancement or restricted diffusion to suggest acute infarct or mass.      Electronically signed by Omkar Prajapati MD      XR ABDOMEN (KUB) (SINGLE AP VIEW)   Final Result      NG tube in the body of the stomach.      Electronically signed by Mckayla Carter MD      CT HEAD WO CONTRAST   Final Result      1. Compared with scan earlier same day, there is a stable right-sided mixed attenuation subdural hematoma with mass effect and approximately 3 mm leftward midline shift      2. Fluid attenuation collection in the middle cranial fossa on the left probably representing an arachnoid cyst or remote insult. This is similar to the prior study. Adjacent curvilinear high attenuation could represent a bridging vessel or an additional    small subdural hematoma.      Electronically signed by Jose David Youssef          Medical Decision Making:  The following items were considered in medical decision making:  Discussion of patient care with other providers  Reviewed clinical lab tests  Reviewed radiology tests  Reviewed other diagnostic tests/interventions    Ismael Muse MD   [facility]  Office : [phone] or through [employer]  Fax :[phone]

## 2025-06-09 NOTE — PLAN OF CARE
Problem: Discharge Planning  Goal: Discharge to home or other facility with appropriate resources  Outcome: Progressing  Patient will have all needs met prior to discharge      Problem: Safety - Adult  Goal: Free from fall injury  Outcome: Progressing  Patient will remain fall free during stay by implementing and following all safety precautions.      Problem: Neurosensory - Adult  Goal: Achieves stable or improved neurological status  6/8/2025 2242 by Anamaria Wynn RN  Outcome: Progressing     Problem: Skin/Tissue Integrity - Adult  Goal: Skin integrity remains intact  Description: 1.  Monitor for areas of redness and/or skin breakdown2.  Assess vascular access sites hourly3.  Every 4-6 hours minimum:  Change oxygen saturation probe site4.  Every 4-6 hours:  If on nasal continuous positive airway pressure, respiratory therapy assess nares and determine need for appliance change or resting period  Outcome: Progressing     Problem: Pain  Goal: Verbalizes/displays adequate comfort level or baseline comfort level  Outcome: Progressing  Patient will have pain managed by utilizing both pharmacological and non pharmacological interventions.      Problem: ABCDS Injury Assessment  Goal: Absence of physical injury  Outcome: Progressing

## 2025-06-09 NOTE — PROGRESS NOTES
Patient is A&Ox3, disoriented to place. VSS on RA. Patient has endorsed pain to head managed  per MAR and non-pharm measures. Patient is tolerating PO diet. Tolerating ambulation x1 assist with GB/stedy. Voiding via BRP/urinal. Pt denies any needs at this time.Patient updated on plan of care. Fall and safety precautions in place, call light within reach. Continuing care.

## 2025-06-09 NOTE — PLAN OF CARE
Problem: Discharge Planning  Goal: Discharge to home or other facility with appropriate resources  6/9/2025 1132 by Estela Thacker RN  Outcome: Progressing     Problem: Safety - Adult  Goal: Free from fall injury  6/9/2025 1132 by Estela Thacker RN  Outcome: Progressing   All fall precautions in place. Bed locked and in lowest position with alarm on. Overbed table and personal belonings within reach. Call light within reach and patient instructed to use call light for assistance. Non-skid socks on.     Problem: Neurosensory - Adult  Goal: Achieves stable or improved neurological status  6/9/2025 1132 by Estela Thacker RN  Outcome: Progressing   Patient A&Ox3/4 and NIH 0    Problem: Neurosensory - Adult  Goal: Absence of seizures  6/9/2025 1132 by Estela Thacker RN  Outcome: Progressing   No presence of seizure like activity     Problem: Neurosensory - Adult  Goal: Remains free of injury related to seizures activity  6/9/2025 1132 by Estela Thacker RN  Outcome: Progressing   No presence of seizure like activity     Problem: Respiratory - Adult  Goal: Achieves optimal ventilation and oxygenation  6/9/2025 1132 by Estela Thacker RN  Outcome: Progressing     Problem: Cardiovascular - Adult  Goal: Maintains optimal cardiac output and hemodynamic stability  6/9/2025 1132 by Estela Thacker RN  Outcome: Progressing   Patient NS with intermittent prolonged QT interval     Problem: Skin/Tissue Integrity - Adult  Goal: Skin integrity remains intact  Description: 1.  Monitor for areas of redness and/or skin breakdown2.  Assess vascular access sites hourly3.  Every 4-6 hours minimum:  Change oxygen saturation probe site4.  Every 4-6 hours:  If on nasal continuous positive airway pressure, respiratory therapy assess nares and determine need for appliance change or resting period  6/9/2025 1132 by Estela Thacker, RN  Outcome: Progressing   Skin assessed this shift. Skin is CDI.     Problem: Musculoskeletal -  Adult  Goal: Return mobility to safest level of function  6/9/2025 1132 by Estela Thacker RN  Outcome: Progressing   Patient able to turn self in bed.     Problem: Gastrointestinal - Adult  Goal: Minimal or absence of nausea and vomiting  6/9/2025 1132 by Estela Thacker RN  Outcome: Progressing   Patient expresses no nausea or vomiting but needs encouragement with feeds.     Problem: Genitourinary - Adult  Goal: Absence of urinary retention  6/9/2025 1132 by Estela Thacker RN  Outcome: Progressing   No presence of urinary retention.     Problem: Metabolic/Fluid and Electrolytes - Adult  Goal: Electrolytes maintained within normal limits  6/9/2025 1132 by Estela Thacker RN  Outcome: Not progressing    Mag replacement given this shift.     Problem: Metabolic/Fluid and Electrolytes - Adult  Goal: Hemodynamic stability and optimal renal function maintained  6/9/2025 1132 by Estela Thacker RN  Outcome: Not progressing  Fluid restriction and fluid maintenance     Problem: Pain  Goal: Verbalizes/displays adequate comfort level or baseline comfort level  6/9/2025 1132 by Estela Thacker RN  Outcome: Progressing   Pt endorsing pain to head. Being treated with PRN pain medication, rest, and frequent repositioning with pillow support for comfort and pressure relief. Pt reports some relief from pain with above interventions.     Problem: ABCDS Injury Assessment  Goal: Absence of physical injury  6/9/2025 1132 by Estela Thacker RN  Outcome: Progressing   Patient encouraged to communicate with nurse with ambulation needs. All safety equipment at bedside.

## 2025-06-09 NOTE — PROGRESS NOTES
Patient is A&Ox3/4; easily reoriented. VSS this shift on RA. Patient has endorsed pain to head managed per MAR and non-pharm measures. Patient is tolerating PO diet poorly and needs encouragement. Pt able to turn self in the bed. Voiding via BRP. Patient updated on plan of care. Fall and safety precautions in place, call light within reach. Plan of care ongoing.

## 2025-06-10 LAB
ALBUMIN SERPL-MCNC: 3.4 G/DL (ref 3.4–5)
ALBUMIN SERPL-MCNC: 3.4 G/DL (ref 3.4–5)
ANION GAP SERPL CALCULATED.3IONS-SCNC: 9 MMOL/L (ref 3–16)
ANION GAP SERPL CALCULATED.3IONS-SCNC: 9 MMOL/L (ref 3–16)
BASOPHILS # BLD: 0.1 K/UL (ref 0–0.2)
BASOPHILS # BLD: 0.1 K/UL (ref 0–0.2)
BASOPHILS NFR BLD: 2.6 %
BASOPHILS NFR BLD: 2.6 %
BUN SERPL-MCNC: 6 MG/DL (ref 7–20)
BUN SERPL-MCNC: 6 MG/DL (ref 7–20)
CALCIUM SERPL-MCNC: 9.1 MG/DL (ref 8.3–10.6)
CALCIUM SERPL-MCNC: 9.1 MG/DL (ref 8.3–10.6)
CHLORIDE SERPL-SCNC: 97 MMOL/L (ref 99–110)
CHLORIDE SERPL-SCNC: 97 MMOL/L (ref 99–110)
CO2 SERPL-SCNC: 28 MMOL/L (ref 21–32)
CO2 SERPL-SCNC: 28 MMOL/L (ref 21–32)
CREAT SERPL-MCNC: 0.6 MG/DL (ref 0.9–1.3)
CREAT SERPL-MCNC: 0.6 MG/DL (ref 0.9–1.3)
DEPRECATED RDW RBC AUTO: 14.8 % (ref 12.4–15.4)
DEPRECATED RDW RBC AUTO: 14.8 % (ref 12.4–15.4)
EOSINOPHIL # BLD: 0.1 K/UL (ref 0–0.6)
EOSINOPHIL # BLD: 0.1 K/UL (ref 0–0.6)
EOSINOPHIL NFR BLD: 1.4 %
EOSINOPHIL NFR BLD: 1.4 %
GFR SERPLBLD CREATININE-BSD FMLA CKD-EPI: >90 ML/MIN/{1.73_M2}
GFR SERPLBLD CREATININE-BSD FMLA CKD-EPI: >90 ML/MIN/{1.73_M2}
GLUCOSE BLD-MCNC: 102 MG/DL (ref 70–99)
GLUCOSE BLD-MCNC: 102 MG/DL (ref 70–99)
GLUCOSE SERPL-MCNC: 120 MG/DL (ref 70–99)
GLUCOSE SERPL-MCNC: 120 MG/DL (ref 70–99)
HCT VFR BLD AUTO: 32.4 % (ref 40.5–52.5)
HCT VFR BLD AUTO: 32.4 % (ref 40.5–52.5)
HGB BLD-MCNC: 11.4 G/DL (ref 13.5–17.5)
HGB BLD-MCNC: 11.4 G/DL (ref 13.5–17.5)
LYMPHOCYTES # BLD: 1 K/UL (ref 1–5.1)
LYMPHOCYTES # BLD: 1 K/UL (ref 1–5.1)
LYMPHOCYTES NFR BLD: 19.9 %
LYMPHOCYTES NFR BLD: 19.9 %
MAGNESIUM SERPL-MCNC: 1.88 MG/DL (ref 1.8–2.4)
MAGNESIUM SERPL-MCNC: 1.88 MG/DL (ref 1.8–2.4)
MCH RBC QN AUTO: 29.5 PG (ref 26–34)
MCH RBC QN AUTO: 29.5 PG (ref 26–34)
MCHC RBC AUTO-ENTMCNC: 35.3 G/DL (ref 31–36)
MCHC RBC AUTO-ENTMCNC: 35.3 G/DL (ref 31–36)
MCV RBC AUTO: 83.5 FL (ref 80–100)
MCV RBC AUTO: 83.5 FL (ref 80–100)
MONOCYTES # BLD: 0.5 K/UL (ref 0–1.3)
MONOCYTES # BLD: 0.5 K/UL (ref 0–1.3)
MONOCYTES NFR BLD: 9.1 %
MONOCYTES NFR BLD: 9.1 %
NEUTROPHILS # BLD: 3.5 K/UL (ref 1.7–7.7)
NEUTROPHILS # BLD: 3.5 K/UL (ref 1.7–7.7)
NEUTROPHILS NFR BLD: 67 %
NEUTROPHILS NFR BLD: 67 %
PERFORMED ON: ABNORMAL
PERFORMED ON: ABNORMAL
PHOSPHATE SERPL-MCNC: 2.9 MG/DL (ref 2.5–4.9)
PHOSPHATE SERPL-MCNC: 2.9 MG/DL (ref 2.5–4.9)
PLATELET # BLD AUTO: 427 K/UL (ref 135–450)
PLATELET # BLD AUTO: 427 K/UL (ref 135–450)
PMV BLD AUTO: 6.5 FL (ref 5–10.5)
PMV BLD AUTO: 6.5 FL (ref 5–10.5)
POTASSIUM SERPL-SCNC: 3.8 MMOL/L (ref 3.5–5.1)
POTASSIUM SERPL-SCNC: 3.8 MMOL/L (ref 3.5–5.1)
RBC # BLD AUTO: 3.87 M/UL (ref 4.2–5.9)
RBC # BLD AUTO: 3.87 M/UL (ref 4.2–5.9)
SODIUM SERPL-SCNC: 134 MMOL/L (ref 136–145)
SODIUM SERPL-SCNC: 134 MMOL/L (ref 136–145)
WBC # BLD AUTO: 5.3 K/UL (ref 4–11)
WBC # BLD AUTO: 5.3 K/UL (ref 4–11)

## 2025-06-10 PROCEDURE — 2500000003 HC RX 250 WO HCPCS

## 2025-06-10 PROCEDURE — 6360000002 HC RX W HCPCS: Performed by: NURSE PRACTITIONER

## 2025-06-10 PROCEDURE — 6370000000 HC RX 637 (ALT 250 FOR IP)

## 2025-06-10 PROCEDURE — 2060000000 HC ICU INTERMEDIATE R&B

## 2025-06-10 PROCEDURE — 36415 COLL VENOUS BLD VENIPUNCTURE: CPT

## 2025-06-10 PROCEDURE — 83735 ASSAY OF MAGNESIUM: CPT

## 2025-06-10 PROCEDURE — 85025 COMPLETE CBC W/AUTO DIFF WBC: CPT

## 2025-06-10 PROCEDURE — 80069 RENAL FUNCTION PANEL: CPT

## 2025-06-10 PROCEDURE — 99232 SBSQ HOSP IP/OBS MODERATE 35: CPT | Performed by: HOSPITALIST

## 2025-06-10 PROCEDURE — 97535 SELF CARE MNGMENT TRAINING: CPT

## 2025-06-10 PROCEDURE — 6370000000 HC RX 637 (ALT 250 FOR IP): Performed by: STUDENT IN AN ORGANIZED HEALTH CARE EDUCATION/TRAINING PROGRAM

## 2025-06-10 PROCEDURE — 97116 GAIT TRAINING THERAPY: CPT

## 2025-06-10 PROCEDURE — 97530 THERAPEUTIC ACTIVITIES: CPT

## 2025-06-10 PROCEDURE — 6360000002 HC RX W HCPCS: Performed by: STUDENT IN AN ORGANIZED HEALTH CARE EDUCATION/TRAINING PROGRAM

## 2025-06-10 RX ORDER — KETOROLAC TROMETHAMINE 30 MG/ML
15 INJECTION, SOLUTION INTRAMUSCULAR; INTRAVENOUS ONCE
Status: COMPLETED | OUTPATIENT
Start: 2025-06-10 | End: 2025-06-10

## 2025-06-10 RX ADMIN — LEVETIRACETAM 500 MG: 500 TABLET, FILM COATED ORAL at 10:27

## 2025-06-10 RX ADMIN — HEPARIN SODIUM 5000 UNITS: 5000 INJECTION INTRAVENOUS; SUBCUTANEOUS at 05:32

## 2025-06-10 RX ADMIN — IBUPROFEN 400 MG: 200 TABLET, FILM COATED ORAL at 18:06

## 2025-06-10 RX ADMIN — HEPARIN SODIUM 5000 UNITS: 5000 INJECTION INTRAVENOUS; SUBCUTANEOUS at 20:07

## 2025-06-10 RX ADMIN — FOLIC ACID 1 MG: 1 TABLET ORAL at 10:27

## 2025-06-10 RX ADMIN — SODIUM CHLORIDE, PRESERVATIVE FREE 10 ML: 5 INJECTION INTRAVENOUS at 10:27

## 2025-06-10 RX ADMIN — HEPARIN SODIUM 5000 UNITS: 5000 INJECTION INTRAVENOUS; SUBCUTANEOUS at 14:00

## 2025-06-10 RX ADMIN — SODIUM CHLORIDE, PRESERVATIVE FREE 10 ML: 5 INJECTION INTRAVENOUS at 20:07

## 2025-06-10 RX ADMIN — Medication 200 MG: at 10:26

## 2025-06-10 RX ADMIN — PANTOPRAZOLE SODIUM 40 MG: 40 TABLET, DELAYED RELEASE ORAL at 05:32

## 2025-06-10 RX ADMIN — LEVETIRACETAM 500 MG: 500 TABLET, FILM COATED ORAL at 20:07

## 2025-06-10 RX ADMIN — ACETAMINOPHEN 650 MG: 325 TABLET ORAL at 19:11

## 2025-06-10 RX ADMIN — KETOROLAC TROMETHAMINE 15 MG: 30 INJECTION, SOLUTION INTRAMUSCULAR at 01:40

## 2025-06-10 RX ADMIN — ESCITALOPRAM OXALATE 20 MG: 20 TABLET, FILM COATED ORAL at 10:27

## 2025-06-10 RX ADMIN — Medication 15 ML: at 10:27

## 2025-06-10 ASSESSMENT — PAIN DESCRIPTION - DESCRIPTORS
DESCRIPTORS: ACHING;DISCOMFORT
DESCRIPTORS: ACHING
DESCRIPTORS: ACHING

## 2025-06-10 ASSESSMENT — PAIN DESCRIPTION - PAIN TYPE
TYPE: ACUTE PAIN

## 2025-06-10 ASSESSMENT — PAIN DESCRIPTION - ORIENTATION
ORIENTATION: RIGHT

## 2025-06-10 ASSESSMENT — PAIN - FUNCTIONAL ASSESSMENT
PAIN_FUNCTIONAL_ASSESSMENT: ACTIVITIES ARE NOT PREVENTED

## 2025-06-10 ASSESSMENT — PAIN DESCRIPTION - LOCATION
LOCATION: HEAD

## 2025-06-10 ASSESSMENT — PAIN SCALES - GENERAL
PAINLEVEL_OUTOF10: 2
PAINLEVEL_OUTOF10: 3
PAINLEVEL_OUTOF10: 4
PAINLEVEL_OUTOF10: 8
PAINLEVEL_OUTOF10: 4
PAINLEVEL_OUTOF10: 0
PAINLEVEL_OUTOF10: 4
PAINLEVEL_OUTOF10: 0

## 2025-06-10 ASSESSMENT — PAIN DESCRIPTION - ONSET
ONSET: ON-GOING

## 2025-06-10 ASSESSMENT — PAIN DESCRIPTION - FREQUENCY
FREQUENCY: INTERMITTENT
FREQUENCY: INTERMITTENT
FREQUENCY: CONTINUOUS

## 2025-06-10 NOTE — PROGRESS NOTES
V2.0    [facility] Progress Note      Name:  Evangelist Ortega /Age/Sex: 1975  (49 y.o. male)   MRN & CSN:  [id number] & [id number] Encounter Date/Time: 6/10/2025 10:33 AM EDT   Location:  5512/5512-01 PCP: Evangelist Schulz DO     Attending:Simon Zapata DO       Hospital Day: 11    Assessment and Recommendations   Per chart review, \"49 y.o. male with a medical hx significant for alcohol use disorder (20+yrs), HTN, depression otherwise as listed in the MHx table below, who presented unresponsive via EMS to [place] ED. Transferred to [facility].     Per ED note and EMS run sheet, pt. Called 911 stating concern for possible alcohol withdrawal. When EMS entered residence he was found unresponsive surrounded by many empty beer cans. Mental status improved en route with localization of pain, spontaneous movement, and arousing to verbal stimuli.      Shortly after arrival to ED patient experienced tonic-clonic seizure receiving 2mg ativan with aborted seizure activity. Within 10 minutes experienced another tonic clonic seizure requiring 5mg versed x2 and versed gtt and subsequently intubated for airway protection.      Per chart review patient seen in ED 2025 for alcohol detoxification and reportedly at that time drinking approximately 24 beers a day. He was discharged on 3 day supply of librium. Multiple hospitalizations in the past for alcohol withdrawal most recently  requiring precedex gtt. Withdrawals with hallucinations in past no reported seizures.     ED Course:  Afebrile 130/81, pulse (!) 105, resp. rate 20, SpO2 99%   AG 30 bicarb 13 pH 7.329 pCO2 26.9 Lactic 14.3 ethanol 72 mag 1.4 Na 115  ALT 36 AST 65   GCS 11 with tremor on exam  CT Head: R subdural hemorrhage with 3mm leftward midline shift likely acute/subacute given mixed attenuation. Fluid collection in the middle cranial fossa on the L likely to represent arachnoid cyst.  Nephro consulted and received 3% hypertonic saline prior to transfer    Temp: 97.5 °F (36.4 °C) 98.8 °F (37.1 °C) 97.9 °F (36.6 °C)    TempSrc: Oral Oral Oral    SpO2: 93% 98% 98%    Weight:    72.8 kg (160 lb 7.9 oz)   Height:             Physical Exam:      General: No distress, alert and oriented x3  Cardiac: S1 plus S2 regular rate and rhythm, no murmurs rubs or gallops  Respiratory: No wheeze or crackles appreciated, equal air entry bilaterally  GI/: Abdomen soft, nontender, bowel sounds audible  Skin: No rashes or ulcers present  MSK: Peripheral pulses intact    Medications:   Medications:    levETIRAcetam  500 mg Oral BID    pantoprazole  40 mg Oral QAM AC    heparin (porcine)  5,000 Units SubCUTAneous 3 times per day    CENTRUM/CERTA-MABEL with minerals oral  15 mL Oral Daily    thiamine  200 mg Oral Daily    folic acid  1 mg Oral Daily    escitalopram  20 mg Oral Daily    amLODIPine  5 mg Oral Daily    sodium chloride flush  5-40 mL IntraVENous 2 times per day      Infusions:    sodium chloride Stopped (06/01/25 1746)    dextrose       PRN Meds: ibuprofen, 400 mg, Q6H PRN  ondansetron, 4 mg, Q8H PRN  sodium chloride flush, 5-40 mL, PRN  sodium chloride, , PRN  labetalol, 10 mg, Q4H PRN  LORazepam, 1 mg, Q1H PRN   Or  LORazepam, 1 mg, Q1H PRN   Or  LORazepam, 2 mg, Q1H PRN   Or  LORazepam, 2 mg, Q1H PRN   Or  LORazepam, 3 mg, Q1H PRN   Or  LORazepam, 3 mg, Q1H PRN   Or  LORazepam, 4 mg, Q1H PRN   Or  LORazepam, 4 mg, Q1H PRN  polyethylene glycol, 17 g, Daily PRN  acetaminophen, 650 mg, Q6H PRN   Or  acetaminophen, 650 mg, Q6H PRN  glucose, 4 tablet, PRN  dextrose bolus, 125 mL, PRN   Or  dextrose bolus, 250 mL, PRN  glucagon (rDNA), 1 mg, PRN  dextrose, , Continuous PRN        Labs and Imaging   XR ABDOMEN (KUB) (SINGLE AP VIEW)  Result Date: 6/1/2025  PORTABLE AP ABDOMEN AT 0625 HOURS: HISTORY: Tube placement. TECHNIQUE: AP semiupright view obtained. FINDINGS: NG tube is in place, its tip projecting in the body of the stomach. Bowel gas pattern in the upper abdomen is

## 2025-06-10 NOTE — PROGRESS NOTES
Patient A&O x4. Some disorientation. VSS. Medication given per MAR. Patient voids via BRP. Patient on 1200mL fluid restriction. Safety precautions in place, bedside table and call light within reach.

## 2025-06-10 NOTE — PROGRESS NOTES
Nephrology Progress Note                                                                                                                                                                                                                                                                                                                                                               Office : [phone]     Fax :[phone]    Patient's Name: Evangelist Ortega  3:20 PM  6/10/2025    Reason for Consult:  Hyponatremia   Requesting Physician:  Evangelist Schulz DO  Chief Complaint:  No chief complaint on file.      Assessment/Plan     # Hyponatremia   - Severe 115 on admission --> 134 today   - Beer potomania (BUN just 4), seizure, SDH   - Encourage solute intake   - Continue 1.2 L fluid restriction   - Continue close monitor of sodium     # Hypokalemia   - Replace PRN  - Monitor    # Lactic acidosis   - 2/2 seizure     # SDH   - Likely traumatic   - Neurosurgery following     # Seizure   - Alcohol ? SDH?   - Neurology following     # Alcohol withdrawal  # Delirium tremens   - [facility] protocol   - IV thiamine & folic acid       History of Present Ilness:    Evangelist Ortega is a 49 y.o. male with     This patient is a 50 y/o male found by EMS for altered mental status and concern for alcohol withdrawal and seizure.  Per EMS report they were called by the patient with concerns for alcohol withdrawal. Patient was verbal on the EMS call but was found unresponsive on their arrival.  Fingerstick glucose in the 100s.  En route EMS reports that his mental status has improved he is now localizing to pain and making eye contact with verbal stimuli.  Concern for postictal state.  Patient is altered on arrival here he is a poor historian     Just after arrival on initial evaluation patient developed tonic-clonic seizure and received 2 mg of Ativan via IO.       Na 115 16:53--> 118 19:16   Mg 1.4-> 9.6 (!)   Ptis intubated   On

## 2025-06-10 NOTE — PROGRESS NOTES
Physical Therapy  Daily Treatment Note    Discharge Recommendation:  Home with 24 hour assist and continued home PT  Equipment Needs:  Rolling walker    Assessment:  Improved activity tolerance overall today. Requiring multiple seated rest breaks during session. Also needing cues + reminders for safe transfers and ambulation with walker. From home alone. Currently needing up to Min assist for safe mobility. Limited by fatigue, weakness. Would benefit from 24 hour assist, use of rolling walker for mobility and continued home PT. Pt will need a rolling walker prior to going home.     HOME HEALTH CARE: LEVEL 1 STANDARD  - Initial home health evaluation to occur within 24-48 hours, in patient home   - Therapy to evaluate with goal of regaining prior level of functioning   - Therapy to evaluate if patient has Home Health Aide needs for personal care    Chart Reviewed: Yes     Other Position/Activity Restrictions: No new restrictions   Additional Pertinent Hx: 49 y.o. y/o male with history significant for alcohol abuse, HTN, anxiety and depression who presents to [facility] as a transfer from [facility] with chief complaint of alcohol withdrawal, was found to be altered by EMS and on ED arrival he experienced 2 generalized tonic-clonic seizures.  Both seizures were aborted with benzodiazepine treatment.  He was intubated for airway protection.  Head CT revealing for right cerebral convexity moderate-sized SDH.      Diagnosis: Status epilepticus   Treatment Diagnosis: Decreased functional mobility    Subjective: Pt in bed initially. Agreeable to working with therapy. Not sure when he's being D/Rangel. Has 2 curb steps to get into home.   States he has neighbors \"who can check in on me.\" When asked if he could have someone stay with him initially at D/C, he said \"Yeah. I could ask my mom to come for a few days.\"    Pain: No c/o voiced.     Objective:    Bed mobility  Supine to sit: SBA, HOB up partially with use of  railing  Scooting: SBA to EOB    Transfers  Sit to stand: Min assist x 1 from bed; CGA from chair (x 5 times). Cues for hand placement/pushing up from sitting surface.   Stand to sit: CGA into chair (x 6 times). Cues for backing up completely and for reaching back for sitting surface.     Ambulation  Assistance Level: Min assist initially, progressing to CGA  Assistive device: Rolling walker  Distance: 40 ft, 70 ft, 20 ft, 40 ft, 80 ft. Seated rests between walks.   Quality of gait: Step-to pattern; toe-out gait; decreased step length/height; decreased pace; effortful.   Other: Needing occasional cues for keeping body within walker space--pt tends to push walker too far ahead.     Stairs  Up/down curb step with walker Min assist (ascending) to CGA (descending) with cues for instruction/safety.    Balance  Sat EOB ~10 minutes with SBA  Static stance with walker CGA  Ambulation with rolling walker Min assist initially, progressing to CGA    Patient Education  Hand placement with transfers when using walker. Keeping body within walker space with ambulation. Needs continued reinforcement.   Curb step with rolling walker. Demonstrated understanding after instruction/cueing.   Calling for assist with needs. Expressed understanding.     Safety Devices  Pt left with needs in reach. In chair (reclined) with chair alarm on. RN updated.     AM-PAC score  AM-PAC Inpatient Mobility Raw Score : 18  AM-PAC Inpatient T-Scale Score : 43.63  Mobility Inpatient CMS 0-100% Score: 46.58  Mobility Inpatient CMS G-Code Modifier : CK    Goals: (as determined and assessed by primary PT)  Time Frame for Short Term Goals: Prior to d/c  Short Term Goal 1: Pt will demo sup<>sit w/ SBA   Short Term Goal 2: Pt will demo STS w/ LRAD and SBA   Short Term Goal 3: Pt will amb 25' w/ LRAD and CGA  Short Term Goal 4: Pt will demo pivot transfer from EOB<>chair w/ CGA and LRAD       Plan:  Plan: 2-5x/week  Current Treatment Recommendations:  Strengthening, Transfer training, Functional mobility training, Balance training, Endurance training, Gait training, Stair training, Home exercise program, Safety education & training, Return to work related activity, Patient/Caregiver education & training, Therapeutic activities    Therapy Time    Individual  Concurrent  Group  Co-treatment    Time In         933   Time Out         1015   Minutes         42     Timed Code Treatment Minutes: 42  Total Treatment Minutes: 42  [x]co-treatment indicated; patient seen for co-treatment due to: patient safety. Pt requiring assist x 2 people for safety during most recent therapy session.   PT addressing: [x] Sitting balance/LE WB,  [x]Standing balance/LE WB, [x]Transfer training, [x] BLE strength/endurance with functional tasks,  [] Other:    Will continue per plan of care.   If patient is discharged prior to next treatment, this note will serve as the discharge summary.    Gladis Batista, PTA #[id number]

## 2025-06-10 NOTE — PROGRESS NOTES
Occupational Therapy  Occupational Therapy  Daily Treatment Note  Patient Name: Evangelist Ortega  MRN: [id number]    Chart Reviewed: Yes       Other Position/Activity Restrictions: No new restrictions     Additional Pertinent Hx: 49 y.o. y/o male with history significant for alcohol abuse, HTN, anxiety and depression who presents to [facility] as a transfer from [facility] with chief complaint of alcohol withdrawal, was found to be altered by EMS and on ED arrival he experienced 2 generalized tonic-clonic seizures.  Both seizures were aborted with benzodiazepine treatment.  He was intubated for airway protection.  Head CT revealing for right cerebral convexity moderate-sized SDH.        Diagnosis: status epilepticus  Treatment Diagnosis: decreased ADLs and transfers    Subjective: Pt side lying left in bed upon entry, agreeable to therapy session.    Pain: Denies pain    Social/Functional History  Lives With: Alone  Type of Home: Apartment  Home Layout: One level  Bathroom Shower/Tub: Walk-in shower  Home Equipment: None  Has the patient had two or more falls in the past year or any fall with injury in the past year?: No  Prior Level of Assist for ADLs: Independent  Prior Level of Assist for Homemaking: Independent  Prior Level of Assist for Ambulation: Independent household ambulator, with or without device  Prior Level of Assist for Transfers: Independent  Active : Yes  Type of Occupation: not currently working  Prior Function  Prior Level of Assist for ADLs: Independent  Prior Level of Assist for Homemaking: Independent  Prior Level of Assist for Transfers: Independent    Objective:    Cognition/Orientation: Ox4  Delayed response to stimuli, decreased insight into deficits    Bed mobility   Rolling:N/A  Supine to sit: SBA  Sit to Supine: N/A  Scooting: SBA    Functional Mobility   Sit to Stand:Min A  Stand to Sit: Min A  Bed to Chair Transfer: CGA with rw   Commode Transfer:N/A  Ambulation: CGA with rw and

## 2025-06-10 NOTE — CARE COORDINATION
CM spoke with patient at bedside.  Patient plans to return home at discharge, states he has friends and neighbors for help if needed.  Patient is agreeable to RW at discharge, declined any home care needs.    Cristiana Pate RN, BSN,    Ortho/Neuro   [phone]

## 2025-06-10 NOTE — PROGRESS NOTES
Comprehensive Nutrition Assessment    RECOMMENDATIONS:  PO Diet: Regular; 1200 ml FR  Nutrition Supplement: Ensure +HP BID to TID  Nutrition Education: Education/Counseling not indicated     NUTRITION ASSESSMENT:   Nutritional summary & status: Follow up. Poor intake of meals, however, consuming high protein Ensure BID. WIll incease frequency to TID. Encouraged pt to consume these inbetween meals. Provided menu to encourage intake. Pt reports poor appetite and lethargy as barriers to intake, also chronic jaw pain. Noted last BM 6/6, no BM on board. Sodium inproving w/ 1200 ml FR. Will continue to follow.     Admission // PMH: Status epilepticus//EtOH misuse, beer potomania, SDH    MALNUTRITION ASSESSMENT  Context of Malnutrition: Social/Environmental Circumstances   Malnutrition Status: Moderate malnutrition  Findings of the 6 clinical characteristics of malnutrition (Minimum of 2 out of 6 clinical characteristics is required to make the diagnosis of moderate or severe Protein Calorie Malnutrition based on AND/ASPEN Guidelines):  Energy Intake:  Less than 75% estimated energy requirements for 3 months or longer  Weight Loss:  7.5% over 3 months     Body Fat Loss:  Mild body fat loss Orbital   Muscle Mass Loss:  Mild muscle mass loss Clavicles (pectoralis & deltoids), Temples (temporalis)  Fluid Accumulation:  No fluid accumulation     Strength:  Not Performed    NUTRITION DIAGNOSIS   Inadequate oral intake related to decreased appetite as evidenced by intake 0-25%    Nutrition Monitoring and Evaluation:   Food/Nutrient Intake Outcomes:  Food and Nutrient Intake, Supplement Intake  Physical Signs/Symptoms Outcomes:  Biochemical Data, Nutrition Focused Physical Findings, Weight, Hemodynamic Status     OBJECTIVE DATA: Significant to nutrition assessment  Nutrition Related Findings: Na 134, -2.3L, loose BM 6/6  Wounds: None  Nutrition Goals: Meet at least 75% of estimated needs, within 2 days     CURRENT NUTRITION  THERAPIES  ADULT ORAL NUTRITION SUPPLEMENT; Lunch, Breakfast; Standard High Calorie/High Protein Oral Supplement  ADULT DIET; Regular; 1200 ml  PO Intake: 51-75%, 1-25%   PO Supplement Intake:Unable to assess (no documentation)  Additional Sources of Calories/IVF:NA     COMPARATIVE STANDARDS  Energy (kcal):  5754-6030 (25-28 kcal/kg CBW)     Protein (g):  68-82 (1.0-1.2 g/kg CBW)       Fluid (ml/day):  1800 mL    ANTHROPOMETRICS  Current Height: 170.2 cm (5' 7\")  Current Weight - Scale: 72.8 kg (160 lb 7.9 oz)    Admission weight: 65.8 kg (145 lb 1 oz)    The patient will be monitored per nutrition standards of care. Consult dietitian if additional nutrition interventions are needed prior to RD reassessment.     Lyssa Aguilar RD  Grace:  [phone]

## 2025-06-10 NOTE — PLAN OF CARE
Problem: Safety - Adult  Goal: Free from fall injury  6/9/2025 2158 by Omkar Cline, RN  Outcome: Progressing  Note: All fall precautions in place. Bed locked and in lowest position with alarm on. Overbed table and personal belonings within reach. Call light within reach and patient instructed to use call light for assistance. Non-skid socks on.       Problem: ABCDS Injury Assessment  Goal: Absence of physical injury  6/9/2025 2158 by Omkar Cline, RN  Outcome: Progressing  Note: Patient has remained free of physical injury this shift. Fall and safety precautions in place. Bed exit alarm activated, bed in lowest position with wheels locked, call light and belongings within reach.

## 2025-06-10 NOTE — PLAN OF CARE
Problem: Discharge Planning  Goal: Discharge to home or other facility with appropriate resources  Outcome: Progressing  Pt involved in discharge planning. Barriers to discharge discussed with pt. Discharge needs identified. Discussed with pt any additional needed resources and transportation plans.      Problem: Safety - Adult  Goal: Free from fall injury  Outcome: Progressing  All fall precautions in place. Bed locked and in lowest position with alarm on. Overbed table and personal belonings within reach. Call light within reach and patient instructed to use call light for assistance. Non-skid socks on.      Problem: Neurosensory - Adult  Goal: Absence of seizures  Outcome: Progressing  No seizure activity thus far this shift.       Problem: Neurosensory - Adult  Goal: Achieves stable or improved neurological status  Outcome: Progressing  Pt is alert and oriented x4. NIH is 0.       Problem: ABCDS Injury Assessment  Goal: Absence of physical injury  Outcome: Progressing      Problem: Skin/Tissue Integrity  Goal: Skin integrity remains intact  Description: 1.  Monitor for areas of redness and/or skin breakdown2.  Assess vascular access sites hourly3.  Every 4-6 hours minimum:  Change oxygen saturation probe site4.  Every 4-6 hours:  If on nasal continuous positive airway pressure, respiratory therapy assess nares and determine need for appliance change or resting period  Outcome: Progressing

## 2025-06-10 NOTE — PROGRESS NOTES
Patient is alert and oriented x4. VSS on RA. Ambulating x1 walker and gait belt. Up to chair this shift. Voiding via BRP. Endorsed pain to right side of head this shift. PRN medications given per MAR. Patient on 1200 mL fluid restriction. Denies needs at this time. All safety and fall precautions in place. Plan of care to continue.

## 2025-06-11 LAB
ALBUMIN SERPL-MCNC: 3.5 G/DL (ref 3.4–5)
ALBUMIN SERPL-MCNC: 3.5 G/DL (ref 3.4–5)
ANION GAP SERPL CALCULATED.3IONS-SCNC: 8 MMOL/L (ref 3–16)
ANION GAP SERPL CALCULATED.3IONS-SCNC: 8 MMOL/L (ref 3–16)
BASOPHILS # BLD: 0.1 K/UL (ref 0–0.2)
BASOPHILS # BLD: 0.1 K/UL (ref 0–0.2)
BASOPHILS NFR BLD: 1.8 %
BASOPHILS NFR BLD: 1.8 %
BUN SERPL-MCNC: 8 MG/DL (ref 7–20)
BUN SERPL-MCNC: 8 MG/DL (ref 7–20)
CALCIUM SERPL-MCNC: 9.3 MG/DL (ref 8.3–10.6)
CALCIUM SERPL-MCNC: 9.3 MG/DL (ref 8.3–10.6)
CHLORIDE SERPL-SCNC: 99 MMOL/L (ref 99–110)
CHLORIDE SERPL-SCNC: 99 MMOL/L (ref 99–110)
CO2 SERPL-SCNC: 29 MMOL/L (ref 21–32)
CO2 SERPL-SCNC: 29 MMOL/L (ref 21–32)
CREAT SERPL-MCNC: 0.7 MG/DL (ref 0.9–1.3)
CREAT SERPL-MCNC: 0.7 MG/DL (ref 0.9–1.3)
DEPRECATED RDW RBC AUTO: 14.8 % (ref 12.4–15.4)
DEPRECATED RDW RBC AUTO: 14.8 % (ref 12.4–15.4)
EOSINOPHIL # BLD: 0.1 K/UL (ref 0–0.6)
EOSINOPHIL # BLD: 0.1 K/UL (ref 0–0.6)
EOSINOPHIL NFR BLD: 2.3 %
EOSINOPHIL NFR BLD: 2.3 %
GFR SERPLBLD CREATININE-BSD FMLA CKD-EPI: >90 ML/MIN/{1.73_M2}
GFR SERPLBLD CREATININE-BSD FMLA CKD-EPI: >90 ML/MIN/{1.73_M2}
GLUCOSE SERPL-MCNC: 102 MG/DL (ref 70–99)
GLUCOSE SERPL-MCNC: 102 MG/DL (ref 70–99)
HCT VFR BLD AUTO: 34.3 % (ref 40.5–52.5)
HCT VFR BLD AUTO: 34.3 % (ref 40.5–52.5)
HGB BLD-MCNC: 12 G/DL (ref 13.5–17.5)
HGB BLD-MCNC: 12 G/DL (ref 13.5–17.5)
LYMPHOCYTES # BLD: 1 K/UL (ref 1–5.1)
LYMPHOCYTES # BLD: 1 K/UL (ref 1–5.1)
LYMPHOCYTES NFR BLD: 19.3 %
LYMPHOCYTES NFR BLD: 19.3 %
MAGNESIUM SERPL-MCNC: 1.85 MG/DL (ref 1.8–2.4)
MAGNESIUM SERPL-MCNC: 1.85 MG/DL (ref 1.8–2.4)
MCH RBC QN AUTO: 29.4 PG (ref 26–34)
MCH RBC QN AUTO: 29.4 PG (ref 26–34)
MCHC RBC AUTO-ENTMCNC: 34.9 G/DL (ref 31–36)
MCHC RBC AUTO-ENTMCNC: 34.9 G/DL (ref 31–36)
MCV RBC AUTO: 84.3 FL (ref 80–100)
MCV RBC AUTO: 84.3 FL (ref 80–100)
MONOCYTES # BLD: 0.4 K/UL (ref 0–1.3)
MONOCYTES # BLD: 0.4 K/UL (ref 0–1.3)
MONOCYTES NFR BLD: 7.3 %
MONOCYTES NFR BLD: 7.3 %
NEUTROPHILS # BLD: 3.7 K/UL (ref 1.7–7.7)
NEUTROPHILS # BLD: 3.7 K/UL (ref 1.7–7.7)
NEUTROPHILS NFR BLD: 69.3 %
NEUTROPHILS NFR BLD: 69.3 %
PHOSPHATE SERPL-MCNC: 3.4 MG/DL (ref 2.5–4.9)
PHOSPHATE SERPL-MCNC: 3.4 MG/DL (ref 2.5–4.9)
PLATELET # BLD AUTO: 454 K/UL (ref 135–450)
PLATELET # BLD AUTO: 454 K/UL (ref 135–450)
PMV BLD AUTO: 6.5 FL (ref 5–10.5)
PMV BLD AUTO: 6.5 FL (ref 5–10.5)
POTASSIUM SERPL-SCNC: 3.6 MMOL/L (ref 3.5–5.1)
POTASSIUM SERPL-SCNC: 3.6 MMOL/L (ref 3.5–5.1)
RBC # BLD AUTO: 4.06 M/UL (ref 4.2–5.9)
RBC # BLD AUTO: 4.06 M/UL (ref 4.2–5.9)
SODIUM SERPL-SCNC: 136 MMOL/L (ref 136–145)
SODIUM SERPL-SCNC: 136 MMOL/L (ref 136–145)
WBC # BLD AUTO: 5.4 K/UL (ref 4–11)
WBC # BLD AUTO: 5.4 K/UL (ref 4–11)

## 2025-06-11 PROCEDURE — 6360000002 HC RX W HCPCS: Performed by: STUDENT IN AN ORGANIZED HEALTH CARE EDUCATION/TRAINING PROGRAM

## 2025-06-11 PROCEDURE — 80069 RENAL FUNCTION PANEL: CPT

## 2025-06-11 PROCEDURE — 83735 ASSAY OF MAGNESIUM: CPT

## 2025-06-11 PROCEDURE — 2500000003 HC RX 250 WO HCPCS

## 2025-06-11 PROCEDURE — 85025 COMPLETE CBC W/AUTO DIFF WBC: CPT

## 2025-06-11 PROCEDURE — 97530 THERAPEUTIC ACTIVITIES: CPT

## 2025-06-11 PROCEDURE — 36415 COLL VENOUS BLD VENIPUNCTURE: CPT

## 2025-06-11 PROCEDURE — 2580000003 HC RX 258

## 2025-06-11 PROCEDURE — 6370000000 HC RX 637 (ALT 250 FOR IP)

## 2025-06-11 PROCEDURE — 6370000000 HC RX 637 (ALT 250 FOR IP): Performed by: STUDENT IN AN ORGANIZED HEALTH CARE EDUCATION/TRAINING PROGRAM

## 2025-06-11 PROCEDURE — 2060000000 HC ICU INTERMEDIATE R&B

## 2025-06-11 PROCEDURE — 97116 GAIT TRAINING THERAPY: CPT

## 2025-06-11 PROCEDURE — 99232 SBSQ HOSP IP/OBS MODERATE 35: CPT | Performed by: HOSPITALIST

## 2025-06-11 PROCEDURE — 97110 THERAPEUTIC EXERCISES: CPT

## 2025-06-11 RX ORDER — SODIUM CHLORIDE, SODIUM LACTATE, POTASSIUM CHLORIDE, AND CALCIUM CHLORIDE .6; .31; .03; .02 G/100ML; G/100ML; G/100ML; G/100ML
500 INJECTION, SOLUTION INTRAVENOUS ONCE
Status: COMPLETED | OUTPATIENT
Start: 2025-06-11 | End: 2025-06-11

## 2025-06-11 RX ADMIN — ACETAMINOPHEN 650 MG: 325 TABLET ORAL at 10:17

## 2025-06-11 RX ADMIN — SODIUM CHLORIDE, SODIUM LACTATE, POTASSIUM CHLORIDE, AND CALCIUM CHLORIDE 500 ML: .6; .31; .03; .02 INJECTION, SOLUTION INTRAVENOUS at 08:59

## 2025-06-11 RX ADMIN — ACETAMINOPHEN 650 MG: 325 TABLET ORAL at 18:49

## 2025-06-11 RX ADMIN — Medication 15 ML: at 08:52

## 2025-06-11 RX ADMIN — HEPARIN SODIUM 5000 UNITS: 5000 INJECTION INTRAVENOUS; SUBCUTANEOUS at 05:13

## 2025-06-11 RX ADMIN — FOLIC ACID 1 MG: 1 TABLET ORAL at 08:51

## 2025-06-11 RX ADMIN — IBUPROFEN 400 MG: 200 TABLET, FILM COATED ORAL at 00:48

## 2025-06-11 RX ADMIN — SODIUM CHLORIDE, PRESERVATIVE FREE 10 ML: 5 INJECTION INTRAVENOUS at 20:34

## 2025-06-11 RX ADMIN — HEPARIN SODIUM 5000 UNITS: 5000 INJECTION INTRAVENOUS; SUBCUTANEOUS at 20:34

## 2025-06-11 RX ADMIN — LEVETIRACETAM 500 MG: 500 TABLET, FILM COATED ORAL at 08:51

## 2025-06-11 RX ADMIN — Medication 200 MG: at 08:52

## 2025-06-11 RX ADMIN — HEPARIN SODIUM 5000 UNITS: 5000 INJECTION INTRAVENOUS; SUBCUTANEOUS at 15:40

## 2025-06-11 RX ADMIN — LEVETIRACETAM 500 MG: 500 TABLET, FILM COATED ORAL at 20:34

## 2025-06-11 RX ADMIN — PANTOPRAZOLE SODIUM 40 MG: 40 TABLET, DELAYED RELEASE ORAL at 05:13

## 2025-06-11 RX ADMIN — ESCITALOPRAM OXALATE 20 MG: 20 TABLET, FILM COATED ORAL at 08:51

## 2025-06-11 ASSESSMENT — PAIN SCALES - GENERAL
PAINLEVEL_OUTOF10: 3
PAINLEVEL_OUTOF10: 3
PAINLEVEL_OUTOF10: 2
PAINLEVEL_OUTOF10: 1
PAINLEVEL_OUTOF10: 4

## 2025-06-11 ASSESSMENT — PAIN - FUNCTIONAL ASSESSMENT
PAIN_FUNCTIONAL_ASSESSMENT: ACTIVITIES ARE NOT PREVENTED
PAIN_FUNCTIONAL_ASSESSMENT: ACTIVITIES ARE NOT PREVENTED

## 2025-06-11 ASSESSMENT — PAIN DESCRIPTION - LOCATION
LOCATION: HEAD

## 2025-06-11 ASSESSMENT — PAIN DESCRIPTION - ONSET
ONSET: ON-GOING
ONSET: ON-GOING

## 2025-06-11 ASSESSMENT — PAIN DESCRIPTION - FREQUENCY
FREQUENCY: CONTINUOUS
FREQUENCY: CONTINUOUS

## 2025-06-11 ASSESSMENT — PAIN SCALES - WONG BAKER: WONGBAKER_NUMERICALRESPONSE: HURTS A LITTLE BIT

## 2025-06-11 ASSESSMENT — PAIN DESCRIPTION - DESCRIPTORS
DESCRIPTORS: SHARP;ACHING;DISCOMFORT
DESCRIPTORS: ACHING;SORE;TENDER
DESCRIPTORS: ACHING

## 2025-06-11 ASSESSMENT — PAIN DESCRIPTION - PAIN TYPE
TYPE: ACUTE PAIN
TYPE: ACUTE PAIN

## 2025-06-11 ASSESSMENT — PAIN DESCRIPTION - ORIENTATION
ORIENTATION: ANTERIOR
ORIENTATION: MID
ORIENTATION: MID

## 2025-06-11 NOTE — PROGRESS NOTES
Physical Therapy  Facility/Department: [facility] 5T ORTHO/NEURO  Physical Therapy Daily Treatment    Name: Evangelist Ortega  : 1975  MRN: [id number]  Date of Service: 2025    Discharge Recommendations:  24 hour supervision or assist, Home with Home health PT   PT Equipment Recommendations  Equipment Needed: Yes  Mobility Devices: Walker  Walker: Rolling      Patient Diagnosis(es): There were no encounter diagnoses.  Past Medical History:  has no past medical history on file.  Past Surgical History:  has no past surgical history on file.    Assessment  Body Structures, Functions, Activity Limitations Requiring Skilled Therapeutic Intervention: Decreased functional mobility ;Decreased strength;Decreased body mechanics;Decreased safe awareness;Decreased endurance;Decreased balance  Assessment: Pt is 48 yo male who lives alone presenting w/ status epliepticus and decreased functional mobility. He reports indep PLOF and states he has family/friends who are able to provide assistance if necessary. Currently, Pt req CGA for transfers and ambulatoin w/ RW. He continues to present below baseline but shows improved activity tolerance ' amb. Pt will benefit from continued acute care PT until d/c to home where HHPT w/ 24 hr assist is recommended. RW rec at d/c as well to optimize safety and independence.  Treatment Diagnosis: Decreased functional mobility  Activity Tolerance  Activity Tolerance: Patient limited by fatigue;Patient limited by endurance    Plan  Physical Therapy Plan  General Plan:  (2-5 times per week)  Current Treatment Recommendations: Strengthening, Transfer training, Functional mobility training, Balance training, Endurance training, Gait training, Stair training, Home exercise program, Safety education & training, Return to work related activity, Patient/Caregiver education & training, Therapeutic activities  Safety Devices  Type of Devices: Left in chair, Call light within reach, Chair alarm  educated on d/c recommendations and importance of sitting up in chair to promote trunk strength.  Education Method: Demonstration;Verbal  Barriers to Learning: None  Education Outcome: Continued education needed      Therapy Time   Individual Concurrent Group Co-treatment   Time In 1425         Time Out 1505         Minutes 40         Timed Code Treatment Minutes: 40 Minutes     Total Treatment Minutes: 40 Minutes    If Pt is discharged prior to next treatment session, let this note serve as discharge summary.    Kishor Zimmerman, PT, DPT

## 2025-06-11 NOTE — PLAN OF CARE
Problem: Neurosensory - Adult  Goal: Achieves stable or improved neurological status  6/10/2025 2211 by Lawanda Luna, RN  Outcome: Progressing   NIH remains a 0. Neuro changes remain the same. Continuing to monitor Q4.   Problem: Safety - Adult  Goal: Free from fall injury  6/10/2025 2211 by Lawanda Luna, RN  Outcome: Progressing   All fall precautions in place. Bed locked and in lowest position with alarm on. Overbed table and personal belonings within reach. Call light within reach and patient instructed to use call light for assistance. Non-skid socks on.

## 2025-06-11 NOTE — PROGRESS NOTES
Patient is alert and oriented x4. VSS on room air. Medications given per MAR, no side effects noted. Patient ambulating x1 with gait belt and walker. Complaints of mild headache, continuing to monitor and manage per MAR. Voiding well via BRP/urinal, no bm this shift.      Patient is currently resting in bed with bed alarm on for safety. Call light within reach and all fall precautions in place. Plan of care continues.

## 2025-06-11 NOTE — CARE COORDINATION
Patient is from home, plans to return home at discharge, declined [facility], has no insurance payor.  Patient is agreeable to RW, ordered today to be delivered to room.  ROSA spoke with Torin with [employer] who will reach out to his supervisor on process for cirilo RW.    Cristiana Pate, RN, BSN,    Ortho/Neuro   [phone]

## 2025-06-11 NOTE — PROGRESS NOTES
Nephrology Progress Note                                                                                                                                                                                                                                                                                                                                                               Office : [phone]     Fax :[phone]    Patient's Name: Evangelist Ortega  6/11/2025    Reason for Consult:  Hyponatremia   Requesting Physician:  Evangelist Schulz DO  Chief Complaint:  No chief complaint on file.      Assessment/Plan     # Hyponatremia   - Severe 115 on admission --> 136 today   - Beer potomania (BUN just 4), seizure, SDH   - Encourage solute intake   - Continue 1.2 L fluid restriction   - Continue close monitor of sodium     # Hypokalemia   - Replace PRN  - Monitor    # Lactic acidosis   - 2/2 seizure     # SDH   - Likely traumatic   - Neurosurgery following     # Seizure   - Alcohol ? SDH?   - Neurology following     # Alcohol withdrawal  # Delirium tremens   - [facility] protocol   - IV thiamine & folic acid       History of Present Ilness:    Evangelist Ortega is a 49 y.o. male with     This patient is a 48 y/o male found by EMS for altered mental status and concern for alcohol withdrawal and seizure.  Per EMS report they were called by the patient with concerns for alcohol withdrawal. Patient was verbal on the EMS call but was found unresponsive on their arrival.  Fingerstick glucose in the 100s.  En route EMS reports that his mental status has improved he is now localizing to pain and making eye contact with verbal stimuli.  Concern for postictal state.  Patient is altered on arrival here he is a poor historian     Just after arrival on initial evaluation patient developed tonic-clonic seizure and received 2 mg of Ativan via IO.       Na 115 16:53--> 118 19:16   Mg 1.4-> 9.6 (!)   Ptis intubated   On propofol    Interval  96%   BMI 24.83 kg/m²   General: Alert, oriented. NAD  Skin: no rash, turgor wnl  Heent:  eomi, mmm  Neck: no bruits or jvd noted  Cardiovascular:  S1, S2 without m/r/g  Respiratory: CTA B without w/r/r  Abdomen:  soft, distended , nd  Ext:  lower extremity edema No    Data:   Labs:  CBC:   Recent Labs     06/09/25  0524 06/10/25  0618 06/11/25  0815   WBC 6.1 5.3 5.4   HGB 11.7* 11.4* 12.0*    427 454*     BMP:    Recent Labs     06/09/25  0524 06/10/25  0618 06/11/25  0815   * 134* 136   K 3.8 3.8 3.6   CL 96* 97* 99   CO2 27 28 29   BUN 5* 6* 8   CREATININE 0.6* 0.6* 0.7*   GLUCOSE 88 120* 102*     Ca/Mg/Phos:   Recent Labs     06/09/25  0524 06/10/25  0618 06/11/25  0815   CALCIUM 9.3 9.1 9.3   MG 1.77* 1.88 1.85   PHOS 3.0 2.9 3.4     Hepatic:   No results for input(s): \"AST\", \"ALT\", \"BILITOT\", \"ALKPHOS\" in the last 72 hours.    Invalid input(s): \"ALB\"    Troponin: No results for input(s): \"TROPONINI\" in the last 72 hours.  BNP: No results for input(s): \"BNP\" in the last 72 hours.  Lipids:   No results for input(s): \"CHOL\", \"TRIG\", \"HDL\" in the last 72 hours.    Invalid input(s): \"LDLCALC\", \"LABVLDL\"    ABGs:   No results for input(s): \"PHART\", \"PO2ART\", \"ZVX7HKH\" in the last 72 hours.    INR:   No results for input(s): \"INR\" in the last 72 hours.    UA:  No results for input(s): \"COLORU\", \"CLARITYU\", \"GLUCOSEU\", \"BILIRUBINUR\", \"KETUA\", \"SPECGRAV\", \"BLOODU\", \"PHUR\", \"PROTEINU\", \"UROBILINOGEN\", \"NITRU\", \"LEUKOCYTESUR\", \"URINETYPE\" in the last 72 hours.    Invalid input(s): \"LABMICR\"     Urine Microscopic:   No results for input(s): \"LABCAST\", \"BACTERIA\", \"COMU\", \"HYALCAST\", \"WBCUA\", \"RBCUA\" in the last 72 hours.    Invalid input(s): \"EPIU\"    Urine Culture: No results for input(s): \"LABURIN\" in the last 72 hours.  Urine Chemistry:   No results for input(s): \"CLUR\", \"LABCREA\", \"PROTEINUR\", \"NAUR\" in the last 72 hours.        IMAGING:  MRI BRAIN W WO CONTRAST   Final Result   1. Stable right-sided

## 2025-06-11 NOTE — PROGRESS NOTES
V2.0    [facility] Progress Note      Name:  Evangelist Ortega /Age/Sex: 1975  (49 y.o. male)   MRN & CSN:  [id number] & [id number] Encounter Date/Time: 2025 10:33 AM EDT   Location:  5512/5512-01 PCP: Evangelist Schulz DO     Attending:Simon Zapata DO       Hospital Day: 12    Assessment and Recommendations   Per chart review, \"49 y.o. male with a medical hx significant for alcohol use disorder (20+yrs), HTN, depression otherwise as listed in the MHx table below, who presented unresponsive via EMS to [place] ED. Transferred to [facility].     Per ED note and EMS run sheet, pt. Called 911 stating concern for possible alcohol withdrawal. When EMS entered residence he was found unresponsive surrounded by many empty beer cans. Mental status improved en route with localization of pain, spontaneous movement, and arousing to verbal stimuli.      Shortly after arrival to ED patient experienced tonic-clonic seizure receiving 2mg ativan with aborted seizure activity. Within 10 minutes experienced another tonic clonic seizure requiring 5mg versed x2 and versed gtt and subsequently intubated for airway protection.      Per chart review patient seen in ED 2025 for alcohol detoxification and reportedly at that time drinking approximately 24 beers a day. He was discharged on 3 day supply of librium. Multiple hospitalizations in the past for alcohol withdrawal most recently  requiring precedex gtt. Withdrawals with hallucinations in past no reported seizures.     ED Course:  Afebrile 130/81, pulse (!) 105, resp. rate 20, SpO2 99%   AG 30 bicarb 13 pH 7.329 pCO2 26.9 Lactic 14.3 ethanol 72 mag 1.4 Na 115  ALT 36 AST 65   GCS 11 with tremor on exam  CT Head: R subdural hemorrhage with 3mm leftward midline shift likely acute/subacute given mixed attenuation. Fluid collection in the middle cranial fossa on the L likely to represent arachnoid cyst.  Nephro consulted and received 3% hypertonic saline prior to transfer  is no evidence of hydrocephalus. ORBITS: The visualized portion of the orbits demonstrate no acute abnormality. SINUSES: The visualized paranasal sinuses and mastoid air cells demonstrate no acute abnormality. SOFT TISSUES/SKULL:  No acute abnormality of the visualized skull or soft tissues.  Endotracheal and enteric tubes, incompletely assessed.  Debris in the right external auditory canal. CERVICAL SPINE BONES/ALIGNMENT: There is no acute fracture or traumatic malalignment. DEGENERATIVE CHANGES: No severe osseous spinal canal stenosis.  Moderate degenerative changes C5-C7. SOFT TISSUES: There is no prevertebral soft tissue swelling.  Endotracheal tube terminates 1.7 cm above the glenn.  Enteric tube courses inferiorly through the esophagus, beyond field of view. Multiple dental caries.     1. Moderate right subdural hemorrhage with 3 mm leftward midline shift, likely acute. 2. Cystic lesion abuts/displaces anterior left temporal lobe, favor arachnoid cyst.  Finding may be further characterized with brain MRI on a nonemergent basis. Critical results were called by Dr. Levy Vasquez to ÓSCAR CHÁVEZ on 5/31/2025 at 18:29.     CT CERVICAL SPINE WO CONTRAST  Result Date: 5/31/2025  EXAMINATION: CT OF THE HEAD WITHOUT CONTRAST; CT OF THE CERVICAL SPINE WITHOUT CONTRAST 5/31/2025 5:58 pm TECHNIQUE: CT of the head was performed without the administration of intravenous contrast. Automated exposure control, iterative reconstruction, and/or weight based adjustment of the mA/kV was utilized to reduce the radiation dose to as low as reasonably achievable.; CT of the cervical spine was performed without the administration of intravenous contrast. Multiplanar reformatted images are provided for review. Automated exposure control, iterative reconstruction, and/or weight based adjustment of the mA/kV was utilized to reduce the radiation dose to as low as reasonably achievable. COMPARISON: None. HISTORY: ORDERING SYSTEM

## 2025-06-11 NOTE — DISCHARGE SUMMARY
V2.0  Discharge Summary    Name:  Evangelist Ortega /Age/Sex: 1975 (49 y.o. male)   Admit Date: 2025  Discharge Date: 25    MRN & CSN:  [id number] & [id number] Encounter Date and Time 25 8:43 AM EDT    Attending:  Simon Zapata DO Discharging Provider: Simon Zapata DO       Hospital Course:     Brief HPI: Evangelist Ortega is a 49 y.o. male who presented unresponsive via EMS to [place] ED. Transferred to [facility].  Per ED note and EMS run sheet, pt. Called 911 stating concern for possible alcohol withdrawal. When EMS entered residence he was found unresponsive surrounded by many empty beer cans. Mental status improved en route with localization of pain, spontaneous movement, and arousing to verbal stimuli.   Shortly after arrival to ED patient experienced tonic-clonic seizure receiving 2mg ativan with aborted seizure activity. Within 10 minutes experienced another tonic clonic seizure requiring 5mg versed x2 and versed gtt and subsequently intubated for airway protection.   Per chart review patient seen in ED 2025 for alcohol detoxification and reportedly at that time drinking approximately 24 beers a day. He was discharged on 3 day supply of librium. Multiple hospitalizations in the past for alcohol withdrawal most recently  requiring precedex gtt. Withdrawals with hallucinations in past no reported seizures.    Brief Problem Based Course:     Status epilepticus  R Subdural hematoma w/ 3mm midline shift  - NCC and NSGY consulted  - Per neurosurgery, 2025 follow-up CT head stable, no blood thinners for 2 weeks, repeat CT head in 2 weeks to ensure resolution  - cEEG negative, DC  - Keppra 500mg BID  - SBP < 160  - PLT goal > 100K   - PT/OT continued to evaluate patient over course of admission. Patient improved physically and is able to be safely discharged to home. He has support at home as well with neighbors and friends. Patient is being discharged to home in stable condition.

## 2025-06-11 NOTE — PROGRESS NOTES
Patient is A&Ox 4. VSS this shift with exception of soft BP- 500 mL LR bolus given. Patient has endorsed pain to head managed well per MAR and non-pharm measures. Patient is tolerating PO diet. Tolerating ambulation x 1 assist with GB/ walker. Voiding via BRP. Patient updated on plan of care. Fall and safety precautions in place, call light within reach.

## 2025-06-11 NOTE — PLAN OF CARE
Problem: Neurosensory - Adult  Goal: Achieves stable or improved neurological status  6/11/2025 0749 by Tracy Singh RN  Outcome: Progressing    Problem: Safety - Adult  Goal: Free from fall injury  6/11/2025 0749 by Tracy Singh RN  Outcome: Progressing  Note: Pt free of fall injury this shift. All proper safety precautions are in place. Call light is within reach. Bed alarm is within reach.      Problem: Neurosensory - Adult  Goal: Remains free of injury related to seizures activity  6/11/2025 0749 by Tracy Singh RN  Outcome: Progressing  Note: Seizure precautions are in place. Seizure pads in place. Video monitoring is in use.

## 2025-06-12 LAB
ALBUMIN SERPL-MCNC: 3.3 G/DL (ref 3.4–5)
ALBUMIN SERPL-MCNC: 3.3 G/DL (ref 3.4–5)
ANION GAP SERPL CALCULATED.3IONS-SCNC: 12 MMOL/L (ref 3–16)
ANION GAP SERPL CALCULATED.3IONS-SCNC: 12 MMOL/L (ref 3–16)
BASOPHILS # BLD: 0.1 K/UL (ref 0–0.2)
BASOPHILS # BLD: 0.1 K/UL (ref 0–0.2)
BASOPHILS NFR BLD: 1.2 %
BASOPHILS NFR BLD: 1.2 %
BUN SERPL-MCNC: 7 MG/DL (ref 7–20)
BUN SERPL-MCNC: 7 MG/DL (ref 7–20)
CALCIUM SERPL-MCNC: 8.9 MG/DL (ref 8.3–10.6)
CALCIUM SERPL-MCNC: 8.9 MG/DL (ref 8.3–10.6)
CHLORIDE SERPL-SCNC: 96 MMOL/L (ref 99–110)
CHLORIDE SERPL-SCNC: 96 MMOL/L (ref 99–110)
CO2 SERPL-SCNC: 22 MMOL/L (ref 21–32)
CO2 SERPL-SCNC: 22 MMOL/L (ref 21–32)
CREAT SERPL-MCNC: 0.6 MG/DL (ref 0.9–1.3)
CREAT SERPL-MCNC: 0.6 MG/DL (ref 0.9–1.3)
DEPRECATED RDW RBC AUTO: 15.1 % (ref 12.4–15.4)
DEPRECATED RDW RBC AUTO: 15.1 % (ref 12.4–15.4)
EOSINOPHIL # BLD: 0.1 K/UL (ref 0–0.6)
EOSINOPHIL # BLD: 0.1 K/UL (ref 0–0.6)
EOSINOPHIL NFR BLD: 1 %
EOSINOPHIL NFR BLD: 1 %
GFR SERPLBLD CREATININE-BSD FMLA CKD-EPI: >90 ML/MIN/{1.73_M2}
GFR SERPLBLD CREATININE-BSD FMLA CKD-EPI: >90 ML/MIN/{1.73_M2}
GLUCOSE SERPL-MCNC: 100 MG/DL (ref 70–99)
GLUCOSE SERPL-MCNC: 100 MG/DL (ref 70–99)
HCT VFR BLD AUTO: 33.5 % (ref 40.5–52.5)
HCT VFR BLD AUTO: 33.5 % (ref 40.5–52.5)
HGB BLD-MCNC: 11.3 G/DL (ref 13.5–17.5)
HGB BLD-MCNC: 11.3 G/DL (ref 13.5–17.5)
LYMPHOCYTES # BLD: 1.1 K/UL (ref 1–5.1)
LYMPHOCYTES # BLD: 1.1 K/UL (ref 1–5.1)
LYMPHOCYTES NFR BLD: 15.2 %
LYMPHOCYTES NFR BLD: 15.2 %
MAGNESIUM SERPL-MCNC: 1.61 MG/DL (ref 1.8–2.4)
MAGNESIUM SERPL-MCNC: 1.61 MG/DL (ref 1.8–2.4)
MCH RBC QN AUTO: 28.8 PG (ref 26–34)
MCH RBC QN AUTO: 28.8 PG (ref 26–34)
MCHC RBC AUTO-ENTMCNC: 33.7 G/DL (ref 31–36)
MCHC RBC AUTO-ENTMCNC: 33.7 G/DL (ref 31–36)
MCV RBC AUTO: 85.5 FL (ref 80–100)
MCV RBC AUTO: 85.5 FL (ref 80–100)
MONOCYTES # BLD: 0.4 K/UL (ref 0–1.3)
MONOCYTES # BLD: 0.4 K/UL (ref 0–1.3)
MONOCYTES NFR BLD: 6.2 %
MONOCYTES NFR BLD: 6.2 %
NEUTROPHILS # BLD: 5.3 K/UL (ref 1.7–7.7)
NEUTROPHILS # BLD: 5.3 K/UL (ref 1.7–7.7)
NEUTROPHILS NFR BLD: 76.4 %
NEUTROPHILS NFR BLD: 76.4 %
PHOSPHATE SERPL-MCNC: 2.6 MG/DL (ref 2.5–4.9)
PHOSPHATE SERPL-MCNC: 2.6 MG/DL (ref 2.5–4.9)
PLATELET # BLD AUTO: 405 K/UL (ref 135–450)
PLATELET # BLD AUTO: 405 K/UL (ref 135–450)
PMV BLD AUTO: 6.9 FL (ref 5–10.5)
PMV BLD AUTO: 6.9 FL (ref 5–10.5)
POTASSIUM SERPL-SCNC: 3.8 MMOL/L (ref 3.5–5.1)
POTASSIUM SERPL-SCNC: 3.8 MMOL/L (ref 3.5–5.1)
RBC # BLD AUTO: 3.92 M/UL (ref 4.2–5.9)
RBC # BLD AUTO: 3.92 M/UL (ref 4.2–5.9)
SODIUM SERPL-SCNC: 130 MMOL/L (ref 136–145)
SODIUM SERPL-SCNC: 130 MMOL/L (ref 136–145)
WBC # BLD AUTO: 6.9 K/UL (ref 4–11)
WBC # BLD AUTO: 6.9 K/UL (ref 4–11)

## 2025-06-12 PROCEDURE — 6370000000 HC RX 637 (ALT 250 FOR IP)

## 2025-06-12 PROCEDURE — 85025 COMPLETE CBC W/AUTO DIFF WBC: CPT

## 2025-06-12 PROCEDURE — 80069 RENAL FUNCTION PANEL: CPT

## 2025-06-12 PROCEDURE — 6370000000 HC RX 637 (ALT 250 FOR IP): Performed by: HOSPITALIST

## 2025-06-12 PROCEDURE — 2060000000 HC ICU INTERMEDIATE R&B

## 2025-06-12 PROCEDURE — 6370000000 HC RX 637 (ALT 250 FOR IP): Performed by: STUDENT IN AN ORGANIZED HEALTH CARE EDUCATION/TRAINING PROGRAM

## 2025-06-12 PROCEDURE — 83735 ASSAY OF MAGNESIUM: CPT

## 2025-06-12 PROCEDURE — 6360000002 HC RX W HCPCS: Performed by: STUDENT IN AN ORGANIZED HEALTH CARE EDUCATION/TRAINING PROGRAM

## 2025-06-12 PROCEDURE — 6360000002 HC RX W HCPCS

## 2025-06-12 PROCEDURE — 2500000003 HC RX 250 WO HCPCS

## 2025-06-12 PROCEDURE — 36415 COLL VENOUS BLD VENIPUNCTURE: CPT

## 2025-06-12 PROCEDURE — 99232 SBSQ HOSP IP/OBS MODERATE 35: CPT | Performed by: HOSPITALIST

## 2025-06-12 RX ORDER — MAGNESIUM SULFATE IN WATER 40 MG/ML
4000 INJECTION, SOLUTION INTRAVENOUS ONCE
Status: COMPLETED | OUTPATIENT
Start: 2025-06-12 | End: 2025-06-12

## 2025-06-12 RX ORDER — LEVETIRACETAM 500 MG/1
500 TABLET ORAL 2 TIMES DAILY
Qty: 60 TABLET | Refills: 3 | Status: SHIPPED | OUTPATIENT
Start: 2025-06-12

## 2025-06-12 RX ORDER — M-VIT,TX,IRON,MINS/CALC/FOLIC 27MG-0.4MG
1 TABLET ORAL DAILY
Qty: 30 TABLET | Refills: 3 | Status: SHIPPED | OUTPATIENT
Start: 2025-06-12 | End: 2026-06-12

## 2025-06-12 RX ORDER — MULTIVIT-MIN/FERROUS GLUCONATE 9 MG/15 ML
15 LIQUID (ML) ORAL DAILY
Qty: 480 ML | Refills: 2 | Status: SHIPPED | OUTPATIENT
Start: 2025-06-13 | End: 2025-06-12 | Stop reason: HOSPADM

## 2025-06-12 RX ORDER — FOLIC ACID 1 MG/1
1 TABLET ORAL DAILY
Qty: 30 TABLET | Refills: 3 | Status: SHIPPED | OUTPATIENT
Start: 2025-06-13

## 2025-06-12 RX ORDER — PANTOPRAZOLE SODIUM 40 MG/1
40 TABLET, DELAYED RELEASE ORAL
Qty: 30 TABLET | Refills: 3 | Status: SHIPPED | OUTPATIENT
Start: 2025-06-13

## 2025-06-12 RX ORDER — THIAMINE MONONITRATE (VIT B1) 100 MG
200 TABLET ORAL DAILY
Qty: 60 TABLET | Refills: 2 | Status: SHIPPED | OUTPATIENT
Start: 2025-06-13

## 2025-06-12 RX ORDER — BUTALBITAL, ACETAMINOPHEN AND CAFFEINE 50; 325; 40 MG/1; MG/1; MG/1
1 TABLET ORAL ONCE
Status: COMPLETED | OUTPATIENT
Start: 2025-06-12 | End: 2025-06-12

## 2025-06-12 RX ADMIN — BUTALBITAL, ACETAMINOPHEN, AND CAFFEINE 1 TABLET: 325; 50; 40 TABLET ORAL at 08:06

## 2025-06-12 RX ADMIN — ACETAMINOPHEN 650 MG: 325 TABLET ORAL at 16:20

## 2025-06-12 RX ADMIN — ACETAMINOPHEN 650 MG: 325 TABLET ORAL at 05:12

## 2025-06-12 RX ADMIN — ESCITALOPRAM OXALATE 20 MG: 20 TABLET, FILM COATED ORAL at 08:07

## 2025-06-12 RX ADMIN — IBUPROFEN 400 MG: 200 TABLET, FILM COATED ORAL at 07:13

## 2025-06-12 RX ADMIN — SODIUM CHLORIDE, PRESERVATIVE FREE 10 ML: 5 INJECTION INTRAVENOUS at 22:06

## 2025-06-12 RX ADMIN — POLYETHYLENE GLYCOL 3350 17 G: 17 POWDER, FOR SOLUTION ORAL at 15:24

## 2025-06-12 RX ADMIN — PANTOPRAZOLE SODIUM 40 MG: 40 TABLET, DELAYED RELEASE ORAL at 05:12

## 2025-06-12 RX ADMIN — Medication 200 MG: at 08:07

## 2025-06-12 RX ADMIN — MAGNESIUM SULFATE HEPTAHYDRATE 4000 MG: 40 INJECTION, SOLUTION INTRAVENOUS at 11:11

## 2025-06-12 RX ADMIN — Medication 15 ML: at 08:08

## 2025-06-12 RX ADMIN — FOLIC ACID 1 MG: 1 TABLET ORAL at 08:07

## 2025-06-12 RX ADMIN — HEPARIN SODIUM 5000 UNITS: 5000 INJECTION INTRAVENOUS; SUBCUTANEOUS at 21:29

## 2025-06-12 RX ADMIN — HEPARIN SODIUM 5000 UNITS: 5000 INJECTION INTRAVENOUS; SUBCUTANEOUS at 05:12

## 2025-06-12 RX ADMIN — ONDANSETRON 4 MG: 4 TABLET, ORALLY DISINTEGRATING ORAL at 12:20

## 2025-06-12 RX ADMIN — LEVETIRACETAM 500 MG: 500 TABLET, FILM COATED ORAL at 21:29

## 2025-06-12 RX ADMIN — Medication 30 G: at 16:34

## 2025-06-12 RX ADMIN — HEPARIN SODIUM 5000 UNITS: 5000 INJECTION INTRAVENOUS; SUBCUTANEOUS at 14:23

## 2025-06-12 RX ADMIN — LEVETIRACETAM 500 MG: 500 TABLET, FILM COATED ORAL at 08:07

## 2025-06-12 ASSESSMENT — PAIN DESCRIPTION - FREQUENCY
FREQUENCY: CONTINUOUS

## 2025-06-12 ASSESSMENT — PAIN DESCRIPTION - PAIN TYPE
TYPE: ACUTE PAIN

## 2025-06-12 ASSESSMENT — PAIN SCALES - GENERAL
PAINLEVEL_OUTOF10: 2
PAINLEVEL_OUTOF10: 6
PAINLEVEL_OUTOF10: 7
PAINLEVEL_OUTOF10: 1
PAINLEVEL_OUTOF10: 6
PAINLEVEL_OUTOF10: 5
PAINLEVEL_OUTOF10: 9
PAINLEVEL_OUTOF10: 3

## 2025-06-12 ASSESSMENT — PAIN DESCRIPTION - ONSET
ONSET: ON-GOING

## 2025-06-12 ASSESSMENT — PAIN DESCRIPTION - DESCRIPTORS
DESCRIPTORS: SHARP;ACHING;DISCOMFORT
DESCRIPTORS: ACHING;SORE;TENDER
DESCRIPTORS: ACHING
DESCRIPTORS: ACHING

## 2025-06-12 ASSESSMENT — PAIN DESCRIPTION - LOCATION
LOCATION: HEAD

## 2025-06-12 ASSESSMENT — PAIN - FUNCTIONAL ASSESSMENT
PAIN_FUNCTIONAL_ASSESSMENT: ACTIVITIES ARE NOT PREVENTED

## 2025-06-12 ASSESSMENT — PAIN DESCRIPTION - ORIENTATION
ORIENTATION: MID
ORIENTATION: RIGHT
ORIENTATION: MID
ORIENTATION: ANTERIOR

## 2025-06-12 ASSESSMENT — PAIN SCALES - WONG BAKER: WONGBAKER_NUMERICALRESPONSE: HURTS A LITTLE BIT

## 2025-06-12 NOTE — PROGRESS NOTES
Occupational Therapy    Attempted pt for OT tx. Pt politely declines 2/2 nausea. RN notified. Will f/u per POC as agreeable.       Mikayla Singh, OTR/L

## 2025-06-12 NOTE — PROGRESS NOTES
V2.0    [facility] [place]      Name:  Evangelist Ortega /Age/Sex: 1975  (49 y.o. male)   MRN & CSN:  [id number] & [id number] Encounter Date/Time: 2025 10:33 AM EDT   Location:  5512/5512-01 PCP: Evangelist Schulz DO     Attending:Simon Zapata DO       Hospital Day: 13    Assessment and Recommendations   Per chart review, \"49 y.o. male with a medical hx significant for alcohol use disorder (20+yrs), HTN, depression otherwise as listed in the MHx table below, who presented unresponsive via EMS to [place] ED. Transferred to [facility].     Per ED note and EMS run sheet, pt. Called 911 stating concern for possible alcohol withdrawal. When EMS entered residence he was found unresponsive surrounded by many empty beer cans. Mental status improved en route with localization of pain, spontaneous movement, and arousing to verbal stimuli.      Shortly after arrival to ED patient experienced tonic-clonic seizure receiving 2mg ativan with aborted seizure activity. Within 10 minutes experienced another tonic clonic seizure requiring 5mg versed x2 and versed gtt and subsequently intubated for airway protection.      Per chart review patient seen in ED 2025 for alcohol detoxification and reportedly at that time drinking approximately 24 beers a day. He was discharged on 3 day supply of librium. Multiple hospitalizations in the past for alcohol withdrawal most recently  requiring precedex gtt. Withdrawals with hallucinations in past no reported seizures.     ED Course:  Afebrile 130/81, pulse (!) 105, resp. rate 20, SpO2 99%   AG 30 bicarb 13 pH 7.329 pCO2 26.9 Lactic 14.3 ethanol 72 mag 1.4 Na 115  ALT 36 AST 65   GCS 11 with tremor on exam  CT Head: R subdural hemorrhage with 3mm leftward midline shift likely acute/subacute given mixed attenuation. Fluid collection in the middle cranial fossa on the L likely to represent arachnoid cyst.  Nephro consulted and received 3% hypertonic saline prior to transfer  replacement  - Replace phosphorus with oral and IV supplements  - 6/6 Na normal at 135. Replacing K, Mg today. Continue to monitor  - 6/7 Na 134. K 3.5. Mg 1.99  - 6/8 Na 133, K 4.1 hemolyzed, Mg 1.80  - 6/9 Na 134, K 3.8, Mg 1.77, ordered for replacement  - Mg 1.61, ordered for repletion, K 3.8. Na 130. Continue fluid restriction    Prolonged qTc (544)  Likely secondary to hypokalemia and hypomagnesemia   Repeat EKG shows resolution of prolonged QTc  Resume SSRI as at home     HTN  - BP a bit soft today. Given IV fluid bolus. Will stop amlodipine. Monitor.      Depression   Resumed home medication    CODE STATUS, full code, verified with the patient at bedside  Wants his friend Andrade to be DIANA Silva consult for POA paperwork  PT OT evaluation      Diet ADULT DIET; Regular; 1200 ml  ADULT ORAL NUTRITION SUPPLEMENT; Lunch, Breakfast, Dinner; Standard High Calorie/High Protein Oral Supplement   DVT Prophylaxis [] Lovenox, [x]  Heparin, [] SCDs, [] Ambulation,  [] Eliquis, [] Xarelto  [] Coumadin   Code Status Full Code   Disposition From: Home  Expected Disposition: TBD  Estimated Date of Discharge: TBD  Patient requires continued admission due to ventilator   Surrogate Decision Maker/ DIANA Ortega, parent     Personally reviewed Lab Studies and Imaging       Subjective:     Chief Complaint: Seizures    49 y.o. male who presented to the ED on 5/31/2025 found unresponsive by EMS after receiving 911 call from patient stating he believed he was going into alcohol withdrawal. Intubated due to repeated seizures. Found to have severe hyponatremia and R SDH as well.    Patient seen and examined at bedside. No acute events overnight.  Reports feeling tired otherwise denies any other acute concerns.     Review of Systems:      Pertinent positives and negatives discussed in HPI    Objective:     Intake/Output Summary (Last 24 hours) at 6/12/2025 1447  Last data filed at 6/12/2025 0534  Gross per 24 hour   Intake 560  infarct.  There is no evidence of hydrocephalus. ORBITS: The visualized portion of the orbits demonstrate no acute abnormality. SINUSES: The visualized paranasal sinuses and mastoid air cells demonstrate no acute abnormality. SOFT TISSUES/SKULL:  No acute abnormality of the visualized skull or soft tissues.  Endotracheal and enteric tubes, incompletely assessed.  Debris in the right external auditory canal. CERVICAL SPINE BONES/ALIGNMENT: There is no acute fracture or traumatic malalignment. DEGENERATIVE CHANGES: No severe osseous spinal canal stenosis.  Moderate degenerative changes C5-C7. SOFT TISSUES: There is no prevertebral soft tissue swelling.  Endotracheal tube terminates 1.7 cm above the glenn.  Enteric tube courses inferiorly through the esophagus, beyond field of view. Multiple dental caries.     1. Moderate right subdural hemorrhage with 3 mm leftward midline shift, likely acute. 2. Cystic lesion abuts/displaces anterior left temporal lobe, favor arachnoid cyst.  Finding may be further characterized with brain MRI on a nonemergent basis. Critical results were called by Dr. Levy Vasquez to ÓSCAR CHÁVEZ on 5/31/2025 at 18:29.     CT CERVICAL SPINE WO CONTRAST  Result Date: 5/31/2025  EXAMINATION: CT OF THE HEAD WITHOUT CONTRAST; CT OF THE CERVICAL SPINE WITHOUT CONTRAST 5/31/2025 5:58 pm TECHNIQUE: CT of the head was performed without the administration of intravenous contrast. Automated exposure control, iterative reconstruction, and/or weight based adjustment of the mA/kV was utilized to reduce the radiation dose to as low as reasonably achievable.; CT of the cervical spine was performed without the administration of intravenous contrast. Multiplanar reformatted images are provided for review. Automated exposure control, iterative reconstruction, and/or weight based adjustment of the mA/kV was utilized to reduce the radiation dose to as low as reasonably achievable. COMPARISON: None. HISTORY:

## 2025-06-12 NOTE — PLAN OF CARE
Problem: Safety - Adult  Goal: Free from fall injury  Outcome: Progressing  Note: Pt free of fall injury this shift. All proper safety precautions are in place. Call light is within reach. Bed alarm is on.     Problem: Neurosensory - Adult  Goal: Achieves stable or improved neurological status  Outcome: Progressing  Note: Absence of seizure activity. CIWA @ 4. No acute neuro changes.      Problem: Neurosensory - Adult  Goal: Remains free of injury related to seizures activity  Outcome: Progressing     Problem: Skin/Tissue Integrity - Adult  Goal: Skin integrity remains intact  Description: 1.  Monitor for areas of redness and/or skin breakdown2.  Assess vascular access sites hourly3.  Every 4-6 hours minimum:  Change oxygen saturation probe site4.  Every 4-6 hours:  If on nasal continuous positive airway pressure, respiratory therapy assess nares and determine need for appliance change or resting period  Outcome: Progressing  Note: Pt turns self frequently. Skin assessed and monitored frequently.

## 2025-06-12 NOTE — PROGRESS NOTES
Nephrology Progress Note                                                                                                                                                                                                                                                                                                                                                               Office : [phone]     Fax :[phone]    Patient's Name: Evangelist Ortega  6/12/2025    Reason for Consult:  Hyponatremia   Requesting Physician:  Evangelist Schulz DO  Chief Complaint:  No chief complaint on file.      Assessment/Plan     # Hyponatremia   - Severe 115 on admission --> 130 today   - Oral urea today x 1   - Beer potomania (BUN just 4), seizure, SDH   - Encourage solute intake   - Continue 1 L fluid restriction   - Continue close monitor of sodium     # Hypokalemia   - Replace PRN  - Monitor    # Lactic acidosis   - 2/2 seizure     # SDH   - Likely traumatic   - Neurosurgery following     # Seizure   - Alcohol ? SDH?   - Neurology following     # Alcohol withdrawal  # Delirium tremens   - [facility] protocol   - IV thiamine & folic acid       History of Present Ilness:    Evangelist Ortega is a 49 y.o. male with     This patient is a 50 y/o male found by EMS for altered mental status and concern for alcohol withdrawal and seizure.  Per EMS report they were called by the patient with concerns for alcohol withdrawal. Patient was verbal on the EMS call but was found unresponsive on their arrival.  Fingerstick glucose in the 100s.  En route EMS reports that his mental status has improved he is now localizing to pain and making eye contact with verbal stimuli.  Concern for postictal state.  Patient is altered on arrival here he is a poor historian     Just after arrival on initial evaluation patient developed tonic-clonic seizure and received 2 mg of Ativan via IO.       Na 115 16:53--> 118 19:16   Mg 1.4-> 9.6 (!)   Ptis intubated   On

## 2025-06-12 NOTE — CARE COORDINATION
Durable Medical Equipment:  DME Provider: [employer]  Equipment obtained during hospitalization: walker      Additional CM Notes: Patient will discharge home, declined and home  care needs.  Patient is in need of RW, no insurance, not able to afford out of pocket costs.  [facility] to deliver RW to room and will be cirilo, billed to hospital, CM manager approved.  Will need a lyft.      Cristiana Pate RN  [facility]  Case Management Department  Ph: [phone]  Fax: [phone]

## 2025-06-12 NOTE — PROGRESS NOTES
Patient is A&Ox 4. VSS on RA. Patient has endorsed pain to head managed well per MAR and non-pharm measures. Patient is tolerating PO diet. Tolerating ambulation x 1 assist with GB/ walker. Voiding via BRP. Patient updated on plan of care. Fall and safety precautions in place, call light within reach.

## 2025-06-12 NOTE — PROGRESS NOTES
Patient is alert and oriented x4. VSS on room air. Medications given per MAR, no side effects noted. Patient ambulating x1 with gait belt and walker. No complaints of pain, continuing to monitor and manage per MAR. Voiding well via urinal, no bm this shift.      Patient is currently resting in bed with bed alarm on for safety. Call light within reach and all fall precautions in place. Plan of care continues.

## 2025-06-13 ENCOUNTER — APPOINTMENT (OUTPATIENT)
Dept: CT IMAGING | Age: 50
DRG: 082 | End: 2025-06-13
Attending: STUDENT IN AN ORGANIZED HEALTH CARE EDUCATION/TRAINING PROGRAM

## 2025-06-13 VITALS
TEMPERATURE: 97.4 F | WEIGHT: 160.5 LBS | BODY MASS INDEX: 25.19 KG/M2 | HEART RATE: 64 BPM | SYSTOLIC BLOOD PRESSURE: 148 MMHG | RESPIRATION RATE: 16 BRPM | SYSTOLIC BLOOD PRESSURE: 148 MMHG | HEIGHT: 67 IN | HEIGHT: 67 IN | DIASTOLIC BLOOD PRESSURE: 81 MMHG | OXYGEN SATURATION: 94 % | DIASTOLIC BLOOD PRESSURE: 81 MMHG | OXYGEN SATURATION: 94 % | WEIGHT: 160.5 LBS | RESPIRATION RATE: 16 BRPM | TEMPERATURE: 97.4 F | BODY MASS INDEX: 25.19 KG/M2 | HEART RATE: 64 BPM

## 2025-06-13 LAB
ALBUMIN SERPL-MCNC: 3.6 G/DL (ref 3.4–5)
ALBUMIN SERPL-MCNC: 3.6 G/DL (ref 3.4–5)
ANION GAP SERPL CALCULATED.3IONS-SCNC: 12 MMOL/L (ref 3–16)
ANION GAP SERPL CALCULATED.3IONS-SCNC: 12 MMOL/L (ref 3–16)
BASOPHILS # BLD: 0.1 K/UL (ref 0–0.2)
BASOPHILS # BLD: 0.1 K/UL (ref 0–0.2)
BASOPHILS NFR BLD: 1.3 %
BASOPHILS NFR BLD: 1.3 %
BUN SERPL-MCNC: 11 MG/DL (ref 7–20)
BUN SERPL-MCNC: 11 MG/DL (ref 7–20)
CALCIUM SERPL-MCNC: 9.3 MG/DL (ref 8.3–10.6)
CALCIUM SERPL-MCNC: 9.3 MG/DL (ref 8.3–10.6)
CHLORIDE SERPL-SCNC: 101 MMOL/L (ref 99–110)
CHLORIDE SERPL-SCNC: 101 MMOL/L (ref 99–110)
CO2 SERPL-SCNC: 23 MMOL/L (ref 21–32)
CO2 SERPL-SCNC: 23 MMOL/L (ref 21–32)
CREAT SERPL-MCNC: 0.7 MG/DL (ref 0.9–1.3)
CREAT SERPL-MCNC: 0.7 MG/DL (ref 0.9–1.3)
DEPRECATED RDW RBC AUTO: 15.4 % (ref 12.4–15.4)
DEPRECATED RDW RBC AUTO: 15.4 % (ref 12.4–15.4)
EOSINOPHIL # BLD: 0.1 K/UL (ref 0–0.6)
EOSINOPHIL # BLD: 0.1 K/UL (ref 0–0.6)
EOSINOPHIL NFR BLD: 0.7 %
EOSINOPHIL NFR BLD: 0.7 %
GFR SERPLBLD CREATININE-BSD FMLA CKD-EPI: >90 ML/MIN/{1.73_M2}
GFR SERPLBLD CREATININE-BSD FMLA CKD-EPI: >90 ML/MIN/{1.73_M2}
GLUCOSE SERPL-MCNC: 93 MG/DL (ref 70–99)
GLUCOSE SERPL-MCNC: 93 MG/DL (ref 70–99)
HCT VFR BLD AUTO: 32.5 % (ref 40.5–52.5)
HCT VFR BLD AUTO: 32.5 % (ref 40.5–52.5)
HGB BLD-MCNC: 11.4 G/DL (ref 13.5–17.5)
HGB BLD-MCNC: 11.4 G/DL (ref 13.5–17.5)
LYMPHOCYTES # BLD: 0.8 K/UL (ref 1–5.1)
LYMPHOCYTES # BLD: 0.8 K/UL (ref 1–5.1)
LYMPHOCYTES NFR BLD: 10.7 %
LYMPHOCYTES NFR BLD: 10.7 %
MAGNESIUM SERPL-MCNC: 2.1 MG/DL (ref 1.8–2.4)
MAGNESIUM SERPL-MCNC: 2.1 MG/DL (ref 1.8–2.4)
MCH RBC QN AUTO: 29.6 PG (ref 26–34)
MCH RBC QN AUTO: 29.6 PG (ref 26–34)
MCHC RBC AUTO-ENTMCNC: 35.1 G/DL (ref 31–36)
MCHC RBC AUTO-ENTMCNC: 35.1 G/DL (ref 31–36)
MCV RBC AUTO: 84.4 FL (ref 80–100)
MCV RBC AUTO: 84.4 FL (ref 80–100)
MONOCYTES # BLD: 0.5 K/UL (ref 0–1.3)
MONOCYTES # BLD: 0.5 K/UL (ref 0–1.3)
MONOCYTES NFR BLD: 6.1 %
MONOCYTES NFR BLD: 6.1 %
NEUTROPHILS # BLD: 6.4 K/UL (ref 1.7–7.7)
NEUTROPHILS # BLD: 6.4 K/UL (ref 1.7–7.7)
NEUTROPHILS NFR BLD: 81.2 %
NEUTROPHILS NFR BLD: 81.2 %
PHOSPHATE SERPL-MCNC: 3.2 MG/DL (ref 2.5–4.9)
PHOSPHATE SERPL-MCNC: 3.2 MG/DL (ref 2.5–4.9)
PLATELET # BLD AUTO: 439 K/UL (ref 135–450)
PLATELET # BLD AUTO: 439 K/UL (ref 135–450)
PMV BLD AUTO: 7 FL (ref 5–10.5)
PMV BLD AUTO: 7 FL (ref 5–10.5)
POTASSIUM SERPL-SCNC: 4.1 MMOL/L (ref 3.5–5.1)
POTASSIUM SERPL-SCNC: 4.1 MMOL/L (ref 3.5–5.1)
RBC # BLD AUTO: 3.85 M/UL (ref 4.2–5.9)
RBC # BLD AUTO: 3.85 M/UL (ref 4.2–5.9)
SODIUM SERPL-SCNC: 136 MMOL/L (ref 136–145)
SODIUM SERPL-SCNC: 136 MMOL/L (ref 136–145)
WBC # BLD AUTO: 7.9 K/UL (ref 4–11)
WBC # BLD AUTO: 7.9 K/UL (ref 4–11)

## 2025-06-13 PROCEDURE — 6360000002 HC RX W HCPCS: Performed by: STUDENT IN AN ORGANIZED HEALTH CARE EDUCATION/TRAINING PROGRAM

## 2025-06-13 PROCEDURE — 70450 CT HEAD/BRAIN W/O DYE: CPT

## 2025-06-13 PROCEDURE — 6370000000 HC RX 637 (ALT 250 FOR IP)

## 2025-06-13 PROCEDURE — 97535 SELF CARE MNGMENT TRAINING: CPT

## 2025-06-13 PROCEDURE — 83735 ASSAY OF MAGNESIUM: CPT

## 2025-06-13 PROCEDURE — 6370000000 HC RX 637 (ALT 250 FOR IP): Performed by: STUDENT IN AN ORGANIZED HEALTH CARE EDUCATION/TRAINING PROGRAM

## 2025-06-13 PROCEDURE — 36415 COLL VENOUS BLD VENIPUNCTURE: CPT

## 2025-06-13 PROCEDURE — 80069 RENAL FUNCTION PANEL: CPT

## 2025-06-13 PROCEDURE — 85025 COMPLETE CBC W/AUTO DIFF WBC: CPT

## 2025-06-13 PROCEDURE — 97530 THERAPEUTIC ACTIVITIES: CPT

## 2025-06-13 RX ADMIN — HEPARIN SODIUM 5000 UNITS: 5000 INJECTION INTRAVENOUS; SUBCUTANEOUS at 05:40

## 2025-06-13 RX ADMIN — Medication 200 MG: at 09:39

## 2025-06-13 RX ADMIN — ESCITALOPRAM OXALATE 20 MG: 20 TABLET, FILM COATED ORAL at 09:39

## 2025-06-13 RX ADMIN — Medication 15 ML: at 09:39

## 2025-06-13 RX ADMIN — ACETAMINOPHEN 650 MG: 325 TABLET ORAL at 12:51

## 2025-06-13 RX ADMIN — FOLIC ACID 1 MG: 1 TABLET ORAL at 09:39

## 2025-06-13 RX ADMIN — POLYETHYLENE GLYCOL 3350 17 G: 17 POWDER, FOR SOLUTION ORAL at 09:39

## 2025-06-13 RX ADMIN — LEVETIRACETAM 500 MG: 500 TABLET, FILM COATED ORAL at 09:39

## 2025-06-13 RX ADMIN — IBUPROFEN 400 MG: 200 TABLET, FILM COATED ORAL at 14:08

## 2025-06-13 RX ADMIN — HEPARIN SODIUM 5000 UNITS: 5000 INJECTION INTRAVENOUS; SUBCUTANEOUS at 14:08

## 2025-06-13 RX ADMIN — PANTOPRAZOLE SODIUM 40 MG: 40 TABLET, DELAYED RELEASE ORAL at 05:40

## 2025-06-13 ASSESSMENT — PAIN - FUNCTIONAL ASSESSMENT: PAIN_FUNCTIONAL_ASSESSMENT: ACTIVITIES ARE NOT PREVENTED

## 2025-06-13 ASSESSMENT — PAIN DESCRIPTION - PAIN TYPE: TYPE: ACUTE PAIN

## 2025-06-13 ASSESSMENT — PAIN DESCRIPTION - LOCATION: LOCATION: HEAD

## 2025-06-13 ASSESSMENT — PAIN DESCRIPTION - FREQUENCY: FREQUENCY: CONTINUOUS

## 2025-06-13 ASSESSMENT — PAIN DESCRIPTION - ONSET: ONSET: ON-GOING

## 2025-06-13 ASSESSMENT — PAIN SCALES - GENERAL
PAINLEVEL_OUTOF10: 2
PAINLEVEL_OUTOF10: 6

## 2025-06-13 ASSESSMENT — PAIN DESCRIPTION - DESCRIPTORS: DESCRIPTORS: ACHING

## 2025-06-13 ASSESSMENT — PAIN DESCRIPTION - ORIENTATION: ORIENTATION: MID

## 2025-06-13 NOTE — PLAN OF CARE
Problem: Safety - Adult  Goal: Free from fall injury  6/12/2025 2204 by Omkar Cline, RN  Outcome: Progressing  Note: All fall precautions in place. Bed locked and in lowest position with alarm on. Overbed table and personal belonings within reach. Call light within reach and patient instructed to use call light for assistance. Non-skid socks on.       Problem: ABCDS Injury Assessment  Goal: Absence of physical injury  6/12/2025 2204 by Omkar Cline, RN  Outcome: Progressing  Note: Patient has remained free of physical injury this shift. Fall and safety precautions in place. Bed exit alarm activated, bed in lowest position with wheels locked, call light and belongings within reach.

## 2025-06-13 NOTE — PLAN OF CARE
Problem: Safety - Adult  Goal: Free from fall injury  6/13/2025 0739 by Tracy Singh RN  Outcome: Adequate for Discharge  Note: Pt free of fall injury this shift. All proper safety precautions are in place. Call light is within reach.      Problem: Neurosensory - Adult  Goal: Achieves stable or improved neurological status  6/13/2025 0739 by Tracy Singh RN  Outcome: Adequate for Discharge     Problem: Pain  Goal: Verbalizes/displays adequate comfort level or baseline comfort level  6/13/2025 0739 by Tracy Singh RN  Outcome: Adequate for Discharge  Note: Pt encouraged to monitor pain and request assistance. Appropriate pain scale is being used.     Problem: Nutrition Deficit:  Goal: Optimize nutritional status  6/13/2025 0739 by Tracy Singh RN  Outcome: Adequate for Discharge

## 2025-06-13 NOTE — PROGRESS NOTES
Occupational Therapy  Facility/Department: [facility] ORTHO/NEURO  Occupational Therapy Treatment Note    Name: Evangelist Ortega  : 1975  MRN: [id number]  Date of Service: 2025    Discharge Recommendations:  Home with Home health OT, 24 hour supervision or assist (initially)          Patient Diagnosis(es): The encounter diagnosis was SDH (subdural hematoma) (HCC).  Past Medical History:  has no past medical history on file.  Past Surgical History:  has no past surgical history on file.    Treatment Diagnosis: decreased ADLs and transfers      Assessment  Performance deficits / Impairments: Decreased ADL status;Decreased strength;Decreased balance;Decreased high-level IADLs;Decreased coordination;Decreased endurance;Decreased functional mobility ;Decreased safe awareness;Decreased cognition  Assessment: Pt demonstrated increased indep with ADLs, functional transfers, and functional mobility.  Recommend discharge to home with intial 24 hr A, home OT.  Treatment Diagnosis: decreased ADLs and transfers  Prognosis: Fair  REQUIRES OT FOLLOW-UP: Yes  Activity Tolerance  Activity Tolerance: Patient Tolerated treatment well     Plan  Occupational Therapy Plan  Times Per Week: 2-5  Times Per Day: Once a day  Current Treatment Recommendations: Strengthening, Self-Care / ADL, Balance training, Functional mobility training, Endurance training, Patient/Caregiver education & training, Neuromuscular re-education, Cognitive reorientation    Restrictions  Position Activity Restriction  Other Position/Activity Restrictions: No new restrictions    Subjective  General  Chart Reviewed: Yes  Additional Pertinent Hx: 49 y.o male who p/w AMS, possible ETOH w/d. On arrival pt with seizure and intubated for airway protection.  HCT: R cerebral convexity SDH, Left anterior fossa arachnoid cyst; repeat HCT: stable. 6/3 extubated    PMH: alcohol use disorder (20+yrs), HTN, depression  Family / Caregiver Present: No  Referring  errors  Insights: Decreased awareness of deficits  Sequencing: Requires cues for some  Orientation  Overall Orientation Status: Impaired  Orientation Level: Oriented to person;Oriented to situation;Oriented to place                  Education Given To: Patient  Education Provided: Role of Therapy;Plan of Care  Education Method: Verbal  Barriers to Learning: None  Education Outcome: Verbalized understanding;Continued education needed                     G-Code     OutComes Score                                                  AM-PAC - ADL  AM-PAC Daily Activity - Inpatient   How much help is needed for putting on and taking off regular lower body clothing?: A Little  How much help is needed for bathing (which includes washing, rinsing, drying)?: A Little  How much help is needed for toileting (which includes using toilet, bedpan, or urinal)?: A Little  How much help is needed for putting on and taking off regular upper body clothing?: None  How much help is needed for taking care of personal grooming?: None  How much help for eating meals?: None  AM-[facility] Inpatient Daily Activity Raw Score: 21  AM-PAC Inpatient ADL T-Scale Score : 44.27  ADL Inpatient CMS 0-100% Score: 32.79  ADL Inpatient CMS G-Code Modifier : CJ    Tinneti Score       Goals  Short Term Goals  Time Frame for Short Term Goals: by dc  Short Term Goal 1: Pt will complete LB dressing with setup-goal met with SBA  Short Term Goal 2: Pt will complete toileting with CGA-goal met  Short Term Goal 3: Pt will complete standing level grooming with SBA-goal partly met, needs CGA/SBA  Patient Goals   Patient goals : to get stronger      Therapy Time   Individual Concurrent Group Co-treatment   Time In 1105         Time Out 1218         Minutes 73             Timed Code Treatment Minutes:  73    Total Treatment Minutes:   73    Jimena Pearce, OTR/L [id number]

## 2025-06-13 NOTE — PROGRESS NOTES
Pt discharged. AVS summary reviewed with patient. IV removed. All belongings left with patient. No further needs at this time.

## 2025-06-13 NOTE — PROGRESS NOTES
Patient A&O x4. VSS. Medications managed per MAR. Patient ambulates x1 GB and walker. BRP. Safety precautions in place, bedside table and call light within reach.

## 2025-06-13 NOTE — CARE COORDINATION
Case Management Assessment            Discharge Note                    Date / Time of Note: 6/13/2025 11:00 AM                  Discharge Note Completed by: Cristiana Pate RN    Patient Name: Evangelist Ortega   YOB: 1975  Diagnosis: Status epilepticus (HCC) [G40.901]   Date / Time: 5/31/2025  7:59 PM    Current PCP: Evangelist Schulz,   Clinic patient: No    Hospitalization in the last 30 days: No       Advance Directives:  Code Status: Full Code  [place] DNR form completed and on chart: Not Indicated    Financial:  Payor: /      Pharmacy:    [id number] - [place], [place] - [address] - P [phone] - F [phone]  [address]  [facility] [address]  Phone: [phone] Fax: [phone]      Assistance purchasing medications?:    Assistance provided by Case Management: Voucher with  Director Approval    Does patient want to participate in local refill/ meds to beds program?: No    Meds To Beds General Rules:  1. Can ONLY be done Monday- Friday between 8:30am-5pm  2. Prescription(s) must be in pharmacy by 3pm to be filled same day  3.Copy of patient's insurance/ prescription drug card and patient face sheet must be sent along with the prescription(s)  4. Cost of Rx cannot be added to hospital bill. If financial assistance is needed, please contact unit  or ;  or  CANNOT provide pharmacy voucher for patients co-pays  5. Patients can then  the prescription on their way out of the hospital at discharge, or pharmacy can deliver to the bedside if staff is available. (payment due at time of pick-up or delivery - cash, check, or card accepted)     Able to afford home medications/ co-pay costs: Yes    ADLS:  Current PT AM-PAC Score: 18 /24  Current OT AM-PAC Score: 19 /24    DISCHARGE Disposition: Home- No Services Needed    LOC at discharge: Not Applicable  YUSEF Completed: Not Indicated    Notification  completed in HENS/PAS?:  Not Applicable    IMM Completed:   Not Indicated due to: no insurance         Transportation:  Transportation PLAN for discharge: lyft    Mode of Transport: Lyft  Reason for medical transport: Not Applicable  Name of Transport Company: Not Applicable  Time of Transport: tbd    Transport form completed: Not Indicated    Home Care:  Home Care ordered at discharge: Not Indicated  Home Care Agency: Not Applicable  Orders faxed: No    Durable Medical Equipment:  DME Provider: [facility]  Equipment obtained during hospitalization: walker    Home Oxygen and Respiratory Equipment:  Oxygen needed at discharge?: Not Indicated  Home Oxygen Company: Not Applicable  Portable tank available for discharge?: Not Indicated        Additional CM Notes: Patient is discharging home with RW from [employer].  Voucher sent to pharmacy for $57.70. Lyft scheduled at 1700.      COVID Result:  No results found for: \"COVID19\"    The Plan for Transition of Care is related to the following treatment goals of Status epilepticus (HCC) [G40.901]    The Patient and/or patient representative Evangelist and his family were provided with a choice of provider and agrees with the discharge plan Yes    Freedom of choice list was provided with basic dialogue that supports the patient's individualized plan of care/goals and shares the quality data associated with the providers. Yes    Care Transitions patient: No    Cristiana Pate RN  [facility]  Case Management Department  Ph: [phone]  Fax: [phone]

## 2025-06-16 ENCOUNTER — APPOINTMENT (OUTPATIENT)
Dept: CT IMAGING | Age: 50
End: 2025-06-16

## 2025-06-16 ENCOUNTER — TELEPHONE (OUTPATIENT)
Dept: FAMILY MEDICINE CLINIC | Age: 50
End: 2025-06-16

## 2025-06-16 ENCOUNTER — HOSPITAL ENCOUNTER (EMERGENCY)
Age: 50
Discharge: HOME | End: 2025-06-16
Attending: EMERGENCY MEDICINE

## 2025-06-16 VITALS
BODY MASS INDEX: 25.01 KG/M2 | SYSTOLIC BLOOD PRESSURE: 156 MMHG | WEIGHT: 165 LBS | DIASTOLIC BLOOD PRESSURE: 91 MMHG | HEIGHT: 68 IN | HEART RATE: 58 BPM | OXYGEN SATURATION: 100 % | RESPIRATION RATE: 16 BRPM | TEMPERATURE: 97.6 F

## 2025-06-16 DIAGNOSIS — R51.9 NONINTRACTABLE HEADACHE, UNSPECIFIED CHRONICITY PATTERN, UNSPECIFIED HEADACHE TYPE: Primary | ICD-10-CM

## 2025-06-16 LAB
ALBUMIN SERPL-MCNC: 3.3 G/DL (ref 3.4–5)
ALBUMIN/GLOB SERPL: 1.4 {RATIO} (ref 1.1–2.2)
ALP SERPL-CCNC: 60 U/L (ref 40–129)
ALT SERPL-CCNC: 20 U/L (ref 10–40)
ANION GAP SERPL CALCULATED.3IONS-SCNC: 10 MMOL/L (ref 3–16)
APTT BLD: 23.9 SEC (ref 22.8–35.8)
AST SERPL-CCNC: 18 U/L (ref 15–37)
BASOPHILS # BLD: 0.1 K/UL (ref 0–0.2)
BASOPHILS NFR BLD: 1.7 %
BILIRUB SERPL-MCNC: 0.3 MG/DL (ref 0–1)
BUN SERPL-MCNC: 5 MG/DL (ref 7–20)
CALCIUM SERPL-MCNC: 8.4 MG/DL (ref 8.3–10.6)
CHLORIDE SERPL-SCNC: 92 MMOL/L (ref 99–110)
CO2 SERPL-SCNC: 25 MMOL/L (ref 21–32)
CREAT SERPL-MCNC: 0.5 MG/DL (ref 0.9–1.3)
DEPRECATED RDW RBC AUTO: 15.3 % (ref 12.4–15.4)
EOSINOPHIL # BLD: 0.1 K/UL (ref 0–0.6)
EOSINOPHIL NFR BLD: 1.3 %
ETHANOLAMINE SERPL-MCNC: NORMAL MG/DL (ref 0–0.08)
GFR SERPLBLD CREATININE-BSD FMLA CKD-EPI: >90 ML/MIN/{1.73_M2}
GLUCOSE SERPL-MCNC: 91 MG/DL (ref 70–99)
HCT VFR BLD AUTO: 34 % (ref 40.5–52.5)
HGB BLD-MCNC: 11.6 G/DL (ref 13.5–17.5)
INR PPP: 1.04 (ref 0.86–1.14)
LYMPHOCYTES # BLD: 1.1 K/UL (ref 1–5.1)
LYMPHOCYTES NFR BLD: 15 %
MCH RBC QN AUTO: 28.9 PG (ref 26–34)
MCHC RBC AUTO-ENTMCNC: 34.2 G/DL (ref 31–36)
MCV RBC AUTO: 84.5 FL (ref 80–100)
MONOCYTES # BLD: 0.5 K/UL (ref 0–1.3)
MONOCYTES NFR BLD: 6.6 %
NEUTROPHILS # BLD: 5.5 K/UL (ref 1.7–7.7)
NEUTROPHILS NFR BLD: 75.4 %
PLATELET # BLD AUTO: 427 K/UL (ref 135–450)
PMV BLD AUTO: 6.9 FL (ref 5–10.5)
POTASSIUM SERPL-SCNC: 4.1 MMOL/L (ref 3.5–5.1)
PROT SERPL-MCNC: 5.6 G/DL (ref 6.4–8.2)
PROTHROMBIN TIME: 13.9 SEC (ref 12.1–14.9)
RBC # BLD AUTO: 4.02 M/UL (ref 4.2–5.9)
SODIUM SERPL-SCNC: 127 MMOL/L (ref 136–145)
WBC # BLD AUTO: 7.3 K/UL (ref 4–11)

## 2025-06-16 PROCEDURE — 6370000000 HC RX 637 (ALT 250 FOR IP): Performed by: EMERGENCY MEDICINE

## 2025-06-16 PROCEDURE — 85730 THROMBOPLASTIN TIME PARTIAL: CPT

## 2025-06-16 PROCEDURE — 70450 CT HEAD/BRAIN W/O DYE: CPT

## 2025-06-16 PROCEDURE — 82077 ASSAY SPEC XCP UR&BREATH IA: CPT

## 2025-06-16 PROCEDURE — 85610 PROTHROMBIN TIME: CPT

## 2025-06-16 PROCEDURE — 99284 EMERGENCY DEPT VISIT MOD MDM: CPT

## 2025-06-16 PROCEDURE — 80053 COMPREHEN METABOLIC PANEL: CPT

## 2025-06-16 PROCEDURE — 2580000003 HC RX 258: Performed by: EMERGENCY MEDICINE

## 2025-06-16 PROCEDURE — 85025 COMPLETE CBC W/AUTO DIFF WBC: CPT

## 2025-06-16 RX ORDER — LORAZEPAM 2 MG/ML
4 INJECTION INTRAMUSCULAR
Status: DISCONTINUED | OUTPATIENT
Start: 2025-06-16 | End: 2025-06-16 | Stop reason: HOSPADM

## 2025-06-16 RX ORDER — LORAZEPAM 2 MG/1
2 TABLET ORAL
Status: DISCONTINUED | OUTPATIENT
Start: 2025-06-16 | End: 2025-06-16 | Stop reason: HOSPADM

## 2025-06-16 RX ORDER — SODIUM CHLORIDE 0.9 % (FLUSH) 0.9 %
5-40 SYRINGE (ML) INJECTION EVERY 12 HOURS SCHEDULED
Status: DISCONTINUED | OUTPATIENT
Start: 2025-06-16 | End: 2025-06-16 | Stop reason: HOSPADM

## 2025-06-16 RX ORDER — LORAZEPAM 1 MG/1
1 TABLET ORAL
Status: DISCONTINUED | OUTPATIENT
Start: 2025-06-16 | End: 2025-06-16 | Stop reason: HOSPADM

## 2025-06-16 RX ORDER — LORAZEPAM 2 MG/ML
2 INJECTION INTRAMUSCULAR
Status: DISCONTINUED | OUTPATIENT
Start: 2025-06-16 | End: 2025-06-16 | Stop reason: HOSPADM

## 2025-06-16 RX ORDER — 0.9 % SODIUM CHLORIDE 0.9 %
1000 INTRAVENOUS SOLUTION INTRAVENOUS ONCE
Status: COMPLETED | OUTPATIENT
Start: 2025-06-16 | End: 2025-06-16

## 2025-06-16 RX ORDER — LANOLIN ALCOHOL/MO/W.PET/CERES
100 CREAM (GRAM) TOPICAL DAILY
Status: DISCONTINUED | OUTPATIENT
Start: 2025-06-16 | End: 2025-06-16 | Stop reason: HOSPADM

## 2025-06-16 RX ORDER — LORAZEPAM 2 MG/1
4 TABLET ORAL
Status: DISCONTINUED | OUTPATIENT
Start: 2025-06-16 | End: 2025-06-16 | Stop reason: HOSPADM

## 2025-06-16 RX ORDER — ACETAMINOPHEN 500 MG
1000 TABLET ORAL ONCE
Status: COMPLETED | OUTPATIENT
Start: 2025-06-16 | End: 2025-06-16

## 2025-06-16 RX ORDER — SODIUM CHLORIDE 9 MG/ML
INJECTION, SOLUTION INTRAVENOUS PRN
Status: DISCONTINUED | OUTPATIENT
Start: 2025-06-16 | End: 2025-06-16 | Stop reason: HOSPADM

## 2025-06-16 RX ORDER — LORAZEPAM 2 MG/ML
3 INJECTION INTRAMUSCULAR
Status: DISCONTINUED | OUTPATIENT
Start: 2025-06-16 | End: 2025-06-16 | Stop reason: HOSPADM

## 2025-06-16 RX ORDER — SODIUM CHLORIDE 0.9 % (FLUSH) 0.9 %
5-40 SYRINGE (ML) INJECTION PRN
Status: DISCONTINUED | OUTPATIENT
Start: 2025-06-16 | End: 2025-06-16 | Stop reason: HOSPADM

## 2025-06-16 RX ORDER — LORAZEPAM 2 MG/ML
1 INJECTION INTRAMUSCULAR
Status: DISCONTINUED | OUTPATIENT
Start: 2025-06-16 | End: 2025-06-16 | Stop reason: HOSPADM

## 2025-06-16 RX ADMIN — SODIUM CHLORIDE 1000 ML: 0.9 INJECTION, SOLUTION INTRAVENOUS at 15:34

## 2025-06-16 RX ADMIN — Medication 100 MG: at 14:55

## 2025-06-16 RX ADMIN — ACETAMINOPHEN 1000 MG: 500 TABLET ORAL at 14:00

## 2025-06-16 ASSESSMENT — PAIN DESCRIPTION - PAIN TYPE: TYPE: ACUTE PAIN

## 2025-06-16 ASSESSMENT — PAIN SCALES - GENERAL
PAINLEVEL_OUTOF10: 6
PAINLEVEL_OUTOF10: 6
PAINLEVEL_OUTOF10: 4

## 2025-06-16 ASSESSMENT — PAIN DESCRIPTION - DESCRIPTORS: DESCRIPTORS: ACHING

## 2025-06-16 ASSESSMENT — PAIN DESCRIPTION - LOCATION: LOCATION: HEAD

## 2025-06-16 ASSESSMENT — PAIN - FUNCTIONAL ASSESSMENT: PAIN_FUNCTIONAL_ASSESSMENT: 0-10

## 2025-06-16 NOTE — ED PROVIDER NOTES
medications on file       DISCONTINUED MEDICATIONS:  Discontinued Medications    No medications on file              (Please note that portions of this note were completed with a voice recognition program.  Efforts were made to edit the dictations but occasionally words are mis-transcribed.)    Kaye David MD (electronically signed)            Kaye David MD  06/16/25 3534

## 2025-06-16 NOTE — TELEPHONE ENCOUNTER
Attempted to call patient. Unable to LVM  Care Transitions Initial Follow Up Call    Outreach made within 2 business days of discharge: Yes    Patient: Evangelist Ortega Patient : 1975   MRN: <[id number]>  Reason for Admission: Subdural Hematoma   Discharge Date: 25       Spoke with:     Discharge department/facility: [facility]    TCM Interactive Patient Contact:  Was patient able to fill all prescriptions:   Was patient instructed to bring all medications to the follow-up visit:   Is patient taking all medications as directed in the discharge summary?   Does patient understand their discharge instructions:   Does patient have questions or concerns that need addressed prior to 7-14 day follow up office visit:     Additional needs identified to be addressed with provider               Scheduled appointment with PCP within 7-14 days    Follow Up  No future appointments.    Rae Goodman

## 2025-06-17 ENCOUNTER — APPOINTMENT (OUTPATIENT)
Dept: CT IMAGING | Age: 50
End: 2025-06-17

## 2025-06-17 ENCOUNTER — HOSPITAL ENCOUNTER (EMERGENCY)
Age: 50
Discharge: ANOTHER HOSPITAL | End: 2025-06-18
Attending: EMERGENCY MEDICINE

## 2025-06-17 VITALS
WEIGHT: 170 LBS | TEMPERATURE: 98.4 F | HEART RATE: 64 BPM | RESPIRATION RATE: 16 BRPM | OXYGEN SATURATION: 96 % | SYSTOLIC BLOOD PRESSURE: 158 MMHG | BODY MASS INDEX: 25.85 KG/M2 | DIASTOLIC BLOOD PRESSURE: 70 MMHG

## 2025-06-17 DIAGNOSIS — R41.0 DISORIENTATION: Primary | ICD-10-CM

## 2025-06-17 LAB
ALBUMIN SERPL-MCNC: 3.7 G/DL (ref 3.4–5)
ALBUMIN/GLOB SERPL: 1.3 {RATIO} (ref 1.1–2.2)
ALP SERPL-CCNC: 57 U/L (ref 40–129)
ALT SERPL-CCNC: 17 U/L (ref 10–40)
AMPHETAMINES UR QL SCN>1000 NG/ML: ABNORMAL
ANION GAP SERPL CALCULATED.3IONS-SCNC: 12 MMOL/L (ref 3–16)
AST SERPL-CCNC: 18 U/L (ref 15–37)
BARBITURATES UR QL SCN>200 NG/ML: ABNORMAL
BASOPHILS # BLD: 0.1 K/UL (ref 0–0.2)
BASOPHILS NFR BLD: 1 %
BENZODIAZ UR QL SCN>200 NG/ML: POSITIVE
BILIRUB SERPL-MCNC: 0.3 MG/DL (ref 0–1)
BUN SERPL-MCNC: 7 MG/DL (ref 7–20)
CALCIUM SERPL-MCNC: 9.5 MG/DL (ref 8.3–10.6)
CANNABINOIDS UR QL SCN>50 NG/ML: ABNORMAL
CHLORIDE SERPL-SCNC: 93 MMOL/L (ref 99–110)
CO2 SERPL-SCNC: 24 MMOL/L (ref 21–32)
COCAINE UR QL SCN: ABNORMAL
CREAT SERPL-MCNC: <0.5 MG/DL (ref 0.9–1.3)
DEPRECATED RDW RBC AUTO: 15.1 % (ref 12.4–15.4)
DRUG SCREEN COMMENT UR-IMP: ABNORMAL
EOSINOPHIL # BLD: 0 K/UL (ref 0–0.6)
EOSINOPHIL NFR BLD: 0.5 %
ETHANOLAMINE SERPL-MCNC: NORMAL MG/DL (ref 0–0.08)
FENTANYL SCREEN, URINE: ABNORMAL
GFR SERPLBLD CREATININE-BSD FMLA CKD-EPI: >90 ML/MIN/{1.73_M2}
GLUCOSE BLD-MCNC: 121 MG/DL
GLUCOSE BLD-MCNC: 121 MG/DL (ref 70–99)
GLUCOSE SERPL-MCNC: 107 MG/DL (ref 70–99)
HCT VFR BLD AUTO: 34.9 % (ref 40.5–52.5)
HGB BLD-MCNC: 11.8 G/DL (ref 13.5–17.5)
INR PPP: 0.92 (ref 0.86–1.14)
LACTATE BLDV-SCNC: 1.3 MMOL/L (ref 0.4–1.9)
LYMPHOCYTES # BLD: 0.8 K/UL (ref 1–5.1)
LYMPHOCYTES NFR BLD: 9.1 %
MCH RBC QN AUTO: 28.9 PG (ref 26–34)
MCHC RBC AUTO-ENTMCNC: 33.7 G/DL (ref 31–36)
MCV RBC AUTO: 85.9 FL (ref 80–100)
METHADONE UR QL SCN>300 NG/ML: ABNORMAL
MONOCYTES # BLD: 0.5 K/UL (ref 0–1.3)
MONOCYTES NFR BLD: 6.4 %
NEUTROPHILS # BLD: 7.1 K/UL (ref 1.7–7.7)
NEUTROPHILS NFR BLD: 83 %
OPIATES UR QL SCN>300 NG/ML: ABNORMAL
OXYCODONE UR QL SCN: ABNORMAL
PCP UR QL SCN>25 NG/ML: ABNORMAL
PERFORMED ON: ABNORMAL
PH UR STRIP: 7 [PH]
PLATELET # BLD AUTO: 455 K/UL (ref 135–450)
PMV BLD AUTO: 6.9 FL (ref 5–10.5)
POTASSIUM SERPL-SCNC: 4 MMOL/L (ref 3.5–5.1)
PROT SERPL-MCNC: 6.6 G/DL (ref 6.4–8.2)
PROTHROMBIN TIME: 12.7 SEC (ref 12.1–14.9)
RBC # BLD AUTO: 4.07 M/UL (ref 4.2–5.9)
SODIUM SERPL-SCNC: 129 MMOL/L (ref 136–145)
TROPONIN, HIGH SENSITIVITY: 24 NG/L (ref 0–22)
WBC # BLD AUTO: 8.6 K/UL (ref 4–11)

## 2025-06-17 PROCEDURE — 85025 COMPLETE CBC W/AUTO DIFF WBC: CPT

## 2025-06-17 PROCEDURE — 80307 DRUG TEST PRSMV CHEM ANLYZR: CPT

## 2025-06-17 PROCEDURE — 36415 COLL VENOUS BLD VENIPUNCTURE: CPT

## 2025-06-17 PROCEDURE — 93005 ELECTROCARDIOGRAM TRACING: CPT | Performed by: EMERGENCY MEDICINE

## 2025-06-17 PROCEDURE — 96374 THER/PROPH/DIAG INJ IV PUSH: CPT

## 2025-06-17 PROCEDURE — 99285 EMERGENCY DEPT VISIT HI MDM: CPT

## 2025-06-17 PROCEDURE — 70450 CT HEAD/BRAIN W/O DYE: CPT

## 2025-06-17 PROCEDURE — 84484 ASSAY OF TROPONIN QUANT: CPT

## 2025-06-17 PROCEDURE — 6360000002 HC RX W HCPCS: Performed by: EMERGENCY MEDICINE

## 2025-06-17 PROCEDURE — 85610 PROTHROMBIN TIME: CPT

## 2025-06-17 PROCEDURE — 70496 CT ANGIOGRAPHY HEAD: CPT

## 2025-06-17 PROCEDURE — 6360000004 HC RX CONTRAST MEDICATION: Performed by: EMERGENCY MEDICINE

## 2025-06-17 PROCEDURE — 80053 COMPREHEN METABOLIC PANEL: CPT

## 2025-06-17 PROCEDURE — 82077 ASSAY SPEC XCP UR&BREATH IA: CPT

## 2025-06-17 PROCEDURE — 83605 ASSAY OF LACTIC ACID: CPT

## 2025-06-17 PROCEDURE — 96375 TX/PRO/DX INJ NEW DRUG ADDON: CPT

## 2025-06-17 RX ORDER — LORAZEPAM 2 MG/ML
1 INJECTION INTRAMUSCULAR ONCE
Status: COMPLETED | OUTPATIENT
Start: 2025-06-17 | End: 2025-06-17

## 2025-06-17 RX ORDER — IOPAMIDOL 755 MG/ML
75 INJECTION, SOLUTION INTRAVASCULAR
Status: COMPLETED | OUTPATIENT
Start: 2025-06-17 | End: 2025-06-17

## 2025-06-17 RX ORDER — LEVETIRACETAM 500 MG/5ML
1500 INJECTION, SOLUTION, CONCENTRATE INTRAVENOUS ONCE
Status: COMPLETED | OUTPATIENT
Start: 2025-06-17 | End: 2025-06-17

## 2025-06-17 RX ADMIN — LEVETIRACETAM 1500 MG: 100 INJECTION INTRAVENOUS at 22:08

## 2025-06-17 RX ADMIN — LORAZEPAM 1 MG: 2 INJECTION INTRAMUSCULAR; INTRAVENOUS at 21:10

## 2025-06-17 RX ADMIN — IOPAMIDOL 75 ML: 755 INJECTION, SOLUTION INTRAVENOUS at 20:10

## 2025-06-17 RX ADMIN — LORAZEPAM 1 MG: 2 INJECTION INTRAMUSCULAR; INTRAVENOUS at 23:57

## 2025-06-17 ASSESSMENT — LIFESTYLE VARIABLES
HOW OFTEN DO YOU HAVE A DRINK CONTAINING ALCOHOL: NEVER
HOW MANY STANDARD DRINKS CONTAINING ALCOHOL DO YOU HAVE ON A TYPICAL DAY: PATIENT DOES NOT DRINK

## 2025-06-17 ASSESSMENT — PAIN - FUNCTIONAL ASSESSMENT
PAIN_FUNCTIONAL_ASSESSMENT: NONE - DENIES PAIN
PAIN_FUNCTIONAL_ASSESSMENT: NONE - DENIES PAIN
PAIN_FUNCTIONAL_ASSESSMENT: 0-10

## 2025-06-17 ASSESSMENT — PAIN DESCRIPTION - LOCATION: LOCATION: HEAD

## 2025-06-17 ASSESSMENT — PAIN DESCRIPTION - PAIN TYPE: TYPE: ACUTE PAIN

## 2025-06-17 ASSESSMENT — PAIN SCALES - GENERAL: PAINLEVEL_OUTOF10: 4

## 2025-06-18 ENCOUNTER — HOSPITAL ENCOUNTER (INPATIENT)
Age: 50
LOS: 2 days | Discharge: HOME | End: 2025-06-20
Attending: INTERNAL MEDICINE | Admitting: INTERNAL MEDICINE

## 2025-06-18 PROBLEM — G40.909 SEIZURE DISORDER (HCC): Status: ACTIVE | Noted: 2025-06-18

## 2025-06-18 PROBLEM — R56.9 SEIZURE (HCC): Status: ACTIVE | Noted: 2025-06-18

## 2025-06-18 LAB
EKG ATRIAL RATE: 63 BPM
EKG DIAGNOSIS: NORMAL
EKG P AXIS: 43 DEGREES
EKG P-R INTERVAL: 138 MS
EKG Q-T INTERVAL: 442 MS
EKG QRS DURATION: 78 MS
EKG QTC CALCULATION (BAZETT): 452 MS
EKG R AXIS: 56 DEGREES
EKG T AXIS: 67 DEGREES
EKG VENTRICULAR RATE: 63 BPM
MAGNESIUM SERPL-MCNC: 1.46 MG/DL (ref 1.8–2.4)

## 2025-06-18 PROCEDURE — 2060000000 HC ICU INTERMEDIATE R&B

## 2025-06-18 PROCEDURE — 6360000002 HC RX W HCPCS: Performed by: INTERNAL MEDICINE

## 2025-06-18 PROCEDURE — 2580000003 HC RX 258: Performed by: INTERNAL MEDICINE

## 2025-06-18 PROCEDURE — 93010 ELECTROCARDIOGRAM REPORT: CPT | Performed by: INTERNAL MEDICINE

## 2025-06-18 PROCEDURE — 6370000000 HC RX 637 (ALT 250 FOR IP): Performed by: PSYCHIATRY & NEUROLOGY

## 2025-06-18 PROCEDURE — 36415 COLL VENOUS BLD VENIPUNCTURE: CPT

## 2025-06-18 PROCEDURE — 6370000000 HC RX 637 (ALT 250 FOR IP): Performed by: INTERNAL MEDICINE

## 2025-06-18 PROCEDURE — 83735 ASSAY OF MAGNESIUM: CPT

## 2025-06-18 RX ORDER — POLYETHYLENE GLYCOL 3350 17 G/17G
17 POWDER, FOR SOLUTION ORAL DAILY PRN
Status: DISCONTINUED | OUTPATIENT
Start: 2025-06-18 | End: 2025-06-20 | Stop reason: HOSPADM

## 2025-06-18 RX ORDER — POTASSIUM CHLORIDE 7.45 MG/ML
10 INJECTION INTRAVENOUS PRN
Status: DISCONTINUED | OUTPATIENT
Start: 2025-06-18 | End: 2025-06-20 | Stop reason: HOSPADM

## 2025-06-18 RX ORDER — LEVETIRACETAM 750 MG/1
750 TABLET ORAL 2 TIMES DAILY
Status: DISCONTINUED | OUTPATIENT
Start: 2025-06-18 | End: 2025-06-20 | Stop reason: HOSPADM

## 2025-06-18 RX ORDER — HYDROMORPHONE HYDROCHLORIDE 1 MG/ML
0.25 INJECTION, SOLUTION INTRAMUSCULAR; INTRAVENOUS; SUBCUTANEOUS
Refills: 0 | Status: DISCONTINUED | OUTPATIENT
Start: 2025-06-18 | End: 2025-06-19

## 2025-06-18 RX ORDER — ACETAMINOPHEN 650 MG/1
650 SUPPOSITORY RECTAL EVERY 6 HOURS PRN
Status: DISCONTINUED | OUTPATIENT
Start: 2025-06-18 | End: 2025-06-20 | Stop reason: HOSPADM

## 2025-06-18 RX ORDER — ACETAMINOPHEN 325 MG/1
650 TABLET ORAL EVERY 6 HOURS PRN
Status: DISCONTINUED | OUTPATIENT
Start: 2025-06-18 | End: 2025-06-20 | Stop reason: HOSPADM

## 2025-06-18 RX ORDER — SODIUM CHLORIDE 9 MG/ML
INJECTION, SOLUTION INTRAVENOUS CONTINUOUS
Status: DISCONTINUED | OUTPATIENT
Start: 2025-06-18 | End: 2025-06-20 | Stop reason: HOSPADM

## 2025-06-18 RX ORDER — SODIUM CHLORIDE 0.9 % (FLUSH) 0.9 %
5-40 SYRINGE (ML) INJECTION PRN
Status: DISCONTINUED | OUTPATIENT
Start: 2025-06-18 | End: 2025-06-20 | Stop reason: HOSPADM

## 2025-06-18 RX ORDER — M-VIT,TX,IRON,MINS/CALC/FOLIC 27MG-0.4MG
1 TABLET ORAL DAILY
Status: DISCONTINUED | OUTPATIENT
Start: 2025-06-18 | End: 2025-06-20 | Stop reason: HOSPADM

## 2025-06-18 RX ORDER — MAGNESIUM SULFATE IN WATER 40 MG/ML
2000 INJECTION, SOLUTION INTRAVENOUS PRN
Status: DISCONTINUED | OUTPATIENT
Start: 2025-06-18 | End: 2025-06-20 | Stop reason: HOSPADM

## 2025-06-18 RX ORDER — ONDANSETRON 4 MG/1
4 TABLET, ORALLY DISINTEGRATING ORAL EVERY 8 HOURS PRN
Status: DISCONTINUED | OUTPATIENT
Start: 2025-06-18 | End: 2025-06-20 | Stop reason: HOSPADM

## 2025-06-18 RX ORDER — ONDANSETRON 2 MG/ML
4 INJECTION INTRAMUSCULAR; INTRAVENOUS EVERY 6 HOURS PRN
Status: DISCONTINUED | OUTPATIENT
Start: 2025-06-18 | End: 2025-06-20 | Stop reason: HOSPADM

## 2025-06-18 RX ORDER — AMLODIPINE BESYLATE 5 MG/1
5 TABLET ORAL DAILY
Status: DISCONTINUED | OUTPATIENT
Start: 2025-06-18 | End: 2025-06-20 | Stop reason: HOSPADM

## 2025-06-18 RX ORDER — ESCITALOPRAM OXALATE 20 MG/1
20 TABLET ORAL DAILY
Status: DISCONTINUED | OUTPATIENT
Start: 2025-06-18 | End: 2025-06-20 | Stop reason: HOSPADM

## 2025-06-18 RX ORDER — SODIUM CHLORIDE 0.9 % (FLUSH) 0.9 %
5-40 SYRINGE (ML) INJECTION EVERY 12 HOURS SCHEDULED
Status: DISCONTINUED | OUTPATIENT
Start: 2025-06-18 | End: 2025-06-20 | Stop reason: HOSPADM

## 2025-06-18 RX ORDER — POTASSIUM CHLORIDE 1500 MG/1
40 TABLET, EXTENDED RELEASE ORAL PRN
Status: DISCONTINUED | OUTPATIENT
Start: 2025-06-18 | End: 2025-06-20 | Stop reason: HOSPADM

## 2025-06-18 RX ORDER — HYDROMORPHONE HYDROCHLORIDE 1 MG/ML
0.5 INJECTION, SOLUTION INTRAMUSCULAR; INTRAVENOUS; SUBCUTANEOUS
Refills: 0 | Status: DISCONTINUED | OUTPATIENT
Start: 2025-06-18 | End: 2025-06-19

## 2025-06-18 RX ORDER — SODIUM CHLORIDE 9 MG/ML
INJECTION, SOLUTION INTRAVENOUS PRN
Status: DISCONTINUED | OUTPATIENT
Start: 2025-06-18 | End: 2025-06-20 | Stop reason: HOSPADM

## 2025-06-18 RX ORDER — THIAMINE HYDROCHLORIDE 100 MG/ML
100 INJECTION, SOLUTION INTRAMUSCULAR; INTRAVENOUS DAILY
Status: DISCONTINUED | OUTPATIENT
Start: 2025-06-18 | End: 2025-06-20 | Stop reason: HOSPADM

## 2025-06-18 RX ADMIN — Medication 1 TABLET: at 11:41

## 2025-06-18 RX ADMIN — AMLODIPINE BESYLATE 5 MG: 5 TABLET ORAL at 11:41

## 2025-06-18 RX ADMIN — ACETAMINOPHEN 650 MG: 325 TABLET ORAL at 08:12

## 2025-06-18 RX ADMIN — ESCITALOPRAM OXALATE 20 MG: 20 TABLET, FILM COATED ORAL at 11:41

## 2025-06-18 RX ADMIN — SODIUM CHLORIDE: 0.9 INJECTION, SOLUTION INTRAVENOUS at 15:52

## 2025-06-18 RX ADMIN — LEVETIRACETAM 750 MG: 750 TABLET, FILM COATED ORAL at 20:12

## 2025-06-18 RX ADMIN — SODIUM CHLORIDE: 0.9 INJECTION, SOLUTION INTRAVENOUS at 02:17

## 2025-06-18 RX ADMIN — THIAMINE HYDROCHLORIDE 100 MG: 100 INJECTION, SOLUTION INTRAMUSCULAR; INTRAVENOUS at 02:20

## 2025-06-18 RX ADMIN — HYDROMORPHONE HYDROCHLORIDE 0.5 MG: 1 INJECTION, SOLUTION INTRAMUSCULAR; INTRAVENOUS; SUBCUTANEOUS at 02:26

## 2025-06-18 ASSESSMENT — PAIN DESCRIPTION - ONSET: ONSET: GRADUAL

## 2025-06-18 ASSESSMENT — PAIN - FUNCTIONAL ASSESSMENT
PAIN_FUNCTIONAL_ASSESSMENT: NONE - DENIES PAIN
PAIN_FUNCTIONAL_ASSESSMENT: ACTIVITIES ARE NOT PREVENTED

## 2025-06-18 ASSESSMENT — PAIN DESCRIPTION - PAIN TYPE
TYPE: ACUTE PAIN
TYPE: ACUTE PAIN

## 2025-06-18 ASSESSMENT — PAIN DESCRIPTION - ORIENTATION: ORIENTATION: MID

## 2025-06-18 ASSESSMENT — PAIN DESCRIPTION - LOCATION
LOCATION: HEAD
LOCATION: HEAD

## 2025-06-18 ASSESSMENT — PAIN DESCRIPTION - DESCRIPTORS
DESCRIPTORS: ACHING
DESCRIPTORS: ACHING

## 2025-06-18 ASSESSMENT — PAIN SCALES - GENERAL
PAINLEVEL_OUTOF10: 0
PAINLEVEL_OUTOF10: 8
PAINLEVEL_OUTOF10: 6
PAINLEVEL_OUTOF10: 9
PAINLEVEL_OUTOF10: 3

## 2025-06-18 ASSESSMENT — PAIN DESCRIPTION - FREQUENCY: FREQUENCY: INTERMITTENT

## 2025-06-18 NOTE — PROGRESS NOTES
Evangelist Ortega is a 49 y.o. male who was admitted overnight and a formal H&P has been noted in the chart.  With significant past medical history of alcohol use now sober for the last 3 weeks, bilateral subdural hematomas, hypertension, major depression, generalized anxiety who is now presenting from home with acute mental status changes and questionable seizures;    Patient is admitted overnight with acute encephalopathy.  Noted to have hyponatremia which is chronic.  He  He had a CT CTA which have been stable with bilateral hygromas  UA UDS negative  Neurology has been consulted  Per chart review patient was discharged recently on Keppra 500 mg p.o. twice daily not sure whether he took it he was admitted with an SDH and had a long ICU stay for an SDH and possible seizure neurology was admitted and started him on above medication.  Patient reported that he continues to drink and he likely was intoxicated yesterday thus the reason why he came to the hospital    Neurology consult has been pending  He does not wish to go to rehab facility.    Tena Jeffrey, CARLA - CNP  6/18/2025  1:49 PM

## 2025-06-18 NOTE — PROGRESS NOTES
Patient alert to place/slef, VSS, RA. Patient agitated at times, pulling equipment off. Patient taking fluids by mouth, low PO intake. Voiding acaudately via urinal/incontinent briefs. No bowel movement this shift. IVF infusing. All safety precautions in place, call light/items in reach.

## 2025-06-18 NOTE — CARE COORDINATION
Case Management Assessment  Initial Evaluation    Date/Time of Evaluation: 6/18/2025 2:42 PM  Assessment Completed by: Cristiana Pate RN    If patient is discharged prior to next notation, then this note serves as note for discharge by case management.    Patient Name: Evangelist Ortega                   YOB: 1975  Diagnosis: Seizure (HCC) [R56.9]  Seizure disorder (HCC) [G40.909]                   Date / Time: 6/18/2025  1:08 AM    Patient Admission Status: Inpatient   Readmission Risk (Low < 19, Mod (19-27), High > 27): Readmission Risk Score: 9.7    Current PCP: Evangelist Schulz, DO  PCP verified by CM? No    Chart Reviewed: Yes      History Provided by: Patient  Patient Orientation: Alert and Oriented    Patient Cognition: Alert    Hospitalization in the last 30 days (Readmission):  No    If yes, Readmission Assessment in CM Navigator will be completed.    Advance Directives:      Code Status: Full Code   Patient's Primary Decision Maker is: Legal Next of Kin    Primary Decision Maker: Mary Ortega - Parent - [phone]    Secondary Decision Maker: Rob Ortega - Parent - [phone]    Discharge Planning:    Patient lives with: Parent Type of Home: House  Primary Care Giver: Self  Patient Support Systems include: Family Members   Current Financial resources: None  Current community resources: None  Current services prior to admission: None            Current DME:              Type of Home Care services:  None    ADLS  Prior functional level: Independent in ADLs/IADLs  Current functional level: Independent in ADLs/IADLs    PT AM-PAC:   /24  OT AM-PAC:   /24    Family can provide assistance at DC: No  Would you like Case Management to discuss the discharge plan with any other family members/significant others, and if so, who? No  Plans to Return to Present Housing: Yes  Other Identified Issues/Barriers to RETURNING to current housing: none  Potential Assistance needed at discharge: N/A             Potential DME:    Patient expects to discharge to: House  Plan for transportation at discharge:      Financial    Payor: /     Does insurance require precert for SNF: No    Potential assistance Purchasing Medications: Yes  Meds-to-Beds request:        [id number] - [place], [place] - [address] - P [phone] - F [phone]  [address]  [facility] [address]  Phone: [phone] Fax: [phone]    [facility] [address] [facility] - [place], [place] - [address] - P [phone] - F [phone]  [address] [address]  Phone: [phone] Fax: [phone]    [facility] - [place], [place] - [address] - P [phone] - F [phone]  [address]  [facility] [address]  Phone: [phone] Fax: [phone]    [id number] - [place], [place] - [address] -  [phone] - F [phone]  [address] [address]  Phone: [phone] Fax: [phone]      Notes:    Factors facilitating achievement of predicted outcomes: Family support    Barriers to discharge: Limited safety awareness and Limited insight into deficits    Additional Case Management Notes: Patient states that he has not been drinking, was not taking his medications.  CM familiar with patient from previous discharge.  Patient discharged home with RW and meds from hospital and lyft home.  Patient doesn't know why he wasn't taking his meds.  Patient states he plans to return home at discharge.    The Plan for Transition of Care is related to the following treatment goals of Seizure (HCC) [R56.9]  Seizure disorder (HCC) [G40.909]    IF APPLICABLE: The Patient and/or patient representative Evangelist and his family were provided with a choice of provider and agrees with the discharge plan. Freedom of choice list with basic dialogue that supports the patient's individualized plan of care/goals and shares the quality data associated with the providers was provided

## 2025-06-18 NOTE — PROGRESS NOTES
4 Eyes Skin Assessment     NAME:  Evangelist Ortega  YOB: 1975  MEDICAL RECORD NUMBER:  [id number]    The patient is being assessed for  Admission    I agree that at least one RN has performed a thorough Head to Toe Skin Assessment on the patient. ALL assessment sites listed below have been assessed.      Areas assessed by both nurses:    Head, Face, Ears, Shoulders, Back, Chest, Arms, Elbows, Hands, Sacrum. Buttock, Coccyx, Ischium, Legs. Feet and Heels, and Under Medical Devices     Bruising to abdomen and bilateral arms.         Does the Patient have a Wound? No noted wound(s)       Mateusz Prevention initiated by RN: Yes  Wound Care Orders initiated by RN: No    Pressure Injury (Stage 3,4, Unstageable, DTI, NWPT, and Complex wounds) if present, place Wound referral order by RN under : No    New Ostomies, if present place, Ostomy referral order under : NO     Nurse 1 eSignature: Electronically signed by Ronda Faith RN on 6/18/25 at 1:20 AM EDT    **SHARE this note so that the co-signing nurse can place an eSignature**    Nurse 2 eSignature: {Esignature:[id number]}

## 2025-06-18 NOTE — H&P
V2.0  History and Physical      Name:  Evangelist Ortega /Age/Sex: 1975  (49 y.o. male)   MRN & CSN:  [id number] & [id number] Encounter Date/Time: 2025 1:42 AM EDT   Location:  55/5513-01 PCP: Evangelist Schulz DO       Hospital Day: 1    Assessment and Plan:   Evangelist Ortega is a 49 y.o. male with significant past medical history of alcohol use now sober for the last 3 weeks, bilateral subdural hematomas, hypertension, major depression, generalized anxiety who is now presenting from home with acute mental status changes and questionable seizures     Acute encephalopathy and waxing and waning mental status per family      All other metabolic work up negative  CT/CTA stable, BL hygromas which are stable  UA negative  UDS negative  Will give normal saline- monitor NA  Consult neurology  Rule out seizures with EEG- got loaded with keppra and ativan in OSH ER  IV thiamine    2. Essential HTN- chronic    Resume norvasc    3. Major depression in remission, chronic    Resume lexapro    4. Urinary retention POA    Anguiano was placed    Hospital Problems           Last Modified POA    * (Principal) Seizure (HCC) 2025 Yes       Disposition:   Current Living situation: home  Expected Disposition: home  Estimated D/C: TBD    Diet ADULT DIET; Regular   DVT Prophylaxis [] Lovenox, []  Heparin, [x] SCDs, [] Ambulation,  [] Eliquis, [] Xarelto, [] Coumadin   Code Status Full Code   Surrogate Decision Maker/ POA      Personally reviewed Lab Studies and Imaging         History from:     patient, electronic medical record    History of Present Illness:     Chief Complaint: confusion    Per OSH ER report    Evangelist Ortega is a 49 y.o. male with history of alcoholism prior seizure and subdural hematomas who presents to the emergency department per EMS with acute altered mental status.  History is provided primarily by the parents as the patient cannot meaningfully contribute to his history.  They explained

## 2025-06-18 NOTE — ED NOTES
Pt bladder scanned r/t inability to follow commands to void in urinal.  Scan revealed 999+.  Attempted f/c placement using 16F, unsuccessful r/t resistance.  Place 14F Coude and obtained urine return.

## 2025-06-18 NOTE — PROGRESS NOTES
Patient admitted to room 5513. Report received from JEANINE Miller via telephone. VSS on room air, exceptions to elevated BP. Patient is a/o x1 (to self).  Patient oriented to room and call light. Rights and responsibilities provided to patient, and welcome packet provided to patient. 4 eyes skin assessment completed with 2nd RN.

## 2025-06-18 NOTE — ED PROVIDER NOTES
[facility] EMERGENCY DEPARTMENT     Pt Name: Evangelist Ortega   MRN: [id number]   Birthdate 1975   Date of evaluation: 6/17/2025   Provider: Dl Simms MD   PCP: Evangelist Schulz DO   Note Started: 9:47 PM EDT 6/17/25     TRIAGE CHIEF COMPLAINT:  Chief Complaint   Patient presents with    Altered Mental Status     Medic state that family called that pt was acting confused and not normal since about 18:00 tonight.      HISTORY OF PRESENT ILLNESS:  Evangelist Ortega is a 49 y.o. male with a history of alcoholism prior seizure and subdural hematomas who presents to the emergency department per EMS with acute altered mental status.  History is provided primarily by the parents as the patient cannot meaningfully contribute to his history.  They explained that he has not had any alcohol for about 3 weeks and has been staying with them and trying to recover.  He had withdrawal initially but those symptoms have resolved.  He has continued to complain of headaches intermittently and has a history of bilateral subdural hematomas.  He was seen at the [place] emergency department yesterday and CT scan that time was unchanged from prior CT scans.  He said that he came home after that visit felt much better.  He ate breakfast and was acting normally all day was up ambulatory speaking and was comfortable.  Then about 6 PM he walked out of his room and he had a pair of pants on backwards and he was trying to put on a second pair of pants inside out.  He was not making sense was very confused and was trying to tie shoes that were not there tried to put on a belt that was not there.  So they called EMS who transported to the in the ED and he arrives in a similar condition.  The family did not witness any seizure activity any fall any injury.  And they say the last time they saw him several hours before this he was \"normal\".    REVIEW OF SYSTEMS:  See HPI for further details. Review of systems otherwise

## 2025-06-18 NOTE — ED TRIAGE NOTES
Family called medics stating that pt was acting confused at home. Pt is able to tell RN his name and  but he is confused on where he is and the year. Pt is sleepy.

## 2025-06-18 NOTE — PROGRESS NOTES
Call placed to Pradeep Miller for report. Nurse unable to give report at this time. RN will call back when nurse is more available.

## 2025-06-18 NOTE — CONSULTS
[place] [id number]                              CONSULTATION      PATIENT NAME: NAHOMY MACKAY              : 1975  MED REC NO: [id number]                      ROOM: [id number]  ACCOUNT NO: [id number]                       ADMIT DATE: 2025  PROVIDER: Ronny Morel MD    NEUROLOGY CONSULTATION    CONSULT DATE: 2025    REFERRING PHYSICIAN:  Dr. Seema Teixeira      IDENTIFYING INFORMATION:  The patient is a 49-year-old right-handed gentleman who was admitted to [facility] following transfer from [place] [facility] on .    REASON FOR CONSULTATION:  Mental status change.    HISTORY OF PRESENT ILLNESS:  The patient has a long history of alcohol use disorder.  He was hospitalized from  through  for elevation liver enzymes and leukopenia, felt to be due to alcohol dependence.  He was admitted for detoxification.    He apparently was seen in emergency room on 2025, with concerns about alcohol use and possible detoxification.    He was then admitted to [facility] from May 31st through .  He apparently was found unresponsive at home.  Mental status improved somewhat on the way to the hospital.  He was noted to have 2 witnessed generalized seizures.  He was started on levetiracetam therapy.  He was noted to have hyponatremia with sodium of 115.  He required intubation.  He was treated for alcohol withdrawal and delirium tremens.  Imaging showed a right subdural hematoma and a left middle cranial fossa arachnoid cyst.  I do not see that he was clearly documented as having nystagmus during his hospital stay.  His status subsequently improved to the point he was able to be discharged home on .    He was seen at the [place] [facility] on  with complaints of headache.  Head CT scan was obtained, which showed stable findings.  Sodium

## 2025-06-18 NOTE — CONSULTS
Dictated # [id number]    Hospitalized 5/31-6/11 with new onset seizures and alcohol withdrawal  Right frontal SDH noted  Treated for alcohol withdrawal and was discharged home with family 6/11 on Keppra 500 mg BID    Reportedly remained sober following discharged  Had acute confusion without witnessed seizure on 6/17 and was transported to [facility]    Awake, fairly alert, somewhat confused and restless  Moderate gaze directional nystagmus on horizontal gaze bilaterally  Left arm strength 4+ with drift  Mild left leg weakness  CT head stable and CTA head and neck OK    Received Ativan and Keppra in ER  Alcohol level was 0    Plan:  On IV thiamine and multivitamin  Keppra 750 mg PO BID  Increase activity as tolerated

## 2025-06-19 LAB
ALBUMIN SERPL-MCNC: 3.8 G/DL (ref 3.4–5)
ALBUMIN/GLOB SERPL: 1.4 {RATIO} (ref 1.1–2.2)
ALP SERPL-CCNC: 60 U/L (ref 40–129)
ALT SERPL-CCNC: 16 U/L (ref 10–40)
ANION GAP SERPL CALCULATED.3IONS-SCNC: 13 MMOL/L (ref 3–16)
ANION GAP SERPL CALCULATED.3IONS-SCNC: 13 MMOL/L (ref 3–16)
AST SERPL-CCNC: 20 U/L (ref 15–37)
BILIRUB SERPL-MCNC: 0.4 MG/DL (ref 0–1)
BUN SERPL-MCNC: 5 MG/DL (ref 7–20)
BUN SERPL-MCNC: 7 MG/DL (ref 7–20)
CALCIUM SERPL-MCNC: 9.1 MG/DL (ref 8.3–10.6)
CALCIUM SERPL-MCNC: 9.4 MG/DL (ref 8.3–10.6)
CHLORIDE SERPL-SCNC: 92 MMOL/L (ref 99–110)
CHLORIDE SERPL-SCNC: 99 MMOL/L (ref 99–110)
CO2 SERPL-SCNC: 24 MMOL/L (ref 21–32)
CO2 SERPL-SCNC: 24 MMOL/L (ref 21–32)
CREAT SERPL-MCNC: 0.5 MG/DL (ref 0.9–1.3)
CREAT SERPL-MCNC: 0.6 MG/DL (ref 0.9–1.3)
GFR SERPLBLD CREATININE-BSD FMLA CKD-EPI: >90 ML/MIN/{1.73_M2}
GFR SERPLBLD CREATININE-BSD FMLA CKD-EPI: >90 ML/MIN/{1.73_M2}
GLUCOSE SERPL-MCNC: 86 MG/DL (ref 70–99)
GLUCOSE SERPL-MCNC: 92 MG/DL (ref 70–99)
MAGNESIUM SERPL-MCNC: 1.63 MG/DL (ref 1.8–2.4)
POTASSIUM SERPL-SCNC: 3.5 MMOL/L (ref 3.5–5.1)
POTASSIUM SERPL-SCNC: 3.9 MMOL/L (ref 3.5–5.1)
PROT SERPL-MCNC: 6.6 G/DL (ref 6.4–8.2)
SODIUM SERPL-SCNC: 129 MMOL/L (ref 136–145)
SODIUM SERPL-SCNC: 136 MMOL/L (ref 136–145)

## 2025-06-19 PROCEDURE — 99232 SBSQ HOSP IP/OBS MODERATE 35: CPT

## 2025-06-19 PROCEDURE — 36415 COLL VENOUS BLD VENIPUNCTURE: CPT

## 2025-06-19 PROCEDURE — 83735 ASSAY OF MAGNESIUM: CPT

## 2025-06-19 PROCEDURE — 84425 ASSAY OF VITAMIN B-1: CPT

## 2025-06-19 PROCEDURE — 80053 COMPREHEN METABOLIC PANEL: CPT

## 2025-06-19 PROCEDURE — 6370000000 HC RX 637 (ALT 250 FOR IP): Performed by: PSYCHIATRY & NEUROLOGY

## 2025-06-19 PROCEDURE — 2060000000 HC ICU INTERMEDIATE R&B

## 2025-06-19 PROCEDURE — 6360000002 HC RX W HCPCS: Performed by: INTERNAL MEDICINE

## 2025-06-19 PROCEDURE — 2580000003 HC RX 258: Performed by: INTERNAL MEDICINE

## 2025-06-19 PROCEDURE — 6370000000 HC RX 637 (ALT 250 FOR IP): Performed by: INTERNAL MEDICINE

## 2025-06-19 RX ORDER — 0.9 % SODIUM CHLORIDE 0.9 %
500 INTRAVENOUS SOLUTION INTRAVENOUS ONCE
Status: COMPLETED | OUTPATIENT
Start: 2025-06-19 | End: 2025-06-20

## 2025-06-19 RX ORDER — LEVETIRACETAM 750 MG/1
750 TABLET ORAL 2 TIMES DAILY
Qty: 60 TABLET | Refills: 3 | Status: SHIPPED | OUTPATIENT
Start: 2025-06-19 | End: 2025-06-20

## 2025-06-19 RX ORDER — MAGNESIUM SULFATE IN WATER 40 MG/ML
2000 INJECTION, SOLUTION INTRAVENOUS PRN
Status: DISCONTINUED | OUTPATIENT
Start: 2025-06-19 | End: 2025-06-19 | Stop reason: SDUPTHER

## 2025-06-19 RX ADMIN — LEVETIRACETAM 750 MG: 750 TABLET, FILM COATED ORAL at 20:42

## 2025-06-19 RX ADMIN — THIAMINE HYDROCHLORIDE 100 MG: 100 INJECTION, SOLUTION INTRAMUSCULAR; INTRAVENOUS at 08:11

## 2025-06-19 RX ADMIN — MAGNESIUM SULFATE HEPTAHYDRATE 2000 MG: 40 INJECTION, SOLUTION INTRAVENOUS at 10:28

## 2025-06-19 RX ADMIN — LEVETIRACETAM 750 MG: 750 TABLET, FILM COATED ORAL at 08:11

## 2025-06-19 RX ADMIN — ACETAMINOPHEN 650 MG: 325 TABLET ORAL at 08:11

## 2025-06-19 RX ADMIN — AMLODIPINE BESYLATE 5 MG: 5 TABLET ORAL at 08:12

## 2025-06-19 RX ADMIN — ESCITALOPRAM OXALATE 20 MG: 20 TABLET, FILM COATED ORAL at 08:12

## 2025-06-19 RX ADMIN — Medication 1 TABLET: at 08:12

## 2025-06-19 RX ADMIN — SODIUM CHLORIDE 950 ML: 0.9 INJECTION, SOLUTION INTRAVENOUS at 21:14

## 2025-06-19 ASSESSMENT — PAIN SCALES - GENERAL
PAINLEVEL_OUTOF10: 2
PAINLEVEL_OUTOF10: 4

## 2025-06-19 ASSESSMENT — PAIN DESCRIPTION - DESCRIPTORS: DESCRIPTORS: ACHING

## 2025-06-19 ASSESSMENT — PAIN DESCRIPTION - FREQUENCY: FREQUENCY: CONTINUOUS

## 2025-06-19 ASSESSMENT — PAIN - FUNCTIONAL ASSESSMENT: PAIN_FUNCTIONAL_ASSESSMENT: ACTIVITIES ARE NOT PREVENTED

## 2025-06-19 ASSESSMENT — PAIN DESCRIPTION - LOCATION: LOCATION: HEAD

## 2025-06-19 ASSESSMENT — PAIN DESCRIPTION - PAIN TYPE: TYPE: ACUTE PAIN

## 2025-06-19 ASSESSMENT — PAIN DESCRIPTION - ONSET: ONSET: ON-GOING

## 2025-06-19 ASSESSMENT — PAIN DESCRIPTION - ORIENTATION: ORIENTATION: MID

## 2025-06-19 NOTE — PROGRESS NOTES
Patient alert and oriented x4, confused at times VSS, RA. Ambulates x 1GB assist, tolerates fairly well. Voiding adequately via BRP, no BM this shift. Pain managed via MAR and non-pharm measures. All safety precautions in place, call light/items in reach. Will

## 2025-06-19 NOTE — PROGRESS NOTES
Pt ano2, VSSRA. Calm / cooperative. Assist x1 GB. Voiding BRP/urinal. No BM this shift. Turns self. No pain endorsed this shift. Neuro stable, all fall/ safety precautions in place. RACHEL, POC cont.

## 2025-06-19 NOTE — DISCHARGE SUMMARY
V2.0  Discharge Summary    Name:  Evangelist Ortega /Age/Sex: 1975 (49 y.o. male)   Admit Date: 2025  Discharge Date: 25    MRN & CSN:  [id number] & [id number] Encounter Date and Time 25 1:03 PM EDT    Attending:  Seema Teixeira MD Discharging Provider: CARLA Alfaro [facility] Course:     Brief HPI: Evangelist Ortega is a 49 y.o. male who presented With significant past medical history of alcohol use now sober for the last 3 weeks, bilateral subdural hematomas, hypertension, major depression, generalized anxiety who is now presenting from home with acute mental status changes and questionable seizures;     Patient is admitted overnight with acute encephalopathy.   Patient was staying with his parents subsequently found to have acute change in mentation unclear history questionable seizure activity no documentation of incontinence no tongue biting not sure he was taking his Keppra though he told me today that he was taking his prescribed dose of Keppra   Noted to have hyponatremia which is chronic.  He  He had a CT CTA which have been stable with bilateral hygromas  UA UDS negative  Neurology has been consulted  Per chart review patient was discharged recently on Keppra 500 mg p.o. twice daily not sure whether he took it he was admitted with an SDH and had a long ICU stay for an SDH and possible seizure neurology was admitted and started him on above medication.      Neurology saw the patient ordered thiamine replacement Keppra increased to 750 mg p.o. twice daily no need for EEG electrolytes were replaced and corrected patient is stable to go home with seizure precaution including seizure safety and driving precautions and patient expressed understanding. Seizure Driving Risk: Having a Seizure while driving puts you at risk for injuring yourself or others. If you drive while having uncontrolled seizures, you may be held liable for injuries to others. Do not drive until you

## 2025-06-19 NOTE — PROGRESS NOTES
NEUROLOGY PROGRESS NOTE       Patient Name: Evangelist Ortega YOB: 1975   Sex: Male Age: 49 yrs     Presenting CC: No chief complaint on file.    Reason for Consult: mental status change    Changes over last 24 hours:   Feels tired but much more back to himself this AM  He is unsure if Keppra was one of the medications he was taking prior to admission  Alert and oriented      ROS: +fatigue, otherwise no complaints    ASSESSMENT & RECOMMENDATIONS   Assessment:  50 yo patient who, By report, had been in reasonable cognitive status at home with parents, but subsequently was found to have fairly acute change in status with confusion, difficulty getting dressed.  The actually of his history is unclear.  He was not witnessed to have any seizure activity.  There is no documentation of incontinence.  There is no apparent evidence of tongue biting.  It is unclear whether he was taking his levetiracetam that was prescribed at discharge.  It is certainly possible that he may have had an unwitnessed seizure with postictal mental status changes.     Head CT scan is reassuring and that it shows stable findings with right subdural hematoma and subdural hygroma as well as a left subdural hygroma and a baseline middle cranial fossa arachnoid cyst.  He is afebrile without leukocytosis. He reports his nystagmus is not a new finding and that his eyes typically \"bounce\".    Plan:  - Thiamine level ordered  - On thiamine replacement.   - Continue Keppra 750 mg BID  - No need for further imaging at this time  - No need for EEG unless exam changes  - Electrolyte abnormality correction per primary team  - Discussed seizure safety and driving precautions and patient expressed understanding. Seizure Driving Risk: Having a Seizure while driving puts you at risk for injuring yourself or others. If you drive while having uncontrolled seizures, you may be held liable for injuries to others. Do not drive until you have been  after being appropriate earlier in the day.  He had a pair of pants on and was trying to put on another pair.  He was having difficulty trying to put on a belt.  In the emergency room, a CT scan of the head showed stable findings.  CT angiogram of the head and neck showed no significant vascular disease.  Glucose was 121 and sodium was 129.  He describes having some possible right upper extremity posturing.  He was described as having horizontal nystagmus.  He was dosed with lorazepam and levetiracetam 1500 mg.  He was [facility].     He reportedly has been sober for 3 weeks.  He apparently was having difficulty with urinary retention following presentation.     Currently, he is in bed, is somewhat restless and confused.  He states he feels \"pretty good.\"  He mentions that he is in the hospital due to \"alcohol withdrawal.\"  He also mentions being \"frustrated with my job.\"  He denies any alcohol use within the past several days.     Prior to his recent hospitalization, he may have been drinking 12 cans of beer per day.  He denies any recent recreational drug use.  He denies significant headache today, but states that headache may be minimal.  He denies dizziness.  He is unaware of double vision or distortion of vision.  He is not aware of any weakness or numbness of his arms or legs.  He denies any hallucinations.  He suggests that he may have been taking a seizure medication at home, but he cannot provide details.    Pertinent home medications:      Interval history:      [facility] Past Medical History:   Diagnosis Date    Alcohol abuse     Allergic rhinitis     Anxiety     Depression     Hypertension     Seizure ([facility]) 6/18/2025      Allergies Allergies   Allergen Reactions    No Known Allergies       Diet ADULT DIET; Regular   Isolation No active isolations     CURRENT SCHEDULED MEDICATIONS   Inpatient Medications     amLODIPine, 5 mg, Oral, Daily    escitalopram, 20 mg, Oral, Daily    therapeutic

## 2025-06-19 NOTE — CARE COORDINATION
Patient is from home with family, was discharged home recently.  Patient known to this CM, chart marked for merge.  CM spoke with Cole in public benefits, who states pt should be eligible for medicaid.  She is starting medicaid application for pt.  Would appreciate therapy recs.  Will continue to follow.    Cristiana Pate RN, BSN,    Ortho/Neuro   [phone]

## 2025-06-20 VITALS
DIASTOLIC BLOOD PRESSURE: 67 MMHG | HEART RATE: 62 BPM | OXYGEN SATURATION: 100 % | TEMPERATURE: 98.1 F | SYSTOLIC BLOOD PRESSURE: 108 MMHG | BODY MASS INDEX: 23.82 KG/M2 | HEIGHT: 68 IN | RESPIRATION RATE: 16 BRPM | WEIGHT: 157.19 LBS

## 2025-06-20 PROCEDURE — 2580000003 HC RX 258

## 2025-06-20 PROCEDURE — 6360000002 HC RX W HCPCS: Performed by: NURSE PRACTITIONER

## 2025-06-20 PROCEDURE — 6370000000 HC RX 637 (ALT 250 FOR IP): Performed by: PSYCHIATRY & NEUROLOGY

## 2025-06-20 PROCEDURE — 97535 SELF CARE MNGMENT TRAINING: CPT

## 2025-06-20 PROCEDURE — 97162 PT EVAL MOD COMPLEX 30 MIN: CPT

## 2025-06-20 PROCEDURE — 97116 GAIT TRAINING THERAPY: CPT

## 2025-06-20 PROCEDURE — 6360000002 HC RX W HCPCS: Performed by: INTERNAL MEDICINE

## 2025-06-20 PROCEDURE — 97166 OT EVAL MOD COMPLEX 45 MIN: CPT

## 2025-06-20 PROCEDURE — 6370000000 HC RX 637 (ALT 250 FOR IP): Performed by: INTERNAL MEDICINE

## 2025-06-20 RX ORDER — LEVETIRACETAM 750 MG/1
750 TABLET ORAL 2 TIMES DAILY
Qty: 60 TABLET | Refills: 3 | Status: ON HOLD | OUTPATIENT
Start: 2025-06-20 | End: 2025-06-23 | Stop reason: HOSPADM

## 2025-06-20 RX ORDER — ENOXAPARIN SODIUM 100 MG/ML
40 INJECTION SUBCUTANEOUS DAILY
Status: DISCONTINUED | OUTPATIENT
Start: 2025-06-20 | End: 2025-06-20 | Stop reason: HOSPADM

## 2025-06-20 RX ADMIN — THIAMINE HYDROCHLORIDE 100 MG: 100 INJECTION, SOLUTION INTRAMUSCULAR; INTRAVENOUS at 08:38

## 2025-06-20 RX ADMIN — Medication 1 TABLET: at 08:38

## 2025-06-20 RX ADMIN — AMLODIPINE BESYLATE 5 MG: 5 TABLET ORAL at 08:38

## 2025-06-20 RX ADMIN — LEVETIRACETAM 750 MG: 750 TABLET, FILM COATED ORAL at 08:38

## 2025-06-20 RX ADMIN — ENOXAPARIN SODIUM 40 MG: 100 INJECTION SUBCUTANEOUS at 08:37

## 2025-06-20 RX ADMIN — ESCITALOPRAM OXALATE 20 MG: 20 TABLET, FILM COATED ORAL at 08:38

## 2025-06-20 RX ADMIN — SODIUM CHLORIDE 500 ML: 0.9 INJECTION, SOLUTION INTRAVENOUS at 00:06

## 2025-06-20 NOTE — PROGRESS NOTES
Occupational Therapy  Facility/Department: [facility] 5T ORTHO/NEURO  Occupational Therapy Initial Assessment/Treatment    Name: Evangelist Ortega  : 1975  MRN: [id number]  Date of Service: 2025    Discharge Recommendations:  24 hour supervision or assist  OT Equipment Recommendations  Equipment Needed: No       Patient Diagnosis(es): There were no encounter diagnoses.  Past Medical History:  has a past medical history of Alcohol abuse, Allergic rhinitis, Anxiety, Depression, Hypertension, and Seizure (HCC).  Past Surgical History:  has no past surgical history on file.    Treatment Diagnosis: imp ADLs/transfers      Assessment  Performance deficits / Impairments: Decreased functional mobility ;Decreased safe awareness  Assessment: Pt is a 49 y.o. male who presents to [facility] with AMS and questionable seizures. Pt from home with parents, normally able to complete functional transfers and ADLs using RW PRN. Pt now presenting to OT evaluation close to functional baseline, pt completes functional transfers, LB dressing, toileting and bed mobility with CGA-SBA st RW. Pt limited by mild balance deficits. Recommending initial close to 24 hr supv/assist at discharge. Will continue to follow while in acute care.  Treatment Diagnosis: imp ADLs/transfers  Activity Tolerance  Activity Tolerance: Patient Tolerated treatment well     Plan  Occupational Therapy Plan  Times Per Week: 2-5  Current Treatment Recommendations: Balance training, Functional mobility training, Safety education & training, Patient/Caregiver education & training, Self-Care / ADL    Restrictions  Position Activity Restriction  Other Position/Activity Restrictions: up as tolerated    Subjective  General  Chart Reviewed: Yes, Orders  Patient assessed for rehabilitation services?: Yes  Additional Pertinent Hx: 48 yo patient who, By report, had been in reasonable cognitive status at home with parents, but subsequently was found to have fairly acute change in

## 2025-06-20 NOTE — PROGRESS NOTES
Pt awake in bed, alert and oriented x4, moments of disorientation. VSS on RA, pt exhibiting no s/s acute distress at this time. Pt tolerating PO diet well. Pt not ambulated this shift d/t sz precautions, pt encouraged to use call light for needs and oriented to fall prevention measures, demonstrating understanding. Pt voiding via urinal. Pt pain managed via MAR. Standard safety precautions in place, bed locked and in lowest position, bed/chair alarm on, gripper socks applied, bedside table/call light within reach, pt oriented to fall prevention measures. Pt states he Has no other needs at this time. Plan of care to continue. Video monitoring for sz precautions and safety.    Electronically signed by DEBORAH MUJICA RN on 6/20/2025 at 2:08 AM

## 2025-06-20 NOTE — PLAN OF CARE
Problem: Discharge Planning  Goal: Discharge to home or other facility with appropriate resources  6/18/2025 2211 by Janes Tai RN  Outcome: Progressing  Flowsheets (Taken 6/18/2025 0353 by Ronda Faith RN)  Discharge to home or other facility with appropriate resources: Identify barriers to discharge with patient and caregiver  Note: Pt plans to discharge home with support of family once medically cleared.      Problem: Confusion  Goal: Confusion, delirium, dementia, or psychosis is improved or at baseline  Description: INTERVENTIONS:  1. Assess for possible contributors to thought disturbance, including medications, impaired vision or hearing, underlying metabolic abnormalities, dehydration, psychiatric diagnoses, and notify attending LIP  2. [place] high risk fall precautions, as indicated  3. Provide frequent short contacts to provide reality reorientation, refocusing and direction  4. Decrease environmental stimuli, including noise as appropriate  5. Monitor and intervene to maintain adequate nutrition, hydration, elimination, sleep and activity  6. If unable to ensure safety without constant attention obtain sitter and review sitter guidelines with assigned personnel  7. Initiate Psychosocial CNS and Spiritual Care consult, as indicated  6/18/2025 2211 by Janes Tai RN  Outcome: Progressing  Flowsheets (Taken 6/18/2025 0353 by Ronda Faith RN)  Effect of thought disturbance (confusion, delirium, dementia, or psychosis) are managed with adequate functional status: Assess for contributors to thought disturbance, including medications, impaired vision or hearing, underlying metabolic abnormalities, dehydration, psychiatric diagnoses, notify LIP  Note: Reorientation provided when required. Cues given for ambulation.     Problem: Safety - Adult  Goal: Free from fall injury  6/18/2025 2211 by Janes Tai RN  Outcome: Progressing  Flowsheets (Taken 6/18/2025 2211)  Free From Fall Injury: 
  Problem: Discharge Planning  Goal: Discharge to home or other facility with appropriate resources  6/19/2025 1958 by Tessa Levin RN  Outcome: Progressing  Note: Collaboration with inter professional healthcare team to meet pt discharge needs  6/19/2025 0901 by Danita Alvarez RN  Outcome: Progressing     Problem: Confusion  Goal: Confusion, delirium, dementia, or psychosis is improved or at baseline  Description: INTERVENTIONS:  1. Assess for possible contributors to thought disturbance, including medications, impaired vision or hearing, underlying metabolic abnormalities, dehydration, psychiatric diagnoses, and notify attending LIP  2. [place] high risk fall precautions, as indicated  3. Provide frequent short contacts to provide reality reorientation, refocusing and direction  4. Decrease environmental stimuli, including noise as appropriate  5. Monitor and intervene to maintain adequate nutrition, hydration, elimination, sleep and activity  6. If unable to ensure safety without constant attention obtain sitter and review sitter guidelines with assigned personnel  7. Initiate Psychosocial CNS and Spiritual Care consult, as indicated  6/19/2025 1958 by Tessa Levin RN  Outcome: Progressing  6/19/2025 0901 by Danita Alvarez RN  Outcome: Progressing     Problem: Safety - Adult  Goal: Free from fall injury  6/19/2025 1958 by Tessa Levin RN  Outcome: Progressing  Note: [place] safety precautions in place, bed locked and in lowest position, bed/chair alarm on, bedside table/call light within reach, gripper socks applied, pt oriented to fall prevention measures.    6/19/2025 0901 by Danita Alvarez RN  Outcome: Progressing     Problem: Skin/Tissue Integrity  Goal: Skin integrity remains intact  Description: 1.  Monitor for areas of redness and/or skin breakdown  2.  Assess vascular access sites hourly  3.  Every 4-6 hours minimum:  Change oxygen saturation probe site  4.  Every 4-6 hours:  If on 
  Problem: Discharge Planning  Goal: Discharge to home or other facility with appropriate resources  Outcome: Progressing  Flowsheets (Taken 6/18/2025 0121)  Discharge to home or other facility with appropriate resources: Identify barriers to discharge with patient and caregiver   Pt involved in discharge planning. Barriers to discharge discussed with patient. Discharge learning needs identified. Discuss with patient any additional needed resources and transportation plans. Case management following plan of care.    Problem: Confusion  Goal: Confusion, delirium, dementia, or psychosis is improved or at baseline  Description: INTERVENTIONS:  1. Assess for possible contributors to thought disturbance, including medications, impaired vision or hearing, underlying metabolic abnormalities, dehydration, psychiatric diagnoses, and notify attending LIP  2. [place] high risk fall precautions, as indicated  3. Provide frequent short contacts to provide reality reorientation, refocusing and direction  4. Decrease environmental stimuli, including noise as appropriate  5. Monitor and intervene to maintain adequate nutrition, hydration, elimination, sleep and activity  6. If unable to ensure safety without constant attention obtain sitter and review sitter guidelines with assigned personnel  7. Initiate Psychosocial CNS and Spiritual Care consult, as indicated  Outcome: Progressing     Problem: Safety - Adult  Goal: Free from fall injury  Outcome: Progressing   All fall precautions in place. Bed locked and in lowest position with alarm on. Overbed table and personal belonings within reach. Call light within reach and patient instructed to use call light for assistance. Non-skid socks on. Camera utilized for safety.   
Problem: Discharge Planning  Goal: Discharge to home or other facility with appropriate resources  6/18/2025 0916 by Danita Alvarez, RN  Outcome: Progressing  Patient actively participates in ADL's and decision making regarding plan of care.     Problem: Confusion  Goal: Confusion, delirium, dementia, or psychosis is improved or at baseline  Description: INTERVENTIONS:  1. Assess for possible contributors to thought disturbance, including medications, impaired vision or hearing, underlying metabolic abnormalities, dehydration, psychiatric diagnoses, and notify attending LIP  2. [place] high risk fall precautions, as indicated  3. Provide frequent short contacts to provide reality reorientation, refocusing and direction  4. Decrease environmental stimuli, including noise as appropriate  5. Monitor and intervene to maintain adequate nutrition, hydration, elimination, sleep and activity  6. If unable to ensure safety without constant attention obtain sitter and review sitter guidelines with assigned personnel  7. Initiate Psychosocial CNS and Spiritual Care consult, as indicated  6/18/2025 0916 by Danita Alvarez, RN  Outcome: Progressing  Frequent rounding, toileting q 2 hours and PRN. No new signs/symptoms of worsening/increased confusion, patiet remains at baseline. Re-direction when appropriate. Will continue to monitor.      Problem: Safety - Adult  Goal: Free from fall injury  6/18/2025 0916 by Danita Alvarez, RN  Outcome: Progressing  No falls/injuries this shift, bed in lowest position, brakes on, bed alarm on, call light in reach, side rails up x2.     Problem: Skin/Tissue Integrity  Goal: Skin integrity remains intact  Description: 1.  Monitor for areas of redness and/or skin breakdown  2.  Assess vascular access sites hourly  3.  Every 4-6 hours minimum:  Change oxygen saturation probe site  4.  Every 4-6 hours:  If on nasal continuous positive airway pressure, respiratory therapy assess nares and determine need for 
Problem: Discharge Planning  Goal: Discharge to home or other facility with appropriate resources  6/19/2025 0901 by Danita Alvarez, RN  Outcome: Progressing  Patient actively participates in ADL's and decision making regarding plan of care.     Problem: Confusion  Goal: Confusion, delirium, dementia, or psychosis is improved or at baseline  Description: INTERVENTIONS:  1. Assess for possible contributors to thought disturbance, including medications, impaired vision or hearing, underlying metabolic abnormalities, dehydration, psychiatric diagnoses, and notify attending LIP  2. [place] high risk fall precautions, as indicated  3. Provide frequent short contacts to provide reality reorientation, refocusing and direction  4. Decrease environmental stimuli, including noise as appropriate  5. Monitor and intervene to maintain adequate nutrition, hydration, elimination, sleep and activity  6. If unable to ensure safety without constant attention obtain sitter and review sitter guidelines with assigned personnel  7. Initiate Psychosocial CNS and Spiritual Care consult, as indicated  6/19/2025 0901 by Danita Alvarez, RN  Outcome: Progressing  Frequent rounding, toileting q 2 hours and PRN. No new signs/symptoms of worsening/increased confusion, patiet remains at baseline. Re-direction when appropriate. Will continue to monitor.      Problem: Safety - Adult  Goal: Free from fall injury  6/19/2025 0901 by Danita Alvarez, RN  Outcome: Progressing  No falls/injuries this shift, bed in lowest position, brakes on, bed alarm on, call light in reach, side rails up x2.     Problem: Skin/Tissue Integrity  Goal: Skin integrity remains intact  Description: 1.  Monitor for areas of redness and/or skin breakdown  2.  Assess vascular access sites hourly  3.  Every 4-6 hours minimum:  Change oxygen saturation probe site  4.  Every 4-6 hours:  If on nasal continuous positive airway pressure, respiratory therapy assess nares and determine need for 
Problem: Discharge Planning  Goal: Discharge to home or other facility with appropriate resources  6/20/2025 0922 by Danita Alvarez, RN  Outcome: Progressing  Patient actively participates in ADL's and decision making regarding plan of care.     Problem: Safety - Adult  Goal: Free from fall injury  6/20/2025 0922 by Danita Alvarez, RN  Outcome: Progressing  No falls/injuries this shift, bed in lowest position, brakes on, bed alarm on, call light in reach, side rails up x2.     Problem: Skin/Tissue Integrity  Goal: Skin integrity remains intact  Description: 1.  Monitor for areas of redness and/or skin breakdown  2.  Assess vascular access sites hourly  3.  Every 4-6 hours minimum:  Change oxygen saturation probe site  4.  Every 4-6 hours:  If on nasal continuous positive airway pressure, respiratory therapy assess nares and determine need for appliance change or resting period  6/20/2025 0922 by Danita Alvarez, RN  Outcome: Progressing  No new skin breakdown noted, no new signs/symptoms of infection, continue to monitor labwork including WBC, medications administered per physician orders.     Problem: ABCDS Injury Assessment  Goal: Absence of physical injury  6/20/2025 0922 by Danita Alvarez, RN  Outcome: Progressing     
Pt is confused and disoriented and unable to answer admission documentation. Will attempt to reach family in the morning to complete admission documentation.     7089 addendum: pt alert and oriented x3 (disoriented to year). Pt states he believes his confusion upon admission was due to his head pain.   
Past 24 hrs

## 2025-06-20 NOTE — CARE COORDINATION
Case Management Assessment            Discharge Note                    Date / Time of Note: 6/20/2025 10:43 AM                  Discharge Note Completed by: Cristiana Pate RN    Patient Name: Evangelist Ortega   YOB: 1975  Diagnosis: Seizure (HCC) [R56.9]  Seizure disorder (HCC) [G40.909]   Date / Time: 6/18/2025  1:08 AM    Current PCP: Evangelist Schulz,   Clinic patient: No    Hospitalization in the last 30 days: Yes       Advance Directives:  Code Status: Full Code  [place] DNR form completed and on chart: Not Indicated    Financial:  Payor: /      Pharmacy:    [facility] [id number] - [facility] [address] - P [phone] - F [phone]  [address]  [facility] [address]  Phone: [phone] Fax: [phone]    [facility] [id number] [place] [facility] - [facility] [address] [place] [address] [phone] - F [phone]  [address] [facility] [address]  Phone: [phone] Fax: [phone]    [facility] - [facility] [address] -  [phone] - F [phone]  [address] [facility] [address]  Phone: [phone] Fax: [phone]    [facility]acy #[id number] - [place], [place] - [address] -  [phone] - F [phone]  [address] [facility] [address]  Phone: [phone] Fax: [phone]      Assistance purchasing medications?: Potential Assistance Purchasing Medications: Yes  Assistance provided by Case Management: None at this time    Does patient want to participate in local refill/ meds to beds program?: Yes    Meds To Beds General Rules:  1. Can ONLY be done Monday- Friday between 8:30am-5pm  2. Prescription(s) must be in pharmacy by 3pm to be filled same day  3.Copy of patient's insurance/ prescription drug card and patient face sheet must be sent along with the prescription(s)  4. Cost of Rx cannot be added to hospital bill. If financial assistance is needed, please contact unit  or ; Case

## 2025-06-20 NOTE — PROGRESS NOTES
Pharmacist Review and Automatic Dose Adjustment of Prophylactic Enoxaparin         The reviewing pharmacist has made an adjustment to the ordered enoxaparin dose or converted to UFH per the approved [facility] protocol and table as identified below.        Evangelist Ortega is a 49 y.o. male.     Recent Labs     06/17/25 2007 06/19/25  0756 06/19/25  2225   CREATININE <0.5* 0.5* 0.6*       Estimated Creatinine Clearance: 143 mL/min (A) (based on SCr of 0.6 mg/dL (L)).    Recent Labs     06/17/25 2007   HGB 11.8*   HCT 34.9*   *     Recent Labs     06/17/25 2007   INR 0.92       Height:   Ht Readings from Last 1 Encounters:   06/18/25 1.727 m (5' 7.99\")     Weight:  Wt Readings from Last 1 Encounters:   06/20/25 71.3 kg (157 lb 3 oz)               Plan: Based upon the patient's weight and renal function    Ordered: Enoxaparin 30mg SUBQ BID    Changed/converted to    New Order: Enoxaparin 40mg SUBQ Daily      Thank you,  Nataliya Michael [facility]  6/20/2025, 8:11 AM

## 2025-06-20 NOTE — PROGRESS NOTES
[facility] Medicine Progress Note  V 5.17      Date of Admission: 6/18/2025    Hospital Day: 3      Chief Admission Complaint: Altered mental state    Subjective: Sleeping in bed denies any new complain    Presenting Admission History:       Evangelist Ortega is a 49 y.o. male who was admitted overnight and a formal H&P has been noted in the chart.  With significant past medical history of alcohol use now sober for the last 3 weeks, bilateral subdural hematomas, hypertension, major depression, generalized anxiety who is now presenting from home with acute mental status changes and questionable seizures;     Patient is admitted overnight with acute encephalopathy.  Noted to have hyponatremia which is chronic.  He  He had a CT CTA which have been stable with bilateral hygromas  UA UDS negative  Neurology has been consulted  Per chart review patient was discharged recently on Keppra 500 mg p.o. twice daily not sure whether he took it he was admitted with an SDH and had a long ICU stay for an SDH and possible seizure neurology was admitted and started him on above medication.  Patient reported that he continues to drink and he likely was intoxicated yesterday thus the reason why he came to the hospital     Neurology consult has been pending    Assessment/Plan:      Acute encephalopathy and waxing and waning mental status per family     ; IV fluid; repeat sodium corrected to 130  All other metabolic work up negative  CT/CTA stable, BL hygromas which are stable  UA negative  UDS negative  Will give normal saline- monitor NA  Consult neurology-neurosurgery increased Keppra from 500 mg p.o. twice daily to 750 mg p.o. twice daily no further recommendation  Rule out seizures with EEG- got loaded with keppra and ativan in OSH ER  IV thiamine  Consult PT OT     2. Essential HTN- chronic     Resume norvasc     3. Major depression in remission, chronic     Resume lexapro     4. Urinary retention POA     Anguiano was

## 2025-06-20 NOTE — PROGRESS NOTES
Discharge instructions reviewed with patient and patient's parents, all questions answered. PIN removed. Patient discharged with all belongings via private vehicle, driven by patient's parents.

## 2025-06-20 NOTE — PROGRESS NOTES
Physical Therapy  Facility/Department: [facility] 5T ORTHO/NEURO  Physical Therapy Initial Assessment    Name: Evangelist Ortega  : 1975  MRN: [id number]  Date of Service: 2025    Discharge Recommendations:  24 hour supervision or assist (initial )   PT Equipment Recommendations  Equipment Needed: No  Other: owns RW      Patient Diagnosis(es): There were no encounter diagnoses.  Past Medical History:  has a past medical history of Alcohol abuse, Allergic rhinitis, Anxiety, Depression, Hypertension, and Seizure (HCC).  Past Surgical History:  has no past surgical history on file.    Assessment  Assessment: Pt presents to [facility] with AMS likely related to drinking at home. PMH of SDH and alcohol use disorder. Pt presents with decreased stability and endurance compared to baseline. PT will follow while pt in hospital to adress deficits however pt expected to progress towards baseline without need of PT  at d/c.  Treatment Diagnosis: decreased fucntional mobility  Requires PT Follow-Up: Yes  Activity Tolerance  Activity Tolerance: Patient tolerated evaluation without incident    Plan  Physical Therapy Plan  General Plan:  (2-5)  Safety Devices  Type of Devices: Call light within reach, Gait belt, Bed alarm in place, Left in bed (bed rail pads on HR per seizure precautions)    Restrictions  Position Activity Restriction  Other Position/Activity Restrictions: up as tolerated     Subjective  General  Chart Reviewed: Yes  Patient assessed for rehabilitation services?: Yes  Additional Pertinent Hx: Evangelist Ortega is a 49 y.o. male who was admitted overnight and a formal H&P has been noted in the chart.  With significant past medical history of alcohol use now sober for the last 3 weeks, bilateral subdural hematomas, hypertension, major depression, generalized anxiety who is now presenting from home with acute mental status changes and questionable seizures;     Patient is admitted overnight with acute encephalopathy.   Noted to have hyponatremia which is chronic.  He  He had a CT CTA which have been stable with bilateral hygromas  UA UDS negative  Neurology has been consulted  Per chart review patient was discharged recently on Keppra 500 mg p.o. twice daily not sure whether he took it he was admitted with an SDH and had a long ICU stay for an SDH and possible seizure neurology was admitted and started him on above medication.  Patient reported that he continues to drink and he likely was intoxicated yesterday thus the reason why he came to the hospital     Neurology consult has been pending  Subjective  Subjective: Reports 2/10 HA pain - denies need for pain medicine         Social/Functional History  Social/Functional History  Lives With: Parent (reports he has mainly been living with parents since prior admission and plans to d/c there))  Type of Home: House  Home Layout: One level  Home Access: Stairs to enter without rails  Entrance Stairs - Number of Steps: 2  Bathroom Shower/Tub: Tub/Shower unit  Bathroom Toilet: Standard  Bathroom Equipment: None  Home Equipment: Walker - Rolling  Has the patient had two or more falls in the past year or any fall with injury in the past year?: Yes  Prior Level of Assist for ADLs: Independent  Homemaking Responsibilities:  (performed by parents)  Prior Level of Assist for Ambulation: Independent household ambulator, with or without device, Independent community ambulator, with or without device (uses walker prn)  Active : No  Vision/Hearing  Vision  Vision: Impaired  Vision Exceptions: Wears glasses for distance  Hearing  Hearing: Within functional limits    Cognition   Orientation  Overall Orientation Status: Within Functional Limits  Orientation Level: Oriented X4  Cognition  Overall Cognitive Status: WNL  Cognition Comment: PMH of SDH- some delayed processing and impulsivity    Objective  Temp: 98.1 °F (36.7 °C)  Pulse: 62  Heart Rate Source: Monitor  Respirations: 16  SpO2: 100

## 2025-06-22 ENCOUNTER — APPOINTMENT (OUTPATIENT)
Dept: CT IMAGING | Age: 50
End: 2025-06-22

## 2025-06-22 ENCOUNTER — HOSPITAL ENCOUNTER (EMERGENCY)
Age: 50
Discharge: ANOTHER HOSPITAL | End: 2025-06-23
Attending: EMERGENCY MEDICINE

## 2025-06-22 DIAGNOSIS — R41.0 DELIRIUM: Primary | ICD-10-CM

## 2025-06-22 LAB
ALBUMIN SERPL-MCNC: 4 G/DL (ref 3.4–5)
ALBUMIN/GLOB SERPL: 1.3 {RATIO} (ref 1.1–2.2)
ALP SERPL-CCNC: 59 U/L (ref 40–129)
ALT SERPL-CCNC: 15 U/L (ref 10–40)
ANION GAP SERPL CALCULATED.3IONS-SCNC: 16 MMOL/L (ref 3–16)
AST SERPL-CCNC: 22 U/L (ref 15–37)
BASE EXCESS BLDV CALC-SCNC: 2.4 MMOL/L (ref -3–3)
BASOPHILS # BLD: 0.1 K/UL (ref 0–0.2)
BASOPHILS NFR BLD: 1.3 %
BILIRUB SERPL-MCNC: 0.5 MG/DL (ref 0–1)
BUN SERPL-MCNC: 7 MG/DL (ref 7–20)
CALCIUM SERPL-MCNC: 9.8 MG/DL (ref 8.3–10.6)
CHLORIDE SERPL-SCNC: 95 MMOL/L (ref 99–110)
CO2 BLDV-SCNC: 25 MMOL/L
CO2 SERPL-SCNC: 22 MMOL/L (ref 21–32)
COHGB MFR BLDV: 1.3 % (ref 0–1.5)
CREAT SERPL-MCNC: <0.5 MG/DL (ref 0.9–1.3)
DEPRECATED RDW RBC AUTO: 15.2 % (ref 12.4–15.4)
EOSINOPHIL # BLD: 0.1 K/UL (ref 0–0.6)
EOSINOPHIL NFR BLD: 1.1 %
ETHANOLAMINE SERPL-MCNC: NORMAL MG/DL (ref 0–0.08)
GFR SERPLBLD CREATININE-BSD FMLA CKD-EPI: >90 ML/MIN/{1.73_M2}
GLUCOSE BLD-MCNC: 103 MG/DL (ref 70–99)
GLUCOSE SERPL-MCNC: 101 MG/DL (ref 70–99)
HCO3 BLDV-SCNC: 24.3 MMOL/L (ref 23–29)
HCT VFR BLD AUTO: 38.1 % (ref 40.5–52.5)
HGB BLD-MCNC: 12.6 G/DL (ref 13.5–17.5)
LACTATE BLDV-SCNC: 1.4 MMOL/L (ref 0.4–1.9)
LACTATE BLDV-SCNC: 2.1 MMOL/L (ref 0.4–1.9)
LYMPHOCYTES # BLD: 1.4 K/UL (ref 1–5.1)
LYMPHOCYTES NFR BLD: 15.8 %
MCH RBC QN AUTO: 28.7 PG (ref 26–34)
MCHC RBC AUTO-ENTMCNC: 33.2 G/DL (ref 31–36)
MCV RBC AUTO: 86.6 FL (ref 80–100)
METHGB MFR BLDV: 0.3 %
MONOCYTES # BLD: 0.6 K/UL (ref 0–1.3)
MONOCYTES NFR BLD: 6.8 %
NEUTROPHILS # BLD: 6.5 K/UL (ref 1.7–7.7)
NEUTROPHILS NFR BLD: 75 %
O2 THERAPY: ABNORMAL
PCO2 BLDV: 29.7 MMHG (ref 40–50)
PERFORMED ON: ABNORMAL
PH BLDV: 7.53 [PH] (ref 7.35–7.45)
PLATELET # BLD AUTO: 518 K/UL (ref 135–450)
PMV BLD AUTO: 7.3 FL (ref 5–10.5)
PO2 BLDV: 28.9 MMHG (ref 25–40)
POTASSIUM SERPL-SCNC: 3.8 MMOL/L (ref 3.5–5.1)
PROT SERPL-MCNC: 7 G/DL (ref 6.4–8.2)
RBC # BLD AUTO: 4.4 M/UL (ref 4.2–5.9)
SAO2 % BLDV: 64 %
SODIUM SERPL-SCNC: 133 MMOL/L (ref 136–145)
VIT B1 BLD-MCNC: 323 NMOL/L (ref 70–180)
WBC # BLD AUTO: 8.7 K/UL (ref 4–11)

## 2025-06-22 PROCEDURE — 96374 THER/PROPH/DIAG INJ IV PUSH: CPT

## 2025-06-22 PROCEDURE — 83605 ASSAY OF LACTIC ACID: CPT

## 2025-06-22 PROCEDURE — 70450 CT HEAD/BRAIN W/O DYE: CPT

## 2025-06-22 PROCEDURE — 99285 EMERGENCY DEPT VISIT HI MDM: CPT

## 2025-06-22 PROCEDURE — 6360000002 HC RX W HCPCS: Performed by: EMERGENCY MEDICINE

## 2025-06-22 PROCEDURE — 82077 ASSAY SPEC XCP UR&BREATH IA: CPT

## 2025-06-22 PROCEDURE — 96375 TX/PRO/DX INJ NEW DRUG ADDON: CPT

## 2025-06-22 PROCEDURE — 96376 TX/PRO/DX INJ SAME DRUG ADON: CPT

## 2025-06-22 PROCEDURE — 36415 COLL VENOUS BLD VENIPUNCTURE: CPT

## 2025-06-22 PROCEDURE — 85025 COMPLETE CBC W/AUTO DIFF WBC: CPT

## 2025-06-22 PROCEDURE — 82803 BLOOD GASES ANY COMBINATION: CPT

## 2025-06-22 PROCEDURE — 80053 COMPREHEN METABOLIC PANEL: CPT

## 2025-06-22 PROCEDURE — 2580000003 HC RX 258: Performed by: EMERGENCY MEDICINE

## 2025-06-22 RX ORDER — 0.9 % SODIUM CHLORIDE 0.9 %
1000 INTRAVENOUS SOLUTION INTRAVENOUS ONCE
Status: COMPLETED | OUTPATIENT
Start: 2025-06-22 | End: 2025-06-22

## 2025-06-22 RX ORDER — LORAZEPAM 2 MG/ML
1 INJECTION INTRAMUSCULAR ONCE
Status: COMPLETED | OUTPATIENT
Start: 2025-06-22 | End: 2025-06-22

## 2025-06-22 RX ORDER — THIAMINE HYDROCHLORIDE 100 MG/ML
100 INJECTION, SOLUTION INTRAMUSCULAR; INTRAVENOUS ONCE
Status: COMPLETED | OUTPATIENT
Start: 2025-06-22 | End: 2025-06-22

## 2025-06-22 RX ADMIN — SODIUM CHLORIDE 1000 ML: 0.9 INJECTION, SOLUTION INTRAVENOUS at 20:30

## 2025-06-22 RX ADMIN — THIAMINE HYDROCHLORIDE 100 MG: 100 INJECTION, SOLUTION INTRAMUSCULAR; INTRAVENOUS at 20:26

## 2025-06-22 RX ADMIN — LORAZEPAM 1 MG: 2 INJECTION INTRAMUSCULAR; INTRAVENOUS at 21:14

## 2025-06-22 RX ADMIN — LORAZEPAM 1 MG: 2 INJECTION INTRAMUSCULAR; INTRAVENOUS at 20:27

## 2025-06-22 ASSESSMENT — PAIN - FUNCTIONAL ASSESSMENT: PAIN_FUNCTIONAL_ASSESSMENT: ADULT NONVERBAL PAIN SCALE (NPVS)

## 2025-06-23 ENCOUNTER — RESULTS FOLLOW-UP (OUTPATIENT)
Dept: EMERGENCY DEPT | Age: 50
End: 2025-06-23

## 2025-06-23 ENCOUNTER — HOSPITAL ENCOUNTER (OUTPATIENT)
Age: 50
Setting detail: OBSERVATION
Discharge: HOME | End: 2025-06-24
Attending: INTERNAL MEDICINE | Admitting: INTERNAL MEDICINE

## 2025-06-23 VITALS
SYSTOLIC BLOOD PRESSURE: 139 MMHG | OXYGEN SATURATION: 97 % | TEMPERATURE: 100.3 F | RESPIRATION RATE: 20 BRPM | DIASTOLIC BLOOD PRESSURE: 72 MMHG | HEART RATE: 78 BPM

## 2025-06-23 PROBLEM — G40.309 NONINTRACTABLE GENERALIZED IDIOPATHIC EPILEPSY WITHOUT STATUS EPILEPTICUS (HCC): Status: ACTIVE | Noted: 2025-06-18

## 2025-06-23 PROBLEM — G40.919 BREAKTHROUGH SEIZURE (HCC): Status: ACTIVE | Noted: 2025-06-18

## 2025-06-23 PROCEDURE — 6370000000 HC RX 637 (ALT 250 FOR IP): Performed by: INTERNAL MEDICINE

## 2025-06-23 PROCEDURE — G0378 HOSPITAL OBSERVATION PER HR: HCPCS

## 2025-06-23 PROCEDURE — 2500000003 HC RX 250 WO HCPCS: Performed by: INTERNAL MEDICINE

## 2025-06-23 PROCEDURE — 6360000002 HC RX W HCPCS: Performed by: INTERNAL MEDICINE

## 2025-06-23 PROCEDURE — 97166 OT EVAL MOD COMPLEX 45 MIN: CPT

## 2025-06-23 PROCEDURE — 96374 THER/PROPH/DIAG INJ IV PUSH: CPT

## 2025-06-23 PROCEDURE — 97530 THERAPEUTIC ACTIVITIES: CPT

## 2025-06-23 PROCEDURE — 99223 1ST HOSP IP/OBS HIGH 75: CPT | Performed by: PSYCHIATRY & NEUROLOGY

## 2025-06-23 PROCEDURE — 97535 SELF CARE MNGMENT TRAINING: CPT

## 2025-06-23 PROCEDURE — 97162 PT EVAL MOD COMPLEX 30 MIN: CPT

## 2025-06-23 PROCEDURE — 97116 GAIT TRAINING THERAPY: CPT

## 2025-06-23 PROCEDURE — 6370000000 HC RX 637 (ALT 250 FOR IP): Performed by: PSYCHIATRY & NEUROLOGY

## 2025-06-23 RX ORDER — AMLODIPINE BESYLATE 5 MG/1
5 TABLET ORAL DAILY
Status: DISCONTINUED | OUTPATIENT
Start: 2025-06-23 | End: 2025-06-24 | Stop reason: HOSPADM

## 2025-06-23 RX ORDER — POLYETHYLENE GLYCOL 3350 17 G/17G
17 POWDER, FOR SOLUTION ORAL DAILY PRN
Status: DISCONTINUED | OUTPATIENT
Start: 2025-06-23 | End: 2025-06-24 | Stop reason: HOSPADM

## 2025-06-23 RX ORDER — ONDANSETRON 4 MG/1
4 TABLET, ORALLY DISINTEGRATING ORAL EVERY 8 HOURS PRN
Status: DISCONTINUED | OUTPATIENT
Start: 2025-06-23 | End: 2025-06-24 | Stop reason: HOSPADM

## 2025-06-23 RX ORDER — POTASSIUM CHLORIDE 7.45 MG/ML
10 INJECTION INTRAVENOUS PRN
Status: DISCONTINUED | OUTPATIENT
Start: 2025-06-23 | End: 2025-06-23 | Stop reason: SDUPTHER

## 2025-06-23 RX ORDER — SODIUM CHLORIDE 9 MG/ML
INJECTION, SOLUTION INTRAVENOUS PRN
Status: DISCONTINUED | OUTPATIENT
Start: 2025-06-23 | End: 2025-06-24 | Stop reason: HOSPADM

## 2025-06-23 RX ORDER — ONDANSETRON 2 MG/ML
4 INJECTION INTRAMUSCULAR; INTRAVENOUS EVERY 6 HOURS PRN
Status: DISCONTINUED | OUTPATIENT
Start: 2025-06-23 | End: 2025-06-24 | Stop reason: HOSPADM

## 2025-06-23 RX ORDER — ESCITALOPRAM OXALATE 20 MG/1
20 TABLET ORAL DAILY
Status: DISCONTINUED | OUTPATIENT
Start: 2025-06-23 | End: 2025-06-24 | Stop reason: HOSPADM

## 2025-06-23 RX ORDER — POTASSIUM CHLORIDE 1500 MG/1
40 TABLET, EXTENDED RELEASE ORAL PRN
Status: DISCONTINUED | OUTPATIENT
Start: 2025-06-23 | End: 2025-06-24 | Stop reason: HOSPADM

## 2025-06-23 RX ORDER — POTASSIUM CHLORIDE 7.45 MG/ML
10 INJECTION INTRAVENOUS PRN
Status: DISCONTINUED | OUTPATIENT
Start: 2025-06-23 | End: 2025-06-24 | Stop reason: HOSPADM

## 2025-06-23 RX ORDER — LEVETIRACETAM 750 MG/1
750 TABLET ORAL 2 TIMES DAILY
Status: DISCONTINUED | OUTPATIENT
Start: 2025-06-23 | End: 2025-06-23

## 2025-06-23 RX ORDER — THIAMINE HYDROCHLORIDE 100 MG/ML
100 INJECTION, SOLUTION INTRAMUSCULAR; INTRAVENOUS DAILY
Status: DISCONTINUED | OUTPATIENT
Start: 2025-06-23 | End: 2025-06-24 | Stop reason: HOSPADM

## 2025-06-23 RX ORDER — ACETAMINOPHEN 650 MG/1
650 SUPPOSITORY RECTAL EVERY 6 HOURS PRN
Status: DISCONTINUED | OUTPATIENT
Start: 2025-06-23 | End: 2025-06-24 | Stop reason: HOSPADM

## 2025-06-23 RX ORDER — M-VIT,TX,IRON,MINS/CALC/FOLIC 27MG-0.4MG
1 TABLET ORAL DAILY
Status: DISCONTINUED | OUTPATIENT
Start: 2025-06-23 | End: 2025-06-24 | Stop reason: HOSPADM

## 2025-06-23 RX ORDER — SODIUM CHLORIDE 0.9 % (FLUSH) 0.9 %
5-40 SYRINGE (ML) INJECTION PRN
Status: DISCONTINUED | OUTPATIENT
Start: 2025-06-23 | End: 2025-06-24 | Stop reason: HOSPADM

## 2025-06-23 RX ORDER — MAGNESIUM SULFATE IN WATER 40 MG/ML
2000 INJECTION, SOLUTION INTRAVENOUS PRN
Status: DISCONTINUED | OUTPATIENT
Start: 2025-06-23 | End: 2025-06-24 | Stop reason: HOSPADM

## 2025-06-23 RX ORDER — LEVETIRACETAM 500 MG/1
1000 TABLET ORAL 2 TIMES DAILY
Status: DISCONTINUED | OUTPATIENT
Start: 2025-06-23 | End: 2025-06-24 | Stop reason: HOSPADM

## 2025-06-23 RX ORDER — ACETAMINOPHEN 325 MG/1
650 TABLET ORAL EVERY 6 HOURS PRN
Status: DISCONTINUED | OUTPATIENT
Start: 2025-06-23 | End: 2025-06-24 | Stop reason: HOSPADM

## 2025-06-23 RX ORDER — SODIUM CHLORIDE 0.9 % (FLUSH) 0.9 %
5-40 SYRINGE (ML) INJECTION EVERY 12 HOURS SCHEDULED
Status: DISCONTINUED | OUTPATIENT
Start: 2025-06-23 | End: 2025-06-24 | Stop reason: HOSPADM

## 2025-06-23 RX ORDER — LEVETIRACETAM 1000 MG/1
1000 TABLET ORAL 2 TIMES DAILY
Qty: 60 TABLET | Refills: 3 | Status: SHIPPED | OUTPATIENT
Start: 2025-06-23

## 2025-06-23 RX ADMIN — SODIUM CHLORIDE, PRESERVATIVE FREE 10 ML: 5 INJECTION INTRAVENOUS at 09:38

## 2025-06-23 RX ADMIN — Medication 1 TABLET: at 09:38

## 2025-06-23 RX ADMIN — ESCITALOPRAM OXALATE 20 MG: 20 TABLET, FILM COATED ORAL at 09:38

## 2025-06-23 RX ADMIN — THIAMINE HYDROCHLORIDE 100 MG: 100 INJECTION, SOLUTION INTRAMUSCULAR; INTRAVENOUS at 09:38

## 2025-06-23 RX ADMIN — LEVETIRACETAM 750 MG: 750 TABLET, FILM COATED ORAL at 09:38

## 2025-06-23 RX ADMIN — LEVETIRACETAM 1000 MG: 500 TABLET, FILM COATED ORAL at 17:15

## 2025-06-23 NOTE — DISCHARGE SUMMARY
V2.0  Discharge Summary    Name:  Evangelist Ortega /Age/Sex: 1975 (49 y.o. male)   Admit Date: 2025  Discharge Date: 25    MRN & CSN:  [id number] & [id number] Encounter Date and Time 25 4:41 PM EDT    Attending:  Simon Zapata DO Discharging Provider: Simon Zapata DO       Hospital Course:     Brief HPI: Evangelist Ortega is a 49 y.o. male who presented with multiple recurrent hospitalizations for transient episodes of confusion-he just left the hospital 2 days ago and now presented to an outside hospital ER with episode of confusion and was given multiple doses of Ativan for agitation and confusion  His workup including a CT head, labs, ethanol level were normal  Neurology was consulted who recommended transfer to [facility] Problem Based Course:     Breakthrough seizure  Altered mental status  - Patient presented with acute onset confusion, appears to wax and wane. Has had several recent admission for essentially the same, though earlier this month did have admission for status epilepticus, alcohol withdrawal  - Patient unsure if he took his Keppra dose last evening. Reports he was recently discharged home with an increase in his Keppra dosing to 750mg BID, though notes his pharmacy said the new prescription was cancelled.   - CT imaging was stable compared to prior. Neurology evaluated patient. Will increase his Keppra to 1000mg BID on discharge. Patient is being discharged to home with new prescription for Keppra, advised he needs to follow up with his neurologist and primary care physician following discharge. Patient's mental status has returned to his baseline.     The patient expressed appropriate understanding of, and agreement with the discharge recommendations, medications, and plan.     Consults this admission:  IP CONSULT TO NEUROLOGY  IP CONSULT TO NEUROLOGY    Discharge Diagnosis:   Breakthrough seizure (HCC)    Discharge Instruction:   Follow up appointments: PCP,

## 2025-06-23 NOTE — ED NOTES
Pt found sideways in the bed. Pt readjusted with Dr. Simms at bedside. Pt A&O x1 at this time. Pt able to provide first and last name   Abdominal pain

## 2025-06-23 NOTE — CARE COORDINATION
06/23/25 1525   Readmission Assessment   Number of Days since last admission? 8-30 days   Previous Disposition Home with Family   Who is being Interviewed Patient   What was the patient's/caregiver's perception as to why they think they needed to return back to the hospital? Other (Comment)  (\"I think I forgot to take my meds\")   Did you visit your Primary Care Physician after you left the hospital, before you returned this time? No   Why weren't you able to visit your PCP? Did not have an appointment   Did you see a specialist, such as Cardiac, Pulmonary, Orthopedic Physician, etc. after you left the hospital? No   Who advised the patient to return to the hospital? Self-referral   Does the patient report anything that got in the way of taking their medications? No  (\"I just forgot\")   In our efforts to provide the best possible care to you and others like you, can you think of anything that we could have done to help you after you left the hospital the first time, so that you might not have needed to return so soon? Other (Comment)  (nothing)       Case Management Assessment  Initial Evaluation    Date/Time of Evaluation: 6/23/2025 3:25 PM  Assessment Completed by: MIO HARMAN    If patient is discharged prior to next notation, then this note serves as note for discharge by case management.    Patient Name: Evangelist Ortega                   YOB: 1975  Diagnosis: Seizure (HCC) [R56.9]                   Date / Time: 6/23/2025  1:02 AM    Patient Admission Status: Observation   Readmission Risk (Low < 19, Mod (19-27), High > 27): Readmission Risk Score: 11.6    Current PCP: Evangelist Schulz, DO  PCP verified by CM? No    Chart Reviewed: Yes      History Provided by: Patient, Medical Record  Patient Orientation: Alert and Oriented    Patient Cognition: Alert    Hospitalization in the last 30 days (Readmission):  Yes    If yes, Readmission Assessment in CM Navigator will be completed.    Advance

## 2025-06-23 NOTE — CONSULTS
Sepsis    Collection Time: 06/22/25  8:21 PM   Result Value Ref Range    Lactic Acid, Sepsis 2.1 (H) 0.4 - 1.9 mmol/L   Ethanol    Collection Time: 06/22/25  8:21 PM   Result Value Ref Range    Ethanol Lvl None Detected mg/dL   Blood gas, venous    Collection Time: 06/22/25  8:21 PM   Result Value Ref Range    pH, Jose 7.530 (H) 7.350 - 7.450    pCO2, Jose 29.7 (L) 40.0 - 50.0 mmHg    PO2, Jose 28.9 25.0 - 40.0 mmHg    HCO3, Venous 24.3 23.0 - 29.0 mmol/L    Base Excess, Jose 2.4 -3.0 - 3.0 mmol/L    O2 Sat, Jose 64 Not Established %    Carboxyhemoglobin 1.3 0.0 - 1.5 %    MetHgb, Jose 0.3 <1.5 %    TC02 (Calc), Jose 25 Not Established mmol/L    O2 Therapy Unknown    Lactate, Sepsis    Collection Time: 06/22/25  9:41 PM   Result Value Ref Range    Lactic Acid, Sepsis 1.4 0.4 - 1.9 mmol/L       Scheduled Meds:   amLODIPine  5 mg Oral Daily    escitalopram  20 mg Oral Daily    levETIRAcetam  750 mg Oral BID    lodoxamide  1 drop Both Eyes 4x Daily    therapeutic multivitamin-minerals  1 tablet Oral Daily    thiamine  100 mg IntraVENous Daily    sodium chloride flush  5-40 mL IntraVENous 2 times per day       Continuous Infusions:  sodium chloride         PRN Meds:  sodium chloride flush, 5-40 mL, PRN  sodium chloride, , PRN  potassium chloride, 40 mEq, PRN   Or  potassium alternative oral replacement, 40 mEq, PRN   Or  potassium chloride, 10 mEq, PRN  magnesium sulfate, 2,000 mg, PRN  ondansetron, 4 mg, Q8H PRN   Or  ondansetron, 4 mg, Q6H PRN  polyethylene glycol, 17 g, Daily PRN  acetaminophen, 650 mg, Q6H PRN   Or  acetaminophen, 650 mg, Q6H PRN              Discussed at length with patient; nursing; and Dr. Benny Lau MD  Neurology/Neurocritical Care  June 23, 2025

## 2025-06-23 NOTE — PLAN OF CARE
Problem: Safety - Adult  Goal: Free from fall injury  6/23/2025 0839 by Lanre Atwood RN  Outcome: Progressing   Pt remains free from injury during this shift. Call light is in reach, bed alarm is activated for safety, bed alarm is activated. Will continue to monitor.

## 2025-06-23 NOTE — H&P
V2.0  History and Physical      Name:  Evangelist Ortega /Age/Sex: 1975  (49 y.o. male)   MRN & CSN:  [id number] & [id number] Encounter Date/Time: 2025 4:19 AM EDT   Location:  [facility]- PCP: Evangelist Schulz DO       Hospital Day: 1    Assessment and Plan:   Evangelist Ortega is a 49 y.o. male with recent hospitalizations for alcohol withdrawal, bilateral subdural hematomas who was again hospitalized from - and was discharged after his Keppra was increased for questionable unwitnessed seizures-has been living with his parents and was again taken to an outside hospital ER with acute onset of confusion after being well throughout the day-CT of the head was stable and patient was transferred again to [facility] for evaluation after discussion with neurology    #1 delirium characterized by waxing and waning mental status versus seizures vs sundowning    Multiple recurrent hospitalizations are characterized by completely normal mental status followed by episodes of confusion-if we can pinpoint an accurate etiology and give family some accurate post discharge instructions that may prevent this cycle of recurrent hospitalizations    CT head is at baseline  Labs are at baseline  Sodium is 133  LFTs are normal  Ethanol was not detected    Resume Keppra 750 twice daily  Consult neurology  IV thiamine  When I asked the patient why he is here he said he is having a migraine and stomach upset  He is answering questions appropriately and is alert and oriented x 3  I have seen him multiple times this month and his mental status is at baseline  He received 2 doses of Ativan in the ER at OSH for confusion and agitation    #2 major depressive disorder, in remission-chronic condition    Resume Lexapro    3.  Essential hypertension-chronic condition    Resume Norvasc 5 mg daily    Hospital Problems           Last Modified POA    * (Principal) Seizure (HCC) 2025 Yes     Disposition:   Current Living

## 2025-06-23 NOTE — DISCHARGE INSTRUCTIONS
We have increased your dose of the Keppra to 1000mg twice per day. I have sent a new prescription to your [facility] pharmacy. Please make sure to pick this up. You may continue using the 500mg tablets if you still have a remaining supply, you would just need to take 2 500mg tablets twice per day until you run out, then continue using the 1000mg tablets.    Please follow up with your neurologist following discharge.    Please follow up with your primary care physician within 1 week following discharge.     Please return to the hospital with any new or worsening symptoms.

## 2025-06-23 NOTE — ED NOTES
Pt's parents at bedside. Parents advised that pt started acting different all of a sudden around 1700 and abnormal behavior has continued. Pt was released from [facility] 2 days ago.

## 2025-06-23 NOTE — PLAN OF CARE
Problem: Safety - Adult  Goal: Free from fall injury  Outcome: Progressing   All fall precautions in place. Bed locked and in lowest position with alarm on. Overbed table and personal belonings within reach. Call light within reach and patient instructed to use call light for assistance. Non-skid socks on.    Problem: Pain  Goal: Verbalizes/displays adequate comfort level or baseline comfort level  Outcome: Progressing   No complaints of pain at this time, continuing to monitor and manage per MAR.

## 2025-06-23 NOTE — ED PROVIDER NOTES
[facility] EMERGENCY DEPARTMENT     Pt Name: Evangelist Ortega   MRN: [id number]   Birthdate 1975   Date of evaluation: 6/22/2025   Provider: Dl Simms MD   PCP: Evangelist Schulz DO   Note Started: 8:22 PM EDT 6/22/25     TRIAGE CHIEF COMPLAINT:  Chief Complaint   Patient presents with    Altered Mental Status     Pt brought in by EMS for AMS called by parents. Per EMS pt was acting \"different\" x1 hour.      HISTORY OF PRESENT ILLNESS:  Evangelist Ortega is a 49 y.o. male with a history of chronic alcoholism for last drink about a month ago, chronic bilateral subdural hematomas, previous isolated tonic-clonic seizures who presents to the emergency department with acute altered mental status shortly prior to arrival.  This patient was seen by myself last week for an extremely similar circumstance.  After his episode last week he was admitted to our hospital seen by neurology and set up for outpatient seizure workup.  Both the episode last week and this week were very similar where he had a very normal day earlier in the day followed by acute marked altered mental status confusion and resting lateral nystagmus.  Today he drove around ran errands when got paperwork from his old home and was \"normal\" per his parents who are caring for him through his recent sobriety.  Then he acutely was found to be confused poorly communicative with isolated stuttering speech and perseverative movements of covering his head and holding his head.  He was brought by EMS for evaluation.  This history is very similar to what happened last week when I saw where he came out of his room after being normal all morning acutely confused perseverating with unusual right upper extremity posturing or repetitive motions.  Last week it was my impression after nonacute CT of the head CTA of the head and neck metabolic workup and alcohol level that this likely was either a prolonged postictal phase or more likely a partial complex

## 2025-06-24 VITALS
OXYGEN SATURATION: 100 % | DIASTOLIC BLOOD PRESSURE: 80 MMHG | TEMPERATURE: 97.7 F | SYSTOLIC BLOOD PRESSURE: 116 MMHG | RESPIRATION RATE: 15 BRPM | HEART RATE: 66 BPM

## 2025-06-24 PROCEDURE — 2500000003 HC RX 250 WO HCPCS: Performed by: INTERNAL MEDICINE

## 2025-06-24 PROCEDURE — 6360000002 HC RX W HCPCS: Performed by: INTERNAL MEDICINE

## 2025-06-24 PROCEDURE — G0378 HOSPITAL OBSERVATION PER HR: HCPCS

## 2025-06-24 PROCEDURE — 6370000000 HC RX 637 (ALT 250 FOR IP): Performed by: INTERNAL MEDICINE

## 2025-06-24 PROCEDURE — 96376 TX/PRO/DX INJ SAME DRUG ADON: CPT

## 2025-06-24 PROCEDURE — 6370000000 HC RX 637 (ALT 250 FOR IP): Performed by: PSYCHIATRY & NEUROLOGY

## 2025-06-24 RX ADMIN — LEVETIRACETAM 1000 MG: 500 TABLET, FILM COATED ORAL at 09:16

## 2025-06-24 RX ADMIN — Medication 1 TABLET: at 09:17

## 2025-06-24 RX ADMIN — ESCITALOPRAM OXALATE 20 MG: 20 TABLET, FILM COATED ORAL at 09:17

## 2025-06-24 RX ADMIN — AMLODIPINE BESYLATE 5 MG: 5 TABLET ORAL at 09:17

## 2025-06-24 RX ADMIN — SODIUM CHLORIDE, PRESERVATIVE FREE 10 ML: 5 INJECTION INTRAVENOUS at 09:17

## 2025-06-24 RX ADMIN — THIAMINE HYDROCHLORIDE 100 MG: 100 INJECTION, SOLUTION INTRAMUSCULAR; INTRAVENOUS at 09:17

## 2025-06-24 NOTE — PLAN OF CARE
Problem: Safety - Adult  Goal: Free from fall injury  Outcome: Progressing   Pt remains free from injury during this shift. Pt is up with assist x 1 person, gait belt. Encourage pt to call for all assistance. Call light is in reach, bed alarm is activated for safety, bed locked and in lowest position. Will continue to monitor.    Problem: Pain  Goal: Verbalizes/displays adequate comfort level or baseline comfort level  Outcome: Progressing   Pt denies pain during this shift. Encourage pt to call if pain is noted. Will continue to monitor.

## 2025-06-24 NOTE — PROGRESS NOTES
4 Eyes Skin Assessment     NAME:  Evangelist Ortega  YOB: 1975  MEDICAL RECORD NUMBER:  [id number]    The patient is being assessed for  Admission    I agree that at least one RN has performed a thorough Head to Toe Skin Assessment on the patient. ALL assessment sites listed below have been assessed.      Areas assessed by both nurses:    Head, Face, Ears, Shoulders, Back, Chest, Arms, Elbows, Hands, Sacrum. Buttock, Coccyx, Ischium, Legs. Feet and Heels, and Under Medical Devices         Does the Patient have a Wound? No noted wound(s)       Mateusz Prevention initiated by RN: Yes  Wound Care Orders initiated by RN: No    Pressure Injury (Stage 3,4, Unstageable, DTI, NWPT, and Complex wounds) if present, place Wound referral order by RN under : No    New Ostomies, if present place, Ostomy referral order under : No     Nurse 1 eSignature: Electronically signed by Meagan Chavarria RN on 6/23/25 at 9:59 AM EDT    **SHARE this note so that the co-signing nurse can place an eSignature**    Nurse 2 eSignature: {Esignature:[id number]}  
Occupational Therapy  Facility/Department: [facility] ORTHO/NEURO  Occupational Therapy Initial Assessment and Treatment     Name: Evangelist Ortega  : 1975  MRN: [id number]  Date of Service: 2025    Discharge Recommendations:  24 hour supervision or assist  OT Equipment Recommendations  Equipment Needed: No       Patient Diagnosis(es): There were no encounter diagnoses.  Past Medical History:  has a past medical history of Alcohol abuse, Allergic rhinitis, Anxiety, Depression, Hypertension, and Seizure (HCC).  Past Surgical History:  has no past surgical history on file.    Treatment Diagnosis: debility      Assessment  Performance deficits / Impairments: Decreased functional mobility ;Decreased ADL status;Decreased strength;Decreased safe awareness;Decreased endurance;Decreased high-level IADLs;Decreased balance  Assessment: Pt presents supine in bed. Agreeable to session. Pt is questionable historian. Pt tolerates session fair overall. Pt is provided SBA for bed mobility and CGA for OOB activity with no AD. Pt noted to have BUE guarded at trunk and decreased stride/shuffled and slow gait. Pt soiled this session and assisted with bath wipe hygiene seated/standing EOB and UBD/LBD exchange. Anticipate pt will cont to progress during admission with cont skilled therapy and OOB activity with RN staff. At d/c recommend 24/7 assist to max pt safety while returning to daily activities and mobility/transfers.  Treatment Diagnosis: debility  Prognosis: Good  Decision Making: Medium Complexity  REQUIRES OT FOLLOW-UP: Yes  Activity Tolerance  Activity Tolerance: Treatment limited secondary to decreased cognition     Plan  Occupational Therapy Plan  Times Per Week: 2-5  Times Per Day: Once a day  Current Treatment Recommendations: Strengthening, ROM, Balance training, Functional mobility training, Safety education & training, Patient/Caregiver education & training, Equipment evaluation, education, & procurement, 
Patient is alert and oriented x4. VSS on room air. Medications given per MAR, no side effects noted. Patient ambulating x1 with gait belt. No complaints of pain, continuing to monitor and manage per MAR. Voiding well via BRP, no bm this shift. Patient worked with PT/OT this shift and ambulated in the mclaughlin, tolerated well.      Patient is currently resting in bed with bed alarm on for safety. Call light within reach and all fall precautions in place. Plan of care continues.    Electronically signed by Meagan Chavarria RN on 6/23/2025 at 7:59 PM   
Pt [facility]/seizures. Pt arrived alert and oriented to self. Pt was stand offish with staff. Telemetry monitor in place. All safety measures in place.  
Pt is alert and oriented. VSS. Pt neuro check WDL. No seizure activity noted. All safety, measures in place. Will continue to monitor.  
Little  How much help is needed climbing 3-5 steps with a railing?: A Little  AM-PAC Inpatient Mobility Raw Score : 18  AM-PAC Inpatient T-Scale Score : 43.63  Mobility Inpatient CMS 0-100% Score: 46.58  Mobility Inpatient CMS G-Code Modifier : CK    Goals  Short Term Goals  Time Frame for Short Term Goals: d/c  Short Term Goal 1: sup<>sit supervision  Short Term Goal 2: sit<>stand supervision  Short Term Goal 3: amb 150' supervision  Short Term Goal 4: ascend/descend 2 stairs with HR and supervision  Patient Goals   Patient Goals : return home when able       Education  Patient Education  Education Given To: Patient  Education Provided: Role of Therapy;Plan of Care  Education Method: Demonstration;Verbal  Barriers to Learning: None  Education Outcome: Verbalized understanding;Demonstrated understanding      Therapy Time   Individual Concurrent Group Co-treatment   Time In 0845         Time Out 0938         Minutes 53         Timed Code Treatment Minutes:  38     Total Treatment Minutes:  53    If the patient is discharged before the next treatment session, this note will serve as the discharge summary.     Jeff Estrada, PT, DPT

## 2025-06-24 NOTE — PLAN OF CARE
Problem: Discharge Planning  Goal: Discharge to home or other facility with appropriate resources  Outcome: Progressing  Flowsheets (Taken 6/24/2025 0800)  Discharge to home or other facility with appropriate resources: Identify barriers to discharge with patient and caregiver   Pt will be assessed for readiness to discharge this shift  Problem: Skin/Tissue Integrity  Goal: Skin integrity remains intact  Description: 1.  Monitor for areas of redness and/or skin breakdown  2.  Assess vascular access sites hourly  3.  Every 4-6 hours minimum:  Change oxygen saturation probe site  4.  Every 4-6 hours:  If on nasal continuous positive airway pressure, respiratory therapy assess nares and determine need for appliance change or resting period  Outcome: Progressing  Flowsheets  Taken 6/24/2025 0940  Skin Integrity Remains Intact: Monitor for areas of redness and/or skin breakdown  Taken 6/24/2025 0800  Skin Integrity Remains Intact: Monitor for areas of redness and/or skin breakdown   Pts skin integrity will remain intact  Problem: Safety - Adult  Goal: Free from fall injury  6/24/2025 0941 by Dedrick Rosado, RN  Outcome: Progressing  Flowsheets (Taken 6/24/2025 0940)  Free From Fall Injury: Instruct family/caregiver on patient safety   Pt will be free from falls and injury this shift

## 2025-07-02 NOTE — PROGRESS NOTES
Physician Progress Note      PATIENT:               NAHOMY MACKAY  CSN #:                  [id number]  :                       1975  ADMIT DATE:       2025 1:08 AM  DISCH DATE:        2025 3:30 PM  RESPONDING  PROVIDER #:        KAELA MORALES          QUERY TEXT:    A mental status change and acute encephalopathy is documented in the medical   record on DC summary .  Please specify the underlying cause:    The clinical indicators include:  -- Admitted with AMS.  History of SDH and ETOH.  -- PN  \"  Patient reported that he continues to drink and he likely was   intoxicated yesterday thus the reason why he came to the hospital  ; IV fluid; repeat sodium corrected to 130  All other metabolic work up negative  CT/CTA stable, BL hygromas which are stable  UA negative  UDS negative\"  -- Neurology  \"Head CT scan from  showed stable right subacute and   chronic subdural hematoma with largest thickness in the right frontal region   9.4 mm. This is unchanged from .There was a stable left subdural   hygroma..\"  -- Thiamine, keppra, neurology consult, supportive care  Options provided:  -- Metabolic encephalopathy secondary to hyponatremia  -- Toxic metabolic encephalopathy secondary to alcohol  -- Seizure secondary to sequala of SDH and AMS is due to postictal state,   encephalopathy is ruled out  -- Seizure unrelated to SDH and AMS is due to postictal state, encephalopathy   is ruled out  -- Other - I will add my own diagnosis  -- Disagree - Not applicable / Not valid  -- Disagree - Clinically unable to determine / Unknown  -- Refer to Clinical Documentation Reviewer    PROVIDER RESPONSE TEXT:    Toxic metabolic encephalopathy secondary to alcohol    Query created by: Estela Price on 2025 12:41 PM      Electronically signed by:  KAELA MORALES 2025 9:00 AM

## 2025-07-05 NOTE — PROGRESS NOTES
Physician Progress Note      PATIENT:               NAHOMY MACKAY  [facility] #:                  [id number]  :                       1975  ADMIT DATE:       2025 7:59 PM  DISCH DATE:        2025 5:02 PM  RESPONDING  PROVIDER #:        Teagan Ortiz NP          QUERY TEXT:    Cerebral edema is documented in the medical record Neurology CN . Based on   your medical judgment, please clarify the clinical significance of this   diagnosis.    The clinical indicators include:  -\"ED Course:  GCS 11 with tremor on exam  CT Head: R subdural hemorrhage with 3mm leftward midline shift likely   acute/subacute given mixed attenuation. Fluid collection in the middle cranial   fossa on the L likely to represent arachnoid cyst.\" (H&P)    -\"Recommendations:  Cerebral edema  Head CT  STAT given anisocoria  Check PT/INR, aPTT STAT  MRI brain w/ & w/ when able\" (Neurology PN )    -\"Previous ED visit 2025 pt. Had fall with R scalp hematoma would highly   suspect traumatic SDH given frequent intoxication\" (IM PN )    -\"He admitted he fell and hit hiss head while drinking a few days ago prior to   coming in.\" (Neuro-Critical Care PN )    -1.Compared with scan earlier same day, there is a stable right-sided mixed   attenuation subdural hematoma with mass effect and approximately 3 mm leftward   midline shift  2.Fluid attenuation collection in the middle cranial fossa on the left   probably representing an arachnoid cyst or remote insult.This is similar to   the prior study.Adjacent curvilinear high attenuation could represent a   bridging vessel or an  additional small subdural hematoma. (CT Head )    -1.Stable right-sided subdural hematoma with mild right frontal subarachnoid   hemorrhage and trace left-sided subdural hematoma.  2. 5.0 x 4.0 cm left middle cranial fossa arachnoid cyst. (MRI )    -CT, MRI, Neuro consult, IV NaCl  Options provided:  -- Cerebral edema is clinically insignificant/ruled

## 2025-07-07 NOTE — PROGRESS NOTES
Physician Progress Note      PATIENT:               NAHOMY MACKAY  [facility] #:                  [id number]  :                       1975  ADMIT DATE:       2025 7:59 PM  DISCH DATE:        2025 5:02 PM  RESPONDING  PROVIDER #:        Simon Zapata DO          QUERY TEXT:    Acute respiratory failure is documented in the medical record D/C Summary.   Based on your medical judgment, please clarify the reason for intubation.    The clinical indicators include:  -\"Shortly after arrival to ED patient experienced tonic-clonic seizure   receiving 2mg ativan with aborted seizure activity. Within 10 minutes   experienced another tonic clonic seizure requiring 5mg versed x2 and versed   gtt and subsequently intubated for airway protection.  Reason for intubation: Airway protection status epilepticus  Vent Day: 0  Vent Settings:  Vent Mode: AC/PRVC Resp Rate (Set): 16 bpm/Vt (Set, mL): 500   mL/ /FiO2 : 50 %  Sedated on Propofol and precedex gtt\" (H&P)  -\"Lungs: breathing unlabored, regular, no audible wheezes\" (Neurology PN )  -\"Effort: Pulmonary effort is normal. No respiratory distress.  Breath sounds: No wheezing, rhonchi or rales.\" (IM PN )  -\"Effort: Pulmonary effort is normal. No respiratory distress.\" (IM PN )  -\"Intubated due to repeated seizures.\" (IM PN )  -\"Shortly after arrival to ED patient experienced tonic-clonic seizure   receiving 2mg ativan with aborted seizure activity. Within 10 minutes   experienced another tonic clonic seizure requiring 5mg versed x2 and versed   gtt and subsequently intubated for airway protection.  Acute hypoxic respiratory failure  Intubated for airway protection in the setting of status epilepticus  6/3/2025 extubated,  Tolerating diet  On room air\" (D/C Summary)    -SpO2 = 82, 86, 88, 80, 89, 78  RR = 33, 24, 9, 26, 32, 25, 8, 31  pH= 7.32  pCO2 = 26.9  pO2 = 131.8  CO2= 13  -mech vent, 4L NC,  Options provided:  -- Intubated for Acute Hypoxic  Respiratory Failure as evidenced by, Please   document evidence.  -- Intubated for airway protection only, Acute Hypoxic Respiratory Failure   ruled out after study  -- Other - I will add my own diagnosis  -- Disagree - Not applicable / Not valid  -- Disagree - Clinically unable to determine / Unknown  -- Refer to Clinical Documentation Reviewer    PROVIDER RESPONSE TEXT:    This patient was intubated for airway protection only, Acute Hypoxic   Respiratory Failure has been ruled out after study.    Query created by: Sarita Corea on 6/30/2025 2:08 PM      QUERY TEXT:    Seizure is documented in the medical record D/C Summary.  Please specify the   cause:    The clinical indicators include:  -\"Etiology for seizure likely include one or combination of the following:   alcohol withdrawal, severe hyponatremia, and subdural hematoma. Previous ED   visit 2/2025 pt. Had fall with R scalp hematoma would highly suspect traumatic   SDH given frequent intoxication.\" (H&P)    -\"presents to [facility] as a transfer from [facility] with chief complaint of   alcohol withdrawal, was found to be altered by EMS and on ED arrival he   experienced 2 generalized tonic-clonic seizures.  Both seizures were aborted   with benzodiazepine treatment.  He was intubated for airway protection.  Head   CT revealing for right cerebral convexity moderate-sized SDH.  Patient seizures multifactorial, likely secondary to alcohol withdrawal,   hyponatremia, and potentially from arachnoid cyst and SDH.\" (Neurology CN   5/31)    -\"Shortly after arrival to ED patient experienced tonic-clonic seizure   receiving 2mg ativan with aborted seizure activity. Within 10 minutes   experienced another tonic clonic seizure requiring 5mg versed x2 and versed   gtt and subsequently intubated for airway protection.  Status epilepticus  R Subdural hematoma w/ 3mm midline shift  NCC and NSGY consulted  Per neurosurgery, 5/31/2025 follow-up CT head stable, no blood  thinners for 2   weeks, repeat CT head in 2 weeks to ensure resolution  cEEG negative, DC  Keppra 500mg BID  SBP < 160  Severe Hyponatremia - resolved  Electrolyte imbalance  Nephro consulted, concern for beer potomania, SIADH\" (D/C Summary)    -\"1.Stable right-sided subdural hematoma with mild right frontal subarachnoid   hemorrhage and trace left-sided subdural hematoma.  2. 5.0 x 4.0 cm left middle cranial fossa arachnoid cyst. (MRI 6/1)    -1.Compared with scan earlier same day, there is a stable right-sided mixed   attenuation subdural hematoma with mass effect and approximately 3 mm leftward   midline shift (CT Head 5/31)    -Bilateral subdural collections without significant interval change.  No midline shift or acute intracranial hemorrhage. (CT Head 6/13)    -keppra, ativan, MRI, CT  Options provided:  -- Seizure related to alcohol withdrawal  -- Seizure related to SIADH  -- Seizure related to subdural hematoma  -- Seizure due to epilepsy with status epilepticus  -- Seizure due to arachnoid cyst  -- Other - I will add my own diagnosis  -- Disagree - Not applicable / Not valid  -- Disagree - Clinically unable to determine / Unknown  -- Refer to Clinical Documentation Reviewer    PROVIDER RESPONSE TEXT:    Unclear cause of seizures. May be related to all of the above    Query created by: Sarita Corea on 6/30/2025 3:12 PM      Electronically signed by:  Simon Zapata DO 7/7/2025 3:19 PM

## 2025-07-17 NOTE — PROGRESS NOTES
Physician Progress Note      PATIENT:               NAHOMY MACKAY  CSN #:                  [id number]  :                       1975  ADMIT DATE:       2025 7:59 PM  DISCH DATE:        2025 5:02 PM  RESPONDING  PROVIDER #:        Ed Coleman DO          QUERY TEXT:    Encephalopathy is documented in the medical record Neuro-critical Care PN .   Please specify type:    The clinical indicators include:  -\"Status epilepticus  R Subdural hematoma w/ 3mm midline shift  Acute encephalopathy  At [place] ED tonic clonic seizure x2 within 10 minutes unresolved AMS.   Received 2mg ativan and 5mg versed x2 then placed on versed gtt Intubated for   airway protection  Would highly suspect traumatic SDH given frequent intoxication.  Keppra 2.5g x1 followed by 500mg BID  Ethanol .072%  Alcohol withdrawal  AUD 20+ years  Tachycardic and hypertensive on presentation with diaphoresis and bilateral   tremor. Unknown time of last drink.  ED 2025 pt reported drinking 18-24 beers daily. Multiple admissions for   withdrawal in past few years requiring precedex gtt. No previous withdrawal   seizures.  CIWA w/ ativan  On propofol and precedex. Wean propofol as tolerated  IV thiamine  folic acid\" (H&P)    -\"50yo man with ETOH abuse presented to ER with encephalopathy, apparently   found essentially unconscious by EMS  began to spontaneously improve until having a GTC-type seizure en route and   then another in the ER for which he was given Ativan, Versed, Keppra 2000mg,   intubated, and started on a Versed gtt but found to have an acute right   frontal convexity SDH  It may actually be that he had a seizure, fell, and that is how he sustained   the SDH  Alternatively, he fell, sustained the SDH, and then seized\" (Neuro PN )    -\"This patient is a 48 y/o male by EMS for altered mental status and concern   for alcohol withdrawal and seizure.  Per EMS report they were called by the   patient with concerns for

## 2025-07-21 NOTE — ADT AUTH CERT
Please note the patient HAD ACTIVE coverage on day of admission. This request is for the 1 day his plan was active.

## 2025-07-22 NOTE — PROGRESS NOTES
Physician Progress Note      PATIENT:               NAHOMY MACKAY  CSN #:                  [id number]  :                       1975  ADMIT DATE:       2025 7:59 PM  DISCH DATE:        2025 5:02 PM  RESPONDING  PROVIDER #:        Simon Zapata DO          QUERY TEXT:    Please clarify the patient?s nutritional status:    The clinical indicators include:  50yo male with history of alcohol use disorder (20+yrs), HTN, depression    Per Dietician note on 6/10/25- Malnutrition Status: Moderate malnutrition.   Energy Intake:  Less than 75% estimated energy requirements for 3 months or   longer. Weight Loss:  7.5% over 3 months. Body Fat Loss:  Mild body fat loss   Orbital  Muscle Mass Loss:  Mild muscle mass loss Clavicles (pectoralis & deltoids),   Temples (temporalis). Fluid Accumulation:  No fluid accumulation    PO Diet: Regular; 1200 ml FR. Nutrition Supplement: Ensure +HP BID to TID.   Nutrition Education: Education/Counseling not indicated  Options provided:  -- Protein calorie malnutrition moderate  -- Other - I will add my own diagnosis  -- Disagree - Not applicable / Not valid  -- Disagree - Clinically unable to determine / Unknown  -- Refer to Clinical Documentation Reviewer    PROVIDER RESPONSE TEXT:    This patient has moderate protein calorie malnutrition.    Query created by: Cristina Higgins on 2025 10:12 AM      Electronically signed by:  Simon Zapata DO 2025 5:51 PM